# Patient Record
Sex: FEMALE | Race: WHITE | NOT HISPANIC OR LATINO | Employment: FULL TIME | URBAN - METROPOLITAN AREA
[De-identification: names, ages, dates, MRNs, and addresses within clinical notes are randomized per-mention and may not be internally consistent; named-entity substitution may affect disease eponyms.]

---

## 2017-01-06 RX ORDER — TRAMADOL HYDROCHLORIDE 50 MG/1
TABLET ORAL
Qty: 60 TABLET | Refills: 2 | OUTPATIENT
Start: 2017-01-06

## 2017-01-19 ENCOUNTER — OFFICE VISIT (OUTPATIENT)
Dept: FAMILY MEDICINE CLINIC | Facility: CLINIC | Age: 47
End: 2017-01-19

## 2017-01-19 VITALS
BODY MASS INDEX: 32.21 KG/M2 | SYSTOLIC BLOOD PRESSURE: 112 MMHG | HEIGHT: 61 IN | WEIGHT: 170.6 LBS | DIASTOLIC BLOOD PRESSURE: 72 MMHG | HEART RATE: 96 BPM | TEMPERATURE: 97.7 F | RESPIRATION RATE: 16 BRPM

## 2017-01-19 DIAGNOSIS — M25.512 CHRONIC PAIN OF BOTH SHOULDERS: Primary | ICD-10-CM

## 2017-01-19 DIAGNOSIS — M54.50 MIDLINE LOW BACK PAIN WITHOUT SCIATICA, UNSPECIFIED CHRONICITY: ICD-10-CM

## 2017-01-19 DIAGNOSIS — G89.29 CHRONIC PAIN OF BOTH SHOULDERS: Primary | ICD-10-CM

## 2017-01-19 DIAGNOSIS — M54.2 NECK PAIN OF OVER 3 MONTHS DURATION: ICD-10-CM

## 2017-01-19 DIAGNOSIS — K21.00 GERD WITH ESOPHAGITIS: ICD-10-CM

## 2017-01-19 DIAGNOSIS — M25.511 CHRONIC PAIN OF BOTH SHOULDERS: Primary | ICD-10-CM

## 2017-01-19 PROCEDURE — 99213 OFFICE O/P EST LOW 20 MIN: CPT | Performed by: PHYSICIAN ASSISTANT

## 2017-01-19 NOTE — PROGRESS NOTES
Subjective   Trinidad Quinteros is a 46 y.o. female    History of Present Illness  Patient presents today for follow-up regarding chronic shoulder and back pain associated with degenerative disc disease and degenerative joint disease.  Patient had previously been stable on combination of tramadol 50 g twice daily with Boelter in twice daily.  However, she developed esophageal ulcerations on this regimen and had to discontinue her Boelter in and start on Protonix.  Likely her GERD associated with this has responded nicely to Protonix and discontinuation of Boelter in.  She is no longer having any of the chest pain that she was experiencing.  However her shoulder pain and back pain has worsened off of Boelter in.  She is tolerating tramadol well denies any adverse effects from it.  She states that on her current regimen of tramadol 50 g twice daily she experiences a continuous level of 6 out of 10 pain on a daily basis.  Becomes bothersome to her during the daytime at work as well as at nighttime.  The following portions of the patient's history were reviewed and updated as appropriate: allergies, current medications, past social history and problem list    Review of Systems   Constitutional: Positive for activity change.   HENT: Negative for sore throat, trouble swallowing and voice change.    Respiratory: Negative.  Negative for shortness of breath.    Gastrointestinal: Negative.    Musculoskeletal: Positive for arthralgias, back pain, myalgias, neck pain and neck stiffness. Negative for gait problem and joint swelling.   Skin: Negative.  Negative for rash and wound.   Neurological: Negative for dizziness, tremors, weakness and numbness.   Hematological: Negative for adenopathy.   Psychiatric/Behavioral: Negative for behavioral problems and dysphoric mood. The patient is not nervous/anxious.        Objective     Vitals:    01/19/17 1510   BP: 112/72   Pulse: 96   Resp: 16   Temp: 97.7 °F (36.5 °C)       Physical Exam    Constitutional: She appears well-developed and well-nourished.   Abdominal: Soft. There is no tenderness.   Musculoskeletal: She exhibits no edema or deformity. Tenderness: tenderness bilateral shoulders and trapezius and posterior cervical musculature and thoracic paraspinal region.   Decreased range of motion of bilateral shoulders and cervical spine secondary to discomfort     Neurological: She exhibits normal muscle tone. Coordination normal.   Skin: Skin is warm and dry. No rash noted. No erythema. No pallor.   Nursing note and vitals reviewed.      Assessment/Plan     Diagnoses and all orders for this visit:    Chronic pain of both shoulders    Midline low back pain without sciatica, unspecified chronicity    Neck pain of over 3 months duration    GERD with esophagitis   continue on Protonix for GERD, I discussed with patient my recommendation that she stay off of anti-inflammatory such as Voltaren due to her history of esophageal ulcerations.  Therefore we will increase patient's tramadol to 100 mg 3 times a day for pain associated with arthritis in neck, back and shoulders.  Discussed common side effects of this medication and caution regarding possible dizziness or drowsiness.  She will increase dosage of medication gradually.

## 2017-01-25 RX ORDER — DEXTROMETHORPHAN HYDROBROMIDE AND PROMETHAZINE HYDROCHLORIDE 15; 6.25 MG/5ML; MG/5ML
SYRUP ORAL
Qty: 240 ML | Refills: 0 | Status: SHIPPED | OUTPATIENT
Start: 2017-01-25 | End: 2017-02-13

## 2017-01-25 RX ORDER — AMOXICILLIN 875 MG/1
875 TABLET, COATED ORAL 2 TIMES DAILY
Qty: 14 TABLET | Refills: 0 | Status: SHIPPED | OUTPATIENT
Start: 2017-01-25 | End: 2017-02-13

## 2017-01-31 DIAGNOSIS — G47.30 SLEEP APNEA, UNSPECIFIED TYPE: Primary | ICD-10-CM

## 2017-02-13 ENCOUNTER — OFFICE VISIT (OUTPATIENT)
Dept: FAMILY MEDICINE CLINIC | Facility: CLINIC | Age: 47
End: 2017-02-13

## 2017-02-13 VITALS
WEIGHT: 170 LBS | DIASTOLIC BLOOD PRESSURE: 78 MMHG | BODY MASS INDEX: 32.1 KG/M2 | OXYGEN SATURATION: 100 % | HEIGHT: 61 IN | HEART RATE: 74 BPM | SYSTOLIC BLOOD PRESSURE: 126 MMHG | TEMPERATURE: 97.7 F

## 2017-02-13 DIAGNOSIS — R68.89 FLU-LIKE SYMPTOMS: Primary | ICD-10-CM

## 2017-02-13 DIAGNOSIS — Z71.6 TOBACCO ABUSE COUNSELING: ICD-10-CM

## 2017-02-13 DIAGNOSIS — J06.9 URI, ACUTE: ICD-10-CM

## 2017-02-13 LAB
EXPIRATION DATE: NORMAL
FLUAV AG NPH QL: NEGATIVE
FLUBV AG NPH QL: NEGATIVE
INTERNAL CONTROL: NORMAL
Lab: NORMAL

## 2017-02-13 PROCEDURE — 99213 OFFICE O/P EST LOW 20 MIN: CPT | Performed by: PHYSICIAN ASSISTANT

## 2017-02-13 PROCEDURE — 87804 INFLUENZA ASSAY W/OPTIC: CPT | Performed by: PHYSICIAN ASSISTANT

## 2017-02-13 PROCEDURE — 99406 BEHAV CHNG SMOKING 3-10 MIN: CPT | Performed by: PHYSICIAN ASSISTANT

## 2017-02-13 RX ORDER — AZITHROMYCIN 250 MG/1
TABLET, FILM COATED ORAL
Qty: 6 TABLET | Refills: 0 | Status: SHIPPED | OUTPATIENT
Start: 2017-02-13 | End: 2017-03-15

## 2017-02-13 NOTE — PROGRESS NOTES
Subjective   Trinidad Quinteros is a 46 y.o. female    History of Present Illness  Patient comes in today complaining of symptoms of postnasal drainage, nasal congestion, headache and cough.  She states that she has a lot of drainage in her throat with a bit of a sore throat.  She is a half pack per day smoker.  She used to be a 1-1/2 pack per day smoker and quit completely for 9 months with Chantix last year but started back in December when her family was going through a lot of stress.  She states that she is now walking one half pack per day most days.  She is interested in trying to quit but states that she does not think she is going to be able to completely stop until the stress in her family life calms down.  The following portions of the patient's history were reviewed and updated as appropriate: allergies, current medications, past social history and problem list    Review of Systems   Constitutional: Positive for fatigue. Negative for fever.   HENT: Positive for postnasal drip, rhinorrhea, sneezing, sore throat and voice change. Negative for sinus pressure.    Respiratory: Positive for cough. Negative for chest tightness and shortness of breath.        Objective     Vitals:    02/13/17 1534   BP: 126/78   Pulse: 74   Temp: 97.7 °F (36.5 °C)   SpO2: 100%       Physical Exam   Constitutional: She appears well-developed and well-nourished. No distress.   HENT:   Head: Normocephalic and atraumatic.   Right Ear: External ear normal.   Left Ear: External ear normal.   Nose: Nose normal.   Mouth/Throat: Oropharynx is clear and moist. No oropharyngeal exudate.   Eyes: Conjunctivae are normal. Right eye exhibits no discharge. Left eye exhibits no discharge.   Neck: Normal range of motion. Neck supple.   Cardiovascular: Normal rate, regular rhythm and normal heart sounds.    Pulmonary/Chest: Effort normal and breath sounds normal. No respiratory distress.   Lymphadenopathy:     She has no cervical adenopathy.   Skin:  Skin is warm and dry. She is not diaphoretic.   Nursing note and vitals reviewed.      Assessment/Plan     Diagnoses and all orders for this visit:    Flu-like symptoms  -     POCT Influenza A/B  -     azithromycin (ZITHROMAX Z-HILARY) 250 MG tablet; Take 2 tablets the first day, then 1 tablet daily for 4 days.    URI, acute    I counseled patient regarding the health benefits in smoking cessation for 3 minutes.  I also discussed with patient the increased health risks of continued smoking, including increased risk for cancers, increased risk for coronary artery disease, increased risk for stroke, heart attack, COPD and overall worsened lung function and increased lung infections.  We discussed different strategies for smoking cessation and prescriptions were offered to assist patient in smoking cessation.  Patient will continue trying to decrease her cigarette consumption and will notify me if she is interested in resuming Chantix.  She declines a prescription of Chantix at this time.

## 2017-02-16 ENCOUNTER — OFFICE VISIT (OUTPATIENT)
Dept: NEUROLOGY | Facility: CLINIC | Age: 47
End: 2017-02-16

## 2017-02-16 VITALS
SYSTOLIC BLOOD PRESSURE: 130 MMHG | DIASTOLIC BLOOD PRESSURE: 80 MMHG | OXYGEN SATURATION: 97 % | HEIGHT: 61 IN | BODY MASS INDEX: 32.51 KG/M2 | WEIGHT: 172.2 LBS | HEART RATE: 86 BPM

## 2017-02-16 DIAGNOSIS — G47.33 OSA (OBSTRUCTIVE SLEEP APNEA): Primary | ICD-10-CM

## 2017-02-16 PROCEDURE — 99243 OFF/OP CNSLTJ NEW/EST LOW 30: CPT | Performed by: PSYCHIATRY & NEUROLOGY

## 2017-02-16 RX ORDER — CODEINE PHOSPHATE AND GUAIFENESIN 10; 100 MG/5ML; MG/5ML
SOLUTION ORAL
Refills: 0 | COMMUNITY
Start: 2017-02-13 | End: 2017-06-16

## 2017-02-16 NOTE — PROGRESS NOTES
Subjective:     Patient ID: Trinidad Quinteros is a 46 y.o. female.    History of Present Illness     47 yo woman referred by Josephine Delacruz for sleep problems.  H/O HTN, GERD, Tob use. Dx with moderate ADRIAN by Dr Stockton 3 to 4 years ago.  Used CPAP for 6 months with improvement in sleep quality and daytime alertness.  Stopped due to financial concerns.  Tolerated CPAP without difficulty.     Complains of moderate excessive daytime sleepiness, sitting quietly at home will fall asleep.  Loud snoring, awakens self snoring and gasping for air, and witnessed apneas.  Sleeps in separate room from  due to snoring.       CBC, CMP, TSH - NCS    The following portions of the patient's history were reviewed and updated as appropriate: allergies, current medications, past family history, past medical history, past social history, past surgical history and problem list.    Review of Systems   Constitutional: Positive for fatigue. Negative for activity change and unexpected weight change.   HENT: Negative for tinnitus and trouble swallowing.    Eyes: Negative for photophobia and visual disturbance.   Respiratory: Negative for apnea, cough and choking.    Cardiovascular: Negative for leg swelling.   Gastrointestinal: Negative for nausea and vomiting.   Endocrine: Negative for cold intolerance and heat intolerance.   Genitourinary: Negative for difficulty urinating, frequency, menstrual problem and urgency.   Musculoskeletal: Positive for back pain and neck pain. Negative for gait problem and myalgias.   Skin: Negative for color change and rash.   Allergic/Immunologic: Negative for immunocompromised state.   Neurological: Negative for dizziness, tremors, seizures, syncope, facial asymmetry, speech difficulty, weakness, light-headedness, numbness and headaches.   Hematological: Negative for adenopathy. Does not bruise/bleed easily.   Psychiatric/Behavioral: Positive for sleep disturbance. Negative for behavioral problems,  "confusion, decreased concentration and hallucinations.        Objective:  Vitals:    02/16/17 0831   BP: 130/80   Pulse: 86   SpO2: 97%   Weight: 172 lb 3.2 oz (78.1 kg)   Height: 61\" (154.9 cm)       Neurologic Exam     Mental Status   Oriented to person, place, and time.   Registration: recalls 3 of 3 objects. Recall at 5 minutes: recalls 3 of 3 objects. Follows 3 step commands.   Attention: normal. Concentration: normal.   Speech: speech is normal   Level of consciousness: alert  Knowledge: good and consistent with education.   Able to name object. Able to read. Able to repeat. Able to write. Normal comprehension.     Cranial Nerves     CN II   Visual fields full to confrontation.   Visual acuity: normal  Right visual field deficit: none  Left visual field deficit: none     CN III, IV, VI   Pupils are equal, round, and reactive to light.  Extraocular motions are normal.   Right pupil: Shape: regular. Reactivity: brisk. Consensual response: intact.   Left pupil: Shape: regular. Reactivity: brisk. Consensual response: intact.   Nystagmus: none   Diplopia: none  Ophthalmoparesis: none  Upgaze: normal  Downgaze: normal  Conjugate gaze: present  Vestibulo-ocular reflex: present    CN V   Facial sensation intact.   Right corneal reflex: normal  Left corneal reflex: normal    CN VII   Right facial weakness: none  Left facial weakness: none    CN VIII   Hearing: intact    CN IX, X   Palate: symmetric  Right gag reflex: normal  Left gag reflex: normal    CN XI   Right sternocleidomastoid strength: normal  Left sternocleidomastoid strength: normal    CN XII   Tongue: not atrophic  Fasciculations: absent  Tongue deviation: none    Motor Exam   Muscle bulk: normal  Overall muscle tone: normal  Right arm tone: normal  Left arm tone: normal  Right leg tone: normal  Left leg tone: normal    Strength   Strength 5/5 throughout.     Sensory Exam   Light touch normal.   Vibration normal.   Proprioception normal.   Pinprick normal. "     Gait, Coordination, and Reflexes     Gait  Gait: normal    Coordination   Romberg: negative  Finger to nose coordination: normal  Heel to shin coordination: normal  Tandem walking coordination: normal    Tremor   Resting tremor: absent  Intention tremor: absent  Action tremor: absent    Reflexes   Reflexes 2+ except as noted.       Physical Exam   Constitutional: She is oriented to person, place, and time. Vital signs are normal. She appears well-developed and well-nourished.   HENT:   Head: Normocephalic and atraumatic.   Eyes: EOM and lids are normal. Pupils are equal, round, and reactive to light.   Fundoscopic exam:       The right eye shows no exudate, no hemorrhage and no papilledema. The right eye shows venous pulsations.        The left eye shows no exudate, no hemorrhage and no papilledema. The left eye shows venous pulsations.   Neck: Normal range of motion and phonation normal. Normal carotid pulses present. Carotid bruit is not present. No thyroid mass and no thyromegaly present.   Cardiovascular: Normal rate, regular rhythm and normal heart sounds.    Pulmonary/Chest: Effort normal.   Neurological: She is oriented to person, place, and time. She has normal strength. She has a normal Finger-Nose-Finger Test, a normal Heel to Shin Test, a normal Romberg Test and a normal Tandem Gait Test. Gait normal.   Skin: Skin is warm and dry.   Psychiatric: She has a normal mood and affect. Her speech is normal.   Nursing note and vitals reviewed.      Assessment/Plan:       Problems Addressed this Visit        Respiratory    ADRIAN (obstructive sleep apnea) - Primary     S/S of ADRIAN.  Schedule home PSG.         Relevant Orders    Home Sleep Study

## 2017-02-21 ENCOUNTER — HOSPITAL ENCOUNTER (OUTPATIENT)
Dept: SLEEP MEDICINE | Facility: HOSPITAL | Age: 47
Discharge: HOME | End: 2017-02-21
Attending: PSYCHIATRY & NEUROLOGY | Admitting: PSYCHIATRY & NEUROLOGY

## 2017-02-21 VITALS
DIASTOLIC BLOOD PRESSURE: 66 MMHG | HEART RATE: 93 BPM | SYSTOLIC BLOOD PRESSURE: 123 MMHG | RESPIRATION RATE: 16 BRPM | WEIGHT: 172 LBS | HEIGHT: 61 IN | BODY MASS INDEX: 32.47 KG/M2

## 2017-02-21 PROCEDURE — 95800 SLP STDY UNATTENDED: CPT | Performed by: INTERNAL MEDICINE

## 2017-02-22 PROCEDURE — 95800 SLP STDY UNATTENDED: CPT | Performed by: INTERNAL MEDICINE

## 2017-03-13 ENCOUNTER — OFFICE VISIT (OUTPATIENT)
Dept: NEUROLOGY | Facility: CLINIC | Age: 47
End: 2017-03-13

## 2017-03-13 VITALS
DIASTOLIC BLOOD PRESSURE: 78 MMHG | BODY MASS INDEX: 31.75 KG/M2 | SYSTOLIC BLOOD PRESSURE: 116 MMHG | HEART RATE: 59 BPM | HEIGHT: 61 IN | OXYGEN SATURATION: 95 % | WEIGHT: 168.2 LBS

## 2017-03-13 DIAGNOSIS — G47.33 OSA (OBSTRUCTIVE SLEEP APNEA): Primary | ICD-10-CM

## 2017-03-13 PROCEDURE — 99213 OFFICE O/P EST LOW 20 MIN: CPT | Performed by: PSYCHIATRY & NEUROLOGY

## 2017-03-13 NOTE — PROGRESS NOTES
Subjective:     Patient ID: Trinidad Quinteros is a 46 y.o. female.    History of Present Illness     45 yo woman returns in follow up for sleep problems.  Last visit on 2/16/17 ordered home PSG.    PSG - AHI 62, supine index 93, side 26.      Remains with excessive daytime sleepiness, snoring and witnessed apnea.       Problem history:    H/O HTN, GERD, Tob use. Dx with moderate ADRIAN by Dr Stockton 3 to 4 years ago.  Used CPAP for 6 months with improvement in sleep quality and daytime alertness.  Stopped due to financial concerns.  Tolerated CPAP without difficulty.     Complains of moderate excessive daytime sleepiness, sitting quietly at home will fall asleep.  Loud snoring, awakens self snoring and gasping for air, and witnessed apneas.  Sleeps in separate room from  due to snoring.       CBC, CMP, TSH - NCS    The following portions of the patient's history were reviewed and updated as appropriate: allergies, current medications, past family history, past medical history, past social history, past surgical history and problem list.    Review of Systems   Constitutional: Positive for fatigue. Negative for activity change and unexpected weight change.   HENT: Negative for tinnitus and trouble swallowing.    Eyes: Negative for photophobia and visual disturbance.   Respiratory: Negative for apnea and choking.    Cardiovascular: Negative for leg swelling.   Endocrine: Negative for cold intolerance and heat intolerance.   Genitourinary: Negative for difficulty urinating, frequency, menstrual problem and urgency.   Musculoskeletal: Positive for back pain. Negative for gait problem.   Skin: Negative for color change.   Allergic/Immunologic: Negative for immunocompromised state.   Neurological: Negative for dizziness, tremors, seizures, syncope, facial asymmetry, speech difficulty and light-headedness.   Hematological: Negative for adenopathy. Does not bruise/bleed easily.   Psychiatric/Behavioral: Positive for  "sleep disturbance. Negative for behavioral problems, confusion, decreased concentration and hallucinations.        Objective:  Vitals:    03/13/17 0811   BP: 116/78   Pulse: 59   SpO2: 95%   Weight: 168 lb 3.2 oz (76.3 kg)   Height: 61\" (154.9 cm)       Neurologic Exam     Mental Status   Oriented to person, place, and time.   Speech: speech is normal   Level of consciousness: alert  Knowledge: good and consistent with education.   Normal comprehension.     Cranial Nerves   Cranial nerves II through XII intact.     CN II   Visual fields full to confrontation.   Visual acuity: normal  Right visual field deficit: none  Left visual field deficit: none     CN III, IV, VI   Pupils are equal, round, and reactive to light.  Extraocular motions are normal.   Nystagmus: none   Diplopia: none  Ophthalmoparesis: none  Upgaze: normal  Downgaze: normal  Conjugate gaze: present    CN V   Facial sensation intact.   Right corneal reflex: normal  Left corneal reflex: normal    CN VII   Right facial weakness: none  Left facial weakness: none    CN VIII   Hearing: intact    CN IX, X   Palate: symmetric  Right gag reflex: normal  Left gag reflex: normal    CN XI   Right sternocleidomastoid strength: normal  Left sternocleidomastoid strength: normal    CN XII   Tongue: not atrophic  Fasciculations: absent  Tongue deviation: none    Motor Exam   Muscle bulk: normal  Overall muscle tone: normal    Strength   Strength 5/5 throughout.     Sensory Exam   Light touch normal.     Gait, Coordination, and Reflexes     Gait  Gait: normal    Tremor   Resting tremor: absent  Intention tremor: absent  Action tremor: absent    Reflexes   Reflexes 2+ except as noted.       Physical Exam   Constitutional: She is oriented to person, place, and time. She appears well-developed and well-nourished.   Eyes: EOM are normal. Pupils are equal, round, and reactive to light.   Neurological: She is oriented to person, place, and time. She has normal strength. " Gait normal.   Psychiatric: Her speech is normal.   Nursing note and vitals reviewed.      Assessment/Plan:       Problems Addressed this Visit        Respiratory    ADRIAN (obstructive sleep apnea) - Primary     Severe ADRIAN AHI 62.    Start auto pap 8 - 16 cm H20

## 2017-03-15 ENCOUNTER — TELEPHONE (OUTPATIENT)
Dept: FAMILY MEDICINE CLINIC | Facility: CLINIC | Age: 47
End: 2017-03-15

## 2017-03-15 RX ORDER — CLARITHROMYCIN 500 MG/1
500 TABLET, COATED ORAL 2 TIMES DAILY
Qty: 14 TABLET | Refills: 0 | Status: SHIPPED | OUTPATIENT
Start: 2017-03-15 | End: 2017-05-15

## 2017-03-15 NOTE — TELEPHONE ENCOUNTER
Kraen, please double check with patient to confirm that she is not currently on the Z-Raffy.  If she has not been please call in Biaxin  mg 2 daily ×7 days to be taken with food.  Also please update her medication list and remove the Z-Raffy fro  m her med list if she is not on it currently.

## 2017-03-15 NOTE — TELEPHONE ENCOUNTER
Patient has chest congestion, with a productive cough. She is cough up brown. Denies SOB, wheezing and fever.  She asked if something could be called in?

## 2017-03-15 NOTE — TELEPHONE ENCOUNTER
"Karen, patient's current medication list shows that she is on a Z-Raffy.  Is this accurate?.  Also could you please change this into a \"telephone encounter\" the way that Keysha demonstrated Tuesday morning.  I think that'll allow me to get straight   into an order.  "

## 2017-03-17 ENCOUNTER — TELEPHONE (OUTPATIENT)
Dept: FAMILY MEDICINE CLINIC | Facility: CLINIC | Age: 47
End: 2017-03-17

## 2017-03-17 RX ORDER — AMOXICILLIN 875 MG/1
875 TABLET, COATED ORAL 2 TIMES DAILY
Qty: 14 TABLET | Refills: 0 | Status: SHIPPED | OUTPATIENT
Start: 2017-03-17 | End: 2017-05-15

## 2017-03-17 NOTE — TELEPHONE ENCOUNTER
Patient states she was prescribed  Biaxin xl 50 mg bid on 3/15 and it is making her have diarrhea and vomiting. Can she get something else called in

## 2017-04-01 DIAGNOSIS — G89.29 BACK PAIN, CHRONIC: ICD-10-CM

## 2017-04-01 DIAGNOSIS — M54.9 BACK PAIN, CHRONIC: ICD-10-CM

## 2017-04-03 ENCOUNTER — TELEPHONE (OUTPATIENT)
Dept: FAMILY MEDICINE CLINIC | Facility: CLINIC | Age: 47
End: 2017-04-03

## 2017-04-03 DIAGNOSIS — Z72.0 TOBACCO ABUSE: Primary | ICD-10-CM

## 2017-04-03 RX ORDER — TIZANIDINE 4 MG/1
TABLET ORAL
Qty: 45 TABLET | Refills: 5 | Status: SHIPPED | OUTPATIENT
Start: 2017-04-03 | End: 2017-10-11 | Stop reason: SDUPTHER

## 2017-04-03 RX ORDER — VARENICLINE TARTRATE 0.5 MG/1
0.5 TABLET, FILM COATED ORAL 2 TIMES DAILY
Qty: 60 TABLET | Refills: 0 | Status: SHIPPED | OUTPATIENT
Start: 2017-04-03 | End: 2017-07-20 | Stop reason: SDUPTHER

## 2017-04-19 ENCOUNTER — TELEPHONE (OUTPATIENT)
Dept: FAMILY MEDICINE CLINIC | Facility: CLINIC | Age: 47
End: 2017-04-19

## 2017-04-19 NOTE — TELEPHONE ENCOUNTER
----- Message from Angela Rahman sent at 4/19/2017 10:20 AM EDT -----  Patient called and has a lot of drainage and it's making her cough/gag. She would like to have something called in to [facility] in [place], [place]..  ThanksAngela..

## 2017-04-20 RX ORDER — CODEINE PHOSPHATE AND GUAIFENESIN 10; 100 MG/5ML; MG/5ML
SOLUTION ORAL
Qty: 120 ML | Refills: 1 | OUTPATIENT
Start: 2017-04-20

## 2017-05-01 ENCOUNTER — TELEPHONE (OUTPATIENT)
Dept: FAMILY MEDICINE CLINIC | Facility: CLINIC | Age: 47
End: 2017-05-01

## 2017-05-05 RX ORDER — TRAMADOL HYDROCHLORIDE 50 MG/1
TABLET ORAL
Qty: 180 TABLET | Refills: 2 | Status: SHIPPED | OUTPATIENT
Start: 2017-05-05 | End: 2017-10-19

## 2017-05-15 ENCOUNTER — OFFICE VISIT (OUTPATIENT)
Dept: NEUROLOGY | Facility: CLINIC | Age: 47
End: 2017-05-15

## 2017-05-15 VITALS
BODY MASS INDEX: 31.95 KG/M2 | HEIGHT: 61 IN | OXYGEN SATURATION: 95 % | WEIGHT: 169.2 LBS | SYSTOLIC BLOOD PRESSURE: 126 MMHG | DIASTOLIC BLOOD PRESSURE: 74 MMHG | HEART RATE: 72 BPM

## 2017-05-15 DIAGNOSIS — G47.33 OSA (OBSTRUCTIVE SLEEP APNEA): Primary | ICD-10-CM

## 2017-05-15 PROCEDURE — 99213 OFFICE O/P EST LOW 20 MIN: CPT | Performed by: PSYCHIATRY & NEUROLOGY

## 2017-06-04 DIAGNOSIS — K21.00 GASTROESOPHAGEAL REFLUX DISEASE WITH ESOPHAGITIS: ICD-10-CM

## 2017-06-05 RX ORDER — PANTOPRAZOLE SODIUM 40 MG/1
TABLET, DELAYED RELEASE ORAL
Qty: 30 TABLET | Refills: 5 | Status: SHIPPED | OUTPATIENT
Start: 2017-06-05 | End: 2017-10-12 | Stop reason: SDUPTHER

## 2017-06-07 ENCOUNTER — APPOINTMENT (OUTPATIENT)
Dept: LAB | Facility: HOSPITAL | Age: 47
End: 2017-06-07

## 2017-06-07 ENCOUNTER — OFFICE VISIT (OUTPATIENT)
Dept: FAMILY MEDICINE CLINIC | Facility: CLINIC | Age: 47
End: 2017-06-07

## 2017-06-07 VITALS
HEART RATE: 64 BPM | DIASTOLIC BLOOD PRESSURE: 78 MMHG | TEMPERATURE: 98 F | WEIGHT: 171 LBS | OXYGEN SATURATION: 98 % | BODY MASS INDEX: 32.28 KG/M2 | SYSTOLIC BLOOD PRESSURE: 118 MMHG | HEIGHT: 61 IN

## 2017-06-07 DIAGNOSIS — M25.552 PAIN OF BOTH HIP JOINTS: Primary | ICD-10-CM

## 2017-06-07 DIAGNOSIS — M25.551 PAIN OF BOTH HIP JOINTS: Primary | ICD-10-CM

## 2017-06-07 DIAGNOSIS — G47.01 INSOMNIA DUE TO MEDICAL CONDITION: ICD-10-CM

## 2017-06-07 LAB
CK SERPL-CCNC: 61 U/L (ref 26–174)
CRP SERPL-MCNC: 0.26 MG/DL (ref 0–1)
ERYTHROCYTE [SEDIMENTATION RATE] IN BLOOD: 9 MM/HR (ref 0–20)

## 2017-06-07 PROCEDURE — 85652 RBC SED RATE AUTOMATED: CPT | Performed by: PHYSICIAN ASSISTANT

## 2017-06-07 PROCEDURE — 36415 COLL VENOUS BLD VENIPUNCTURE: CPT | Performed by: PHYSICIAN ASSISTANT

## 2017-06-07 PROCEDURE — 82550 ASSAY OF CK (CPK): CPT | Performed by: PHYSICIAN ASSISTANT

## 2017-06-07 PROCEDURE — 86140 C-REACTIVE PROTEIN: CPT | Performed by: PHYSICIAN ASSISTANT

## 2017-06-07 PROCEDURE — 99213 OFFICE O/P EST LOW 20 MIN: CPT | Performed by: PHYSICIAN ASSISTANT

## 2017-06-07 RX ORDER — PREDNISONE 20 MG/1
20 TABLET ORAL 2 TIMES DAILY
Qty: 10 TABLET | Refills: 0 | Status: SHIPPED | OUTPATIENT
Start: 2017-06-07 | End: 2017-06-16

## 2017-06-07 RX ORDER — SERTRALINE HYDROCHLORIDE 100 MG/1
1 TABLET, FILM COATED ORAL DAILY
Refills: 1 | COMMUNITY
Start: 2017-05-15 | End: 2017-10-12 | Stop reason: SDUPTHER

## 2017-06-07 NOTE — PROGRESS NOTES
Subjective   Trinidad Quinteros is a 46 y.o. female    History of Present Illness  Patient presents today complaining of pain in both hips on and off throughout the daytime but much worse at night for the past 2-3 weeks.  She states it awakens her in the middle the night and she cannot rest well because of the pain.  She denies any history of trauma.  She states she has been taking her tramadol 2 tablets 4 times a day which controls her pain during the daytime but not at night.  She denies any significant increase in her low back pain.  She states she's also been feeling some muscular pain in her posterior neck and upper back.  The following portions of the patient's history were reviewed and updated as appropriate: allergies, current medications, past social history and problem list    Review of Systems   Constitutional: Positive for activity change. Negative for fatigue and unexpected weight change.   Musculoskeletal: Positive for arthralgias and myalgias. Negative for gait problem and joint swelling.   Skin: Negative.    Neurological: Negative for tremors, weakness, light-headedness, numbness and headaches.   Psychiatric/Behavioral: Positive for sleep disturbance. Negative for agitation, behavioral problems, confusion, decreased concentration, dysphoric mood and hallucinations. The patient is not nervous/anxious and is not hyperactive.        Objective     Vitals:    06/07/17 1557   BP: 118/78   Pulse: 64   Temp: 98 °F (36.7 °C)   SpO2: 98%       Physical Exam   Constitutional: She is oriented to person, place, and time. She appears well-developed and well-nourished. No distress.   Eyes: Conjunctivae are normal.   Cardiovascular: Normal rate and regular rhythm.    Pulmonary/Chest: Effort normal and breath sounds normal.   Musculoskeletal: Normal range of motion. She exhibits tenderness. She exhibits no edema or deformity.   Neurological: She is alert and oriented to person, place, and time. She exhibits normal  muscle tone. Coordination normal.   Skin: Skin is warm and dry. No rash noted. She is not diaphoretic. No erythema. No pallor.   Psychiatric: She has a normal mood and affect. Her behavior is normal. Judgment and thought content normal. Cognition and memory are normal. She is attentive.   Nursing note and vitals reviewed.      Assessment/Plan     Diagnoses and all orders for this visit:    Pain of both hip joints  -     CK  -     C-reactive Protein  -     Sedimentation Rate    Insomnia due to medical condition    Other orders  -     sertraline (ZOLOFT) 100 MG tablet; Take 1 tablet by mouth Daily.  -     predniSONE (DELTASONE) 20 MG tablet; Take 1 tablet by mouth 2 (Two) Times a Day.    Prescription written for Percocet 5/325 take one daily at bedtime #10, gabapentin 300 mg 1 daily at bedtime #30, [place] appropriate, discussed abuse potential of these medications, this is for short-term usage only.  Continue on current medications and add prednisone as noted above.  I have asked patient to hold her Lipitor for the next 3 weeks in case she is having an adverse effect from Lipitor causing her myalgias as well.

## 2017-06-09 ENCOUNTER — TELEPHONE (OUTPATIENT)
Dept: FAMILY MEDICINE CLINIC | Facility: CLINIC | Age: 47
End: 2017-06-09

## 2017-06-09 RX ORDER — AMOXICILLIN 875 MG/1
875 TABLET, COATED ORAL 2 TIMES DAILY
Qty: 14 TABLET | Refills: 0 | Status: SHIPPED | OUTPATIENT
Start: 2017-06-09 | End: 2017-07-14

## 2017-06-09 NOTE — TELEPHONE ENCOUNTER
Patient states she has a sinus infection  Would like something stent to [facility] [place]. Patient has nka.

## 2017-06-16 ENCOUNTER — OFFICE VISIT (OUTPATIENT)
Dept: FAMILY MEDICINE CLINIC | Facility: CLINIC | Age: 47
End: 2017-06-16

## 2017-06-16 VITALS
SYSTOLIC BLOOD PRESSURE: 114 MMHG | BODY MASS INDEX: 31.34 KG/M2 | WEIGHT: 166 LBS | DIASTOLIC BLOOD PRESSURE: 74 MMHG | HEART RATE: 96 BPM | OXYGEN SATURATION: 99 % | TEMPERATURE: 99.1 F | HEIGHT: 61 IN

## 2017-06-16 DIAGNOSIS — M25.552 PAIN OF BOTH HIP JOINTS: Primary | ICD-10-CM

## 2017-06-16 DIAGNOSIS — M25.512 CHRONIC PAIN OF BOTH SHOULDERS: ICD-10-CM

## 2017-06-16 DIAGNOSIS — M25.551 PAIN OF BOTH HIP JOINTS: Primary | ICD-10-CM

## 2017-06-16 DIAGNOSIS — M25.511 CHRONIC PAIN OF BOTH SHOULDERS: ICD-10-CM

## 2017-06-16 DIAGNOSIS — G89.29 CHRONIC PAIN OF BOTH SHOULDERS: ICD-10-CM

## 2017-06-16 PROCEDURE — 99213 OFFICE O/P EST LOW 20 MIN: CPT | Performed by: PHYSICIAN ASSISTANT

## 2017-06-16 RX ORDER — OXYCODONE HYDROCHLORIDE AND ACETAMINOPHEN 5; 325 MG/1; MG/1
1 TABLET ORAL NIGHTLY
Refills: 0 | COMMUNITY
Start: 2017-06-07 | End: 2017-10-19

## 2017-06-16 RX ORDER — GABAPENTIN 300 MG/1
1 CAPSULE ORAL NIGHTLY
Refills: 0 | COMMUNITY
Start: 2017-06-07 | End: 2017-08-06 | Stop reason: SDUPTHER

## 2017-06-16 NOTE — PROGRESS NOTES
Subjective   Trinidad Quinteros is a 46 y.o. female    History of Present Illness  Patient presents today for follow-up on bilateral hip pain and upper back and neck pain.  Please see previous office notes.  She states all of her pain has completely resolved and she is sleeping well now with the combination of gabapentin 3 mg nightly with Percocet 5/325 nightly.  She is been able to reduce down to no longer taking tramadol at all during the day and not having pain at all during the day.  She also has been off of Lipitor since her last visit.  She denies any adverse effects on current meds.  The following portions of the patient's history were reviewed and updated as appropriate: allergies, current medications, past social history and problem list    Review of Systems   Constitutional: Positive for activity change.   Musculoskeletal: Negative for arthralgias, gait problem, joint swelling and myalgias.   Skin: Negative.    Neurological: Negative for weakness and numbness.       Objective     Vitals:    06/16/17 0914   BP: 114/74   Pulse: 96   Temp: 99.1 °F (37.3 °C)   SpO2: 99%       Physical Exam   Constitutional: She appears well-developed and well-nourished.   Musculoskeletal: She exhibits no edema, tenderness or deformity.   Neurological: She exhibits normal muscle tone. Coordination normal.   Skin: Skin is warm and dry. No rash noted. No erythema. No pallor.   Psychiatric: She has a normal mood and affect. Her behavior is normal. Judgment and thought content normal.   Nursing note and vitals reviewed.      Assessment/Plan     Diagnoses and all orders for this visit:    Pain of both hip joints    Chronic pain of both shoulders    Other orders  -     gabapentin (NEURONTIN) 300 MG capsule; Take 1 capsule by mouth Every Night.  -     oxyCODONE-acetaminophen (PERCOCET) 5-325 MG per tablet; Take 1 tablet by mouth Every Night.    Refilled Percocet 5/325 one daily at bedtime #30 with no refills and gabapentin 300 mg 1  daily at bedtime #30 with 5 refills.  Discussed abuse potential is medications, controlled substance agreement reviewed and signed, [place] appropriate.  Will have patient resume her Lipitor and if myalgias return she is to notify me.  Otherwise follow-up in one month for recheck which time we will try to reduce her down off of Percocet.

## 2017-07-14 ENCOUNTER — OFFICE VISIT (OUTPATIENT)
Dept: FAMILY MEDICINE CLINIC | Facility: CLINIC | Age: 47
End: 2017-07-14

## 2017-07-14 VITALS
OXYGEN SATURATION: 100 % | WEIGHT: 165 LBS | BODY MASS INDEX: 31.15 KG/M2 | DIASTOLIC BLOOD PRESSURE: 70 MMHG | SYSTOLIC BLOOD PRESSURE: 126 MMHG | HEART RATE: 114 BPM | HEIGHT: 61 IN

## 2017-07-14 DIAGNOSIS — M25.512 CHRONIC PAIN OF BOTH SHOULDERS: ICD-10-CM

## 2017-07-14 DIAGNOSIS — G89.29 CHRONIC PAIN OF BOTH SHOULDERS: ICD-10-CM

## 2017-07-14 DIAGNOSIS — M25.551 PAIN OF BOTH HIP JOINTS: Primary | ICD-10-CM

## 2017-07-14 DIAGNOSIS — M25.511 CHRONIC PAIN OF BOTH SHOULDERS: ICD-10-CM

## 2017-07-14 DIAGNOSIS — R94.31 ABNORMAL EKG: ICD-10-CM

## 2017-07-14 DIAGNOSIS — M25.552 PAIN OF BOTH HIP JOINTS: Primary | ICD-10-CM

## 2017-07-14 PROCEDURE — 99213 OFFICE O/P EST LOW 20 MIN: CPT | Performed by: PHYSICIAN ASSISTANT

## 2017-07-14 RX ORDER — ATORVASTATIN CALCIUM 40 MG/1
1 TABLET, FILM COATED ORAL DAILY
Refills: 1 | COMMUNITY
Start: 2017-07-05 | End: 2017-10-12 | Stop reason: SDUPTHER

## 2017-07-14 NOTE — PROGRESS NOTES
Subjective   Trinidad Quinteros is a 46 y.o. female    History of Present Illness  Patient presents today for follow-up on chronic hip and shoulder pain.  She states that her hip and shoulder pain are both markedly improved.  Her hip pain is completely resolved and her shoulder pain is much better.  She is taking 2 tramadol daily in the morning time for her shoulder pain and hip pain and not requiring any further pain medication throughout the day time other than the one Percocet 1 gabapentin at nighttime.  She denies any adverse effects from either medication.  The following portions of the patient's history were reviewed and updated as appropriate: allergies, current medications, past social history and problem list    Review of Systems   Constitutional: Positive for activity change.   Musculoskeletal: Positive for arthralgias and myalgias. Negative for gait problem and joint swelling.        Pt has seen a significant reduction in shoulder pain overall to where she is only having to take 2 tramadol each morning and her hip pain is completely controlled.  She is resting well throughout the night now.   Skin: Negative.    Neurological: Negative for weakness and numbness.       Objective     Vitals:    07/14/17 1519   BP: 126/70   Pulse: 114   SpO2: 100%       Physical Exam   Constitutional: She appears well-developed and well-nourished.   Musculoskeletal: She exhibits no edema, tenderness or deformity.   Neurological: She exhibits normal muscle tone. Coordination normal.   Skin: Skin is warm and dry. No rash noted. No erythema. No pallor.   Nursing note and vitals reviewed.      Assessment/Plan     Diagnoses and all orders for this visit:    Pain of both hip joints    Abnormal EKG  -     Ambulatory Referral to Cardiology    Chronic pain of both shoulders    Other orders  -     atorvastatin (LIPITOR) 40 MG tablet; Take 1 tablet by mouth Daily.    Continue on gabapentin at current dosage, new prescription written for  Percocet 5/325 take 1 daily at bedtime #30 with no refills.  Follow-up in office in 3 months for recheck.  Joshua is appropriate, controlled substance agreement is signed and abuse potential discussed regarding this medication.

## 2017-07-20 DIAGNOSIS — Z72.0 TOBACCO ABUSE: ICD-10-CM

## 2017-07-20 RX ORDER — VARENICLINE TARTRATE 0.5 MG/1
TABLET, FILM COATED ORAL
Qty: 60 TABLET | Refills: 0 | Status: SHIPPED | OUTPATIENT
Start: 2017-07-20 | End: 2017-08-21 | Stop reason: SDUPTHER

## 2017-08-03 ENCOUNTER — OFFICE VISIT (OUTPATIENT)
Dept: FAMILY MEDICINE CLINIC | Facility: CLINIC | Age: 47
End: 2017-08-03

## 2017-08-03 VITALS
RESPIRATION RATE: 14 BRPM | HEIGHT: 61 IN | BODY MASS INDEX: 31.87 KG/M2 | HEART RATE: 63 BPM | SYSTOLIC BLOOD PRESSURE: 122 MMHG | WEIGHT: 168.8 LBS | OXYGEN SATURATION: 98 % | DIASTOLIC BLOOD PRESSURE: 72 MMHG

## 2017-08-03 DIAGNOSIS — F41.8 SITUATIONAL ANXIETY: Primary | ICD-10-CM

## 2017-08-03 PROCEDURE — 99213 OFFICE O/P EST LOW 20 MIN: CPT | Performed by: PHYSICIAN ASSISTANT

## 2017-08-04 NOTE — PROGRESS NOTES
Subjective   Trinidad Quinteros is a 46 y.o. female    History of Present Illness  Patient presents today for evaluation of acute worsening of generalized anxiety disorder due to situational stressors.  She was just notified by her brother over the weekend that he has been diagnosed with stage IV lung cancer.  Patient has had a number of family crises occur within the past 6 months.  She states that she's having difficulty clearing her mind of this, difficulty focusing and concentrating on work throughout the day great difficulty clearing her mind to build asleep at night.  She denies any homicidal or suicidal ideations.  She states her Zoloft has been keeping her anxiety depression very well controlled until this news reached her this weekend.  The following portions of the patient's history were reviewed and updated as appropriate: allergies, current medications, past social history and problem list    Review of Systems   Constitutional: Negative for appetite change and fatigue.   Respiratory: Negative for chest tightness and shortness of breath.    Gastrointestinal: Negative for abdominal pain, diarrhea and nausea.   Neurological: Negative for dizziness, tremors, weakness, light-headedness and headaches.   Psychiatric/Behavioral: Positive for sleep disturbance. Negative for agitation, behavioral problems, confusion, decreased concentration, dysphoric mood and suicidal ideas. The patient is nervous/anxious.        Objective     Vitals:    08/03/17 1608   BP: 122/72   Pulse: 63   Resp: 14   SpO2: 98%       Physical Exam   Constitutional: She is oriented to person, place, and time. She appears well-developed and well-nourished.   Neck: No thyroid mass and no thyromegaly present.   Cardiovascular: Normal rate, regular rhythm and normal heart sounds.    Pulmonary/Chest: Effort normal.   Neurological: She is alert and oriented to person, place, and time.   Psychiatric: Her speech is normal and behavior is normal.  Judgment and thought content normal. Her mood appears anxious. Her affect is not angry and not inappropriate. Cognition and memory are normal. She does not exhibit a depressed mood. She is attentive.   Nursing note and vitals reviewed.      Assessment/Plan     Diagnoses and all orders for this visit:    Situational anxiety    Prescription written for Xanax 0.5 mg instructions to take a half to 1 twice a day when necessary anxiety or panic in summer 60 with no refills.  Continue on Zoloft at current dosage.  Discussed common side effects of this medication and warnings, recommended not to drive after taking medication.  Follow-up if unimproved after one week.  Follow-up sooner if any adverse effects noted for medication of symptoms of anxiety worsen.

## 2017-08-09 RX ORDER — GABAPENTIN 300 MG/1
CAPSULE ORAL
Qty: 30 CAPSULE | Refills: 5 | OUTPATIENT
Start: 2017-08-09 | End: 2017-10-12 | Stop reason: SDUPTHER

## 2017-08-14 ENCOUNTER — TELEPHONE (OUTPATIENT)
Dept: FAMILY MEDICINE CLINIC | Facility: CLINIC | Age: 47
End: 2017-08-14

## 2017-08-14 NOTE — TELEPHONE ENCOUNTER
----- Message from Angela Rahman sent at 8/14/2017  2:53 PM EDT -----  Patient would like a refill on oxyCODONE-acetaminophen (PERCOCET) 5-325 MG per tablet..  Thanks,  Angela..       Refill written, given to Tyler

## 2017-08-21 ENCOUNTER — CLINICAL SUPPORT (OUTPATIENT)
Dept: FAMILY MEDICINE CLINIC | Facility: CLINIC | Age: 47
End: 2017-08-21

## 2017-08-21 DIAGNOSIS — J30.1 SEASONAL ALLERGIC RHINITIS DUE TO POLLEN: Primary | ICD-10-CM

## 2017-08-21 DIAGNOSIS — Z72.0 TOBACCO ABUSE: ICD-10-CM

## 2017-08-21 DIAGNOSIS — J32.9 SINUSITIS, UNSPECIFIED CHRONICITY, UNSPECIFIED LOCATION: ICD-10-CM

## 2017-08-21 DIAGNOSIS — Z88.9 MULTIPLE ALLERGIES: ICD-10-CM

## 2017-08-21 PROCEDURE — 96372 THER/PROPH/DIAG INJ SC/IM: CPT | Performed by: PHYSICIAN ASSISTANT

## 2017-08-21 RX ORDER — METHYLPREDNISOLONE ACETATE 40 MG/ML
40 INJECTION, SUSPENSION INTRA-ARTICULAR; INTRALESIONAL; INTRAMUSCULAR; SOFT TISSUE ONCE
Status: COMPLETED | OUTPATIENT
Start: 2017-08-21 | End: 2017-08-21

## 2017-08-21 RX ADMIN — METHYLPREDNISOLONE ACETATE 40 MG: 40 INJECTION, SUSPENSION INTRA-ARTICULAR; INTRALESIONAL; INTRAMUSCULAR; SOFT TISSUE at 16:29

## 2017-08-22 RX ORDER — VARENICLINE TARTRATE 0.5 MG/1
TABLET, FILM COATED ORAL
Qty: 60 TABLET | Refills: 0 | Status: SHIPPED | OUTPATIENT
Start: 2017-08-22 | End: 2017-09-28 | Stop reason: SDUPTHER

## 2017-08-28 ENCOUNTER — TELEPHONE (OUTPATIENT)
Dept: FAMILY MEDICINE CLINIC | Facility: CLINIC | Age: 47
End: 2017-08-28

## 2017-08-28 NOTE — TELEPHONE ENCOUNTER
----- Message from Angela Rahman sent at 8/25/2017  1:58 PM EDT -----  Looking at Dr.Van Hernández's message from August 18th regarding patient's urinary incontinence;can patient please have something called in for bladder control such as Dr.Van Hernández's suggestion of Toviaz. Patient is not allergic to anything. She uses [facility] in [place], [place]..  ThanksAngela..

## 2017-08-28 NOTE — TELEPHONE ENCOUNTER
Pt was wanting something for her urinary incontinence.  She has not tried anything like oxybutynin or detrol.  Would we be able to prescribe her something

## 2017-08-29 ENCOUNTER — TELEPHONE (OUTPATIENT)
Dept: FAMILY MEDICINE CLINIC | Facility: CLINIC | Age: 47
End: 2017-08-29

## 2017-08-29 RX ORDER — OXYBUTYNIN CHLORIDE 10 MG/1
10 TABLET, EXTENDED RELEASE ORAL DAILY
Qty: 30 TABLET | Refills: 5 | Status: SHIPPED | OUTPATIENT
Start: 2017-08-29 | End: 2017-10-12 | Stop reason: SDUPTHER

## 2017-08-29 NOTE — TELEPHONE ENCOUNTER
----- Message from Angela Rahman sent at 8/28/2017  8:40 AM EDT -----  Looking at Dr.Van Hernández's message from August 18th regarding patient's urinary incontinence;can patient please have something called in for bladder control such as Dr.Van Hernández's suggestion of Toviaz. Patient is not allergic to anything. She uses [facility] in [place], [place]..

## 2017-08-29 NOTE — TELEPHONE ENCOUNTER
Try oxybutynin ER 10 mg 1 daily, #30, 5 refills    Per Dr. Roth Going to fax in medication above for patient.

## 2017-09-07 ENCOUNTER — TELEPHONE (OUTPATIENT)
Dept: FAMILY MEDICINE CLINIC | Facility: CLINIC | Age: 47
End: 2017-09-07

## 2017-09-07 RX ORDER — ALPRAZOLAM 0.5 MG/1
0.5 TABLET ORAL 2 TIMES DAILY PRN
Qty: 60 TABLET | Refills: 0 | OUTPATIENT
Start: 2017-09-07 | End: 2017-10-19 | Stop reason: SDUPTHER

## 2017-09-07 NOTE — TELEPHONE ENCOUNTER
----- Message from Angela Rahman sent at 9/7/2017  9:11 AM EDT -----  Patient would like a refill on her Xanax .5mg twice daily as needed. She uses [facility] in [place], [place]..  ThanksSandra.

## 2017-09-12 ENCOUNTER — TELEPHONE (OUTPATIENT)
Dept: FAMILY MEDICINE CLINIC | Facility: CLINIC | Age: 47
End: 2017-09-12

## 2017-09-13 ENCOUNTER — TELEPHONE (OUTPATIENT)
Dept: FAMILY MEDICINE CLINIC | Facility: CLINIC | Age: 47
End: 2017-09-13

## 2017-09-13 RX ORDER — TRAMADOL HYDROCHLORIDE 50 MG/1
TABLET ORAL
Qty: 60 TABLET | Refills: 2 | OUTPATIENT
Start: 2017-09-13

## 2017-09-28 ENCOUNTER — TELEPHONE (OUTPATIENT)
Dept: FAMILY MEDICINE CLINIC | Facility: CLINIC | Age: 47
End: 2017-09-28

## 2017-09-28 DIAGNOSIS — Z72.0 TOBACCO ABUSE: ICD-10-CM

## 2017-09-28 RX ORDER — VARENICLINE TARTRATE 0.5 MG/1
0.5 TABLET, FILM COATED ORAL 2 TIMES DAILY
Qty: 56 TABLET | Refills: 0 | Status: SHIPPED | OUTPATIENT
Start: 2017-09-28 | End: 2017-10-12 | Stop reason: SDUPTHER

## 2017-09-28 NOTE — TELEPHONE ENCOUNTER
----- Message from Angela Rahman sent at 9/28/2017  2:58 PM EDT -----      ----- Message -----     From: Angela Rahman     Sent: 9/28/2017  11:53 AM       To: Josephine Delacruz PA-C    Patient needs her Chantix sent in to [facility]..  Thanks,  Angela..

## 2017-09-29 ENCOUNTER — TELEPHONE (OUTPATIENT)
Dept: FAMILY MEDICINE CLINIC | Facility: CLINIC | Age: 47
End: 2017-09-29

## 2017-09-29 RX ORDER — VARENICLINE TARTRATE 1 MG/1
1 TABLET, FILM COATED ORAL 2 TIMES DAILY
Qty: 60 TABLET | Refills: 0 | Status: SHIPPED | OUTPATIENT
Start: 2017-09-29 | End: 2017-10-12 | Stop reason: SDUPTHER

## 2017-09-29 NOTE — TELEPHONE ENCOUNTER
----- Message from Angela Rahman sent at 9/28/2017 11:53 AM EDT -----  Patient needs her Chantix sent in to [facility]..  Thanks,  Sandra.

## 2017-10-10 ENCOUNTER — TELEPHONE (OUTPATIENT)
Dept: FAMILY MEDICINE CLINIC | Facility: CLINIC | Age: 47
End: 2017-10-10

## 2017-10-10 RX ORDER — AMLODIPINE BESYLATE 5 MG/1
5 TABLET ORAL DAILY
Qty: 30 TABLET | Refills: 11 | Status: CANCELLED | OUTPATIENT
Start: 2017-10-10

## 2017-10-10 RX ORDER — VALSARTAN AND HYDROCHLOROTHIAZIDE 160; 12.5 MG/1; MG/1
1 TABLET, FILM COATED ORAL DAILY
Qty: 30 TABLET | Refills: 11 | Status: CANCELLED | OUTPATIENT
Start: 2017-10-10

## 2017-10-10 RX ORDER — ATORVASTATIN CALCIUM 40 MG/1
40 TABLET, FILM COATED ORAL DAILY
Qty: 30 TABLET | Refills: 11 | Status: CANCELLED | OUTPATIENT
Start: 2017-10-10

## 2017-10-10 NOTE — TELEPHONE ENCOUNTER
This is fine to call in the tizanidine, I don't have Norco on her med list but was Percocet.  I also don't have this dosage of Xanax on the med list.  Please clarify the exact dosages of her medications that are narcotics for me.  And please make sure they're corrected in her chart.

## 2017-10-10 NOTE — TELEPHONE ENCOUNTER
----- Message from Agnela Rahman sent at 10/10/2017  2:27 PM EDT -----  Patient needs the following scripts   Norco 1XD   Xanax 1 mg 2XD  Tizanidine 4mg ½ to 1 tab po 2XD as needed for pain   ThanksAngela..

## 2017-10-11 RX ORDER — TIZANIDINE 4 MG/1
TABLET ORAL
Qty: 45 TABLET | Refills: 5 | Status: SHIPPED | OUTPATIENT
Start: 2017-10-11 | End: 2017-10-11 | Stop reason: SDUPTHER

## 2017-10-11 RX ORDER — TIZANIDINE 4 MG/1
2-4 TABLET ORAL 2 TIMES DAILY PRN
Qty: 45 TABLET | Refills: 5 | Status: SHIPPED | OUTPATIENT
Start: 2017-10-11 | End: 2018-06-08

## 2017-10-11 NOTE — TELEPHONE ENCOUNTER
Patient is taking:     Percocet 5/325mg 1 po at Bedtime     Xanax 0.5mg 1 po BID PRN     Tizanidine was sent to pharm-

## 2017-10-11 NOTE — TELEPHONE ENCOUNTER
Please make sure her medication list is updated correctly in the chart so that we have corrected documentation of her medications for future.  I will see her in the office tomorrow for the narcotic refills.

## 2017-10-12 ENCOUNTER — OFFICE VISIT (OUTPATIENT)
Dept: FAMILY MEDICINE CLINIC | Facility: CLINIC | Age: 47
End: 2017-10-12

## 2017-10-12 VITALS
TEMPERATURE: 98.3 F | SYSTOLIC BLOOD PRESSURE: 120 MMHG | WEIGHT: 170 LBS | HEART RATE: 68 BPM | OXYGEN SATURATION: 97 % | RESPIRATION RATE: 14 BRPM | HEIGHT: 61 IN | BODY MASS INDEX: 32.1 KG/M2 | DIASTOLIC BLOOD PRESSURE: 72 MMHG

## 2017-10-12 DIAGNOSIS — I10 ESSENTIAL HYPERTENSION: Primary | ICD-10-CM

## 2017-10-12 DIAGNOSIS — Z72.0 TOBACCO ABUSE: ICD-10-CM

## 2017-10-12 DIAGNOSIS — F41.9 ANXIETY: ICD-10-CM

## 2017-10-12 DIAGNOSIS — G89.29 CHRONIC MIDLINE LOW BACK PAIN WITHOUT SCIATICA: ICD-10-CM

## 2017-10-12 DIAGNOSIS — M54.50 CHRONIC MIDLINE LOW BACK PAIN WITHOUT SCIATICA: ICD-10-CM

## 2017-10-12 DIAGNOSIS — M25.552 PAIN OF LEFT HIP JOINT: ICD-10-CM

## 2017-10-12 PROCEDURE — 99214 OFFICE O/P EST MOD 30 MIN: CPT | Performed by: PHYSICIAN ASSISTANT

## 2017-10-12 RX ORDER — SERTRALINE HYDROCHLORIDE 100 MG/1
100 TABLET, FILM COATED ORAL DAILY
Qty: 30 TABLET | Refills: 11 | Status: SHIPPED | OUTPATIENT
Start: 2017-10-12 | End: 2018-10-16 | Stop reason: SDUPTHER

## 2017-10-12 RX ORDER — PROMETHAZINE HYDROCHLORIDE 25 MG/1
25 TABLET ORAL EVERY 6 HOURS PRN
Qty: 12 TABLET | Refills: 11 | Status: SHIPPED | OUTPATIENT
Start: 2017-10-12 | End: 2018-10-16 | Stop reason: SDUPTHER

## 2017-10-12 RX ORDER — VARENICLINE TARTRATE 0.5 MG/1
0.5 TABLET, FILM COATED ORAL 2 TIMES DAILY
Qty: 56 TABLET | Refills: 0 | Status: SHIPPED | OUTPATIENT
Start: 2017-10-12 | End: 2018-01-05 | Stop reason: SDUPTHER

## 2017-10-12 RX ORDER — ATORVASTATIN CALCIUM 40 MG/1
40 TABLET, FILM COATED ORAL DAILY
Qty: 30 TABLET | Refills: 11 | Status: SHIPPED | OUTPATIENT
Start: 2017-10-12 | End: 2018-10-16 | Stop reason: SDUPTHER

## 2017-10-12 RX ORDER — OXYBUTYNIN CHLORIDE 10 MG/1
10 TABLET, EXTENDED RELEASE ORAL DAILY
Qty: 30 TABLET | Refills: 11 | Status: SHIPPED | OUTPATIENT
Start: 2017-10-12 | End: 2018-02-19

## 2017-10-12 RX ORDER — PANTOPRAZOLE SODIUM 40 MG/1
40 TABLET, DELAYED RELEASE ORAL DAILY
Qty: 30 TABLET | Refills: 11 | Status: SHIPPED | OUTPATIENT
Start: 2017-10-12 | End: 2018-10-16 | Stop reason: SDUPTHER

## 2017-10-13 PROBLEM — F41.9 ANXIETY: Status: ACTIVE | Noted: 2017-10-13

## 2017-10-13 PROBLEM — M54.50 LOW BACK PAIN: Status: ACTIVE | Noted: 2017-10-13

## 2017-10-13 PROBLEM — M25.552 PAIN OF LEFT HIP JOINT: Status: ACTIVE | Noted: 2017-10-13

## 2017-10-13 NOTE — PROGRESS NOTES
Subjective   Trinidad Quniteros is a 47 y.o. female    History of Present Illness  Patient comes in today for follow-up on chronic hip pain, chronic back pain, generalized anxiety disorder and hypertension.  She needs med refills today as well.  Her hip and back pain are stable, she is typically been taking tramadol in the morning, Percocet in the evening and gabapentin in evening.  She feels gabapentin is very effective in her hip and low back pain and would like to try increasing it to twice a day with thoughts that she may be able to decrease her tramadol.  Anxiety is stable on Xanax, blood pressure has been well-controlled lately and patient states that she hasn't taken her blood pressure medication several days.  She would like to try reducing her blood pressure medications.  The following portions of the patient's history were reviewed and updated as appropriate: allergies, current medications, past social history and problem list    Review of Systems   Constitutional: Positive for activity change. Negative for appetite change, fatigue and unexpected weight change.   Respiratory: Negative.  Negative for cough, chest tightness and shortness of breath.    Cardiovascular: Negative for chest pain, palpitations and leg swelling.   Gastrointestinal: Negative.  Negative for abdominal pain, diarrhea and nausea.   Genitourinary: Negative.    Musculoskeletal: Positive for arthralgias, back pain and myalgias. Negative for gait problem and joint swelling.   Skin: Negative.  Negative for color change and rash.   Neurological: Negative for dizziness, tremors, syncope, weakness, light-headedness, numbness and headaches.   Psychiatric/Behavioral: Positive for dysphoric mood and sleep disturbance. Negative for agitation, behavioral problems, confusion, decreased concentration and suicidal ideas. The patient is nervous/anxious.        Objective     Vitals:    10/12/17 0814   BP: 120/72   Pulse: 68   Resp: 14   Temp: 98.3 °F (36.8  °C)   SpO2: 97%       Physical Exam   Constitutional: She is oriented to person, place, and time. She appears well-developed and well-nourished. No distress.   Neck: No JVD present. No thyroid mass and no thyromegaly present.   Cardiovascular: Normal rate, regular rhythm, normal heart sounds and intact distal pulses.    No murmur heard.  Pulmonary/Chest: Effort normal and breath sounds normal. No respiratory distress. She exhibits no tenderness.   Abdominal: Soft. She exhibits no distension. There is no tenderness.   Musculoskeletal: She exhibits no edema, tenderness or deformity.   Neurological: She is alert and oriented to person, place, and time. She exhibits normal muscle tone. Coordination normal.   Skin: Skin is warm and dry. No rash noted. She is not diaphoretic. No erythema. No pallor.   Psychiatric: Her speech is normal and behavior is normal. Judgment and thought content normal. Her mood appears anxious. Her affect is not angry and not inappropriate. Cognition and memory are normal. She does not exhibit a depressed mood. She is attentive.   Nursing note and vitals reviewed.      Assessment/Plan     Diagnoses and all orders for this visit:    Essential hypertension    Tobacco abuse  -     varenicline (CHANTIX) 0.5 MG tablet; Take 1 tablet by mouth 2 (Two) Times a Day.    Chronic midline low back pain without sciatica    Pain of left hip joint    Anxiety    Other orders  -     sertraline (ZOLOFT) 100 MG tablet; Take 1 tablet by mouth Daily.  -     promethazine (PHENERGAN) 25 MG tablet; Take 1 tablet by mouth Every 6 (Six) Hours if needed for nausea and vomiting  -     pantoprazole (PROTONIX) 40 MG EC tablet; Take 1 tablet by mouth Daily.  -     oxybutynin XL (DITROPAN-XL) 10 MG 24 hr tablet; Take 1 tablet by mouth Daily.  -     atorvastatin (LIPITOR) 40 MG tablet; Take 1 tablet by mouth Daily.    Refill Xanax, tramadol, gabapentin, Percocet.  See scanned prescriptions.  Med refills given for 6 months worth  on gabapentin tramadol and Xanax in one month's worth on Percocet.  Patient's to hold her Norvasc currently and monitor blood pressure, resume blood pressure rises above 140/90.  Will increase gabapentin to twice daily and discussed common side effects to look for.  Discussed abuse potential of medications prescribed.  Zachery appropriate.

## 2017-10-19 ENCOUNTER — TELEPHONE (OUTPATIENT)
Dept: FAMILY MEDICINE CLINIC | Facility: CLINIC | Age: 47
End: 2017-10-19

## 2017-10-19 ENCOUNTER — OFFICE VISIT (OUTPATIENT)
Dept: FAMILY MEDICINE CLINIC | Facility: CLINIC | Age: 47
End: 2017-10-19

## 2017-10-19 VITALS
BODY MASS INDEX: 32.36 KG/M2 | WEIGHT: 171.4 LBS | OXYGEN SATURATION: 99 % | TEMPERATURE: 97.9 F | DIASTOLIC BLOOD PRESSURE: 70 MMHG | RESPIRATION RATE: 14 BRPM | HEIGHT: 61 IN | SYSTOLIC BLOOD PRESSURE: 130 MMHG | HEART RATE: 86 BPM

## 2017-10-19 DIAGNOSIS — J30.2 ACUTE SEASONAL ALLERGIC RHINITIS, UNSPECIFIED TRIGGER: Primary | ICD-10-CM

## 2017-10-19 PROCEDURE — 96372 THER/PROPH/DIAG INJ SC/IM: CPT | Performed by: PHYSICIAN ASSISTANT

## 2017-10-19 PROCEDURE — 99213 OFFICE O/P EST LOW 20 MIN: CPT | Performed by: PHYSICIAN ASSISTANT

## 2017-10-19 RX ORDER — METHYLPREDNISOLONE ACETATE 40 MG/ML
40 INJECTION, SUSPENSION INTRA-ARTICULAR; INTRALESIONAL; INTRAMUSCULAR; SOFT TISSUE ONCE
Status: COMPLETED | OUTPATIENT
Start: 2017-10-19 | End: 2017-10-19

## 2017-10-19 RX ADMIN — METHYLPREDNISOLONE ACETATE 40 MG: 40 INJECTION, SUSPENSION INTRA-ARTICULAR; INTRALESIONAL; INTRAMUSCULAR; SOFT TISSUE at 15:03

## 2017-10-19 NOTE — PROGRESS NOTES
Subjective   Trinidad Quinteros is a 47 y.o. female    History of Present Illness  Patient comes in today stating that she's been feeling bad with postnasal drip, clear runny nose, dull headache and dry cough on and off since the weekend when she fell down in the [place] and got cold.  She is not smoking currently.  She is not running a fever.  She states that in the past she's had a Depo-Medrol shot for allergies that has helped.  She does have seasonal allergies.  The following portions of the patient's history were reviewed and updated as appropriate: allergies, current medications, past social history and problem list    Review of Systems   Constitutional: Negative for chills, fatigue and fever.   HENT: Positive for congestion, postnasal drip, rhinorrhea, sneezing and sore throat. Negative for ear pain, hearing loss, sinus pressure and trouble swallowing.    Eyes: Positive for itching.   Respiratory: Negative for cough.    Cardiovascular: Negative for chest pain.   Neurological: Negative for headaches.       Objective     Vitals:    10/19/17 1340   BP: 130/70   Pulse: 86   Resp: 14   Temp: 97.9 °F (36.6 °C)   SpO2: 99%       Physical Exam   Constitutional: She appears well-developed and well-nourished. No distress.   HENT:   Head: Normocephalic and atraumatic.   Right Ear: External ear normal.   Left Ear: External ear normal.   Nose: Nose normal.   Mouth/Throat: Oropharynx is clear and moist.   Eyes: Conjunctivae are normal.   Cardiovascular: Normal rate, regular rhythm and normal heart sounds.    Pulmonary/Chest: Effort normal and breath sounds normal.   Skin: She is not diaphoretic.   Nursing note and vitals reviewed.      Assessment/Plan     Diagnoses and all orders for this visit:    Acute seasonal allergic rhinitis, unspecified trigger  -     methylPREDNISolone acetate (DEPO-medrol) injection 40 mg; Inject 1 mL into the shoulder, thigh, or buttocks 1 (One) Time.

## 2017-10-19 NOTE — TELEPHONE ENCOUNTER
----- Message from Angela Rahman sent at 10/19/2017 11:58 AM EDT -----  Patient wants to know if she can get a Rocephin shot for a sinus infection. She said her head hurts, and her eyes are swollen.  ThanksAngela..

## 2017-11-10 ENCOUNTER — TELEPHONE (OUTPATIENT)
Dept: FAMILY MEDICINE CLINIC | Facility: CLINIC | Age: 47
End: 2017-11-10

## 2017-11-10 NOTE — TELEPHONE ENCOUNTER
----- Message from Angela Rahman sent at 11/10/2017  8:23 AM EST -----  Patient needs a refill on her prescription Percocet 325 mg 5x daily.   ThanksSandra.

## 2017-11-10 NOTE — TELEPHONE ENCOUNTER
Patient is due for refills of Percocet.  Phone message in chart showed incorrect dosage.  She is on Percocet 5/325 once per day.  Prescription written for patient to have refill of quantity 30 with no refills.  Zachery appropriate.  Prescription scanned into chart.

## 2017-11-20 ENCOUNTER — OFFICE VISIT (OUTPATIENT)
Dept: FAMILY MEDICINE CLINIC | Facility: CLINIC | Age: 47
End: 2017-11-20

## 2017-11-20 VITALS
OXYGEN SATURATION: 100 % | HEART RATE: 58 BPM | DIASTOLIC BLOOD PRESSURE: 80 MMHG | TEMPERATURE: 97.7 F | HEIGHT: 61 IN | WEIGHT: 173 LBS | BODY MASS INDEX: 32.66 KG/M2 | SYSTOLIC BLOOD PRESSURE: 124 MMHG

## 2017-11-20 DIAGNOSIS — M79.672 LEFT FOOT PAIN: Primary | ICD-10-CM

## 2017-11-20 PROCEDURE — 99213 OFFICE O/P EST LOW 20 MIN: CPT | Performed by: PHYSICIAN ASSISTANT

## 2017-11-20 RX ORDER — PREDNISONE 20 MG/1
20 TABLET ORAL 2 TIMES DAILY
Qty: 10 TABLET | Refills: 0 | Status: SHIPPED | OUTPATIENT
Start: 2017-11-20 | End: 2017-12-13

## 2017-11-20 NOTE — PROGRESS NOTES
Subjective   Trinidad Quinteros is a 47 y.o. female    History of Present Illness  Patient comes in today concerned with pain in her left foot since yesterday.  No history of trauma or injury.  She states she just woke up with it hurting on the side of her foot yesterday.  She states it hurts when she bears weight on it.  There is no redness, no swelling no increased warmth.  She states it has a sensation of throbbing and burning.  She states putting ice pack on it does seem to help last night.  The following portions of the patient's history were reviewed and updated as appropriate: allergies, current medications, past social history and problem list    Review of Systems   Constitutional: Positive for activity change.   Cardiovascular: Negative for leg swelling.   Musculoskeletal: Positive for arthralgias and myalgias. Negative for gait problem and joint swelling.   Skin: Negative.    Neurological: Negative for weakness and numbness.       Objective     Vitals:    11/20/17 1034   BP: 124/80   Pulse: 58   Temp: 97.7 °F (36.5 °C)   SpO2: 100%       Physical Exam   Constitutional: She appears well-developed and well-nourished.   Cardiovascular: Intact distal pulses.    Musculoskeletal: Normal range of motion. She exhibits tenderness ( Mild tenderness lateral mid shaft left foot fifth metatarsal). She exhibits no edema or deformity.   Neurological: She exhibits normal muscle tone. Coordination normal.   Skin: Skin is warm and dry. No rash noted. No erythema. No pallor.   Nursing note and vitals reviewed.      Assessment/Plan     Diagnoses and all orders for this visit:    Left foot pain  -     diclofenac (VOLTAREN) 1 % gel gel; Apply 4 g topically 4 (Four) Times a Day As Needed (foot pain).  -     predniSONE (DELTASONE) 20 MG tablet; Take 1 tablet by mouth 2 (Two) Times a Day for foot pain

## 2017-12-10 RX ORDER — OXYCODONE HYDROCHLORIDE AND ACETAMINOPHEN 5; 325 MG/1; MG/1
1 TABLET ORAL
Qty: 30 TABLET | Refills: 0 | Status: CANCELLED | OUTPATIENT
Start: 2017-12-10

## 2017-12-11 ENCOUNTER — TELEPHONE (OUTPATIENT)
Dept: FAMILY MEDICINE CLINIC | Facility: CLINIC | Age: 47
End: 2017-12-11

## 2017-12-11 NOTE — TELEPHONE ENCOUNTER
----- Message from Angela Rahman sent at 12/11/2017  3:03 PM EST -----  Patient is requesting a refill on her oxyCODONE-acetaminophen (PERCOCET) 5-325 MG per tablet one PO @night..  ThanksAngela..

## 2017-12-13 ENCOUNTER — OFFICE VISIT (OUTPATIENT)
Dept: FAMILY MEDICINE CLINIC | Facility: CLINIC | Age: 47
End: 2017-12-13

## 2017-12-13 ENCOUNTER — APPOINTMENT (OUTPATIENT)
Dept: LAB | Facility: HOSPITAL | Age: 47
End: 2017-12-13

## 2017-12-13 VITALS
TEMPERATURE: 97.8 F | OXYGEN SATURATION: 98 % | WEIGHT: 179 LBS | HEART RATE: 57 BPM | SYSTOLIC BLOOD PRESSURE: 130 MMHG | BODY MASS INDEX: 33.79 KG/M2 | HEIGHT: 61 IN | DIASTOLIC BLOOD PRESSURE: 84 MMHG

## 2017-12-13 DIAGNOSIS — N39.3 STRESS INCONTINENCE: ICD-10-CM

## 2017-12-13 DIAGNOSIS — R53.83 FATIGUE, UNSPECIFIED TYPE: ICD-10-CM

## 2017-12-13 DIAGNOSIS — G47.33 OSA (OBSTRUCTIVE SLEEP APNEA): ICD-10-CM

## 2017-12-13 DIAGNOSIS — I10 ESSENTIAL HYPERTENSION: ICD-10-CM

## 2017-12-13 DIAGNOSIS — Z12.39 BREAST CANCER SCREENING: ICD-10-CM

## 2017-12-13 DIAGNOSIS — E66.09 CLASS 1 OBESITY DUE TO EXCESS CALORIES WITHOUT SERIOUS COMORBIDITY WITH BODY MASS INDEX (BMI) OF 33.0 TO 33.9 IN ADULT: ICD-10-CM

## 2017-12-13 DIAGNOSIS — R73.03 PREDIABETES: ICD-10-CM

## 2017-12-13 DIAGNOSIS — K21.9 GASTROESOPHAGEAL REFLUX DISEASE WITHOUT ESOPHAGITIS: ICD-10-CM

## 2017-12-13 DIAGNOSIS — F41.9 ANXIETY: ICD-10-CM

## 2017-12-13 DIAGNOSIS — N32.81 OAB (OVERACTIVE BLADDER): ICD-10-CM

## 2017-12-13 DIAGNOSIS — Z00.00 GENERAL MEDICAL EXAM: Primary | ICD-10-CM

## 2017-12-13 DIAGNOSIS — E78.49 OTHER HYPERLIPIDEMIA: ICD-10-CM

## 2017-12-13 LAB
ANION GAP SERPL CALCULATED.3IONS-SCNC: 5 MMOL/L (ref 3–11)
ARTICHOKE IGE QN: 129 MG/DL (ref 0–130)
BUN BLD-MCNC: 8 MG/DL (ref 9–23)
BUN/CREAT SERPL: 10 (ref 7–25)
CALCIUM SPEC-SCNC: 9.8 MG/DL (ref 8.7–10.4)
CHLORIDE SERPL-SCNC: 102 MMOL/L (ref 99–109)
CHOLEST SERPL-MCNC: 237 MG/DL (ref 0–200)
CO2 SERPL-SCNC: 32 MMOL/L (ref 20–31)
CREAT BLD-MCNC: 0.8 MG/DL (ref 0.6–1.3)
DEPRECATED RDW RBC AUTO: 45.9 FL (ref 37–54)
ERYTHROCYTE [DISTWIDTH] IN BLOOD BY AUTOMATED COUNT: 13.9 % (ref 11.3–14.5)
GFR SERPL CREATININE-BSD FRML MDRD: 77 ML/MIN/1.73
GLUCOSE BLD-MCNC: 99 MG/DL (ref 70–100)
HBA1C MFR BLD: 6.2 % (ref 4.8–5.6)
HCT VFR BLD AUTO: 39 % (ref 34.5–44)
HDLC SERPL-MCNC: 41 MG/DL (ref 40–60)
HGB BLD-MCNC: 12.6 G/DL (ref 11.5–15.5)
MCH RBC QN AUTO: 29.3 PG (ref 27–31)
MCHC RBC AUTO-ENTMCNC: 32.3 G/DL (ref 32–36)
MCV RBC AUTO: 90.7 FL (ref 80–99)
PLATELET # BLD AUTO: 286 10*3/MM3 (ref 150–450)
PMV BLD AUTO: 9.5 FL (ref 6–12)
POTASSIUM BLD-SCNC: 4.5 MMOL/L (ref 3.5–5.5)
RBC # BLD AUTO: 4.3 10*6/MM3 (ref 3.89–5.14)
SODIUM BLD-SCNC: 139 MMOL/L (ref 132–146)
TRIGL SERPL-MCNC: 522 MG/DL (ref 0–150)
TSH SERPL DL<=0.05 MIU/L-ACNC: 2.85 MIU/ML (ref 0.35–5.35)
WBC NRBC COR # BLD: 6.41 10*3/MM3 (ref 3.5–10.8)

## 2017-12-13 PROCEDURE — 85027 COMPLETE CBC AUTOMATED: CPT | Performed by: PHYSICIAN ASSISTANT

## 2017-12-13 PROCEDURE — 99396 PREV VISIT EST AGE 40-64: CPT | Performed by: PHYSICIAN ASSISTANT

## 2017-12-13 PROCEDURE — 80061 LIPID PANEL: CPT | Performed by: PHYSICIAN ASSISTANT

## 2017-12-13 PROCEDURE — 83036 HEMOGLOBIN GLYCOSYLATED A1C: CPT | Performed by: PHYSICIAN ASSISTANT

## 2017-12-13 PROCEDURE — 36415 COLL VENOUS BLD VENIPUNCTURE: CPT | Performed by: PHYSICIAN ASSISTANT

## 2017-12-13 PROCEDURE — 80048 BASIC METABOLIC PNL TOTAL CA: CPT | Performed by: PHYSICIAN ASSISTANT

## 2017-12-13 PROCEDURE — 84443 ASSAY THYROID STIM HORMONE: CPT | Performed by: PHYSICIAN ASSISTANT

## 2017-12-13 NOTE — PROGRESS NOTES
Subjective   Trinidad Quinteros is a 47 y.o. female    History of Present Illness  Patient presents today for a preventive medical visit.  Patient is here to determine screening labs and tests that are due and to determine immunization status as well.  Additionally we will review her chronic medical conditions while she is here, all chronic medical conditions are currently stable on medication except for her stress incontinence, she states every time she laughs or sneezes she loses urine and is been having to wear a pad daily.  This is becoming uncomfortable to her.    Patient will be counseled regarding preventative medicine issues such as regular exercise and  healthy diet as well.    The following portions of the patient's history were reviewed and updated as appropriate: allergies, current medications, past social history and problem list    Review of Systems   Constitutional: Positive for appetite change ( Patient states in smoking cessation she has been eating more candy, weight is going up). Negative for diaphoresis, fatigue and unexpected weight change.   HENT: Negative.    Eyes: Negative.  Negative for visual disturbance.        Up-to-date on annual eye exam   Respiratory: Negative.  Negative for apnea, cough, chest tightness and shortness of breath.         Wearing CPAP   Cardiovascular: Negative.  Negative for chest pain, palpitations and leg swelling.   Gastrointestinal: Negative.  Negative for abdominal pain, diarrhea, nausea and vomiting.   Endocrine: Negative.  Negative for polydipsia, polyphagia and polyuria.   Genitourinary: Positive for enuresis.   Musculoskeletal: Positive for arthralgias and back pain. Negative for gait problem and myalgias.   Skin: Negative.  Negative for color change and rash.   Allergic/Immunologic: Negative.    Neurological: Negative.  Negative for dizziness, tremors, syncope, weakness, light-headedness, numbness and headaches.   Hematological: Negative.     Psychiatric/Behavioral: Negative.  Negative for agitation, behavioral problems, confusion, decreased concentration, dysphoric mood, sleep disturbance and suicidal ideas. The patient is not nervous/anxious.    All other systems reviewed and are negative.      Objective     Vitals:    12/13/17 0836   BP: 130/84   Pulse: 57   Temp: 97.8 °F (36.6 °C)   SpO2: 98%       Physical Exam   Constitutional: She is oriented to person, place, and time. She appears well-developed and well-nourished.   HENT:   Head: Normocephalic and atraumatic.   Right Ear: External ear normal.   Left Ear: External ear normal.   Nose: Nose normal.   Mouth/Throat: Oropharynx is clear and moist.   Eyes: Conjunctivae and EOM are normal. Pupils are equal, round, and reactive to light.   Neck: Normal range of motion. Neck supple. No JVD present. Carotid bruit is not present. No thyromegaly present.   Cardiovascular: Normal rate, regular rhythm, normal heart sounds and intact distal pulses.    No murmur heard.  Pulmonary/Chest: Effort normal and breath sounds normal.   Abdominal: Soft. Bowel sounds are normal. She exhibits no mass. There is no tenderness.   Musculoskeletal: Normal range of motion. She exhibits no edema.   Lymphadenopathy:     She has no cervical adenopathy.   Neurological: She is alert and oriented to person, place, and time. She has normal reflexes. No cranial nerve deficit.   Skin: Skin is warm and dry.   Psychiatric: She has a normal mood and affect.   Nursing note and vitals reviewed.    Discussed preventative medicine issues with patient including regular exercise, healthy diet, stress reduction, adequate sleep and recommended age-appropriate screening studies.  Assessment/Plan     Diagnoses and all orders for this visit:    General medical exam  -     CBC (No Diff)    Breast cancer screening  -     Mammo Screening Digital Tomosynthesis Bilateral With CAD; Future    Other hyperlipidemia  -     Lipid Panel    Essential  hypertension  -     Basic Metabolic Panel    ADRIAN (obstructive sleep apnea)    Gastroesophageal reflux disease without esophagitis  -     CBC (No Diff)    OAB (overactive bladder)  -     Ambulatory Referral to Gynecology    Anxiety    Stress incontinence    Prediabetes  -     Basic Metabolic Panel  -     Hemoglobin A1c    Fatigue, unspecified type  -     TSH    Class 1 obesity due to excess calories without serious comorbidity with body mass index (BMI) of 33.0 to 33.9 in adult      Counseled patient regarding need to reduce concentrated sugars and diet

## 2017-12-18 ENCOUNTER — TELEPHONE (OUTPATIENT)
Dept: FAMILY MEDICINE CLINIC | Facility: CLINIC | Age: 47
End: 2017-12-18

## 2017-12-18 RX ORDER — AMOXICILLIN 875 MG/1
875 TABLET, COATED ORAL 2 TIMES DAILY
Qty: 14 TABLET | Refills: 0 | Status: SHIPPED | OUTPATIENT
Start: 2017-12-18 | End: 2018-01-11

## 2017-12-26 ENCOUNTER — TELEPHONE (OUTPATIENT)
Dept: FAMILY MEDICINE CLINIC | Facility: CLINIC | Age: 47
End: 2017-12-26

## 2017-12-26 RX ORDER — DEXTROMETHORPHAN HYDROBROMIDE AND PROMETHAZINE HYDROCHLORIDE 15; 6.25 MG/5ML; MG/5ML
10 SYRUP ORAL EVERY 4 HOURS PRN
Qty: 120 ML | Refills: 1 | Status: SHIPPED | OUTPATIENT
Start: 2017-12-26 | End: 2018-01-11

## 2017-12-26 RX ORDER — AZITHROMYCIN 250 MG/1
TABLET, FILM COATED ORAL
Qty: 6 TABLET | Refills: 0 | Status: SHIPPED | OUTPATIENT
Start: 2017-12-26 | End: 2018-01-11

## 2017-12-26 NOTE — TELEPHONE ENCOUNTER
Patient was prescribed amoxicillin 875mg Dec 18 but still c/o nasal pressure/congestion, HA, and coughing at night. She is sleeping in a recliner and unable to use CPAP d/t cough. She wants to know if another a/b and cough syrup can be sent to [facility].

## 2017-12-26 NOTE — TELEPHONE ENCOUNTER
Please call in a Z-Raffy and promethazine DM 2 teaspoons every 4 hours as needed for cough dispense 120 mL with 1 refill.

## 2018-01-02 DIAGNOSIS — Z72.0 TOBACCO ABUSE: ICD-10-CM

## 2018-01-02 RX ORDER — VARENICLINE TARTRATE 0.5 MG/1
0.5 TABLET, FILM COATED ORAL
Qty: 56 TABLET | Refills: 0 | OUTPATIENT
Start: 2018-01-02

## 2018-01-05 ENCOUNTER — TELEPHONE (OUTPATIENT)
Dept: FAMILY MEDICINE CLINIC | Facility: CLINIC | Age: 48
End: 2018-01-05

## 2018-01-05 DIAGNOSIS — Z72.0 TOBACCO ABUSE: ICD-10-CM

## 2018-01-05 RX ORDER — VARENICLINE TARTRATE 0.5 MG/1
0.5 TABLET, FILM COATED ORAL 2 TIMES DAILY
Qty: 60 TABLET | Refills: 0 | Status: SHIPPED | OUTPATIENT
Start: 2018-01-05 | End: 2018-05-10

## 2018-01-05 NOTE — TELEPHONE ENCOUNTER
----- Message from Angela Rahman sent at 1/5/2018  8:36 AM EST -----  Patient needs a refill on her varenicline (CHANTIX) 0.5 MG tablet(2 x a day) sent to [facility] pharmacy..  ThanksAngela..

## 2018-01-11 ENCOUNTER — TELEPHONE (OUTPATIENT)
Dept: FAMILY MEDICINE CLINIC | Facility: CLINIC | Age: 48
End: 2018-01-11

## 2018-01-11 ENCOUNTER — OFFICE VISIT (OUTPATIENT)
Dept: FAMILY MEDICINE CLINIC | Facility: CLINIC | Age: 48
End: 2018-01-11

## 2018-01-11 VITALS
HEART RATE: 64 BPM | DIASTOLIC BLOOD PRESSURE: 80 MMHG | TEMPERATURE: 98.3 F | SYSTOLIC BLOOD PRESSURE: 132 MMHG | OXYGEN SATURATION: 99 % | WEIGHT: 179 LBS | HEIGHT: 61 IN | BODY MASS INDEX: 33.79 KG/M2

## 2018-01-11 DIAGNOSIS — J32.9 SINUSITIS, UNSPECIFIED CHRONICITY, UNSPECIFIED LOCATION: ICD-10-CM

## 2018-01-11 DIAGNOSIS — M25.552 PAIN OF LEFT HIP JOINT: ICD-10-CM

## 2018-01-11 DIAGNOSIS — M54.50 CHRONIC MIDLINE LOW BACK PAIN WITHOUT SCIATICA: Primary | ICD-10-CM

## 2018-01-11 DIAGNOSIS — G89.29 CHRONIC MIDLINE LOW BACK PAIN WITHOUT SCIATICA: Primary | ICD-10-CM

## 2018-01-11 PROCEDURE — 99213 OFFICE O/P EST LOW 20 MIN: CPT | Performed by: PHYSICIAN ASSISTANT

## 2018-01-11 RX ORDER — CEFDINIR 300 MG/1
CAPSULE ORAL
Qty: 20 CAPSULE | Refills: 0 | Status: SHIPPED | OUTPATIENT
Start: 2018-01-11 | End: 2018-02-19

## 2018-01-11 NOTE — TELEPHONE ENCOUNTER
----- Message from Angela Rahman sent at 1/11/2018  8:58 AM EST -----  Patient needs script for  Oxycodone 5-325 MG, 1 tablet at bedtime for pain. Patient also has some sinus pressure and drainage. Headache persistent throughout the day as well. Wants to know if something can be prescribed for the sinus infection. Pharmacy is [facility]..  ThanksAngela..

## 2018-01-11 NOTE — TELEPHONE ENCOUNTER
I would be glad to work her in and see her today in the office, we just had her on antibiotic a couple of weeks ago so I want to check her and examine her to make sure what will work best for her at this time.  I will write her pain medication at the same time that I see her.

## 2018-01-11 NOTE — PROGRESS NOTES
Subjective   Trinidad Quinteros is a 47 y.o. female    History of Present Illness  Patient comes in today for 2 separate concerns one is for follow-up on chronic back pain and hip pain due to degenerative disc disease and degenerative joint disease.  She is due for refills of her Percocet she takes 1 tablet nightly for discomfort and is doing well on this regimen she states that her pain is well-controlled were she is able to sleep now and she denies any hangover effect in the morning from medication or any specific adverse effects.  She also has a new problem which is of sinus congestion, body aches and cough which have worsened over the course last 2 days.  She does not run a fever, she states she feels more tired than usual, she's had problems on and off with sinus infections for the past month and is been a couple antibiotics and feels that her symptoms never completely went away.  She did get a flu shot.  She is doing very well on her smoking cessation she is down to 1 cigarette every other day.  The following portions of the patient's history were reviewed and updated as appropriate: allergies, current medications, past social history and problem list    Review of Systems   Constitutional: Negative.  Negative for chills, fatigue and fever.   HENT: Positive for congestion, ear pain, postnasal drip, rhinorrhea and sinus pressure. Negative for sore throat.    Eyes: Positive for pain.   Respiratory: Positive for cough. Negative for shortness of breath.    Gastrointestinal: Negative.    Genitourinary: Negative.    Musculoskeletal: Positive for arthralgias and back pain. Negative for gait problem and myalgias.   Neurological: Positive for headaches. Negative for dizziness, tremors, weakness and numbness.   Hematological: Negative for adenopathy.       Objective     Vitals:    01/11/18 1540   BP: 132/80   Pulse: 64   Temp: 98.3 °F (36.8 °C)   SpO2: 99%       Physical Exam   Constitutional: She is oriented to person,  place, and time. She appears well-developed and well-nourished.   HENT:   Head: Normocephalic and atraumatic.   Right Ear: Tympanic membrane and ear canal normal.   Left Ear: Tympanic membrane and ear canal normal.   Nose: Mucosal edema, rhinorrhea and sinus tenderness present. Right sinus exhibits maxillary sinus tenderness and frontal sinus tenderness. Left sinus exhibits maxillary sinus tenderness and frontal sinus tenderness.   Mouth/Throat: Oropharynx is clear and moist. No oropharyngeal exudate.   Eyes: Pupils are equal, round, and reactive to light.   Cardiovascular: Normal rate and regular rhythm.    Pulmonary/Chest: Effort normal and breath sounds normal.   Abdominal: Soft. Bowel sounds are normal.   Musculoskeletal:        Lumbar back: She exhibits decreased range of motion, tenderness, bony tenderness and pain. She exhibits no swelling, no deformity and no spasm.   Neurological: She is alert and oriented to person, place, and time. She has normal reflexes.   Nursing note and vitals reviewed.      Assessment/Plan     Diagnoses and all orders for this visit:    Chronic midline low back pain without sciatica    Pain of left hip joint    Sinusitis, unspecified chronicity, unspecified location    Other orders  -     cefdinir (OMNICEF) 300 MG capsule; Take 2 capsules once daily for 10 days    Rapid flu screen was negative discussed with patient, refill given on Percocet 5/325 one daily at bedtime for chronic back and hip pain associated with arthritis dispensed #30, discussed abuse potential this medication, Joshua appropriate.  Continue with Stahist the patient currently has been using half to 1 every 12 hours as needed for congestion

## 2018-01-29 ENCOUNTER — TELEPHONE (OUTPATIENT)
Dept: FAMILY MEDICINE CLINIC | Facility: CLINIC | Age: 48
End: 2018-01-29

## 2018-01-29 RX ORDER — DEXTROMETHORPHAN HYDROBROMIDE AND PROMETHAZINE HYDROCHLORIDE 15; 6.25 MG/5ML; MG/5ML
SYRUP ORAL
Qty: 180 ML | Refills: 0 | Status: SHIPPED | OUTPATIENT
Start: 2018-01-29 | End: 2018-02-19

## 2018-02-09 ENCOUNTER — TELEPHONE (OUTPATIENT)
Dept: FAMILY MEDICINE CLINIC | Facility: CLINIC | Age: 48
End: 2018-02-09

## 2018-02-19 ENCOUNTER — TELEPHONE (OUTPATIENT)
Dept: FAMILY MEDICINE CLINIC | Facility: CLINIC | Age: 48
End: 2018-02-19

## 2018-02-19 ENCOUNTER — OFFICE VISIT (OUTPATIENT)
Dept: FAMILY MEDICINE CLINIC | Facility: CLINIC | Age: 48
End: 2018-02-19

## 2018-02-19 VITALS
SYSTOLIC BLOOD PRESSURE: 130 MMHG | HEIGHT: 61 IN | TEMPERATURE: 98 F | HEART RATE: 68 BPM | OXYGEN SATURATION: 99 % | DIASTOLIC BLOOD PRESSURE: 84 MMHG

## 2018-02-19 DIAGNOSIS — R68.89 FLU-LIKE SYMPTOMS: Primary | ICD-10-CM

## 2018-02-19 LAB
EXPIRATION DATE: NORMAL
FLUAV AG NPH QL: NORMAL
FLUBV AG NPH QL: NORMAL
INTERNAL CONTROL: NORMAL
Lab: NORMAL

## 2018-02-19 PROCEDURE — 87804 INFLUENZA ASSAY W/OPTIC: CPT | Performed by: PHYSICIAN ASSISTANT

## 2018-02-19 PROCEDURE — 99213 OFFICE O/P EST LOW 20 MIN: CPT | Performed by: PHYSICIAN ASSISTANT

## 2018-02-19 RX ORDER — ONDANSETRON HYDROCHLORIDE 8 MG/1
8 TABLET, FILM COATED ORAL EVERY 8 HOURS PRN
Qty: 15 TABLET | Refills: 0 | Status: SHIPPED | OUTPATIENT
Start: 2018-02-19 | End: 2018-05-10

## 2018-02-19 RX ORDER — DICYCLOMINE HCL 20 MG
20 TABLET ORAL EVERY 6 HOURS
Qty: 20 TABLET | Refills: 0 | Status: SHIPPED | OUTPATIENT
Start: 2018-02-19 | End: 2018-05-10

## 2018-02-19 RX ORDER — DEXTROMETHORPHAN HYDROBROMIDE AND PROMETHAZINE HYDROCHLORIDE 15; 6.25 MG/5ML; MG/5ML
SYRUP ORAL
Qty: 180 ML | Refills: 0 | Status: SHIPPED | OUTPATIENT
Start: 2018-02-19 | End: 2018-03-21 | Stop reason: SDUPTHER

## 2018-02-19 NOTE — PROGRESS NOTES
Subjective   Trinidad Quinteros is a 47 y.o. female  Nausea (nausea x4 days ); Chills (body chills x4 days ); and Generalized Body Aches (body aches x4 days )      History of Present Illness  Patient comes in today for evaluation of symptoms that began yesterday with body aches chills and nausea.  She had 3 episodes of diarrhea today.  Slight cough.  She has smoked a few cigarettes recently approximate 6 per day.  No fever.  She does work in our office.  The following portions of the patient's history were reviewed and updated as appropriate: allergies, current medications, past social history and problem list    Review of Systems   Constitutional: Positive for appetite change, chills and fatigue. Negative for fever and unexpected weight change.   Respiratory: Positive for cough. Negative for choking, chest tightness and shortness of breath.    Cardiovascular: Negative for chest pain.   Gastrointestinal: Positive for diarrhea and nausea. Negative for abdominal distention, abdominal pain, blood in stool, constipation and vomiting.   Musculoskeletal: Positive for myalgias.   Skin: Negative.        Objective     Vitals:    02/19/18 1615   BP: 130/84   Pulse: 68   Temp: 98 °F (36.7 °C)   SpO2: 99%       Physical Exam   Constitutional: She appears well-developed and well-nourished. No distress.   HENT:   Head: Normocephalic and atraumatic.   Right Ear: External ear normal.   Left Ear: External ear normal.   Nose: Nose normal.   Mouth/Throat: Oropharynx is clear and moist. No oropharyngeal exudate.   Eyes: Conjunctivae are normal. Right eye exhibits no discharge. Left eye exhibits no discharge. No scleral icterus.   Neck: Normal range of motion. Neck supple.   Cardiovascular: Normal rate, regular rhythm and normal heart sounds.    Pulmonary/Chest: Effort normal and breath sounds normal. No respiratory distress.   Abdominal: Soft. Bowel sounds are normal. She exhibits no distension and no mass. There is no tenderness. There  is no rebound and no guarding. No hernia.   Lymphadenopathy:     She has no cervical adenopathy.   Skin: Skin is warm and dry. No rash noted. She is not diaphoretic. No erythema. No pallor.   Nursing note and vitals reviewed.    Rapid flu screen negative discussed with patient  Assessment/Plan     Diagnoses and all orders for this visit:    Flu-like symptoms  -     POCT Influenza A/B  -     ondansetron (ZOFRAN) 8 MG tablet; Take 1 tablet by mouth Every 8 (Eight) Hours As Needed for Nausea.  -     dicyclomine (BENTYL) 20 MG tablet; Take 1 tablet by mouth Every 6 (Six) Hours.  -     promethazine-dextromethorphan (PROMETHAZINE-DM) 6.25-15 MG/5ML syrup; Take 1-2 teaspoons by mouth every 4 hours as needed for cough

## 2018-02-19 NOTE — TELEPHONE ENCOUNTER
----- Message from Angela Rahman sent at 2/19/2018  8:16 AM EST -----  Patient is complaining of nausea, sinus drainage (coughing), nasal stuffiness, head/ ear ache. She wants to know if you can squeeze her in today?   Also, she needs a refill on her promethazine-dextromethorphan (PROMETHAZINE-DM) 6.25-15 MG/5ML syrup sent to [facility] pharmacy..  ThanksAngela..

## 2018-02-22 ENCOUNTER — APPOINTMENT (OUTPATIENT)
Dept: MAMMOGRAPHY | Facility: HOSPITAL | Age: 48
End: 2018-02-22

## 2018-03-12 ENCOUNTER — TELEPHONE (OUTPATIENT)
Dept: FAMILY MEDICINE CLINIC | Facility: CLINIC | Age: 48
End: 2018-03-12

## 2018-03-12 NOTE — TELEPHONE ENCOUNTER
----- Message from Angela Rahman sent at 3/12/2018 11:31 AM EDT -----  Patient needs a refill on her oxyCODONE-acetaminophen (PERCOCET) 5-325 MG per tablet take once daily for back and hip pain..  ThanksAngela..

## 2018-03-21 ENCOUNTER — OFFICE VISIT (OUTPATIENT)
Dept: FAMILY MEDICINE CLINIC | Facility: CLINIC | Age: 48
End: 2018-03-21

## 2018-03-21 VITALS
HEART RATE: 73 BPM | TEMPERATURE: 97.7 F | DIASTOLIC BLOOD PRESSURE: 64 MMHG | OXYGEN SATURATION: 98 % | SYSTOLIC BLOOD PRESSURE: 122 MMHG | HEIGHT: 61 IN | WEIGHT: 180 LBS | BODY MASS INDEX: 33.99 KG/M2

## 2018-03-21 DIAGNOSIS — R68.89 FLU-LIKE SYMPTOMS: ICD-10-CM

## 2018-03-21 PROCEDURE — 99213 OFFICE O/P EST LOW 20 MIN: CPT | Performed by: PHYSICIAN ASSISTANT

## 2018-03-21 RX ORDER — CEFDINIR 300 MG/1
300 CAPSULE ORAL 2 TIMES DAILY
Qty: 14 CAPSULE | Refills: 0 | Status: SHIPPED | OUTPATIENT
Start: 2018-03-21 | End: 2018-03-28

## 2018-03-21 RX ORDER — DEXTROMETHORPHAN HYDROBROMIDE AND PROMETHAZINE HYDROCHLORIDE 15; 6.25 MG/5ML; MG/5ML
SYRUP ORAL
Qty: 180 ML | Refills: 0 | Status: SHIPPED | OUTPATIENT
Start: 2018-03-21 | End: 2018-04-30 | Stop reason: SDUPTHER

## 2018-03-21 NOTE — PROGRESS NOTES
Subjective   Trinidad Quinteros is a 47 y.o. female  Sinus Problem (sinus pressure with brown mucus  x2 days ) and Cough (cough x2 days )      History of Present Illness  Patient comes in today with concerns of sinus congestion postnasal drainage, sore throat and cough since last night.  Patient is smoking one half pack per day of cigarettes.  She denies any fever.  The following portions of the patient's history were reviewed and updated as appropriate: allergies, current medications, past social history and problem list    Review of Systems   Constitutional: Negative for chills, fatigue and fever.   HENT: Positive for congestion, ear pain, postnasal drip, rhinorrhea, sinus pressure and sore throat.    Eyes: Positive for pain.   Respiratory: Positive for cough. Negative for shortness of breath.    Neurological: Positive for headaches. Negative for dizziness.   Hematological: Negative for adenopathy.       Objective     Vitals:    03/21/18 1529   BP: 122/64   Pulse: 73   Temp: 97.7 °F (36.5 °C)   SpO2: 98%       Physical Exam   Constitutional: She appears well-developed and well-nourished.   HENT:   Head: Normocephalic and atraumatic.   Right Ear: Tympanic membrane and ear canal normal.   Left Ear: Tympanic membrane and ear canal normal.   Nose: Mucosal edema, rhinorrhea and sinus tenderness present. Right sinus exhibits maxillary sinus tenderness and frontal sinus tenderness. Left sinus exhibits maxillary sinus tenderness and frontal sinus tenderness.   Mouth/Throat: Oropharynx is clear and moist. No oropharyngeal exudate.   Eyes: Pupils are equal, round, and reactive to light.   Cardiovascular: Normal rate and regular rhythm.    Pulmonary/Chest: Effort normal and breath sounds normal.   Nursing note and vitals reviewed.      Assessment/Plan     Diagnoses and all orders for this visit:    Flu-like symptoms  -     promethazine-dextromethorphan (PROMETHAZINE-DM) 6.25-15 MG/5ML syrup; Take 1-2 teaspoons by mouth every 4  hours as needed for cough    Other orders  -     cefdinir (OMNICEF) 300 MG capsule; Take 1 capsule by mouth 2 (Two) Times a Day.

## 2018-03-28 ENCOUNTER — TELEPHONE (OUTPATIENT)
Dept: FAMILY MEDICINE CLINIC | Facility: CLINIC | Age: 48
End: 2018-03-28

## 2018-03-28 RX ORDER — AZITHROMYCIN 250 MG/1
TABLET, FILM COATED ORAL
Qty: 6 TABLET | Refills: 0 | Status: SHIPPED | OUTPATIENT
Start: 2018-03-28 | End: 2018-05-10

## 2018-03-28 RX ORDER — PREDNISONE 20 MG/1
20 TABLET ORAL DAILY
Qty: 5 TABLET | Refills: 0 | Status: SHIPPED | OUTPATIENT
Start: 2018-03-28 | End: 2018-05-10

## 2018-03-28 NOTE — TELEPHONE ENCOUNTER
----- Message from Angela Rahman sent at 3/28/2018  9:44 AM EDT -----  Patient has been on cefdinir (OMNICEF) 300 MG capsules (one tablet twice daily) and has taken the last of her antibiotic today. Patient still has sinus drainage/pressure and feels like she still has a sinus infection. Patient is wondering if something else stronger should be called in?? Patient is allergic to bactrim. Patient uses [facility] pharmacy..  Thanks,  Angela..

## 2018-04-10 ENCOUNTER — TELEPHONE (OUTPATIENT)
Dept: FAMILY MEDICINE CLINIC | Facility: CLINIC | Age: 48
End: 2018-04-10

## 2018-04-10 RX ORDER — ALPRAZOLAM 0.5 MG/1
0.5 TABLET ORAL 2 TIMES DAILY
Qty: 60 TABLET | Refills: 0 | OUTPATIENT
Start: 2018-04-10 | End: 2018-05-10 | Stop reason: SDUPTHER

## 2018-04-10 NOTE — TELEPHONE ENCOUNTER
Please call in 1 month's refill on Xanax, prescription has been written for 1 month of Percocet, notify patient she is due for a follow-up in the office for recheck next month.

## 2018-04-10 NOTE — TELEPHONE ENCOUNTER
----- Message from Angela Rahman sent at 4/10/2018 10:30 AM EDT -----  Patient needs a refill on her Oxycodone 5-325 mg (one at night for hip/back pain) and also Xanax 0.5 mg (2 x daily) out of refills. [facility]..  Thanks,  Angela..

## 2018-04-26 RX ORDER — VALSARTAN AND HYDROCHLOROTHIAZIDE 160; 12.5 MG/1; MG/1
1 TABLET, FILM COATED ORAL DAILY
Qty: 30 TABLET | Refills: 5 | Status: SHIPPED | OUTPATIENT
Start: 2018-04-26 | End: 2018-07-18 | Stop reason: ALTCHOICE

## 2018-04-30 ENCOUNTER — TELEPHONE (OUTPATIENT)
Dept: FAMILY MEDICINE CLINIC | Facility: CLINIC | Age: 48
End: 2018-04-30

## 2018-04-30 RX ORDER — DEXTROMETHORPHAN HYDROBROMIDE AND PROMETHAZINE HYDROCHLORIDE 15; 6.25 MG/5ML; MG/5ML
5 SYRUP ORAL 4 TIMES DAILY PRN
Qty: 118 ML | Refills: 0 | Status: SHIPPED | OUTPATIENT
Start: 2018-04-30 | End: 2018-05-10

## 2018-04-30 RX ORDER — AMOXICILLIN 875 MG/1
875 TABLET, COATED ORAL 2 TIMES DAILY
Qty: 14 TABLET | Refills: 0 | Status: SHIPPED | OUTPATIENT
Start: 2018-04-30 | End: 2018-05-10

## 2018-05-10 ENCOUNTER — OFFICE VISIT (OUTPATIENT)
Dept: FAMILY MEDICINE CLINIC | Facility: CLINIC | Age: 48
End: 2018-05-10

## 2018-05-10 VITALS
HEART RATE: 86 BPM | HEIGHT: 61 IN | WEIGHT: 178 LBS | TEMPERATURE: 98 F | DIASTOLIC BLOOD PRESSURE: 82 MMHG | BODY MASS INDEX: 33.61 KG/M2 | OXYGEN SATURATION: 99 % | SYSTOLIC BLOOD PRESSURE: 130 MMHG

## 2018-05-10 DIAGNOSIS — Z72.0 TOBACCO ABUSE: ICD-10-CM

## 2018-05-10 DIAGNOSIS — M54.50 CHRONIC MIDLINE LOW BACK PAIN WITHOUT SCIATICA: Primary | ICD-10-CM

## 2018-05-10 DIAGNOSIS — B35.1 ONYCHOMYCOSIS: ICD-10-CM

## 2018-05-10 DIAGNOSIS — F41.9 ANXIETY: ICD-10-CM

## 2018-05-10 DIAGNOSIS — G89.29 CHRONIC MIDLINE LOW BACK PAIN WITHOUT SCIATICA: Primary | ICD-10-CM

## 2018-05-10 PROCEDURE — 99214 OFFICE O/P EST MOD 30 MIN: CPT | Performed by: PHYSICIAN ASSISTANT

## 2018-05-10 RX ORDER — FLUCONAZOLE 200 MG/1
200 TABLET ORAL WEEKLY
Qty: 4 TABLET | Refills: 2 | Status: SHIPPED | OUTPATIENT
Start: 2018-05-10 | End: 2018-07-12

## 2018-05-10 NOTE — PROGRESS NOTES
Subjective   Trinidad Quinteros is a 47 y.o. female  Back Pain (follow up on Low back pain, req refill on Oxycodone) and Anxiety (Follow up on anxiety, req refill on Alprazolam )      History of Present Illness  Patient comes in today for evaluation of 3 separate issues.  She's here for follow-up on chronic low back pain associated with spinal stenosis and degenerative disc disease.  Last x-ray was in 2015 showing near collapse of L5-S1 disc space.  She states that she still has significant pain especially at night time awakening her from her sleep if she does not take her Percocet.  She states with the Percocet she is able to rest well.  She still feels discomfort throughout the day but is very tolerable and she is able to function and do simple things that she enjoys such as gardening with modifications, she uses a stool to sit on when she does report gardening.  She denies numbness or weakness in her legs.  Second issue is generalized anxiety disorder.  This is currently stable.  She states that her stress level has reduced somewhat in her family as she had a lot of high stress with multiple family members ill last year.  She is trying to reduce her Xanax down currently trying to cut back to a half a tablet in the morning and 1 in the evening.  She does have some days that she needs a whole tablet twice daily.  Still taking Zoloft as well.  The addition of gabapentin to the Percocet at nighttime helped her rest improved significantly and she is due for refill on this also.  No adverse effects from her medications, denies any dizziness drowsiness or confusion.  She also has a new problem which is of thickening yellowing appearance to her great toenail on her left foot.  States it has gotten progressively worse over the past 6 months.  She has not tried any prescription medications just over-the-counter remedies which is been ineffective.  The following portions of the patient's history were reviewed and updated as  appropriate: allergies, current medications, past social history and problem list    Review of Systems   Constitutional: Negative.  Negative for appetite change and fatigue.   Respiratory: Negative.  Negative for chest tightness and shortness of breath.    Gastrointestinal: Negative.  Negative for abdominal pain, diarrhea and nausea.   Genitourinary: Negative.    Musculoskeletal: Positive for arthralgias and back pain. Negative for gait problem and myalgias.   Skin: Positive for color change.   Neurological: Negative for dizziness, tremors, weakness, light-headedness, numbness and headaches.   Psychiatric/Behavioral: Positive for sleep disturbance. Negative for agitation, behavioral problems, confusion, decreased concentration, dysphoric mood and suicidal ideas. The patient is nervous/anxious.        Objective     Vitals:    05/10/18 0818   BP: 130/82   Pulse: 86   Temp: 98 °F (36.7 °C)   SpO2: 99%       Physical Exam   Constitutional: She is oriented to person, place, and time. She appears well-developed and well-nourished. No distress.   Neck: No thyroid mass and no thyromegaly present.   Cardiovascular: Normal rate, regular rhythm and normal heart sounds.    Pulmonary/Chest: Effort normal and breath sounds normal.   Abdominal: Soft. Bowel sounds are normal.   Musculoskeletal:        Lumbar back: She exhibits decreased range of motion, tenderness, bony tenderness and pain. She exhibits no swelling, no deformity and no spasm.   Neurological: She is alert and oriented to person, place, and time. She has normal reflexes.   Skin: Skin is warm and dry. No rash noted. She is not diaphoretic. No erythema. No pallor.   Toenail on great toe left foot thickened and yellowed consistent with onychomycosis.   Psychiatric: Her speech is normal and behavior is normal. Judgment and thought content normal. Her mood appears anxious. Her affect is not angry and not inappropriate. Cognition and memory are normal. She does not exhibit  a depressed mood. She is attentive.   Nursing note and vitals reviewed.      Assessment/Plan     Diagnoses and all orders for this visit:    Chronic midline low back pain without sciatica    Tobacco abuse    Anxiety    Onychomycosis    Other orders  -     fluconazole (DIFLUCAN) 200 MG tablet; Take 1 tablet by mouth 1 (One) Time Per Week for fungal infection    Refilled gabapentin 300 mg twice a day #60 with 5 refills, Xanax 0.5 mg half to 1 twice a day for anxiety #60 with 5 refills, discussed abuse potential these medications, Joshua reviewed, appropriate, follow-up in 6 months for recheck.  Refilled Percocet 5/325 one daily at bedtime or chronic back pain #30 with no refills, follow-up in office in 3 months for recheck.

## 2018-05-30 ENCOUNTER — TELEPHONE (OUTPATIENT)
Dept: FAMILY MEDICINE CLINIC | Facility: CLINIC | Age: 48
End: 2018-05-30

## 2018-05-30 RX ORDER — DICYCLOMINE HCL 20 MG
20 TABLET ORAL EVERY 6 HOURS
Qty: 20 TABLET | Refills: 1 | Status: SHIPPED | OUTPATIENT
Start: 2018-05-30 | End: 2018-08-15 | Stop reason: SDUPTHER

## 2018-06-12 RX ORDER — TIZANIDINE 4 MG/1
2-4 TABLET ORAL 2 TIMES DAILY PRN
Qty: 45 TABLET | Refills: 5 | Status: SHIPPED | OUTPATIENT
Start: 2018-06-12 | End: 2018-12-10

## 2018-07-09 ENCOUNTER — TELEPHONE (OUTPATIENT)
Dept: FAMILY MEDICINE CLINIC | Facility: CLINIC | Age: 48
End: 2018-07-09

## 2018-07-09 RX ORDER — AZITHROMYCIN 250 MG/1
TABLET, FILM COATED ORAL
Qty: 6 TABLET | Refills: 0 | Status: SHIPPED | OUTPATIENT
Start: 2018-07-09 | End: 2018-08-15

## 2018-07-09 NOTE — TELEPHONE ENCOUNTER
Kaleigh, please call in a Z-Raffy for patient and I have given her a copy of her Percocet prescription      ----- Message from Trinidad Quinteros sent at 7/9/2018 10:54 AM EDT -----  Can I get something called in for sinus infection.Nasal pressure, drainage, ears hurting. Also script for:      oxyCODONE-acetaminophen (PERCOCET) 5-325 MG per tablet 30 tablet 0    Sig - Route: Take 1 tablet by mouth every night at bedtime for hip and back pain - Oral     Or do I have to be seen. Thank you

## 2018-07-11 ENCOUNTER — TELEPHONE (OUTPATIENT)
Dept: FAMILY MEDICINE CLINIC | Facility: CLINIC | Age: 48
End: 2018-07-11

## 2018-07-11 NOTE — TELEPHONE ENCOUNTER
I want you to make an appointment to see me this week in the office so we can go through this.    ----- Message from Trinidad Quinteros sent at 7/11/2018  8:59 AM EDT -----  I AM STILL HAVING A LOT OF PROBLEMS WITH DIARRHEA. ITS LIKE EVERYTHING I EAT GOES STRAIGHT THROUGH. GOING AT LEAST 3 OR MORE TIMES A DAY. I AM TAKING THE BENTYL, BUT STILL HAVING DIARRHEA. JUST WANTED TO SEE WHAT YOU SUGGEST THAT I DO. THANK YOU

## 2018-07-12 ENCOUNTER — OFFICE VISIT (OUTPATIENT)
Dept: FAMILY MEDICINE CLINIC | Facility: CLINIC | Age: 48
End: 2018-07-12

## 2018-07-12 ENCOUNTER — LAB (OUTPATIENT)
Dept: LAB | Facility: HOSPITAL | Age: 48
End: 2018-07-12

## 2018-07-12 VITALS
DIASTOLIC BLOOD PRESSURE: 80 MMHG | TEMPERATURE: 98.6 F | HEIGHT: 61 IN | BODY MASS INDEX: 32.89 KG/M2 | OXYGEN SATURATION: 99 % | WEIGHT: 174.2 LBS | HEART RATE: 87 BPM | SYSTOLIC BLOOD PRESSURE: 136 MMHG

## 2018-07-12 DIAGNOSIS — R19.7 DIARRHEA, UNSPECIFIED TYPE: Primary | ICD-10-CM

## 2018-07-12 DIAGNOSIS — R19.7 DIARRHEA, UNSPECIFIED TYPE: ICD-10-CM

## 2018-07-12 DIAGNOSIS — R63.4 UNEXPLAINED WEIGHT LOSS: ICD-10-CM

## 2018-07-12 DIAGNOSIS — R11.0 NAUSEA: ICD-10-CM

## 2018-07-12 LAB
ALBUMIN SERPL-MCNC: 4.6 G/DL (ref 3.2–4.8)
ALBUMIN/GLOB SERPL: 2.1 G/DL (ref 1.5–2.5)
ALP SERPL-CCNC: 86 U/L (ref 25–100)
ALT SERPL W P-5'-P-CCNC: 15 U/L (ref 7–40)
ANION GAP SERPL CALCULATED.3IONS-SCNC: 3 MMOL/L (ref 3–11)
AST SERPL-CCNC: 18 U/L (ref 0–33)
BASOPHILS # BLD AUTO: 0.04 10*3/MM3 (ref 0–0.2)
BASOPHILS NFR BLD AUTO: 0.5 % (ref 0–1)
BILIRUB SERPL-MCNC: 0.3 MG/DL (ref 0.3–1.2)
BUN BLD-MCNC: 9 MG/DL (ref 9–23)
BUN/CREAT SERPL: 10.2 (ref 7–25)
CALCIUM SPEC-SCNC: 9 MG/DL (ref 8.7–10.4)
CHLORIDE SERPL-SCNC: 106 MMOL/L (ref 99–109)
CO2 SERPL-SCNC: 31 MMOL/L (ref 20–31)
CREAT BLD-MCNC: 0.88 MG/DL (ref 0.6–1.3)
CRP SERPL-MCNC: 0.7 MG/DL (ref 0–1)
DEPRECATED RDW RBC AUTO: 47.4 FL (ref 37–54)
EOSINOPHIL # BLD AUTO: 0.21 10*3/MM3 (ref 0–0.3)
EOSINOPHIL NFR BLD AUTO: 2.6 % (ref 0–3)
ERYTHROCYTE [DISTWIDTH] IN BLOOD BY AUTOMATED COUNT: 14.2 % (ref 11.3–14.5)
ERYTHROCYTE [SEDIMENTATION RATE] IN BLOOD: 9 MM/HR (ref 0–20)
GFR SERPL CREATININE-BSD FRML MDRD: 69 ML/MIN/1.73
GLOBULIN UR ELPH-MCNC: 2.2 GM/DL
GLUCOSE BLD-MCNC: 83 MG/DL (ref 70–100)
HCT VFR BLD AUTO: 43.1 % (ref 34.5–44)
HGB BLD-MCNC: 13.9 G/DL (ref 11.5–15.5)
IMM GRANULOCYTES # BLD: 0.02 10*3/MM3 (ref 0–0.03)
IMM GRANULOCYTES NFR BLD: 0.2 % (ref 0–0.6)
LYMPHOCYTES # BLD AUTO: 2.97 10*3/MM3 (ref 0.6–4.8)
LYMPHOCYTES NFR BLD AUTO: 36.1 % (ref 24–44)
MCH RBC QN AUTO: 29.3 PG (ref 27–31)
MCHC RBC AUTO-ENTMCNC: 32.3 G/DL (ref 32–36)
MCV RBC AUTO: 90.9 FL (ref 80–99)
MONOCYTES # BLD AUTO: 0.54 10*3/MM3 (ref 0–1)
MONOCYTES NFR BLD AUTO: 6.6 % (ref 0–12)
NEUTROPHILS # BLD AUTO: 4.45 10*3/MM3 (ref 1.5–8.3)
NEUTROPHILS NFR BLD AUTO: 54 % (ref 41–71)
PLATELET # BLD AUTO: 297 10*3/MM3 (ref 150–450)
PMV BLD AUTO: 10.4 FL (ref 6–12)
POTASSIUM BLD-SCNC: 3.7 MMOL/L (ref 3.5–5.5)
PROT SERPL-MCNC: 6.8 G/DL (ref 5.7–8.2)
RBC # BLD AUTO: 4.74 10*6/MM3 (ref 3.89–5.14)
SODIUM BLD-SCNC: 140 MMOL/L (ref 132–146)
WBC NRBC COR # BLD: 8.23 10*3/MM3 (ref 3.5–10.8)

## 2018-07-12 PROCEDURE — 99213 OFFICE O/P EST LOW 20 MIN: CPT | Performed by: PHYSICIAN ASSISTANT

## 2018-07-12 PROCEDURE — 86140 C-REACTIVE PROTEIN: CPT

## 2018-07-12 PROCEDURE — 85025 COMPLETE CBC W/AUTO DIFF WBC: CPT

## 2018-07-12 PROCEDURE — 80053 COMPREHEN METABOLIC PANEL: CPT

## 2018-07-12 PROCEDURE — 85652 RBC SED RATE AUTOMATED: CPT

## 2018-07-12 PROCEDURE — 36415 COLL VENOUS BLD VENIPUNCTURE: CPT

## 2018-07-12 RX ORDER — METRONIDAZOLE 500 MG/1
500 TABLET ORAL 3 TIMES DAILY
Qty: 21 TABLET | Refills: 0 | Status: SHIPPED | OUTPATIENT
Start: 2018-07-12 | End: 2018-08-15 | Stop reason: ALTCHOICE

## 2018-07-12 NOTE — PROGRESS NOTES
Subjective   Trinidad Quinteros is a 47 y.o. female  Diarrhea (Diarrhea x2 weeks ) and Nausea (intermittent nausea x2 weeks )      History of Present Illness  Patient comes in today with concerns of worsening progressive diarrhea.  She states that she initially had diarrhea that started mid May and was given Bentyl which she states did help and the diarrhea went away completely for approximately 3 weeks then it came back.  She states over the course of the past week it has worsened significantly where she is having liquid stools at least 3 times a day after each meal.  She does not have any nocturnal symptoms.  She has intermittent nausea but no vomiting.  She states she feels excessively gassy and bloated.  No fever.  No blood in stools no black tarry stools.  No history of colonoscopy, no history of colon disorders.  Weight is down 6 pounds since March  unintentionally.  No foreign travel, no family members ill, she was recently prescribed Zithromax but she states the symptoms that started before this.  Bentyl is helping some.  The following portions of the patient's history were reviewed and updated as appropriate: allergies, current medications, past social history and problem list    Review of Systems   Constitutional: Positive for appetite change. Negative for chills, diaphoresis, fever and unexpected weight change.   Respiratory: Negative for cough, chest tightness and shortness of breath.    Cardiovascular: Negative for chest pain.   Gastrointestinal: Positive for abdominal distention, diarrhea and nausea. Negative for abdominal pain, blood in stool, constipation and vomiting.   Genitourinary: Negative.  Negative for difficulty urinating and dysuria.   Musculoskeletal: Negative.  Negative for back pain.   Skin: Negative for color change and rash.   Allergic/Immunologic: Negative for food allergies.       Objective     Vitals:    07/12/18 1531   BP: 136/80   Pulse: 87   Temp: 98.6 °F (37 °C)   SpO2: 99%        Physical Exam   Constitutional: She is oriented to person, place, and time. She appears well-developed and well-nourished. No distress.   HENT:   Head: Normocephalic and atraumatic.   Eyes: Conjunctivae are normal. No scleral icterus.   Cardiovascular: Normal rate and regular rhythm.    Pulmonary/Chest: Effort normal and breath sounds normal.   Abdominal: Soft. Bowel sounds are normal. She exhibits no distension and no mass. There is no tenderness. There is no rebound and no guarding. No hernia.   Neurological: She is alert and oriented to person, place, and time.   Skin: Skin is warm and dry. No rash noted. She is not diaphoretic. No erythema. No pallor.   Psychiatric: She has a normal mood and affect. Her behavior is normal.   Nursing note and vitals reviewed.      Assessment/Plan     Diagnoses and all orders for this visit:    Diarrhea, unspecified type  -     CBC & Differential; Future  -     Comprehensive Metabolic Panel; Future  -     C-reactive Protein; Future  -     Sedimentation Rate; Future    Unexplained weight loss  -     CBC & Differential; Future  -     Comprehensive Metabolic Panel; Future  -     C-reactive Protein; Future  -     Sedimentation Rate; Future    Nausea  -     CBC & Differential; Future  -     Comprehensive Metabolic Panel; Future  -     C-reactive Protein; Future  -     Sedimentation Rate; Future    Other orders  -     metroNIDAZOLE (FLAGYL) 500 MG tablet; Take 1 tablet by mouth 3 (Three) Times a Day.

## 2018-07-18 ENCOUNTER — TELEPHONE (OUTPATIENT)
Dept: FAMILY MEDICINE CLINIC | Facility: CLINIC | Age: 48
End: 2018-07-18

## 2018-07-18 RX ORDER — LOSARTAN POTASSIUM AND HYDROCHLOROTHIAZIDE 12.5; 1 MG/1; MG/1
1 TABLET ORAL DAILY
Qty: 30 TABLET | Refills: 5 | Status: SHIPPED | OUTPATIENT
Start: 2018-07-18 | End: 2018-08-15 | Stop reason: ALTCHOICE

## 2018-07-18 NOTE — TELEPHONE ENCOUNTER
New replacement medicine has been sent to your pharmacy, please stop your current valsartan/HCTZ    ----- Message from Trinidad Quinteros sent at 7/18/2018 11:20 AM EDT -----  NEED NEW / DIFFERENT BP MEDICATION DUE TO RECALL ON VALSARTIN.    DEBRA ARZATE     THANK YOU

## 2018-08-08 RX ORDER — AMOXICILLIN 875 MG/1
875 TABLET, COATED ORAL 2 TIMES DAILY
Qty: 14 TABLET | Refills: 0 | Status: SHIPPED | OUTPATIENT
Start: 2018-08-08 | End: 2018-08-15 | Stop reason: ALTCHOICE

## 2018-08-15 ENCOUNTER — OFFICE VISIT (OUTPATIENT)
Dept: FAMILY MEDICINE CLINIC | Facility: CLINIC | Age: 48
End: 2018-08-15

## 2018-08-15 VITALS
DIASTOLIC BLOOD PRESSURE: 80 MMHG | TEMPERATURE: 98.5 F | SYSTOLIC BLOOD PRESSURE: 140 MMHG | WEIGHT: 170 LBS | BODY MASS INDEX: 32.1 KG/M2 | HEIGHT: 61 IN | OXYGEN SATURATION: 99 % | HEART RATE: 64 BPM

## 2018-08-15 DIAGNOSIS — R63.4 UNEXPLAINED WEIGHT LOSS: ICD-10-CM

## 2018-08-15 DIAGNOSIS — Z80.0 FAMILY HISTORY OF COLON CANCER: ICD-10-CM

## 2018-08-15 DIAGNOSIS — R19.7 DIARRHEA, UNSPECIFIED TYPE: Primary | ICD-10-CM

## 2018-08-15 DIAGNOSIS — J06.9 ACUTE URI: ICD-10-CM

## 2018-08-15 PROCEDURE — 99214 OFFICE O/P EST MOD 30 MIN: CPT | Performed by: PHYSICIAN ASSISTANT

## 2018-08-15 RX ORDER — LOSARTAN POTASSIUM 100 MG/1
100 TABLET ORAL DAILY
Qty: 30 TABLET | Refills: 5 | Status: SHIPPED | OUTPATIENT
Start: 2018-08-15 | End: 2019-02-21 | Stop reason: SDUPTHER

## 2018-08-15 RX ORDER — HYDROCHLOROTHIAZIDE 12.5 MG/1
12.5 TABLET ORAL DAILY
Qty: 30 TABLET | Refills: 5 | Status: SHIPPED | OUTPATIENT
Start: 2018-08-15 | End: 2019-02-21 | Stop reason: SDUPTHER

## 2018-08-15 RX ORDER — DICYCLOMINE HCL 20 MG
20 TABLET ORAL EVERY 6 HOURS
Qty: 20 TABLET | Refills: 1 | Status: SHIPPED | OUTPATIENT
Start: 2018-08-15 | End: 2019-05-21 | Stop reason: SDUPTHER

## 2018-08-15 RX ORDER — CEFDINIR 300 MG/1
300 CAPSULE ORAL 2 TIMES DAILY
Qty: 14 CAPSULE | Refills: 0 | Status: SHIPPED | OUTPATIENT
Start: 2018-08-15 | End: 2018-11-07

## 2018-08-15 NOTE — PROGRESS NOTES
Subjective   Trinidad Quinteros is a 47 y.o. female  Earache (bilateral ear pain x1 week ); Sinus Pressure (Sinus pressure x1 week ); and Cough (Productive cough with yellow mucus x1 week )      History of Present Illness  Patient comes in for 2 separate issues today 1 is for URI symptoms of earache sinus pressure and congestion and cough for the past week.  She has had some yellow mucus.  She smokes half pack per day of cigarettes.  She states symptoms seem to be worsening.  Second issue is of continued difficulty with diarrhea since May.  Please see previous office notes and labs.  She first started having loose stools and may, symptoms initially responded to Bentyl but then they returned.  Did not respond to Flagyl, labs were normal, no history of diarrhea in the past, patient has had more issues with constipation in the past.  No history of colonoscopy, she does have family history for grandfather having colon cancer.  Her weight decreased by 10 pounds since this begun in May.  She has tried a probiotic without relief.  She states she's not having any anxiety or stress that seems to make her feel like having diarrhea, she's having several liquid stools a day, no blood in her stool.  No medication changes.  The following portions of the patient's history were reviewed and updated as appropriate: allergies, current medications, past social history and problem list    Review of Systems   Constitutional: Positive for appetite change. Negative for chills, diaphoresis, fever and unexpected weight change.   HENT: Positive for congestion, postnasal drip, rhinorrhea, sneezing, sore throat and voice change. Negative for sinus pressure.    Respiratory: Positive for cough. Negative for chest tightness and shortness of breath.    Cardiovascular: Negative for chest pain.   Gastrointestinal: Positive for abdominal distention and diarrhea. Negative for abdominal pain, blood in stool, constipation, nausea and vomiting.    Genitourinary: Negative for difficulty urinating, dysuria and hematuria.   Musculoskeletal: Negative for back pain.   Skin: Negative for color change and rash.   Allergic/Immunologic: Negative.  Negative for food allergies.   Hematological: Negative.        Objective     Vitals:    08/15/18 1542   BP: 140/80   Pulse: 64   Temp: 98.5 °F (36.9 °C)   SpO2: 99%       Physical Exam   Constitutional: She is oriented to person, place, and time. She appears well-developed and well-nourished. No distress.   HENT:   Head: Normocephalic and atraumatic.   Right Ear: Tympanic membrane and ear canal normal.   Left Ear: Tympanic membrane and ear canal normal.   Nose: Mucosal edema, rhinorrhea and sinus tenderness present. Right sinus exhibits maxillary sinus tenderness and frontal sinus tenderness. Left sinus exhibits maxillary sinus tenderness and frontal sinus tenderness.   Mouth/Throat: Oropharynx is clear and moist. No oropharyngeal exudate.   Eyes: Pupils are equal, round, and reactive to light. Conjunctivae are normal. No scleral icterus.   Cardiovascular: Normal rate and regular rhythm.    Pulmonary/Chest: Effort normal and breath sounds normal.   Abdominal: Soft. Bowel sounds are normal. She exhibits no distension and no mass. There is no tenderness. There is no rebound and no guarding. No hernia.   Neurological: She is alert and oriented to person, place, and time.   Skin: Skin is warm and dry. No rash noted. She is not diaphoretic. No erythema. No pallor.   Psychiatric: She has a normal mood and affect. Her behavior is normal. Judgment and thought content normal.   Nursing note and vitals reviewed.      Assessment/Plan     Diagnoses and all orders for this visit:    Diarrhea, unspecified type  -     Ambulatory Referral to Gastroenterology    Acute URI    Family history of colon cancer  -     Ambulatory Referral to Gastroenterology    Unexplained weight loss    Other orders  -     dicyclomine (BENTYL) 20 MG tablet; Take  1 tablet by mouth Every 6 (Six) Hours. For diarrhea and stomach cramps  -     cefdinir (OMNICEF) 300 MG capsule; Take 1 capsule by mouth 2 (Two) Times a Day.  -     losartan (COZAAR) 100 MG tablet; Take 1 tablet by mouth Daily.  -     hydrochlorothiazide (HYDRODIURIL) 12.5 MG tablet; Take 1 tablet by mouth Daily.

## 2018-08-23 ENCOUNTER — TELEPHONE (OUTPATIENT)
Dept: FAMILY MEDICINE CLINIC | Facility: CLINIC | Age: 48
End: 2018-08-23

## 2018-08-23 DIAGNOSIS — Z12.11 SCREENING FOR COLON CANCER: Primary | ICD-10-CM

## 2018-08-23 RX ORDER — METHYLPREDNISOLONE 4 MG/1
TABLET ORAL
Qty: 21 TABLET | Refills: 0 | Status: SHIPPED | OUTPATIENT
Start: 2018-08-23 | End: 2018-11-14

## 2018-08-23 RX ORDER — POLYETHYLENE GLYCOL 3350, SODIUM CHLORIDE, SODIUM BICARBONATE, POTASSIUM CHLORIDE 420; 11.2; 5.72; 1.48 G/4L; G/4L; G/4L; G/4L
POWDER, FOR SOLUTION ORAL TAKE AS DIRECTED
Qty: 4000 ML | Refills: 0 | Status: SHIPPED | OUTPATIENT
Start: 2018-08-23 | End: 2018-11-14

## 2018-08-23 NOTE — TELEPHONE ENCOUNTER
Yes, this sounds more like allergies I have sent in a Medrol Dosepak for her to try and would recommend taking an over-the-counter antihistamine such as Claritin along with it.    ----- Message from Angela Rahman sent at 8/23/2018  8:14 AM EDT -----  Patient finished her antibiotic cefdinir (OMNICEF) 300 MG capsule (take one capsule twice daily). She is still suffering from sinus drainage/pressure. Patient's ears hurt as well. She is wondering if this is more allergy related?   ThanksAngela..

## 2018-08-31 ENCOUNTER — LAB REQUISITION (OUTPATIENT)
Dept: LAB | Facility: HOSPITAL | Age: 48
End: 2018-08-31

## 2018-08-31 ENCOUNTER — OUTSIDE FACILITY SERVICE (OUTPATIENT)
Dept: GASTROENTEROLOGY | Facility: CLINIC | Age: 48
End: 2018-08-31

## 2018-08-31 DIAGNOSIS — Z12.11 ENCOUNTER FOR SCREENING FOR MALIGNANT NEOPLASM OF COLON: ICD-10-CM

## 2018-08-31 PROCEDURE — 45385 COLONOSCOPY W/LESION REMOVAL: CPT | Performed by: INTERNAL MEDICINE

## 2018-08-31 PROCEDURE — 88305 TISSUE EXAM BY PATHOLOGIST: CPT | Performed by: INTERNAL MEDICINE

## 2018-09-04 ENCOUNTER — TELEPHONE (OUTPATIENT)
Dept: FAMILY MEDICINE CLINIC | Facility: CLINIC | Age: 48
End: 2018-09-04

## 2018-09-04 LAB
CYTO UR: NORMAL
LAB AP CASE REPORT: NORMAL
LAB AP CLINICAL INFORMATION: NORMAL
PATH REPORT.FINAL DX SPEC: NORMAL
PATH REPORT.GROSS SPEC: NORMAL

## 2018-09-04 RX ORDER — VARENICLINE TARTRATE 0.5 MG/1
0.5 TABLET, FILM COATED ORAL DAILY
Qty: 30 TABLET | Refills: 1 | Status: SHIPPED | OUTPATIENT
Start: 2018-09-04 | End: 2018-09-05 | Stop reason: SDUPTHER

## 2018-09-04 NOTE — TELEPHONE ENCOUNTER
Please call in a Chantix starter pack and then a Chantix continuing pack with 5 refills.    ----- Message from Trinidad Quinteros sent at 9/4/2018 12:23 PM EDT -----  Contact: PATIENT  CAN YOU CALL IN CHANTIX FOR ME PLEASE.     [facility]

## 2018-09-05 ENCOUNTER — TELEPHONE (OUTPATIENT)
Dept: FAMILY MEDICINE CLINIC | Facility: CLINIC | Age: 48
End: 2018-09-05

## 2018-09-05 RX ORDER — VARENICLINE TARTRATE 0.5 MG/1
0.5 TABLET, FILM COATED ORAL 2 TIMES DAILY
Qty: 60 TABLET | Refills: 1 | Status: SHIPPED | OUTPATIENT
Start: 2018-09-05 | End: 2019-03-08

## 2018-10-08 ENCOUNTER — TELEPHONE (OUTPATIENT)
Dept: FAMILY MEDICINE CLINIC | Facility: CLINIC | Age: 48
End: 2018-10-08

## 2018-10-08 NOTE — TELEPHONE ENCOUNTER
----- Message from Angela Rahman sent at 10/8/2018  9:06 AM EDT -----  Patient needs a refill on her [place] 5/325, taken nightly..  Thanks,  Angela..

## 2018-10-17 ENCOUNTER — TELEPHONE (OUTPATIENT)
Dept: FAMILY MEDICINE CLINIC | Facility: CLINIC | Age: 48
End: 2018-10-17

## 2018-10-17 RX ORDER — ATORVASTATIN CALCIUM 40 MG/1
40 TABLET, FILM COATED ORAL DAILY
Qty: 30 TABLET | Refills: 11 | Status: SHIPPED | OUTPATIENT
Start: 2018-10-17 | End: 2019-10-30 | Stop reason: SDUPTHER

## 2018-10-17 RX ORDER — PANTOPRAZOLE SODIUM 40 MG/1
40 TABLET, DELAYED RELEASE ORAL DAILY
Qty: 30 TABLET | Refills: 11 | Status: SHIPPED | OUTPATIENT
Start: 2018-10-17 | End: 2019-10-30 | Stop reason: SDUPTHER

## 2018-10-17 RX ORDER — PROMETHAZINE HYDROCHLORIDE 25 MG/1
25 TABLET ORAL EVERY 6 HOURS PRN
Qty: 12 TABLET | Refills: 11 | Status: SHIPPED | OUTPATIENT
Start: 2018-10-17 | End: 2019-10-30 | Stop reason: SDUPTHER

## 2018-10-17 RX ORDER — SERTRALINE HYDROCHLORIDE 100 MG/1
100 TABLET, FILM COATED ORAL DAILY
Qty: 30 TABLET | Refills: 11 | Status: SHIPPED | OUTPATIENT
Start: 2018-10-17 | End: 2019-10-30 | Stop reason: SDUPTHER

## 2018-10-17 NOTE — TELEPHONE ENCOUNTER
----- Message from Angela Rahman sent at 10/17/2018  9:19 AM EDT -----  Patient wants to see if [facility] will write her a script for oxyCODONE-acetaminophen (PERCOCET) 5-325 MG per tablet (taken once nightly before bedtime)..The Norco is not working and she wakes up with back pain..  ThanksAngela..

## 2018-11-07 ENCOUNTER — TELEPHONE (OUTPATIENT)
Dept: FAMILY MEDICINE CLINIC | Facility: CLINIC | Age: 48
End: 2018-11-07

## 2018-11-07 RX ORDER — AMOXICILLIN 875 MG/1
875 TABLET, COATED ORAL 2 TIMES DAILY
Qty: 20 TABLET | Refills: 0 | Status: SHIPPED | OUTPATIENT
Start: 2018-11-07 | End: 2018-11-28

## 2018-11-07 NOTE — TELEPHONE ENCOUNTER
----- Message from Angela Rahman sent at 11/7/2018  9:29 AM EST -----  Patient has a toothache, and thinks that it's infected. She wants to know if she can get an antibiotic sent to the [facility] pharmacy..  ThanksAngela..

## 2018-11-09 ENCOUNTER — TELEPHONE (OUTPATIENT)
Dept: FAMILY MEDICINE CLINIC | Facility: CLINIC | Age: 48
End: 2018-11-09

## 2018-11-09 NOTE — TELEPHONE ENCOUNTER
----- Message from Angela Rahman sent at 11/9/2018 11:04 AM EST -----  Patient needs refills on her ALPRAZolam (XANAX) 0.5 MG tablet (take one tablet two times daily as needed), also her gabapentin (NEURONTIN) 300 MG capsule (take one capsule by mouth twice daily) and traMADol (ULTRAM) 50 MG tablet (take two every morning for hip pain), sent to [facility]..  Thanks,  Sandra.

## 2018-11-09 NOTE — TELEPHONE ENCOUNTER
Patient is due for a follow-up in the office to review all medical conditions.  Please call in enough refills for her to have through the next week and I would be glad to see her next week in the office.

## 2018-11-14 ENCOUNTER — OFFICE VISIT (OUTPATIENT)
Dept: FAMILY MEDICINE CLINIC | Facility: CLINIC | Age: 48
End: 2018-11-14

## 2018-11-14 VITALS
WEIGHT: 180 LBS | BODY MASS INDEX: 33.99 KG/M2 | HEART RATE: 68 BPM | SYSTOLIC BLOOD PRESSURE: 138 MMHG | HEIGHT: 61 IN | TEMPERATURE: 98.5 F | OXYGEN SATURATION: 99 % | DIASTOLIC BLOOD PRESSURE: 84 MMHG

## 2018-11-14 DIAGNOSIS — G89.29 CHRONIC MIDLINE LOW BACK PAIN WITH SCIATICA, SCIATICA LATERALITY UNSPECIFIED: Primary | ICD-10-CM

## 2018-11-14 DIAGNOSIS — M54.40 CHRONIC MIDLINE LOW BACK PAIN WITH SCIATICA, SCIATICA LATERALITY UNSPECIFIED: Primary | ICD-10-CM

## 2018-11-14 DIAGNOSIS — F41.9 ANXIETY: ICD-10-CM

## 2018-11-14 DIAGNOSIS — I10 ESSENTIAL HYPERTENSION: ICD-10-CM

## 2018-11-14 DIAGNOSIS — E78.49 OTHER HYPERLIPIDEMIA: ICD-10-CM

## 2018-11-14 DIAGNOSIS — Z12.39 BREAST CANCER SCREENING: ICD-10-CM

## 2018-11-14 PROCEDURE — 99213 OFFICE O/P EST LOW 20 MIN: CPT | Performed by: PHYSICIAN ASSISTANT

## 2018-11-14 NOTE — PROGRESS NOTES
Subjective   Trinidad Quinteros is a 48 y.o. female  Back Pain (Follow up on back pain, req refills on Gabapentin and Oxycodone ) and Anxiety (Follow up on anxiety, req refills on Xanax )      History of Present Illness  Patient comes in today for follow-up on chronic back pain associated with degenerative disc disease with radiculopathy she is due for refills on her oxycodone that she takes once a very night and her gabapentin that she takes twice daily.  States her back pain is stable on this we tried reducing her down to hydrocodone that she had significant pain in her back and hip causing her difficulty throughout the day with walking and caring out ADLs.  She denies any pain or problems with this regimen.  She also is due for refills on her Xanax for anxiety she states she does have a lot of stress going on in her family life that she feels that currently her medication regimen is keeping it controlled and she is resting well at night she denies any adverse effects from medications.  She states she has been smoking a bit more lately but is trying to cut back again.  The following portions of the patient's history were reviewed and updated as appropriate: allergies, current medications, past social history and problem list    Review of Systems   Constitutional: Negative.  Negative for appetite change and fatigue.   Respiratory: Negative.  Negative for chest tightness and shortness of breath.    Gastrointestinal: Negative.  Negative for abdominal pain, diarrhea and nausea.   Genitourinary: Negative.    Musculoskeletal: Positive for arthralgias, back pain and myalgias. Negative for gait problem.   Neurological: Positive for numbness. Negative for dizziness, tremors, weakness, light-headedness and headaches.   Psychiatric/Behavioral: Negative for agitation, behavioral problems, confusion, decreased concentration, dysphoric mood, sleep disturbance and suicidal ideas. The patient is nervous/anxious.        Objective      Vitals:    11/14/18 1334   BP: 138/84   Pulse: 68   Temp: 98.5 °F (36.9 °C)   SpO2: 99%       Physical Exam   Constitutional: She is oriented to person, place, and time. She appears well-developed and well-nourished. No distress.   Neck: No thyroid mass and no thyromegaly present.   Cardiovascular: Normal rate, regular rhythm and normal heart sounds.   Pulmonary/Chest: Effort normal and breath sounds normal.   Abdominal: Soft. Bowel sounds are normal.   Musculoskeletal:        Lumbar back: She exhibits decreased range of motion, tenderness, bony tenderness and pain. She exhibits no swelling, no deformity and no spasm.   Neurological: She is alert and oriented to person, place, and time. She has normal reflexes. No cranial nerve deficit. Coordination normal.   Skin: She is not diaphoretic.   Psychiatric: Her speech is normal and behavior is normal. Judgment and thought content normal. Her mood appears anxious. Her affect is not angry and not inappropriate. Cognition and memory are normal. She does not exhibit a depressed mood. She is attentive.   Nursing note and vitals reviewed.      Assessment/Plan     Diagnoses and all orders for this visit:    Chronic midline low back pain with sciatica, sciatica laterality unspecified    Anxiety    Refilled gabapentin 3 mg twice daily for 6 months and Xanax 0.5 mg twice daily for 6 months as well as Percocet 5/325 one daily at bedtime for chronic back pain with no refills discussed abuse potential these medications, Joshua reviewed, appropriate, follow-up in office in one 6 months and as needed.

## 2018-11-21 ENCOUNTER — TRANSCRIBE ORDERS (OUTPATIENT)
Dept: FAMILY MEDICINE CLINIC | Facility: CLINIC | Age: 48
End: 2018-11-21

## 2018-11-21 DIAGNOSIS — Z12.31 VISIT FOR SCREENING MAMMOGRAM: Primary | ICD-10-CM

## 2018-11-28 ENCOUNTER — OFFICE VISIT (OUTPATIENT)
Dept: FAMILY MEDICINE CLINIC | Facility: CLINIC | Age: 48
End: 2018-11-28

## 2018-11-28 VITALS
HEIGHT: 61 IN | SYSTOLIC BLOOD PRESSURE: 130 MMHG | TEMPERATURE: 97.8 F | OXYGEN SATURATION: 100 % | HEART RATE: 68 BPM | BODY MASS INDEX: 33.04 KG/M2 | WEIGHT: 175 LBS | DIASTOLIC BLOOD PRESSURE: 82 MMHG

## 2018-11-28 DIAGNOSIS — M54.40 CHRONIC MIDLINE LOW BACK PAIN WITH SCIATICA, SCIATICA LATERALITY UNSPECIFIED: Primary | ICD-10-CM

## 2018-11-28 DIAGNOSIS — M54.42 ACUTE LEFT-SIDED LOW BACK PAIN WITH LEFT-SIDED SCIATICA: ICD-10-CM

## 2018-11-28 DIAGNOSIS — G89.29 CHRONIC MIDLINE LOW BACK PAIN WITH SCIATICA, SCIATICA LATERALITY UNSPECIFIED: Primary | ICD-10-CM

## 2018-11-28 PROCEDURE — 96372 THER/PROPH/DIAG INJ SC/IM: CPT | Performed by: PHYSICIAN ASSISTANT

## 2018-11-28 PROCEDURE — 99213 OFFICE O/P EST LOW 20 MIN: CPT | Performed by: PHYSICIAN ASSISTANT

## 2018-11-28 RX ORDER — KETOROLAC TROMETHAMINE 30 MG/ML
30 INJECTION, SOLUTION INTRAMUSCULAR; INTRAVENOUS EVERY 6 HOURS PRN
Status: DISCONTINUED | OUTPATIENT
Start: 2018-11-28 | End: 2018-11-29

## 2018-11-28 RX ORDER — METHYLPREDNISOLONE ACETATE 40 MG/ML
40 INJECTION, SUSPENSION INTRA-ARTICULAR; INTRALESIONAL; INTRAMUSCULAR; SOFT TISSUE ONCE
Status: COMPLETED | OUTPATIENT
Start: 2018-11-28 | End: 2018-11-28

## 2018-11-28 RX ADMIN — METHYLPREDNISOLONE ACETATE 40 MG: 40 INJECTION, SUSPENSION INTRA-ARTICULAR; INTRALESIONAL; INTRAMUSCULAR; SOFT TISSUE at 11:12

## 2018-11-28 RX ADMIN — METHYLPREDNISOLONE ACETATE 40 MG: 40 INJECTION, SUSPENSION INTRA-ARTICULAR; INTRALESIONAL; INTRAMUSCULAR; SOFT TISSUE at 11:13

## 2018-11-28 NOTE — PROGRESS NOTES
Subjective   Trinidad Quinteros is a 48 y.o. female  Back Pain (increased low back pain since this morning )      History of Present Illness  Patient comes in today concerned with severe burning pain in her left heel that started last evening and this morning as she does over to  her first she felt extreme pain and burning going down her entire left leg from her low back.  She does have a history of degenerative disc disease of her lumbar spine.  She denies any numbness or weakness in her leg.  The following portions of the patient's history were reviewed and updated as appropriate: allergies, current medications, past social history and problem list    Review of Systems   Constitutional: Negative.    Respiratory: Negative.    Gastrointestinal: Negative.    Genitourinary: Negative.    Musculoskeletal: Positive for back pain, gait problem and myalgias. Negative for arthralgias.   Neurological: Negative for dizziness, tremors, weakness and numbness.       Objective     Vitals:    11/28/18 1033   BP: 130/82   Pulse: 68   Temp: 97.8 °F (36.6 °C)   SpO2: 100%       Physical Exam   Constitutional: She is oriented to person, place, and time. She appears well-developed and well-nourished. No distress.   Cardiovascular: Normal rate and regular rhythm.   Pulmonary/Chest: Effort normal and breath sounds normal.   Abdominal: Soft. Bowel sounds are normal.   Musculoskeletal: She exhibits tenderness ( Tenderness left low back into buttock).        Lumbar back: She exhibits decreased range of motion, tenderness, bony tenderness and pain. She exhibits no swelling, no deformity and no spasm.   Gait antalgic   Neurological: She is alert and oriented to person, place, and time. She has normal reflexes. No sensory deficit.   Skin: Skin is warm and dry. No rash noted. She is not diaphoretic. No erythema.   Nursing note and vitals reviewed.      Assessment/Plan     Diagnoses and all orders for this visit:    Chronic midline low back  pain with sciatica, sciatica laterality unspecified    Acute left-sided low back pain with left-sided sciatica  -     methylPREDNISolone acetate (DEPO-medrol) injection 40 mg; Inject 1 mL into the appropriate muscle as directed by prescriber 1 (One) Time.  -     methylPREDNISolone acetate (DEPO-medrol) injection 40 mg; Inject 1 mL into the appropriate muscle as directed by prescriber 1 (One) Time.  -     ketorolac (TORADOL) injection 30 mg; Infuse 1 mL into a venous catheter Every 6 (Six) Hours As Needed for Moderate Pain .  -     ketorolac (TORADOL) injection 30 mg; Infuse 1 mL into a venous catheter Every 6 (Six) Hours As Needed for Moderate Pain .

## 2018-11-29 RX ORDER — KETOROLAC TROMETHAMINE 30 MG/ML
2 INJECTION, SOLUTION INTRAMUSCULAR; INTRAVENOUS ONCE
Status: COMPLETED | OUTPATIENT
Start: 2018-11-28 | End: 2018-11-29

## 2018-11-29 RX ADMIN — KETOROLAC TROMETHAMINE 2.1 MG: 30 INJECTION, SOLUTION INTRAMUSCULAR; INTRAVENOUS at 08:37

## 2018-11-30 ENCOUNTER — TELEPHONE (OUTPATIENT)
Dept: FAMILY MEDICINE CLINIC | Facility: CLINIC | Age: 48
End: 2018-11-30

## 2018-11-30 NOTE — TELEPHONE ENCOUNTER
----- Message from Angela Rahman sent at 11/30/2018  2:51 PM EST -----  Patient wants to know if she can take percocet more than twice daily for her back pain? She is on oxyCODONE-acetaminophen (PERCOCET) 5-325 MG per tablets.   ThanksAngela..

## 2018-12-04 ENCOUNTER — TELEPHONE (OUTPATIENT)
Dept: FAMILY MEDICINE CLINIC | Facility: CLINIC | Age: 48
End: 2018-12-04

## 2018-12-04 RX ORDER — DEXTROMETHORPHAN HYDROBROMIDE AND PROMETHAZINE HYDROCHLORIDE 15; 6.25 MG/5ML; MG/5ML
5 SYRUP ORAL 4 TIMES DAILY PRN
Qty: 120 ML | Refills: 0 | Status: SHIPPED | OUTPATIENT
Start: 2018-12-04 | End: 2019-02-11

## 2018-12-04 RX ORDER — AMOXICILLIN 875 MG/1
875 TABLET, COATED ORAL 2 TIMES DAILY
Qty: 20 TABLET | Refills: 0 | Status: SHIPPED | OUTPATIENT
Start: 2018-12-04 | End: 2019-01-30

## 2018-12-04 NOTE — TELEPHONE ENCOUNTER
----- Message from Angela Rahman sent at 12/4/2018  9:56 AM EST -----  Patient has a sinus infection/green mucus (coughing up), lots of drainage, head pressure, and  cough is worse at night.Patient would like an antibiotic and cough syrup.  She wants to know if she can get something called in to [facility] pharmacy..  ThanksAngela..

## 2018-12-11 ENCOUNTER — TELEPHONE (OUTPATIENT)
Dept: FAMILY MEDICINE CLINIC | Facility: CLINIC | Age: 48
End: 2018-12-11

## 2018-12-11 RX ORDER — TIZANIDINE 4 MG/1
2-4 TABLET ORAL 2 TIMES DAILY PRN
Qty: 45 TABLET | Refills: 5 | Status: SHIPPED | OUTPATIENT
Start: 2018-12-11 | End: 2019-06-06 | Stop reason: SDUPTHER

## 2018-12-13 ENCOUNTER — LAB (OUTPATIENT)
Dept: LAB | Facility: HOSPITAL | Age: 48
End: 2018-12-13

## 2018-12-13 DIAGNOSIS — I10 ESSENTIAL HYPERTENSION: ICD-10-CM

## 2018-12-13 DIAGNOSIS — E78.49 OTHER HYPERLIPIDEMIA: ICD-10-CM

## 2018-12-13 LAB
ANION GAP SERPL CALCULATED.3IONS-SCNC: 5 MMOL/L (ref 3–11)
ARTICHOKE IGE QN: 146 MG/DL (ref 0–130)
BUN BLD-MCNC: 9 MG/DL (ref 9–23)
BUN/CREAT SERPL: 9.9 (ref 7–25)
CALCIUM SPEC-SCNC: 9.5 MG/DL (ref 8.7–10.4)
CHLORIDE SERPL-SCNC: 105 MMOL/L (ref 99–109)
CHOLEST SERPL-MCNC: 230 MG/DL (ref 0–200)
CO2 SERPL-SCNC: 31 MMOL/L (ref 20–31)
CREAT BLD-MCNC: 0.91 MG/DL (ref 0.6–1.3)
GFR SERPL CREATININE-BSD FRML MDRD: 66 ML/MIN/1.73
GLUCOSE BLD-MCNC: 78 MG/DL (ref 70–100)
HDLC SERPL-MCNC: 53 MG/DL (ref 40–60)
POTASSIUM BLD-SCNC: 4.4 MMOL/L (ref 3.5–5.5)
SODIUM BLD-SCNC: 141 MMOL/L (ref 132–146)
TRIGL SERPL-MCNC: 387 MG/DL (ref 0–150)

## 2018-12-13 PROCEDURE — 80061 LIPID PANEL: CPT

## 2018-12-13 PROCEDURE — 80048 BASIC METABOLIC PNL TOTAL CA: CPT

## 2018-12-13 PROCEDURE — 36415 COLL VENOUS BLD VENIPUNCTURE: CPT

## 2019-01-02 ENCOUNTER — TELEPHONE (OUTPATIENT)
Dept: FAMILY MEDICINE CLINIC | Facility: CLINIC | Age: 49
End: 2019-01-02

## 2019-01-02 RX ORDER — CEFDINIR 300 MG/1
300 CAPSULE ORAL 2 TIMES DAILY
Qty: 14 CAPSULE | Refills: 0 | Status: SHIPPED | OUTPATIENT
Start: 2019-01-02 | End: 2019-01-30

## 2019-01-02 NOTE — TELEPHONE ENCOUNTER
----- Message from Angela Rahman sent at 1/2/2019  9:44 AM EST -----  Patient needs something called in for a sinus infection, Cefdinir( preferably), she said it works better than amoxicillin. Her symptoms are head congestion, sinus pressure, cough, and ear pain. She also wants to know if she can get some cough syrup to help her sleep at night due to the coughing/drainage. Patient uses [facility] pharmacy..  ThanksSandra.

## 2019-01-08 ENCOUNTER — TELEPHONE (OUTPATIENT)
Dept: FAMILY MEDICINE CLINIC | Facility: CLINIC | Age: 49
End: 2019-01-08

## 2019-01-08 NOTE — TELEPHONE ENCOUNTER
----- Message from Angela Rahman sent at 1/8/2019 11:20 AM EST -----  Trinidad needs a refill on her Percocet 5/325 1 po [facility]..  ThanksLy

## 2019-01-30 ENCOUNTER — OFFICE VISIT (OUTPATIENT)
Dept: FAMILY MEDICINE CLINIC | Facility: CLINIC | Age: 49
End: 2019-01-30

## 2019-01-30 VITALS
DIASTOLIC BLOOD PRESSURE: 70 MMHG | OXYGEN SATURATION: 99 % | WEIGHT: 175 LBS | TEMPERATURE: 98.1 F | HEIGHT: 61 IN | BODY MASS INDEX: 33.04 KG/M2 | HEART RATE: 64 BPM | SYSTOLIC BLOOD PRESSURE: 126 MMHG

## 2019-01-30 DIAGNOSIS — M77.9 TENDINITIS: ICD-10-CM

## 2019-01-30 DIAGNOSIS — M54.10 RADICULOPATHY OF ARM: ICD-10-CM

## 2019-01-30 DIAGNOSIS — M25.512 ACUTE PAIN OF LEFT SHOULDER: Primary | ICD-10-CM

## 2019-01-30 PROCEDURE — 99213 OFFICE O/P EST LOW 20 MIN: CPT | Performed by: PHYSICIAN ASSISTANT

## 2019-01-30 RX ORDER — METHYLPREDNISOLONE 4 MG/1
TABLET ORAL
Qty: 21 TABLET | Refills: 0 | Status: SHIPPED | OUTPATIENT
Start: 2019-01-30 | End: 2019-03-08

## 2019-01-30 NOTE — PROGRESS NOTES
Subjective   Trinidad Quinteros is a 48 y.o. female  Shoulder Pain (left shoulder pain x2 week )      History of Present Illness  Patient comes in today for dilation of left shoulder pain for the past 2 weeks.  No history of trauma.  States started in the back of her shoulder/upper shoulder blade region on the left side and has spread down to her elbow.  She states now she is expressing tingling from her elbow to her hand in all fingertips.  No weakness or numbness.  Denies any neck pain.  She's been taking her regular medications without any improvement.  No change in activities.  The following portions of the patient's history were reviewed and updated as appropriate: allergies, current medications, past social history and problem list    Review of Systems   Constitutional: Positive for activity change.   Musculoskeletal: Positive for arthralgias and myalgias. Negative for gait problem and joint swelling.   Skin: Negative.    Neurological: Negative for weakness and numbness.       Objective     Vitals:    01/30/19 1455   BP: 126/70   Pulse: 64   Temp: 98.1 °F (36.7 °C)   SpO2: 99%       Physical Exam   Constitutional: She is oriented to person, place, and time. She appears well-developed and well-nourished.   Musculoskeletal: She exhibits tenderness ( Tenderness at left posterior shoulder/scapular region and tender Route bicep tendon adjacent to the proximal left elbow and nontender at lateral epicondyles or medial epicondyles, strength equal 5 over 5 both upper extremities.). She exhibits no edema or deformity.   Neurological: She is alert and oriented to person, place, and time. She exhibits normal muscle tone. Coordination normal.   Neurovascular status intact upper extremities.   Skin: Skin is warm and dry. No rash noted. No erythema. No pallor.   Nursing note and vitals reviewed.      Assessment/Plan     Diagnoses and all orders for this visit:    Acute pain of left shoulder    Tendinitis    Radiculopathy of  arm    Other orders  -     MethylPREDNISolone (MEDROL, HILARY,) 4 MG tablet; Take as directed on package instructions.    Will consider x-ray of cervical spine and thoracic spine if symptoms persist.

## 2019-02-07 ENCOUNTER — TRANSCRIBE ORDERS (OUTPATIENT)
Dept: FAMILY MEDICINE CLINIC | Facility: CLINIC | Age: 49
End: 2019-02-07

## 2019-02-07 DIAGNOSIS — M25.512 ACUTE PAIN OF LEFT SHOULDER: Primary | ICD-10-CM

## 2019-02-07 RX ORDER — TRAMADOL HYDROCHLORIDE 50 MG/1
100 TABLET ORAL EVERY MORNING
Qty: 60 TABLET | Refills: 5 | Status: CANCELLED | OUTPATIENT
Start: 2019-02-07

## 2019-02-11 ENCOUNTER — OFFICE VISIT (OUTPATIENT)
Dept: FAMILY MEDICINE CLINIC | Facility: CLINIC | Age: 49
End: 2019-02-11

## 2019-02-11 VITALS
TEMPERATURE: 98.2 F | DIASTOLIC BLOOD PRESSURE: 84 MMHG | SYSTOLIC BLOOD PRESSURE: 142 MMHG | HEART RATE: 98 BPM | HEIGHT: 61 IN | BODY MASS INDEX: 33.23 KG/M2 | WEIGHT: 176 LBS | OXYGEN SATURATION: 99 %

## 2019-02-11 DIAGNOSIS — R68.89 FLU-LIKE SYMPTOMS: Primary | ICD-10-CM

## 2019-02-11 DIAGNOSIS — J01.90 ACUTE NON-RECURRENT SINUSITIS, UNSPECIFIED LOCATION: ICD-10-CM

## 2019-02-11 PROCEDURE — 87804 INFLUENZA ASSAY W/OPTIC: CPT | Performed by: PHYSICIAN ASSISTANT

## 2019-02-11 PROCEDURE — 99213 OFFICE O/P EST LOW 20 MIN: CPT | Performed by: PHYSICIAN ASSISTANT

## 2019-02-11 RX ORDER — CEFDINIR 300 MG/1
300 CAPSULE ORAL 2 TIMES DAILY
Qty: 20 CAPSULE | Refills: 0 | Status: SHIPPED | OUTPATIENT
Start: 2019-02-11 | End: 2019-02-25

## 2019-02-11 RX ORDER — PREDNISONE 20 MG/1
20 TABLET ORAL 2 TIMES DAILY
Qty: 10 TABLET | Refills: 0 | Status: SHIPPED | OUTPATIENT
Start: 2019-02-11 | End: 2019-03-08

## 2019-02-11 RX ORDER — DEXTROMETHORPHAN HYDROBROMIDE AND PROMETHAZINE HYDROCHLORIDE 15; 6.25 MG/5ML; MG/5ML
5 SYRUP ORAL 4 TIMES DAILY PRN
Qty: 120 ML | Refills: 1 | Status: SHIPPED | OUTPATIENT
Start: 2019-02-11 | End: 2019-03-08

## 2019-02-11 NOTE — PROGRESS NOTES
Subjective   Trinidad Quinteros is a 48 y.o. female  Cough (productive cough with green mucus x2 days ); Generalized Body Aches (body aches x2 days ); and Sore Throat (sore throat x2 days )      History of Present Illness  Patient comes in today for evaluation of symptoms that are progressively worsened through the weekend.  She states she's having some generalized body aches, sore throat and chest congestion and head congestion, she is coughing up green mucus.  She's been taking Stahist and some difficulty breathing with sore throat.  She has not run a fever.  She did get a flu shot.  The following portions of the patient's history were reviewed and updated as appropriate: allergies, current medications, past social history and problem list    Review of Systems   Constitutional: Negative for chills, fatigue and fever.   HENT: Positive for congestion, ear pain, postnasal drip, rhinorrhea and sinus pressure. Negative for sore throat.    Eyes: Negative for pain.   Respiratory: Positive for cough. Negative for shortness of breath.    Musculoskeletal: Positive for myalgias.   Neurological: Negative for dizziness and headaches.   Hematological: Negative for adenopathy.       Objective     Vitals:    02/11/19 0951   BP: 142/84   Pulse: 98   Temp: 98.2 °F (36.8 °C)   SpO2: 99%       Physical Exam   Constitutional: She appears well-developed and well-nourished. No distress.   HENT:   Head: Normocephalic and atraumatic.   Right Ear: Tympanic membrane and ear canal normal.   Left Ear: Tympanic membrane and ear canal normal.   Nose: Mucosal edema, rhinorrhea and sinus tenderness present. Right sinus exhibits maxillary sinus tenderness and frontal sinus tenderness. Left sinus exhibits maxillary sinus tenderness and frontal sinus tenderness.   Mouth/Throat: Oropharynx is clear and moist. No oropharyngeal exudate.   Eyes: Pupils are equal, round, and reactive to light.   Cardiovascular: Normal rate, regular rhythm and normal heart  sounds.   Pulmonary/Chest: Effort normal. She has wheezes.   Skin: She is not diaphoretic.   Nursing note and vitals reviewed.  Assessment/Plan     Diagnoses and all orders for this visit:    Flu-like symptoms  -     POCT Influenza A/B    Acute non-recurrent sinusitis, unspecified location    Other orders  -     cefdinir (OMNICEF) 300 MG capsule; Take 1 capsule by mouth 2 (Two) Times a Day.  -     promethazine-dextromethorphan (PROMETHAZINE-DM) 6.25-15 MG/5ML syrup; Take 5 mL by mouth 4 (Four) Times a Day As Needed for Cough.  -     predniSONE (DELTASONE) 20 MG tablet; Take 1 tablet by mouth 2 (Two) Times a Day.     flu test negative discussed with patient

## 2019-02-12 ENCOUNTER — APPOINTMENT (OUTPATIENT)
Dept: MAMMOGRAPHY | Facility: HOSPITAL | Age: 49
End: 2019-02-12

## 2019-02-21 RX ORDER — HYDROCHLOROTHIAZIDE 12.5 MG/1
12.5 TABLET ORAL DAILY
Qty: 30 TABLET | Refills: 5 | Status: SHIPPED | OUTPATIENT
Start: 2019-02-21 | End: 2019-09-05 | Stop reason: SDUPTHER

## 2019-02-21 RX ORDER — LOSARTAN POTASSIUM 100 MG/1
100 TABLET ORAL DAILY
Qty: 30 TABLET | Refills: 5 | Status: SHIPPED | OUTPATIENT
Start: 2019-02-21 | End: 2019-09-05 | Stop reason: SDUPTHER

## 2019-02-25 ENCOUNTER — TELEPHONE (OUTPATIENT)
Dept: FAMILY MEDICINE CLINIC | Facility: CLINIC | Age: 49
End: 2019-02-25

## 2019-02-25 RX ORDER — AMOXICILLIN AND CLAVULANATE POTASSIUM 875; 125 MG/1; MG/1
1 TABLET, FILM COATED ORAL 2 TIMES DAILY
Qty: 20 TABLET | Refills: 0 | Status: SHIPPED | OUTPATIENT
Start: 2019-02-25 | End: 2019-03-08

## 2019-02-25 NOTE — TELEPHONE ENCOUNTER
----- Message from Angela Rahman sent at 2/25/2019  9:01 AM EST -----  Patient said that her sinus infection has gotten much worse. Complaints of head congestion, and nasal stuffiness. She was put on Cefdinir 300 mg, one tablet twice daily, and also Prednisone 20 mg, one tablet twice daily back on 02/11/19. Patient wants to know if something stronger can be called in to the [facility] pharmacy.  ThanksSandra.

## 2019-02-25 NOTE — TELEPHONE ENCOUNTER
----- Message from Angela Rahman sent at 2/25/2019  9:01 AM EST -----  Patient said that her sinus infection has gotten much worse. Complaints of head congestion, and nasal stuffiness. She was put on Cefdinir 300 mg, one tablet twice daily, and also Prednisone 20 mg, one tablet twice daily back on 02/11/19. Patient wants to know if something stronger can be called in to the [facility] pharmacy.  ThanksSandra.

## 2019-03-08 ENCOUNTER — OFFICE VISIT (OUTPATIENT)
Dept: FAMILY MEDICINE CLINIC | Facility: CLINIC | Age: 49
End: 2019-03-08

## 2019-03-08 ENCOUNTER — HOSPITAL ENCOUNTER (OUTPATIENT)
Dept: GENERAL RADIOLOGY | Facility: HOSPITAL | Age: 49
Discharge: HOME | End: 2019-03-08
Admitting: PHYSICIAN ASSISTANT

## 2019-03-08 ENCOUNTER — HOSPITAL ENCOUNTER (OUTPATIENT)
Dept: GENERAL RADIOLOGY | Facility: HOSPITAL | Age: 49
Discharge: HOME | End: 2019-03-08

## 2019-03-08 VITALS
HEIGHT: 61 IN | BODY MASS INDEX: 33.99 KG/M2 | TEMPERATURE: 97.8 F | OXYGEN SATURATION: 98 % | HEART RATE: 72 BPM | SYSTOLIC BLOOD PRESSURE: 130 MMHG | DIASTOLIC BLOOD PRESSURE: 72 MMHG | WEIGHT: 180 LBS

## 2019-03-08 DIAGNOSIS — M79.602 PAIN OF LEFT UPPER EXTREMITY: ICD-10-CM

## 2019-03-08 DIAGNOSIS — G89.29 CHRONIC LEFT-SIDED THORACIC BACK PAIN: ICD-10-CM

## 2019-03-08 DIAGNOSIS — M54.6 CHRONIC LEFT-SIDED THORACIC BACK PAIN: ICD-10-CM

## 2019-03-08 DIAGNOSIS — M25.552 PAIN OF LEFT HIP JOINT: ICD-10-CM

## 2019-03-08 DIAGNOSIS — M54.40 CHRONIC MIDLINE LOW BACK PAIN WITH SCIATICA, SCIATICA LATERALITY UNSPECIFIED: ICD-10-CM

## 2019-03-08 DIAGNOSIS — G89.29 CHRONIC MIDLINE LOW BACK PAIN WITH SCIATICA, SCIATICA LATERALITY UNSPECIFIED: ICD-10-CM

## 2019-03-08 DIAGNOSIS — M54.2 NECK PAIN: ICD-10-CM

## 2019-03-08 DIAGNOSIS — M54.2 NECK PAIN: Primary | ICD-10-CM

## 2019-03-08 PROCEDURE — 73030 X-RAY EXAM OF SHOULDER: CPT

## 2019-03-08 PROCEDURE — 99214 OFFICE O/P EST MOD 30 MIN: CPT | Performed by: PHYSICIAN ASSISTANT

## 2019-03-08 PROCEDURE — 72070 X-RAY EXAM THORAC SPINE 2VWS: CPT

## 2019-03-08 PROCEDURE — 72040 X-RAY EXAM NECK SPINE 2-3 VW: CPT

## 2019-03-08 NOTE — PROGRESS NOTES
Subjective   Trinidad Quinteros is a 48 y.o. female  Shoulder Pain (Follow up on left shoulder pain going down arm and hand, req refill on Oxycodone ); Tingling (increased tingling sensation in fingers on left hand ); and Back Pain      History of Present Illness  Patient comes in today for follow-up on chronic back and hip pain which is currently stable on combination of gabapentin, Zanaflex and Percocet.  She is due for refills of Percocet.  She is also here for evaluation of persistent worsening left shoulder, left lateral neck and left upper back pain since the beginning of January.  She was seen the end of January for this initially, see notes in chart.  She states that has not improved at all in fact has worsened.  She now is experiencing pain with occasional numbness stinging burning and tingling going into her left elbow and into her left hand and fingers.  It is bothering her during the daytime and at nighttime.  She has no history of trauma to the neck arm or upper back.  No improvement with prednisone.  She is currently taking oxycodone nightly for chronic hip and back pain as well as gabapentin 300 mg's twice daily and muscle relaxants without resolution or improvement in symptoms.  She is also due for refills on her Percocet today that she takes for chronic back and hip pain.  This is currently stable on medication.  The following portions of the patient's history were reviewed and updated as appropriate: allergies, current medications, past social history and problem list    Review of Systems   Constitutional: Positive for activity change.   Musculoskeletal: Positive for arthralgias, back pain, myalgias and neck pain. Negative for gait problem and joint swelling.   Skin: Negative.    Neurological: Positive for weakness and numbness. Negative for tremors.   Psychiatric/Behavioral: Positive for sleep disturbance.       Objective     Vitals:    03/08/19 1502   BP: 130/72   Pulse: 72   Temp: 97.8 °F (36.6  °C)   SpO2: 98%       Physical Exam   Constitutional: She is oriented to person, place, and time. She appears well-developed and well-nourished. No distress.   Musculoskeletal: She exhibits tenderness ( Tenderness left upper trapezius, left lateral cervical musculature and left lateral thoracic region as well as left posterior shoulder.). She exhibits no edema or deformity.   Neurological: She is alert and oriented to person, place, and time. She displays abnormal reflex. A sensory deficit is present. No cranial nerve deficit. She exhibits normal muscle tone. Coordination normal.   Skin: Skin is warm and dry. No rash noted. She is not diaphoretic. No erythema. No pallor.   Nursing note and vitals reviewed.      Assessment/Plan     Diagnoses and all orders for this visit:    Neck pain  -     XR Spine Cervical 2 or 3 View; Future    Chronic left-sided thoracic back pain  -     XR spine thoracic 2 vw; Future    Chronic midline low back pain with sciatica, sciatica laterality unspecified    Pain of left hip joint    Pain of left upper extremity    Increase gabapentin to 300 mg in the morning 600 mg at nighttime new prescription given #90 with 5 refills, refilled Percocet 5/325 1 nightly for chronic back and hip pain #30 with no refills.  Discussed abuse potential of these medications Joshua reviewed, appropriate.  I will contact patient with x-ray report after I have reviewed it and will determine whether MRI and/or physical therapy would be needed for further evaluation and treatment.

## 2019-03-11 ENCOUNTER — TRANSCRIBE ORDERS (OUTPATIENT)
Dept: FAMILY MEDICINE CLINIC | Facility: CLINIC | Age: 49
End: 2019-03-11

## 2019-03-11 ENCOUNTER — TELEPHONE (OUTPATIENT)
Dept: FAMILY MEDICINE CLINIC | Facility: CLINIC | Age: 49
End: 2019-03-11

## 2019-03-11 DIAGNOSIS — G89.29 CHRONIC NECK PAIN: ICD-10-CM

## 2019-03-11 DIAGNOSIS — M51.9 THORACIC DISC DISORDER: Primary | ICD-10-CM

## 2019-03-11 DIAGNOSIS — M54.2 CHRONIC NECK PAIN: ICD-10-CM

## 2019-03-13 ENCOUNTER — HOSPITAL ENCOUNTER (OUTPATIENT)
Dept: MRI IMAGING | Facility: HOSPITAL | Age: 49
Discharge: HOME | End: 2019-03-13
Admitting: PHYSICIAN ASSISTANT

## 2019-03-13 DIAGNOSIS — M54.2 CHRONIC NECK PAIN: ICD-10-CM

## 2019-03-13 DIAGNOSIS — M51.9 THORACIC DISC DISORDER: ICD-10-CM

## 2019-03-13 DIAGNOSIS — G89.29 CHRONIC NECK PAIN: ICD-10-CM

## 2019-03-13 PROCEDURE — 72141 MRI NECK SPINE W/O DYE: CPT

## 2019-03-13 RX ORDER — CYANOCOBALAMIN 1000 UG/ML
1000 INJECTION, SOLUTION INTRAMUSCULAR; SUBCUTANEOUS
Qty: 3 ML | Refills: 3 | Status: SHIPPED | OUTPATIENT
Start: 2019-03-13 | End: 2020-03-25 | Stop reason: SDUPTHER

## 2019-03-15 ENCOUNTER — TRANSCRIBE ORDERS (OUTPATIENT)
Dept: FAMILY MEDICINE CLINIC | Facility: CLINIC | Age: 49
End: 2019-03-15

## 2019-03-15 DIAGNOSIS — M54.12 CERVICAL RADICULAR PAIN: Primary | ICD-10-CM

## 2019-03-19 ENCOUNTER — TELEPHONE (OUTPATIENT)
Dept: FAMILY MEDICINE CLINIC | Facility: CLINIC | Age: 49
End: 2019-03-19

## 2019-03-19 NOTE — TELEPHONE ENCOUNTER
----- Message from Trinidad Quinteros sent at 3/19/2019 11:05 AM EDT -----  Contact: patient  I AM ON [place], DOES IT NEED TO BE CHANGED DUE TO THE RECALL? THANK YOU

## 2019-03-19 NOTE — TELEPHONE ENCOUNTER
Spoke to pharm, pharmacist states the pharmacy notify's the patients when there medication needs to be changed. She said if the patient hasn't received a call then it doesn't need to changed at this point in time. Notified patient

## 2019-03-25 RX ORDER — FLUCONAZOLE 200 MG/1
TABLET ORAL
Qty: 6 TABLET | Refills: 1 | Status: SHIPPED | OUTPATIENT
Start: 2019-03-25 | End: 2019-08-19

## 2019-03-26 ENCOUNTER — OFFICE VISIT (OUTPATIENT)
Dept: NEUROSURGERY | Facility: CLINIC | Age: 49
End: 2019-03-26

## 2019-03-26 ENCOUNTER — LAB (OUTPATIENT)
Dept: LAB | Facility: HOSPITAL | Age: 49
End: 2019-03-26

## 2019-03-26 VITALS
WEIGHT: 182.2 LBS | SYSTOLIC BLOOD PRESSURE: 128 MMHG | DIASTOLIC BLOOD PRESSURE: 72 MMHG | BODY MASS INDEX: 33.53 KG/M2 | HEIGHT: 62 IN | TEMPERATURE: 98.7 F

## 2019-03-26 DIAGNOSIS — R20.2 NUMBNESS AND TINGLING IN LEFT HAND: ICD-10-CM

## 2019-03-26 DIAGNOSIS — M79.602 LEFT ARM PAIN: ICD-10-CM

## 2019-03-26 DIAGNOSIS — M50.30 DEGENERATIVE DISC DISEASE, CERVICAL: Primary | ICD-10-CM

## 2019-03-26 DIAGNOSIS — R20.0 NUMBNESS AND TINGLING IN LEFT HAND: ICD-10-CM

## 2019-03-26 DIAGNOSIS — M50.30 DEGENERATIVE DISC DISEASE, CERVICAL: ICD-10-CM

## 2019-03-26 LAB
ERYTHROCYTE [SEDIMENTATION RATE] IN BLOOD: 13 MM/HR (ref 0–20)
RHEUMATOID FACT SERPL-ACNC: NEGATIVE [IU]/ML

## 2019-03-26 PROCEDURE — 36415 COLL VENOUS BLD VENIPUNCTURE: CPT

## 2019-03-26 PROCEDURE — 99243 OFF/OP CNSLTJ NEW/EST LOW 30: CPT | Performed by: NEUROLOGICAL SURGERY

## 2019-03-26 PROCEDURE — 86430 RHEUMATOID FACTOR TEST QUAL: CPT

## 2019-03-26 RX ORDER — NABUMETONE 750 MG/1
750 TABLET, FILM COATED ORAL 2 TIMES DAILY
Qty: 60 TABLET | Refills: 0 | Status: SHIPPED | OUTPATIENT
Start: 2019-03-26 | End: 2019-04-22 | Stop reason: SDUPTHER

## 2019-03-26 NOTE — PATIENT INSTRUCTIONS
In 2-3 weeks    Call Dr. Mcmahon on a Monday or Tuesday with an update.    Ask for Alvina () and leave a message for  Dr. Mcmahon.   He will call you back at the end of the day as soon as he can.     [phone]

## 2019-03-26 NOTE — PROGRESS NOTES
Trinidad MARIO Neli  1970  [id number]      Chief Complaint   Patient presents with   • Tingling     LUE   • Numbness     LUE   • Neck Pain       HISTORY OF PRESENT ILLNESS: This is a 48-year-old female who has a genetic predisposition for degenerative osteoarthritis.  She has had issues in her lumbar area for some time.  She has been on medications and physical therapy which has helped somewhat.  Within the last several weeks however she developed severe pain in the cervical area which radiates into the left upper extremity.  The pain in the cervical area has predated that of the radiculopathy.  Cervical MRIs been performed and she is referred for neurosurgical consultation.    Past Medical History:   Diagnosis Date   • Depression    • Hyperlipidemia    • Hypertension    • Low back pain        Past Surgical History:   Procedure Laterality Date   • APPENDECTOMY     • CARDIAC CATHETERIZATION     • CARDIAC CATHETERIZATION     •  SECTION     • HAND TENDON SURGERY Right    • PARTIAL HYSTERECTOMY     • TUBAL ABDOMINAL LIGATION         Family History   Problem Relation Age of Onset   • COPD Mother    • COPD Father    • Cancer Other    • Arthritis Other    • Diabetes Other    • Asthma Other    • Heart disease Other         heart trouble   • Hypertension Other         high blood pressure   • Alcohol abuse Other    • Breast cancer Maternal Aunt 50   • Ovarian cancer Neg Hx        Social History     Socioeconomic History   • Marital status:      Spouse name: Not on file   • Number of children: Not on file   • Years of education: Not on file   • Highest education level: Not on file   Tobacco Use   • Smoking status: Former Smoker   • Smokeless tobacco: Never Used   Substance and Sexual Activity   • Alcohol use: No   • Drug use: No   • Sexual activity: Yes     Partners: Male       Allergies   Allergen Reactions   • Bactrim [Sulfamethoxazole-Trimethoprim]          Current Outpatient  "Medications:   •  ALPRAZolam (XANAX) 0.5 MG tablet, Take 1 tablet by mouth 2 (Two) Times a Day for anxiety., Disp: 60 tablet, Rfl: 5  •  atorvastatin (LIPITOR) 40 MG tablet, Take 1 tablet by mouth Daily., Disp: 30 tablet, Rfl: 11  •  cyanocobalamin 1000 MCG/ML injection, Inject 1 mL into the appropriate muscle as directed by prescriber Every 28 (Twenty-Eight) Days., Disp: 3 mL, Rfl: 3  •  dicyclomine (BENTYL) 20 MG tablet, Take 1 tablet by mouth Every 6 (Six) Hours. For diarrhea and stomach cramps, Disp: 20 tablet, Rfl: 1  •  fluconazole (DIFLUCAN) 200 MG tablet, Take 1 weekly for fungal infection, Disp: 6 tablet, Rfl: 1  •  gabapentin (NEURONTIN) 300 MG capsule, Take 1 capsule by mouth 2 (Two) Times a Day for neuropathy., Disp: 60 capsule, Rfl: 5  •  gabapentin (NEURONTIN) 300 MG capsule, Take 1 capsule by mouth in the Morning and 2 capsules at Bedtime for radiculopathy, Disp: 90 capsule, Rfl: 5  •  hydrochlorothiazide (HYDRODIURIL) 12.5 MG tablet, Take 1 tablet by mouth Daily., Disp: 30 tablet, Rfl: 5  •  losartan (COZAAR) 100 MG tablet, Take 1 tablet by mouth Daily., Disp: 30 tablet, Rfl: 5  •  oxyCODONE-acetaminophen (PERCOCET) 5-325 MG per tablet, Take 1 tablet by mouth at Bedtime for Chronic Back & Hip Pain, Disp: 30 tablet, Rfl: 0  •  pantoprazole (PROTONIX) 40 MG EC tablet, Take 1 tablet by mouth Daily., Disp: 30 tablet, Rfl: 11  •  promethazine (PHENERGAN) 25 MG tablet, Take 1 tablet by mouth Every 6 (Six) Hours if needed for nausea and vomiting, Disp: 12 tablet, Rfl: 11  •  sertraline (ZOLOFT) 100 MG tablet, Take 1 tablet by mouth Daily., Disp: 30 tablet, Rfl: 11  •  Syringe/Needle, Disp, (LUER LOCK SAFETY SYRINGES) 25G X 1\" 3 ML misc, Use with B-12 injections IN THE HIP, DELTOID OR THIGH ONCE MONTHLY, Disp: 4 each, Rfl: 4  •  tiZANidine (ZANAFLEX) 4 MG tablet, Take 0.5-1 tablets by mouth 2 (Two) Times a Day As Needed for back pain, Disp: 45 tablet, Rfl: 5  •  traMADol (ULTRAM) 50 MG tablet, Take 1 tablet " "by mouth 3 (Three) Times a Day for chronic pain., Disp: 90 tablet, Rfl: 5    Review of Systems   Constitutional: Positive for fatigue and unexpected weight change.   Respiratory: Positive for apnea.    Musculoskeletal: Positive for arthralgias, back pain, myalgias, neck pain and neck stiffness.   Psychiatric/Behavioral: Positive for dysphoric mood.       Vitals:    03/26/19 0856   BP: 128/72   BP Location: Left arm   Patient Position: Sitting   Temp: 98.7 °F (37.1 °C)   TempSrc: Temporal   Weight: 82.6 kg (182 lb 3.2 oz)   Height: 157.5 cm (62\")       Neurological Examination:    Mental status/speech: The patient is alert and oriented.  Speech is clear without aphysia or dysarthria.  No overt cognitive deficits.    Cranial nerve examination:    Olfaction: Smell is intact.  Vision: Vision is intact without visual field abnormalities.  Funduscopic examination is normal.  No pupillary irregularity.  Ocular motor examination: The extraocular muscles are intact.  There is no diplopia.  The pupil is round and reactive to both light and accommodation.  There is no nystagmus.  Facial movement/sensation: There is no facial weakness.  Sensation is intact in the first, second, and third divisions of the trigeminal nerve.  The corneal reflex is intact.  Auditory: Hearing is intact to finger rub bilaterally.  Cranial nerves IX, X, XI, XII: Phonation is normal.  No dysphagia.  Tongue is protruded in the midline without atrophy.  The gag reflex is intact.  Shoulder shrug is normal.    Musculoligamentous ligamentous examination: She has limitation of range of motion toward her left.  Her strength is intact however.  Sensation is intact.  The deep tendon reflexes are symmetrically preserved.  There is a negative Tinel.  Her gait appears to be normal.    Medical Decision Making:     Diagnostic Data Set: The cervical MRI data sets show the presence of degenerative osteoarthritic changes C5-C6 and C6-C7.  She has osteophyte formation " in the neuroforamen bilaterally more so on the left than the right at particularly C5-C6.      Assessment: Symptomatic cervical spondylosis C5-C6, C6-C7          Recommendations: A conservative approach should be employed initially prior to any consideration of surgical intervention.  She has significant disease which provides clinical correlation to the anatomical changes demonstrated by MRI.  I have sent her to physical therapy; given her prescription of Relafen 750 mg twice daily and will ask her to contact me in the next 3 weeks.  At that time if she is not better I would recommend further diagnostic studies to include myelography and EMG/NCV.  If physical therapy is helpful in the context of resolution of her radicular symptoms facet block or epidural steroid injections would be a therapeutic option.  However, if her radiculopathy continues surgery may will be called for.  I have also obtained a rheumatoid arthritis factor.  I will keep you informed and thank you for allowing me to see her.        I greatly appreciate the opportunity to see and evaluate this individual.  If you have questions or concerns regarding issues that I may have overlooked please call me at any time: [phone].  Dwight Mcmahon M.D.  [facility]  [address] [place] [address]      Scribed for Rogerio Mcmahon MD by Deric Galeas CMA. 3/26/2019 9:12 AM     I have read and concur with the information provided by the scribe.  Rogerio Mcmahon MD

## 2019-04-08 ENCOUNTER — TELEPHONE (OUTPATIENT)
Dept: FAMILY MEDICINE CLINIC | Facility: CLINIC | Age: 49
End: 2019-04-08

## 2019-04-12 ENCOUNTER — TELEPHONE (OUTPATIENT)
Dept: FAMILY MEDICINE CLINIC | Facility: CLINIC | Age: 49
End: 2019-04-12

## 2019-04-12 RX ORDER — BENZONATATE 200 MG/1
200 CAPSULE ORAL 3 TIMES DAILY PRN
Qty: 21 CAPSULE | Refills: 0 | Status: SHIPPED | OUTPATIENT
Start: 2019-04-12 | End: 2019-04-29 | Stop reason: ALTCHOICE

## 2019-04-12 RX ORDER — DICYCLOMINE HCL 20 MG
20 TABLET ORAL EVERY 6 HOURS
Qty: 20 TABLET | Refills: 1 | Status: SHIPPED | OUTPATIENT
Start: 2019-04-12 | End: 2019-05-21 | Stop reason: SDUPTHER

## 2019-04-16 ENCOUNTER — HOSPITAL ENCOUNTER (OUTPATIENT)
Dept: PHYSICAL THERAPY | Facility: HOSPITAL | Age: 49
Setting detail: THERAPIES SERIES
Discharge: HOME | End: 2019-04-16

## 2019-04-16 DIAGNOSIS — R20.0 NUMBNESS AND TINGLING IN LEFT HAND: ICD-10-CM

## 2019-04-16 DIAGNOSIS — M79.602 LEFT ARM PAIN: ICD-10-CM

## 2019-04-16 DIAGNOSIS — R20.2 NUMBNESS AND TINGLING IN LEFT HAND: ICD-10-CM

## 2019-04-16 DIAGNOSIS — M50.30 DEGENERATIVE DISC DISEASE, CERVICAL: Primary | ICD-10-CM

## 2019-04-16 PROCEDURE — 97161 PT EVAL LOW COMPLEX 20 MIN: CPT

## 2019-04-16 NOTE — THERAPY EVALUATION
"    Outpatient Physical Therapy Ortho Initial Evaluation  [facility]     Patient Name: Trinidad Quinteros  : 1970  MRN: [id number]  Today's Date: 2019      Visit Date: 2019    Patient Active Problem List   Diagnosis   • GERD (gastroesophageal reflux disease)   • Depression   • Hyperlipidemia   • Hypertension   • ADRIAN (obstructive sleep apnea)   • Snoring   • Low back pain   • Pain of left hip joint   • Anxiety   • OAB (overactive bladder)   • Onychomycosis   • Degenerative disc disease, cervical        Past Medical History:   Diagnosis Date   • Depression    • Hyperlipidemia    • Hypertension    • Low back pain      Past Surgical History:   Procedure Laterality Date   • APPENDECTOMY     • CARDIAC CATHETERIZATION     • CARDIAC CATHETERIZATION     •  SECTION     • HAND TENDON SURGERY Right    • PARTIAL HYSTERECTOMY     • TUBAL ABDOMINAL LIGATION       Visit Dx:     ICD-10-CM ICD-9-CM   1. Degenerative disc disease, cervical M50.30 722.4   2. Left arm pain M79.602 729.5   3. Numbness and tingling in left hand R20.0 782.0    R20.2      Patient History     Row Name 19 0845             History    Chief Complaint  Pain;Numbness;Tinglings  -AC      Type of Pain  Neck pain;Upper Extremity / Arm  -      Date Current Problem(s) Began  -- One year ago  -      Brief Description of Current Complaint  Pt has neck/LUE pain with onset one year ago, with fluctuating symptoms that now have become constant.  Pain is aggravated by computer work.  Runs down the back of her arm and on the dorsal aspect of last 3 digits, and has a \"hot\" feeling along lateral elbow.  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 Enjoys yardwork and does cleaning, housework, cooking, etc.  -AC      Patient seeing anyone else for problem(s)?  Mcmahon  -AC      How has patient tried to help current problem?  Heat/ice, stretching (mild relief)  -AC      What clinical tests have you had for this problem?  X-ray;MRI  -AC      Results of Clinical Tests  Degenerative OA at C5-6 and C6-7; osteophytes at C5-6 bilaterally  -AC      History of Previous Related Injuries  DDD in neck and lumbar spine  -AC         Pain     Pain Location  Arm;Neck  -AC      Pain at Present  5  -AC      Pain at Best  5  -AC      Pain at Worst  10 Without pain medication  -AC      Pain Frequency  Intermittent  -AC      Pain Description  Aching;Sore;Other (Comment) Hot  -AC      What Performance Factors Make the Current Problem(s) WORSE?  Working at computer, neck rotation to left  -AC      What Performance Factors Make the Current Problem(s) BETTER?  Tramadol, Percocet, sitting back on a ball placed at neck/shoulder  -AC      Is your sleep disturbed?  Yes  -AC      Is medication used to assist with sleep?  Yes Percocet at night  -AC      Total hours of sleep per night  6-8  -AC      What position do you sleep in?  Right sidelying  -AC      Difficulties at work?  Working at a computer aggravates pain, neck rotation  -AC      Difficulties with ADL's?  Putting hair up/putting arm behind head  -AC         Fall Risk Assessment    Any falls in the past year:  No  -AC         Daily Activities    Primary Language  English  -AC      Are you able to read  Yes  -AC      Are you able to write  Yes  -AC      How does patient learn best?  Listening  -AC      Barriers to learning  None  -AC      Pt Participated in POC and Goals  Yes  -AC         Safety    Are you being hurt, hit, or frightened by anyone at home or in your life?  No  -AC      Are you being neglected by a caregiver  No  -AC        User Key  (r) = Recorded By, (t) = Taken By, (c) = Cosigned By    Initials Name Provider  Type    AC Santa Jasso, PT Physical Therapist        PT Ortho     Row Name 04/16/19 0845       Posture/Observations    Alignment Options  Cervical lordosis;Rounded shoulders  -AC    Cervical Lordosis  Increased  -AC    Rounded Shoulders  Mild;Moderate  -AC       Quarter Clearing    Quarter Clearing  Upper Quarter Clearing  -AC       DTR- Upper Quarter Clearing    Biceps (C5/6)  Bilateral:;2- Normal response  -AC    Brachioradialis (C6)  Bilateral:;1- Minimal response  -AC    Triceps (C7)  Bilateral:;1- Minimal response  -AC       Neural Tension Signs- Upper Quarter Clearing    ULNTT 1  Left:;Negative  -AC    ULNTT 3  Left:;Negative  -AC    ULNTT 4  Left:;Postive Positive with elbow flexion  -AC       Sensory Screen for Light Touch- Upper Quarter Clearing    C4 (posterior shoulder)  Bilateral:;Intact  -AC    C5 (lateral upper arm)  Bilateral:;Intact  -AC    C6 (tip of thumb)  Bilateral:;Intact  -AC    C7 (tip of 3rd finger)  Left:;Diminished  -AC    C8 (tip of 5th finger)  Left:;Diminished  -AC    T1 (medial lower arm)  Bilateral:;Intact  -AC       Myotomal Screen- Upper Quarter Clearing    Shoulder flexion (C5)  Right:;4+ (Good +);Left:;4 (Good)  -AC    Elbow flexion/wrist extension (C6)  Right:;4+ (Good +);Left:;4 (Good)  -AC    Elbow extension/wrist flexion (C7)  Right:;4+ (Good +);Left:;4 (Good)  -AC    Finger flexion/ (C8)  Bilateral:;4+ (Good +)  -AC    Finger abduction (T1)  Bilateral:;4 (Good)  -AC       Cervical/Shoulder ROM Screen    Cervical flexion  Normal 48 deg  -AC    Cervical extension  Impaired 30 deg with pain in both neck/shoulders  -AC    Cervical lateral flexion  Impaired 32 deg R, 23 deg L with pain in L shoulder  -AC    Cervical rotation  Impaired 52 deg R, 34 deg L with L sided stretch both ways  -AC       Special Tests/Palpation    Special Tests/Palpation  Cervical/Thoracic Positive R Spurlings and compression, negative distraction  -AC       Cervical Palpation    Cervical  Palpation- Location?  Cervical facets;Spinous process  -AC    Cervical Facets  Tender C4, 5  -AC    Spinous Process  Tender L C7  -AC       General ROM    Head/Neck/Trunk  Neck Extension;Neck Flexion;Neck Lt Lateral Flexion;Neck Rt Lateral Flexion;Neck Lt Rotation;Neck Rt Rotation  -AC       Head/Neck/Trunk    Neck Extension AROM  30  -AC    Neck Flexion AROM  48  -AC    Neck Lt Lateral Flexion AROM  23  -AC    Neck Rt Lateral Flexion AROM  32  -AC    Neck Lt Rotation AROM  34  -AC    Neck Rt Rotation AROM  52  -AC        Strength Right    # Reps  3  -AC    Right Rung  2  -AC    Right  Test 1  76  -AC    Right  Test 2  75  -AC    Right  Test 3  74  -AC     Strength Average Right  75  -AC        Strength Left    # Reps  3  -AC    Left Rung  2  -AC    Left  Test 1  55  -AC    Left  Test 2  55  -AC    Left  Test 3  68  -AC     Strength Average Left  59.33  -AC       Hand  Strength     Strength Affected Side  Bilateral  -AC      User Key  (r) = Recorded By, (t) = Taken By, (c) = Cosigned By    Initials Name Provider Type    AC Santa Jasso, PT Physical Therapist        Therapy Education  Education Details: HEP provided including: chin tucks, cervical extension with towel, towel-assisted rotation stretch to L, and ulnar nerve glide.  Given: HEP  Program: New  How Provided: Verbal, Demonstration, Written  Provided to: Patient  Level of Understanding: Verbalized, Demonstrated     PT OP Goals     Row Name 04/16/19 0845          PT Short Term Goals    STG Date to Achieve  05/07/19  -     STG 1  Decrease neck/LUE pain by > or = 50%.  -AC     STG 1 Progress  New  -     STG 2  Perform independent HEP to improve ROM and manage radicular symptoms.  -     STG 2 Progress  New  -     STG 3  Improve L sidebending & rotation ROM to at least 45 deg.  -     STG 3 Progress  New  -        Long Term Goals    LTG Date to Achieve  05/28/19  -     LTG 1  Decrease neck/LUE  pain by > or = 75%.  -AC     LTG 1 Progress  New  -AC     LTG 2  Improve cervical ROM to be within 10 deg of normal in all directions.  -AC     LTG 2 Progress  New  -AC     LTG 3  Improve QuickDASH score to < or = 30% to reflect significant improvement in performance of functional activities.  -AC     LTG 3 Progress  New  -AC     LTG 4  Enable ability to perform work duties, including typing at a desk, with minimal-no pain (< or = 3/10).  -AC     LTG 4 Progress  New  -AC     LTG 5  Enable restful sleep without requiring medication to mask neck/LUE pain.  -AC     LTG 5 Progress  New  -AC        Time Calculation    PT Goal Re-Cert Due Date  07/15/19  -AC       User Key  (r) = Recorded By, (t) = Taken By, (c) = Cosigned By    Initials Name Provider Type    AC Santa Jasso, PT Physical Therapist        PT Assessment/Plan     Row Name 04/16/19 0845          PT Assessment    Functional Limitations  Limitation in home management;Performance in leisure activities;Performance in work activities;Performance in self-care ADL  -AC     Impairments  Pain;Range of motion;Joint mobility;Joint integrity;Muscle strength;Posture;Impaired muscle length;Sensation;Peripheral nerve integrity  -AC     Assessment Comments  Pt presents with evolving symptoms of low complexity, with signs and symptoms consistent with cervical DDD and radiculopathy along ulnar nerve distribution.  Pt has associated limitations in ROM, myotomal weakness, and positive neural tension.  PT intervention is warranted to restore ROM/strength and alleviate pain to return to PLOF.  -AC     Please refer to paper survey for additional self-reported information  Yes  -AC     Rehab Potential  Good Guarded d/t chronicity of condition  -AC     Patient/caregiver participated in establishment of treatment plan and goals  Yes  -AC     Patient would benefit from skilled therapy intervention  Yes  -AC        PT Plan    PT Frequency  2x/week  -AC     Predicted Duration of  Therapy Intervention (Therapy Eval)  12 visits  -AC     Planned CPT's?  PT EVAL LOW COMPLEXITY: 67862;PT THER PROC EA 15 MIN: 21886;PT MANUAL THERAPY EA 15 MIN: 64915;PT TRACTION CERVICAL: 36753;PT HOT/COLD PACK WC NONMCARE: 59901;PT SELF CARE/MGMT/TRAIN 15 MIN: 26419;PT THER SUPP EA 15 MIN;PT THER MASS EA 15 MIN: 76281;PT RE-EVAL: 34159;PT THER ACT EA 15 MIN: 91901;PT ELECTRICAL STIM UNATTEND:   -AC     PT Plan Comments  Progress ROM/mobility exercises as tolerated, utilizing manual therapy and mechanical traction to assist in symptom reduction.  -AC       User Key  (r) = Recorded By, (t) = Taken By, (c) = Cosigned By    Initials Name Provider Type    AC Santa Jasso, PT Physical Therapist        Outcome Measure Options: Quick DASH  Quick DASH  Open a tight or new jar.: Mild Difficulty  Do heavy household chores (e.g., wash walls, wash floors): Moderate Difficulty  Carry a shopping bag or briefcase: Mild Difficulty  Wash your back: Mild Difficulty  Use a knife to cut food: Mild Difficulty  Recreational activities in which you take some force or impact through your arm, should or hand (e.g. golf, hammering, tennis, etc.): Severe Difficulty  During the past week, to what extent has your arm, shoulder, or hand problem interfered with your normal social activites with family, friends, neighbors or groups?: Moderately  During the past week, were you limited in your work or other regular daily activities as a result of your arm, shoulder or hand problem?: Moderately Limited  Arm, Shoulder, or hand pain: Moderate  Tingling (pins and needles) in your arm, shoulder, or hand: Moderate  During the past week, how much difficulty have you had sleeping because of the pain in your arm, shoulder or hand?: Severe Difficulty  Number of Questions Answered: 11  Quick DASH Score: 45.45     Time Calculation:     Start Time: 0845     Therapy Charges for Today     Code Description Service Date Service Provider Modifiers Qty     88737389676  PT EVAL LOW COMPLEXITY 4 4/16/2019 Santa Jasso, PT GP 1        PT G-Codes  Outcome Measure Options: Quick DASH  Quick DASH Score: 45.45     Santa Jasso, PT  4/16/2019

## 2019-04-22 ENCOUNTER — HOSPITAL ENCOUNTER (OUTPATIENT)
Dept: PHYSICAL THERAPY | Facility: HOSPITAL | Age: 49
Setting detail: THERAPIES SERIES
Discharge: HOME | End: 2019-04-22

## 2019-04-22 DIAGNOSIS — R20.0 NUMBNESS AND TINGLING IN LEFT HAND: ICD-10-CM

## 2019-04-22 DIAGNOSIS — R20.2 NUMBNESS AND TINGLING IN LEFT HAND: ICD-10-CM

## 2019-04-22 DIAGNOSIS — M50.30 DEGENERATIVE DISC DISEASE, CERVICAL: Primary | ICD-10-CM

## 2019-04-22 DIAGNOSIS — M79.602 LEFT ARM PAIN: ICD-10-CM

## 2019-04-22 PROCEDURE — 97140 MANUAL THERAPY 1/> REGIONS: CPT

## 2019-04-22 PROCEDURE — 97110 THERAPEUTIC EXERCISES: CPT

## 2019-04-22 NOTE — THERAPY TREATMENT NOTE
Outpatient Physical Therapy Ortho Treatment Note  [facility]     Patient Name: Trinidad Quinteros  : 1970  MRN: [id number]  Today's Date: 2019      Visit Date: 2019    Visit Dx:    ICD-10-CM ICD-9-CM   1. Degenerative disc disease, cervical M50.30 722.4   2. Left arm pain M79.602 729.5   3. Numbness and tingling in left hand R20.0 782.0    R20.2        Patient Active Problem List   Diagnosis   • GERD (gastroesophageal reflux disease)   • Depression   • Hyperlipidemia   • Hypertension   • ADRIAN (obstructive sleep apnea)   • Snoring   • Low back pain   • Pain of left hip joint   • Anxiety   • OAB (overactive bladder)   • Onychomycosis   • Degenerative disc disease, cervical        Past Medical History:   Diagnosis Date   • Depression    • Hyperlipidemia    • Hypertension    • Low back pain         Past Surgical History:   Procedure Laterality Date   • APPENDECTOMY     • CARDIAC CATHETERIZATION     • CARDIAC CATHETERIZATION     •  SECTION     • HAND TENDON SURGERY Right    • PARTIAL HYSTERECTOMY     • TUBAL ABDOMINAL LIGATION       PT Assessment/Plan     Row Name 19 1400          PT Assessment    Assessment Comments  Client reported tingling in her hand abolished after repeated extension and lateral flexion left.  She still continued to have some discomfort to the left shoulder.  -MM        PT Plan    PT Plan Comments  Continue per plan of treatment with exercise and manual therapy.  -MM       User Key  (r) = Recorded By, (t) = Taken By, (c) = Cosigned By    Initials Name Provider Type    Dm Morrow, PT Physical Therapist            Exercises     Row Name 19 1400             Subjective Comments    Subjective Comments  Client reports neck tightness and pain in the left arm with fingers tingling at 5/10.  She reports pain was worse earlier but improved after taking tramadol.  She notices tingling in digits 3 4 and 5.  -MM         Subjective  Pain    Able to rate subjective pain?  yes  -MM      Pre-Treatment Pain Level  5  -MM      Post-Treatment Pain Level  3  -MM         Total Minutes    24383 - PT Therapeutic Exercise Minutes  15  -MM      72399 - PT Manual Therapy Minutes  15  -MM         Exercise 1    Exercise Name 1  Updated and reviewed home program.  Exercise focused on: Seated chin tuck and extension with towel, chin tuck and lateral flexion left with towel, supine neck extension with head off the table.  -MM      Time 1  15  -MM      Additional Comments  Therapeutic exercise  -MM        User Key  (r) = Recorded By, (t) = Taken By, (c) = Cosigned By    Initials Name Provider Type    Dm Morrow, PT Physical Therapist           Manual Rx (last 36 hours)      Manual Treatments     Row Name 04/22/19 1400             Total Minutes    92008 - PT Manual Therapy Minutes  15  -MM         Manual Rx 1    Manual Rx 1 Location  Cervical  -MM      Manual Rx 1 Type  Included manual traction with moderate pull.  Also included PA mobilization with movement for extension with head off table.  Including mobilization with movement for left lateral flexion.  -MM      Manual Rx 1 Duration  15 minutes  -MM        User Key  (r) = Recorded By, (t) = Taken By, (c) = Cosigned By    Initials Name Provider Type    Dm Morrow, PT Physical Therapist      Time Calculation:   Start Time: 1400  Therapy Charges for Today     Code Description Service Date Service Provider Modifiers Qty    [id number]  PT THER PROC EA 15 MIN 4/22/2019 Dm Steele, PT GP 1    [id number]  PT MANUAL THERAPY EA 15 MIN 4/22/2019 Dm Steele, PT GP 1        Dm Steele PT  4/22/2019

## 2019-04-23 RX ORDER — NABUMETONE 750 MG/1
750 TABLET, FILM COATED ORAL 2 TIMES DAILY
Qty: 60 TABLET | Refills: 0 | Status: SHIPPED | OUTPATIENT
Start: 2019-04-23 | End: 2019-05-21 | Stop reason: SDUPTHER

## 2019-04-24 ENCOUNTER — TELEPHONE (OUTPATIENT)
Dept: FAMILY MEDICINE CLINIC | Facility: CLINIC | Age: 49
End: 2019-04-24

## 2019-04-24 RX ORDER — PREDNISONE 20 MG/1
20 TABLET ORAL 2 TIMES DAILY
Qty: 10 TABLET | Refills: 0 | Status: SHIPPED | OUTPATIENT
Start: 2019-04-24 | End: 2019-05-06 | Stop reason: SDUPTHER

## 2019-04-29 ENCOUNTER — OFFICE VISIT (OUTPATIENT)
Dept: FAMILY MEDICINE CLINIC | Facility: CLINIC | Age: 49
End: 2019-04-29

## 2019-04-29 VITALS
OXYGEN SATURATION: 99 % | BODY MASS INDEX: 33.49 KG/M2 | HEIGHT: 62 IN | TEMPERATURE: 97.8 F | HEART RATE: 71 BPM | DIASTOLIC BLOOD PRESSURE: 86 MMHG | WEIGHT: 182 LBS | SYSTOLIC BLOOD PRESSURE: 142 MMHG

## 2019-04-29 DIAGNOSIS — R05.9 COUGH: ICD-10-CM

## 2019-04-29 DIAGNOSIS — J01.91 ACUTE RECURRENT SINUSITIS, UNSPECIFIED LOCATION: Primary | ICD-10-CM

## 2019-04-29 PROCEDURE — 99213 OFFICE O/P EST LOW 20 MIN: CPT | Performed by: PHYSICIAN ASSISTANT

## 2019-04-29 PROCEDURE — 96372 THER/PROPH/DIAG INJ SC/IM: CPT | Performed by: PHYSICIAN ASSISTANT

## 2019-04-29 RX ORDER — CEFTRIAXONE 1 G/1
1 INJECTION, POWDER, FOR SOLUTION INTRAMUSCULAR; INTRAVENOUS ONCE
Status: COMPLETED | OUTPATIENT
Start: 2019-04-29 | End: 2019-04-29

## 2019-04-29 RX ORDER — DEXTROMETHORPHAN HYDROBROMIDE AND PROMETHAZINE HYDROCHLORIDE 15; 6.25 MG/5ML; MG/5ML
5 SYRUP ORAL EVERY 4 HOURS PRN
Qty: 180 ML | Refills: 0 | Status: SHIPPED | OUTPATIENT
Start: 2019-04-29 | End: 2019-05-06 | Stop reason: SDUPTHER

## 2019-04-29 RX ORDER — METHYLPREDNISOLONE ACETATE 40 MG/ML
40 INJECTION, SUSPENSION INTRA-ARTICULAR; INTRALESIONAL; INTRAMUSCULAR; SOFT TISSUE ONCE
Status: COMPLETED | OUTPATIENT
Start: 2019-04-29 | End: 2019-04-29

## 2019-04-29 RX ORDER — CEFUROXIME AXETIL 500 MG/1
500 TABLET ORAL 2 TIMES DAILY
Qty: 14 TABLET | Refills: 0 | Status: SHIPPED | OUTPATIENT
Start: 2019-04-29 | End: 2019-05-10

## 2019-04-29 RX ADMIN — METHYLPREDNISOLONE ACETATE 40 MG: 40 INJECTION, SUSPENSION INTRA-ARTICULAR; INTRALESIONAL; INTRAMUSCULAR; SOFT TISSUE at 14:56

## 2019-04-29 RX ADMIN — CEFTRIAXONE 1 G: 1 INJECTION, POWDER, FOR SOLUTION INTRAMUSCULAR; INTRAVENOUS at 14:55

## 2019-04-29 NOTE — PROGRESS NOTES
Subjective   Trinidad Quinteros is a 48 y.o. female  Cough (productive cough with green mucus x3 days ) and sinus drainage (sinus drainage x5 days )      History of Present Illness  Patient comes in today concerned with ongoing persistent cough with green mucus and sinus drainage with a cough is keeping her up at night.  This been worsening over the course the past 5 days despite taking prednisone and stay hist.  She is a smoker.  She states her  has been sick lately as well.  The following portions of the patient's history were reviewed and updated as appropriate: allergies, current medications, past social history and problem list    Review of Systems   Constitutional: Negative for chills, fatigue and fever.   HENT: Positive for congestion, ear pain, postnasal drip, rhinorrhea and sinus pressure. Negative for sore throat.    Eyes: Negative for pain.   Respiratory: Positive for cough. Negative for shortness of breath.    Neurological: Positive for headaches. Negative for dizziness.   Hematological: Negative for adenopathy.       Objective     Vitals:    04/29/19 1428   BP: 142/86   Pulse: 71   Temp: 97.8 °F (36.6 °C)   SpO2: 99%       Physical Exam   Constitutional: She appears well-developed and well-nourished. No distress.   HENT:   Head: Normocephalic and atraumatic.   Right Ear: Tympanic membrane and ear canal normal.   Left Ear: Tympanic membrane and ear canal normal.   Nose: Mucosal edema, rhinorrhea and sinus tenderness present. Right sinus exhibits maxillary sinus tenderness and frontal sinus tenderness. Left sinus exhibits maxillary sinus tenderness and frontal sinus tenderness.   Mouth/Throat: Oropharynx is clear and moist. No oropharyngeal exudate.   Eyes: Pupils are equal, round, and reactive to light.   Cardiovascular: Normal rate and regular rhythm.   Pulmonary/Chest: Effort normal and breath sounds normal. No respiratory distress.   Skin: She is not diaphoretic.   Nursing note and vitals  reviewed.      Assessment/Plan     Diagnoses and all orders for this visit:    Acute recurrent sinusitis, unspecified location  -     cefTRIAXone (ROCEPHIN) injection 1 g  -     methylPREDNISolone acetate (DEPO-medrol) injection 40 mg    Cough    Other orders  -     promethazine-dextromethorphan (PROMETHAZINE-DM) 6.25-15 MG/5ML syrup; 2 tsp every night for cough and 1 tsp q4hr during day prn  -     cefuroxime (CEFTIN) 500 MG tablet; Take 1 tablet by mouth 2 (Two) Times a Day.

## 2019-04-30 ENCOUNTER — HOSPITAL ENCOUNTER (OUTPATIENT)
Dept: PHYSICAL THERAPY | Facility: HOSPITAL | Age: 49
Setting detail: THERAPIES SERIES
Discharge: HOME | End: 2019-04-30

## 2019-04-30 DIAGNOSIS — R20.0 NUMBNESS AND TINGLING IN LEFT HAND: ICD-10-CM

## 2019-04-30 DIAGNOSIS — M50.30 DEGENERATIVE DISC DISEASE, CERVICAL: Primary | ICD-10-CM

## 2019-04-30 DIAGNOSIS — M79.602 LEFT ARM PAIN: ICD-10-CM

## 2019-04-30 DIAGNOSIS — R20.2 NUMBNESS AND TINGLING IN LEFT HAND: ICD-10-CM

## 2019-04-30 PROCEDURE — 97110 THERAPEUTIC EXERCISES: CPT

## 2019-04-30 PROCEDURE — 97140 MANUAL THERAPY 1/> REGIONS: CPT

## 2019-05-01 ENCOUNTER — HOSPITAL ENCOUNTER (OUTPATIENT)
Dept: MAMMOGRAPHY | Facility: HOSPITAL | Age: 49
Discharge: HOME | End: 2019-05-01
Admitting: PHYSICIAN ASSISTANT

## 2019-05-01 DIAGNOSIS — Z12.31 VISIT FOR SCREENING MAMMOGRAM: ICD-10-CM

## 2019-05-01 PROCEDURE — 77063 BREAST TOMOSYNTHESIS BI: CPT

## 2019-05-01 PROCEDURE — 77067 SCR MAMMO BI INCL CAD: CPT | Performed by: RADIOLOGY

## 2019-05-01 PROCEDURE — 77063 BREAST TOMOSYNTHESIS BI: CPT | Performed by: RADIOLOGY

## 2019-05-01 PROCEDURE — 77067 SCR MAMMO BI INCL CAD: CPT

## 2019-05-01 NOTE — THERAPY TREATMENT NOTE
Outpatient Physical Therapy Ortho Treatment Note  [facility]     Patient Name: Trinidad Quinteros  : 1970  MRN: [id number]  Today's Date: 2019      Visit Date: 2019    Visit Dx:    ICD-10-CM ICD-9-CM   1. Degenerative disc disease, cervical M50.30 722.4   2. Left arm pain M79.602 729.5   3. Numbness and tingling in left hand R20.0 782.0    R20.2        Patient Active Problem List   Diagnosis   • GERD (gastroesophageal reflux disease)   • Depression   • Hyperlipidemia   • Hypertension   • ADRIAN (obstructive sleep apnea)   • Snoring   • Low back pain   • Pain of left hip joint   • Anxiety   • OAB (overactive bladder)   • Onychomycosis   • Degenerative disc disease, cervical        Past Medical History:   Diagnosis Date   • Depression    • Hyperlipidemia    • Hypertension    • Low back pain         Past Surgical History:   Procedure Laterality Date   • APPENDECTOMY     • CARDIAC CATHETERIZATION     • CARDIAC CATHETERIZATION     •  SECTION     • HAND TENDON SURGERY Right    • PARTIAL HYSTERECTOMY     • TUBAL ABDOMINAL LIGATION           PT Assessment/Plan     Row Name 19 1415          PT Assessment    Assessment Comments  Overall client has noticed a significant amount of improvement. Symptoms are intermittent and less overall pain into the left arm/hand. At times she still notices some tingling into the left hand.   -MM        PT Plan    PT Plan Comments  Continue per plan of treatment with exercises and manual therapy.   -MM       User Key  (r) = Recorded By, (t) = Taken By, (c) = Cosigned By    Initials Name Provider Type    MM Dm Steele, PT Physical Therapist            Exercises     Row Name 19 1415             Subjective Comments    Subjective Comments  Client reports no pain at the time of treatment. She reports symptoms are intermittent. Symtpoms to the left arm have been much improved.   -MM         Subjective Pain    Able to rate  subjective pain?  yes  -MM      Pre-Treatment Pain Level  0  -MM      Post-Treatment Pain Level  0  -MM         Total Minutes    29859 - PT Therapeutic Exercise Minutes  15  -MM      43105 - PT Manual Therapy Minutes  15  -MM         Exercise 1    Exercise Name 1  Reviewed exercises to include: neck extension with towel, sidebending with sheet assist, head turns left with towel, thoracic extension over foam and cervical extension over foam.  -MM      Cueing 1  Verbal  -MM      Time 1  15  -MM      Additional Comments  ther ex  -MM        User Key  (r) = Recorded By, (t) = Taken By, (c) = Cosigned By    Initials Name Provider Type    Dm Morrow, PT Physical Therapist              Manual Rx (last 36 hours)      Manual Treatments     Row Name 04/30/19 1415             Total Minutes    27193 - PT Manual Therapy Minutes  15  -MM         Manual Rx 1    Manual Rx 1 Location  Cervical  -MM      Manual Rx 1 Type  Included manual traction with moderate pull.  Also included PA mobilization with movement for extension with head off table.  Including mobilization with movement for left lateral flexion.  -MM      Manual Rx 1 Duration  15 minutes  -MM        User Key  (r) = Recorded By, (t) = Taken By, (c) = Cosigned By    Initials Name Provider Type    Dm Morrow, PT Physical Therapist        Time Calculation:   Start Time: 1415  Therapy Charges for Today     Code Description Service Date Service Provider Modifiers Qty    80255851689  PT THER PROC EA 15 MIN 4/30/2019 Dm Steele, PT GP 1    [id number]  PT MANUAL THERAPY EA 15 MIN 4/30/2019 Dm Steele, PT GP 1        Dm Steele PT  5/1/2019

## 2019-05-06 ENCOUNTER — TELEPHONE (OUTPATIENT)
Dept: FAMILY MEDICINE CLINIC | Facility: CLINIC | Age: 49
End: 2019-05-06

## 2019-05-06 RX ORDER — PREDNISONE 20 MG/1
20 TABLET ORAL 2 TIMES DAILY
Qty: 10 TABLET | Refills: 0 | Status: SHIPPED | OUTPATIENT
Start: 2019-05-06 | End: 2019-08-01

## 2019-05-06 RX ORDER — DEXTROMETHORPHAN HYDROBROMIDE AND PROMETHAZINE HYDROCHLORIDE 15; 6.25 MG/5ML; MG/5ML
5 SYRUP ORAL EVERY 4 HOURS PRN
Qty: 180 ML | Refills: 1 | Status: SHIPPED | OUTPATIENT
Start: 2019-05-06 | End: 2019-08-01 | Stop reason: SDUPTHER

## 2019-05-06 NOTE — TELEPHONE ENCOUNTER
----- Message from Trinidad Quinteros sent at 5/6/2019  8:20 AM EDT -----  Contact: PATIENT  STILL COUGHING, CONGESTED. NASAL BLOCKAGE, PRESSURE. HAVE TAKEN ALL ANTIBIOTICS. SHOULD I TRY ANOTHER ROUND OR GET ANOTHER SHOT. ALSO CAN I GET A REFILL ON COUGH SYRUP. THANK YOU    [facility]

## 2019-05-06 NOTE — TELEPHONE ENCOUNTER
Please call in a refill of Promethazine DM 1 to 2 teaspoons every 4 hours as needed for cough 180 mL with 1 refill and prednisone 20 mg 1 twice daily for 5 days #10 have her continue on current antibiotic.

## 2019-05-09 RX ORDER — ALPRAZOLAM 0.5 MG/1
0.5 TABLET ORAL 2 TIMES DAILY
Qty: 60 TABLET | Refills: 5 | Status: CANCELLED | OUTPATIENT
Start: 2019-05-09

## 2019-05-09 NOTE — TELEPHONE ENCOUNTER
----- Message from Angela Rahman sent at 5/9/2019 12:14 PM EDT -----  Patient needs a refill on her Percocet 5-325 mg, once nightly for back and hip pain. She uses [facility]..  ThanksAngela..

## 2019-05-10 ENCOUNTER — TELEPHONE (OUTPATIENT)
Dept: FAMILY MEDICINE CLINIC | Facility: CLINIC | Age: 49
End: 2019-05-10

## 2019-05-10 RX ORDER — CEFUROXIME AXETIL 250 MG/1
250 TABLET ORAL 2 TIMES DAILY
Qty: 20 TABLET | Refills: 0 | Status: SHIPPED | OUTPATIENT
Start: 2019-05-10 | End: 2019-08-19

## 2019-05-10 NOTE — TELEPHONE ENCOUNTER
----- Message from Trinidad Quinteros sent at 5/10/2019  9:02 AM EDT -----  FINISHED ANTIBIOTIC ON 5/7/19. COUGH IS BETTER, STILL HAVING SINUS PRESSURE, BLOWING GREEN MUCUS OUT.  STUFFINESS

## 2019-05-10 NOTE — TELEPHONE ENCOUNTER
I dont know where that request for alprazolam came through but the original message is for her Percocet.

## 2019-05-13 ENCOUNTER — HOSPITAL ENCOUNTER (OUTPATIENT)
Dept: PHYSICAL THERAPY | Facility: HOSPITAL | Age: 49
Setting detail: THERAPIES SERIES
Discharge: HOME | End: 2019-05-13

## 2019-05-13 DIAGNOSIS — R20.2 NUMBNESS AND TINGLING IN LEFT HAND: ICD-10-CM

## 2019-05-13 DIAGNOSIS — M79.602 LEFT ARM PAIN: ICD-10-CM

## 2019-05-13 DIAGNOSIS — R20.0 NUMBNESS AND TINGLING IN LEFT HAND: ICD-10-CM

## 2019-05-13 DIAGNOSIS — M50.30 DEGENERATIVE DISC DISEASE, CERVICAL: Primary | ICD-10-CM

## 2019-05-13 PROCEDURE — 97110 THERAPEUTIC EXERCISES: CPT

## 2019-05-13 NOTE — THERAPY DISCHARGE NOTE
Outpatient Physical Therapy Ortho Progress Note/Discharge Summary   [facility]     Patient Name: Trinidad Quinteros  : 1970  MRN: [id number]  Today's Date: 2019      Visit Date: 2019    Visit Dx:    ICD-10-CM ICD-9-CM   1. Degenerative disc disease, cervical M50.30 722.4   2. Left arm pain M79.602 729.5   3. Numbness and tingling in left hand R20.0 782.0    R20.2        Patient Active Problem List   Diagnosis   • GERD (gastroesophageal reflux disease)   • Depression   • Hyperlipidemia   • Hypertension   • ADRIAN (obstructive sleep apnea)   • Snoring   • Low back pain   • Pain of left hip joint   • Anxiety   • OAB (overactive bladder)   • Onychomycosis   • Degenerative disc disease, cervical        Past Medical History:   Diagnosis Date   • Depression    • Hyperlipidemia    • Hypertension    • Low back pain         Past Surgical History:   Procedure Laterality Date   • APPENDECTOMY     • CARDIAC CATHETERIZATION     • CARDIAC CATHETERIZATION     •  SECTION     • HAND TENDON SURGERY Right    • PARTIAL HYSTERECTOMY     • TUBAL ABDOMINAL LIGATION         PT Ortho     Row Name 19 1715       Head/Neck/Trunk    Neck Extension AROM  38  -AC    Neck Flexion AROM  53  -AC    Neck Lt Lateral Flexion AROM  20  -AC    Neck Rt Lateral Flexion AROM  28  -AC    Neck Lt Rotation AROM  48  -AC    Neck Rt Rotation AROM  50  -AC        Strength Right    # Reps  3  -AC    Right Rung  2  -AC    Right  Test 1  82  -AC    Right  Test 2  85  -AC    Right  Test 3  85  -AC     Strength Average Right  84  -AC        Strength Left    # Reps  3  -AC    Left Rung  2  -AC    Left  Test 1  60  -AC    Left  Test 2  62  -AC    Left  Test 3  58  -AC     Strength Average Left  60  -AC      User Key  (r) = Recorded By, (t) = Taken By, (c) = Cosigned By    Initials Name Provider Type    AC Santa Jasso, PT Physical Therapist        Exercises      Row Name 05/13/19 1715             Subjective Comments    Subjective Comments  Pt states she has had no neck pain over the last two weeks. Has had occasional pain into L upper arm/tingling at work, but able to self-resolve with chin tucks and nerve glides.  -AC         Subjective Pain    Able to rate subjective pain?  yes  -AC      Pre-Treatment Pain Level  0  -AC      Post-Treatment Pain Level  0  -AC         Total Minutes    93494 - PT Therapeutic Exercise Minutes  23  -AC         Exercise 1    Exercise Name 1  Reassessment measures included in therex time. Reviewed and provided printout of HEP including: band pull aparts, rows, sweeps with red band; and yellow band chin tucks and tucks/rotation bilaterally.  -AC      Cueing 1  Verbal;Tactile;Demo  -AC      Time 1  23  -AC      Additional Comments  Ther Ex  -AC        User Key  (r) = Recorded By, (t) = Taken By, (c) = Cosigned By    Initials Name Provider Type    AC Santa Jasso, PT Physical Therapist        PT OP Goals     Row Name 05/13/19 1715          PT Short Term Goals    STG Date to Achieve  05/07/19  -AC     STG 1  Decrease neck/LUE pain by > or = 50%.  -AC     STG 1 Progress  Met  -AC     STG 1 Progress Comments  98% per pt report  -AC     STG 2  Perform independent HEP to improve ROM and manage radicular symptoms.  -AC     STG 2 Progress  Met  -     STG 3  Improve L sidebending & rotation ROM to at least 45 deg.  -AC     STG 3 Progress  Partially Met Met for rotation but not sidebending  -        Long Term Goals    LTG Date to Achieve  05/28/19  -AC     LTG 1  Decrease neck/LUE pain by > or = 75%.  -AC     LTG 1 Progress  Met  -AC     LTG 2  Improve cervical ROM to be within 10 deg of normal in all directions.  -AC     LTG 2 Progress  Partially Met  -AC     LTG 3  Improve QuickDASH score to < or = 30% to reflect significant improvement in performance of functional activities.  -AC     LTG 3 Progress  Met  -     LTG 4  Enable ability to  perform work duties, including typing at a desk, with minimal-no pain (< or = 3/10).  -     LTG 4 Progress  Met  -     LTG 4 Progress Comments  3/10  -     LTG 5  Enable restful sleep without requiring medication to mask neck/LUE pain.  -AC     LTG 5 Progress  Not Met  -     LTG 5 Progress Comments  Pt states she was already taking medication for back/hip pain  -        Time Calculation    PT Goal Re-Cert Due Date  07/15/19  -       User Key  (r) = Recorded By, (t) = Taken By, (c) = Cosigned By    Initials Name Provider Type    AC Santa Jasso, PT Physical Therapist        Outcome Measure Options: Quick DASH  Quick DASH  Open a tight or new jar.: Mild Difficulty  Do heavy household chores (e.g., wash walls, wash floors): Moderate Difficulty  Carry a shopping bag or briefcase: No Difficulty  Wash your back: No Difficulty  Use a knife to cut food: No Difficulty  Recreational activities in which you take some force or impact through your arm, should or hand (e.g. golf, hammering, tennis, etc.): Mild Difficulty  During the past week, to what extent has your arm, shoulder, or hand problem interfered with your normal social activites with family, friends, neighbors or groups?: Slightly  During the past week, were you limited in your work or other regular daily activities as a result of your arm, shoulder or hand problem?: Not limited at all  Arm, Shoulder, or hand pain: Mild  Tingling (pins and needles) in your arm, shoulder, or hand: Mild  During the past week, how much difficulty have you had sleeping because of the pain in your arm, shoulder or hand?: No difficulty  Number of Questions Answered: 11  Quick DASH Score: 15.91    Time Calculation:   Start Time: 1715  Therapy Charges for Today     Code Description Service Date Service Provider Modifiers Qty    98332683060 HC PT THER PROC EA 15 MIN 5/13/2019 Santa Jasso, PT GP 2        PT G-Codes  Outcome Measure Options: Quick DASH  Quick DASH  Score: 15.91     OP PT Discharge Summary  Date of Discharge: 05/13/19  Reason for Discharge: Maximum functional potential achieved  Outcomes Achieved: Patient able to partially acheive established goals  Discharge Destination: Home with home program  Discharge Instructions/Additional Comments: Pt was seen for four PT visits to address neck pain/L arm radiculopathy. Pt was able to meet or partially meet all but one LTG, and reports 98% improvement in overall symptoms and significant improvement in function (15% QuickDASH).  Pt only has mild pain into lateral upper arm/hand tingling, however is able to self-resolve with exercises quickly.  Provided pt progressed HEP to build upper quarter and cervical strength/endurance. Pt will be discharged with HEP at this time.       Santa Jasso, PT  5/13/2019

## 2019-05-21 RX ORDER — NABUMETONE 750 MG/1
750 TABLET, FILM COATED ORAL 2 TIMES DAILY
Qty: 60 TABLET | Refills: 0 | Status: SHIPPED | OUTPATIENT
Start: 2019-05-21 | End: 2019-06-23 | Stop reason: SDUPTHER

## 2019-05-21 RX ORDER — DICYCLOMINE HCL 20 MG
20 TABLET ORAL EVERY 6 HOURS
Qty: 20 TABLET | Refills: 1 | Status: SHIPPED | OUTPATIENT
Start: 2019-05-21 | End: 2019-08-19 | Stop reason: SDUPTHER

## 2019-05-22 RX ORDER — GABAPENTIN 300 MG/1
300 CAPSULE ORAL 2 TIMES DAILY
Qty: 60 CAPSULE | Refills: 5 | OUTPATIENT
Start: 2019-05-22

## 2019-05-22 NOTE — TELEPHONE ENCOUNTER
Yes, please call in refill 1 twice daily for radiculopathy/neuropathy please call in a 3-month supply of 180 tablets with 1 refill per Dr. Roth

## 2019-06-07 RX ORDER — TIZANIDINE 4 MG/1
2-4 TABLET ORAL 2 TIMES DAILY PRN
Qty: 45 TABLET | Refills: 5 | Status: SHIPPED | OUTPATIENT
Start: 2019-06-07 | End: 2020-01-05 | Stop reason: SDUPTHER

## 2019-06-10 ENCOUNTER — TELEPHONE (OUTPATIENT)
Dept: FAMILY MEDICINE CLINIC | Facility: CLINIC | Age: 49
End: 2019-06-10

## 2019-06-10 NOTE — TELEPHONE ENCOUNTER
----- Message from Angela Rahman sent at 6/10/2019  8:43 AM EDT -----  Patient needs a refill on her oxycodone 5 - 325mg 1tab  @ night for back / hip pain..  ThanksAngela..

## 2019-06-23 DIAGNOSIS — M50.30 DEGENERATIVE DISC DISEASE, CERVICAL: Primary | ICD-10-CM

## 2019-06-24 RX ORDER — NABUMETONE 750 MG/1
750 TABLET, FILM COATED ORAL 2 TIMES DAILY
Qty: 60 TABLET | Refills: 0 | Status: SHIPPED | OUTPATIENT
Start: 2019-06-24 | End: 2019-07-21 | Stop reason: SDUPTHER

## 2019-06-24 NOTE — TELEPHONE ENCOUNTER
Provider:  Lisandro  Caller: christopher  Time of call:  n/a   Phone #:  n/a  Last visit: 3/26/19    Next visit: none     Reason for call:         Automated refill request.

## 2019-07-09 ENCOUNTER — TELEPHONE (OUTPATIENT)
Dept: FAMILY MEDICINE CLINIC | Facility: CLINIC | Age: 49
End: 2019-07-09

## 2019-07-21 DIAGNOSIS — M50.30 DEGENERATIVE DISC DISEASE, CERVICAL: ICD-10-CM

## 2019-07-22 RX ORDER — NABUMETONE 750 MG/1
750 TABLET, FILM COATED ORAL 2 TIMES DAILY
Qty: 60 TABLET | Refills: 0 | Status: SHIPPED | OUTPATIENT
Start: 2019-07-22 | End: 2019-08-25 | Stop reason: SDUPTHER

## 2019-08-01 ENCOUNTER — TELEPHONE (OUTPATIENT)
Dept: FAMILY MEDICINE CLINIC | Facility: CLINIC | Age: 49
End: 2019-08-01

## 2019-08-01 RX ORDER — AMOXICILLIN 875 MG/1
875 TABLET, COATED ORAL 2 TIMES DAILY
Qty: 20 TABLET | Refills: 0 | Status: SHIPPED | OUTPATIENT
Start: 2019-08-01 | End: 2019-08-19

## 2019-08-01 RX ORDER — PREDNISONE 10 MG/1
10 TABLET ORAL 2 TIMES DAILY
Qty: 10 TABLET | Refills: 0 | Status: SHIPPED | OUTPATIENT
Start: 2019-08-01 | End: 2019-08-19

## 2019-08-01 RX ORDER — DEXTROMETHORPHAN HYDROBROMIDE AND PROMETHAZINE HYDROCHLORIDE 15; 6.25 MG/5ML; MG/5ML
5 SYRUP ORAL EVERY 4 HOURS PRN
Qty: 180 ML | Refills: 1 | Status: SHIPPED | OUTPATIENT
Start: 2019-08-01 | End: 2019-09-05

## 2019-08-01 NOTE — TELEPHONE ENCOUNTER
----- Message from Trinidad Quinteros sent at 8/1/2019  9:56 AM EDT -----  Can I get something called in for sinus pressure, drainage & stuffiness, itchy eyes. Already have some stahist. Also if I can get some cough syrup for night time. When laying down the drainage causes coughing. Thank you    Maya ARZATE

## 2019-08-07 ENCOUNTER — TELEPHONE (OUTPATIENT)
Dept: FAMILY MEDICINE CLINIC | Facility: CLINIC | Age: 49
End: 2019-08-07

## 2019-08-07 NOTE — TELEPHONE ENCOUNTER
----- Message from Angela Rahman sent at 8/7/2019 11:51 AM EDT -----  Patient needs a refill on her oxycodone 5 - 325mg 1tab  @ night for back / hip pain..  ThanksAngela..

## 2019-08-19 ENCOUNTER — OFFICE VISIT (OUTPATIENT)
Dept: FAMILY MEDICINE CLINIC | Facility: CLINIC | Age: 49
End: 2019-08-19

## 2019-08-19 VITALS
HEIGHT: 62 IN | OXYGEN SATURATION: 99 % | BODY MASS INDEX: 31.65 KG/M2 | HEART RATE: 71 BPM | SYSTOLIC BLOOD PRESSURE: 128 MMHG | DIASTOLIC BLOOD PRESSURE: 82 MMHG | TEMPERATURE: 98.4 F | WEIGHT: 172 LBS

## 2019-08-19 DIAGNOSIS — R10.9 ACUTE ABDOMINAL PAIN IN RIGHT FLANK: Primary | ICD-10-CM

## 2019-08-19 PROCEDURE — 99213 OFFICE O/P EST LOW 20 MIN: CPT | Performed by: PHYSICIAN ASSISTANT

## 2019-08-19 RX ORDER — CIPROFLOXACIN 500 MG/1
500 TABLET, FILM COATED ORAL 2 TIMES DAILY
Qty: 10 TABLET | Refills: 0 | Status: SHIPPED | OUTPATIENT
Start: 2019-08-19 | End: 2019-09-05

## 2019-08-19 RX ORDER — DICYCLOMINE HCL 20 MG
20 TABLET ORAL EVERY 6 HOURS
Qty: 20 TABLET | Refills: 1 | Status: SHIPPED | OUTPATIENT
Start: 2019-08-19 | End: 2019-10-30 | Stop reason: SDUPTHER

## 2019-08-19 NOTE — PROGRESS NOTES
Subjective   Trinidad Quinteros is a 48 y.o. female  Abdominal Pain (upper right side abdominal pain x2 days )      History of Present Illness  Patient comes in today for assessment of abdominal pain that started over the weekend.  She states that she started having pain in her right mid quadrant of her abdomen on Saturday and is bothered her on and off throughout the weekend.  Little bit of nausea and some diarrhea, no vomiting.  No fever.  Urinating normally.  The following portions of the patient's history were reviewed and updated as appropriate: allergies, current medications, past social history and problem list    Review of Systems   Constitutional: Positive for appetite change. Negative for chills, diaphoresis, fever and unexpected weight change.   Respiratory: Negative for cough, chest tightness and shortness of breath.    Cardiovascular: Negative for chest pain.   Gastrointestinal: Positive for abdominal pain and diarrhea. Negative for abdominal distention, blood in stool, constipation, nausea and vomiting.   Genitourinary: Negative.  Negative for difficulty urinating and dysuria.   Musculoskeletal: Negative for back pain.   Skin: Negative.  Negative for color change and rash.   Allergic/Immunologic: Negative for food allergies.       Objective     Vitals:    08/19/19 1118   BP: 128/82   Pulse: 71   Temp: 98.4 °F (36.9 °C)   SpO2: 99%       Physical Exam   Constitutional: She is oriented to person, place, and time. She appears well-developed and well-nourished. No distress.   Eyes: Conjunctivae are normal.   Cardiovascular: Normal rate and regular rhythm.   Pulmonary/Chest: Effort normal and breath sounds normal.   Abdominal: Soft. Bowel sounds are normal. She exhibits no distension and no mass. There is tenderness ( Mild tenderness right flank anteriorly, no posterior tenderness). There is no rebound and no guarding. No hernia.   Neurological: She is alert and oriented to person, place, and time.   Skin:  Skin is warm and dry. No rash noted. She is not diaphoretic. No erythema. No pallor.   Psychiatric: She has a normal mood and affect. Her behavior is normal.   Nursing note and vitals reviewed.      Assessment/Plan     Diagnoses and all orders for this visit:    Acute abdominal pain in right flank    Other orders  -     ciprofloxacin (CIPRO) 500 MG tablet; Take 1 tablet by mouth 2 (Two) Times a Day.  -     dicyclomine (BENTYL) 20 MG tablet; Take 1 tablet by mouth Every 6 (Six) Hours. For diarrhea and stomach cramps    Follow-up for further evaluation and treatment if unimproved after 72 hours or if symptoms worsen.

## 2019-08-25 DIAGNOSIS — M50.30 DEGENERATIVE DISC DISEASE, CERVICAL: ICD-10-CM

## 2019-08-26 RX ORDER — NABUMETONE 750 MG/1
750 TABLET, FILM COATED ORAL 2 TIMES DAILY
Qty: 60 TABLET | Refills: 0 | Status: SHIPPED | OUTPATIENT
Start: 2019-08-26 | End: 2019-09-29 | Stop reason: SDUPTHER

## 2019-08-26 NOTE — TELEPHONE ENCOUNTER
Provider:  néstor  Caller:  Automated refill request  Surgery:  na  Surgery Date:  na  Last visit:  3/26/19  Next visit: tbd    Reason for call:         Requested Prescriptions     Pending Prescriptions Disp Refills   • nabumetone (RELAFEN) 750 MG tablet 60 tablet 0     Sig: Take 1 tablet by mouth 2 (Two) Times a Day.

## 2019-09-04 ENCOUNTER — TELEPHONE (OUTPATIENT)
Dept: FAMILY MEDICINE CLINIC | Facility: CLINIC | Age: 49
End: 2019-09-04

## 2019-09-05 ENCOUNTER — OFFICE VISIT (OUTPATIENT)
Dept: FAMILY MEDICINE CLINIC | Facility: CLINIC | Age: 49
End: 2019-09-05

## 2019-09-05 DIAGNOSIS — Z87.898 HISTORY OF PREDIABETES: Primary | ICD-10-CM

## 2019-09-05 DIAGNOSIS — M50.30 DEGENERATIVE DISC DISEASE, CERVICAL: ICD-10-CM

## 2019-09-05 DIAGNOSIS — G47.33 OSA (OBSTRUCTIVE SLEEP APNEA): ICD-10-CM

## 2019-09-05 DIAGNOSIS — G89.29 CHRONIC MIDLINE LOW BACK PAIN WITH SCIATICA, SCIATICA LATERALITY UNSPECIFIED: ICD-10-CM

## 2019-09-05 DIAGNOSIS — E78.5 DYSLIPIDEMIA: ICD-10-CM

## 2019-09-05 DIAGNOSIS — M54.40 CHRONIC MIDLINE LOW BACK PAIN WITH SCIATICA, SCIATICA LATERALITY UNSPECIFIED: ICD-10-CM

## 2019-09-05 DIAGNOSIS — R53.83 FATIGUE, UNSPECIFIED TYPE: ICD-10-CM

## 2019-09-05 LAB
ALBUMIN SERPL-MCNC: 4.5 G/DL (ref 3.5–5.2)
ALBUMIN/GLOB SERPL: 2 G/DL
ALP SERPL-CCNC: 85 U/L (ref 39–117)
ALT SERPL W P-5'-P-CCNC: 14 U/L (ref 1–33)
ANION GAP SERPL CALCULATED.3IONS-SCNC: 14.7 MMOL/L (ref 5–15)
ARTICHOKE IGE QN: 144 MG/DL (ref 0–100)
AST SERPL-CCNC: 14 U/L (ref 1–32)
BASOPHILS # BLD AUTO: 0.05 10*3/MM3 (ref 0–0.2)
BASOPHILS NFR BLD AUTO: 0.6 % (ref 0–1.5)
BILIRUB SERPL-MCNC: 0.3 MG/DL (ref 0.2–1.2)
BUN BLD-MCNC: 10 MG/DL (ref 6–20)
BUN/CREAT SERPL: 13.2 (ref 7–25)
CALCIUM SPEC-SCNC: 10.1 MG/DL (ref 8.6–10.5)
CHLORIDE SERPL-SCNC: 99 MMOL/L (ref 98–107)
CHOLEST SERPL-MCNC: 290 MG/DL (ref 0–200)
CO2 SERPL-SCNC: 26.3 MMOL/L (ref 22–29)
CREAT BLD-MCNC: 0.76 MG/DL (ref 0.57–1)
DEPRECATED RDW RBC AUTO: 46.1 FL (ref 37–54)
EOSINOPHIL # BLD AUTO: 0.13 10*3/MM3 (ref 0–0.4)
EOSINOPHIL NFR BLD AUTO: 1.4 % (ref 0.3–6.2)
ERYTHROCYTE [DISTWIDTH] IN BLOOD BY AUTOMATED COUNT: 13.6 % (ref 12.3–15.4)
GFR SERPL CREATININE-BSD FRML MDRD: 81 ML/MIN/1.73
GLOBULIN UR ELPH-MCNC: 2.2 GM/DL
GLUCOSE BLD-MCNC: 100 MG/DL (ref 65–99)
HBA1C MFR BLD: 5.7 % (ref 4.8–5.6)
HCT VFR BLD AUTO: 43.4 % (ref 34–46.6)
HDLC SERPL-MCNC: 35 MG/DL (ref 40–60)
HGB BLD-MCNC: 13.8 G/DL (ref 12–15.9)
IMM GRANULOCYTES # BLD AUTO: 0.02 10*3/MM3 (ref 0–0.05)
IMM GRANULOCYTES NFR BLD AUTO: 0.2 % (ref 0–0.5)
IRON 24H UR-MRATE: 82 MCG/DL (ref 37–145)
LDLC SERPL CALC-MCNC: ABNORMAL MG/DL
LDLC/HDLC SERPL: ABNORMAL {RATIO}
LYMPHOCYTES # BLD AUTO: 2.52 10*3/MM3 (ref 0.7–3.1)
LYMPHOCYTES NFR BLD AUTO: 28.1 % (ref 19.6–45.3)
MCH RBC QN AUTO: 29.6 PG (ref 26.6–33)
MCHC RBC AUTO-ENTMCNC: 31.8 G/DL (ref 31.5–35.7)
MCV RBC AUTO: 92.9 FL (ref 79–97)
MONOCYTES # BLD AUTO: 0.4 10*3/MM3 (ref 0.1–0.9)
MONOCYTES NFR BLD AUTO: 4.5 % (ref 5–12)
NEUTROPHILS # BLD AUTO: 5.85 10*3/MM3 (ref 1.7–7)
NEUTROPHILS NFR BLD AUTO: 65.2 % (ref 42.7–76)
NRBC BLD AUTO-RTO: 0 /100 WBC (ref 0–0.2)
PLATELET # BLD AUTO: 294 10*3/MM3 (ref 140–450)
PMV BLD AUTO: 11.3 FL (ref 6–12)
POTASSIUM BLD-SCNC: 4.4 MMOL/L (ref 3.5–5.2)
PROT SERPL-MCNC: 6.7 G/DL (ref 6–8.5)
RBC # BLD AUTO: 4.67 10*6/MM3 (ref 3.77–5.28)
SODIUM BLD-SCNC: 140 MMOL/L (ref 136–145)
TRIGL SERPL-MCNC: 862 MG/DL (ref 0–150)
TSH SERPL DL<=0.05 MIU/L-ACNC: 2.49 UIU/ML (ref 0.27–4.2)
VIT B12 BLD-MCNC: 430 PG/ML (ref 211–946)
VLDLC SERPL-MCNC: ABNORMAL MG/DL
WBC NRBC COR # BLD: 8.97 10*3/MM3 (ref 3.4–10.8)

## 2019-09-05 PROCEDURE — 80053 COMPREHEN METABOLIC PANEL: CPT | Performed by: PHYSICIAN ASSISTANT

## 2019-09-05 PROCEDURE — 82607 VITAMIN B-12: CPT | Performed by: PHYSICIAN ASSISTANT

## 2019-09-05 PROCEDURE — 36415 COLL VENOUS BLD VENIPUNCTURE: CPT | Performed by: PHYSICIAN ASSISTANT

## 2019-09-05 PROCEDURE — 83540 ASSAY OF IRON: CPT | Performed by: PHYSICIAN ASSISTANT

## 2019-09-05 PROCEDURE — 85025 COMPLETE CBC W/AUTO DIFF WBC: CPT | Performed by: PHYSICIAN ASSISTANT

## 2019-09-05 PROCEDURE — 80061 LIPID PANEL: CPT | Performed by: PHYSICIAN ASSISTANT

## 2019-09-05 PROCEDURE — 99214 OFFICE O/P EST MOD 30 MIN: CPT | Performed by: PHYSICIAN ASSISTANT

## 2019-09-05 PROCEDURE — 83721 ASSAY OF BLOOD LIPOPROTEIN: CPT | Performed by: PHYSICIAN ASSISTANT

## 2019-09-05 PROCEDURE — 84443 ASSAY THYROID STIM HORMONE: CPT | Performed by: PHYSICIAN ASSISTANT

## 2019-09-05 PROCEDURE — 83036 HEMOGLOBIN GLYCOSYLATED A1C: CPT | Performed by: PHYSICIAN ASSISTANT

## 2019-09-05 RX ORDER — ALPRAZOLAM 0.5 MG/1
0.5 TABLET ORAL 2 TIMES DAILY PRN
Qty: 60 TABLET | Refills: 3 | Status: CANCELLED | OUTPATIENT
Start: 2019-09-05
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
## 2019-09-06 RX ORDER — LOSARTAN POTASSIUM 100 MG/1
100 TABLET ORAL DAILY
Qty: 30 TABLET | Refills: 5 | Status: SHIPPED | OUTPATIENT
Start: 2019-09-06 | End: 2020-02-05 | Stop reason: SDUPTHER

## 2019-09-06 RX ORDER — HYDROCHLOROTHIAZIDE 12.5 MG/1
12.5 TABLET ORAL DAILY
Qty: 30 TABLET | Refills: 5 | Status: SHIPPED | OUTPATIENT
Start: 2019-09-06 | End: 2020-02-05 | Stop reason: SDUPTHER

## 2019-09-20 ENCOUNTER — TELEPHONE (OUTPATIENT)
Dept: FAMILY MEDICINE CLINIC | Facility: CLINIC | Age: 49
End: 2019-09-20

## 2019-09-20 NOTE — TELEPHONE ENCOUNTER
Please call in Xanax 0.5 mg 1 twice daily for anxiety #60 with 5 refills.  Joshua has been reviewed.

## 2019-09-24 ENCOUNTER — OFFICE VISIT (OUTPATIENT)
Dept: FAMILY MEDICINE CLINIC | Facility: CLINIC | Age: 49
End: 2019-09-24

## 2019-09-24 VITALS
OXYGEN SATURATION: 99 % | WEIGHT: 173 LBS | DIASTOLIC BLOOD PRESSURE: 82 MMHG | HEIGHT: 62 IN | TEMPERATURE: 97.9 F | HEART RATE: 74 BPM | SYSTOLIC BLOOD PRESSURE: 136 MMHG | BODY MASS INDEX: 31.83 KG/M2

## 2019-09-24 DIAGNOSIS — J06.9 ACUTE URI: Primary | ICD-10-CM

## 2019-09-24 PROCEDURE — 99213 OFFICE O/P EST LOW 20 MIN: CPT | Performed by: PHYSICIAN ASSISTANT

## 2019-09-24 RX ORDER — CEFDINIR 300 MG/1
300 CAPSULE ORAL 2 TIMES DAILY
Qty: 14 CAPSULE | Refills: 0 | Status: SHIPPED | OUTPATIENT
Start: 2019-09-24 | End: 2019-11-25

## 2019-09-24 RX ORDER — PREDNISONE 20 MG/1
20 TABLET ORAL 2 TIMES DAILY
Qty: 10 TABLET | Refills: 0 | Status: SHIPPED | OUTPATIENT
Start: 2019-09-24 | End: 2020-02-05

## 2019-09-24 NOTE — PROGRESS NOTES
Subjective   Trinidad Quinteros is a 49 y.o. female  Sinus Problem (Sinus pain and pressure since yesterday )      History of Present Illness  Patient comes in today for evaluation of rapidly progressing head and chest congestion.  States that she was feeling fine until yesterday, states she felt very tired last night went to bed early, coughed all night.  She has chest congestion head congestion today.  Her son has been sick with similar symptoms.  The following portions of the patient's history were reviewed and updated as appropriate: allergies, current medications, past social history and problem list    Review of Systems   Constitutional: Negative for fever.   HENT: Positive for congestion, postnasal drip, rhinorrhea, sneezing, sore throat and voice change. Negative for sinus pressure.    Respiratory: Positive for cough.        Objective     Vitals:    09/24/19 1304   BP: 136/82   Pulse: 74   Temp: 97.9 °F (36.6 °C)   SpO2: 99%       Physical Exam   Constitutional: She appears well-developed and well-nourished.  Non-toxic appearance. She has a sickly appearance. No distress.   HENT:   Head: Normocephalic and atraumatic.   Right Ear: Tympanic membrane and ear canal normal.   Left Ear: Tympanic membrane and ear canal normal.   Nose: Mucosal edema, rhinorrhea and sinus tenderness present. Right sinus exhibits maxillary sinus tenderness and frontal sinus tenderness. Left sinus exhibits maxillary sinus tenderness and frontal sinus tenderness.   Mouth/Throat: Oropharynx is clear and moist. No oropharyngeal exudate.   Eyes: Pupils are equal, round, and reactive to light.   Cardiovascular: Normal rate and regular rhythm.   Pulmonary/Chest: Effort normal. She has wheezes.   Skin: She is not diaphoretic.   Nursing note and vitals reviewed.      Assessment/Plan     Diagnoses and all orders for this visit:    Acute URI    Other orders  -     cefdinir (OMNICEF) 300 MG capsule; Take 1 capsule by mouth 2 (Two) Times a Day.  -      predniSONE (DELTASONE) 20 MG tablet; Take 1 tablet by mouth 2 (Two) Times a Day.    Prescription for 120 mL of promethazine with codeine 2 teaspoons at bedtime as needed for cough, 1 refill.  Discussed abuse potential and controlled substance status of medication as well as drowsiness warning and only to be used at bedtime.

## 2019-09-25 RX ORDER — ALPRAZOLAM 0.5 MG/1
0.5 TABLET ORAL 2 TIMES DAILY PRN
Qty: 60 TABLET | Refills: 5 | OUTPATIENT
Start: 2019-09-25 | End: 2020-04-02 | Stop reason: SDUPTHER

## 2019-09-29 DIAGNOSIS — M50.30 DEGENERATIVE DISC DISEASE, CERVICAL: ICD-10-CM

## 2019-09-30 ENCOUNTER — TELEPHONE (OUTPATIENT)
Dept: FAMILY MEDICINE CLINIC | Facility: CLINIC | Age: 49
End: 2019-09-30

## 2019-09-30 RX ORDER — NABUMETONE 750 MG/1
750 TABLET, FILM COATED ORAL 2 TIMES DAILY
Qty: 60 TABLET | Refills: 0 | Status: SHIPPED | OUTPATIENT
Start: 2019-09-30 | End: 2019-10-30 | Stop reason: SDUPTHER

## 2019-10-01 ENCOUNTER — TELEPHONE (OUTPATIENT)
Dept: FAMILY MEDICINE CLINIC | Facility: CLINIC | Age: 49
End: 2019-10-01

## 2019-10-01 RX ORDER — CLARITHROMYCIN 500 MG/1
500 TABLET, COATED ORAL 2 TIMES DAILY
Qty: 14 TABLET | Refills: 0 | Status: SHIPPED | OUTPATIENT
Start: 2019-10-01 | End: 2020-02-05

## 2019-10-01 NOTE — TELEPHONE ENCOUNTER
----- Message from Lisa Anderson sent at 10/1/2019  1:25 PM EDT -----  Contact:  [facility], DANITA, [phone]  RX CALLING RE. A PRESCRIPTION FOR:  CLARITHROMYCIN, 500 MG., TAKEN 2 TABLETS ONCE/DAY.  RX DOES NOT STOCK THIS ONE; CAN FILL FOR:  STANDARD CLARITHROMYCIN WITH TWICE A DAY DOSAGE, IF WANTING TODAY.  (OTHERWISE, WILL BE TOMORROW FOR WHAT WAS ORIGINALLY ORDERED).    CONTACT DANITA CHUA @ [facility] # ABOVE.

## 2019-10-07 ENCOUNTER — TELEPHONE (OUTPATIENT)
Dept: FAMILY MEDICINE CLINIC | Facility: CLINIC | Age: 49
End: 2019-10-07

## 2019-10-14 ENCOUNTER — TELEPHONE (OUTPATIENT)
Dept: FAMILY MEDICINE CLINIC | Facility: CLINIC | Age: 49
End: 2019-10-14

## 2019-10-14 NOTE — TELEPHONE ENCOUNTER
----- Message from Trinidad Quinteros sent at 10/14/2019  1:44 PM EDT -----  I HAVE A RASH ON BOTH ARMS AND LEGS. STARTED ON Friday NIGHT. ITCHY, AS THE DAY HAS WENT ON IT SEAMS TO BE SPREADING AND ITCHING MORE . CAN SOMETHING BE CALLED IN FOR HER.    [facility]

## 2019-10-30 DIAGNOSIS — M50.30 DEGENERATIVE DISC DISEASE, CERVICAL: ICD-10-CM

## 2019-10-31 ENCOUNTER — TELEPHONE (OUTPATIENT)
Dept: FAMILY MEDICINE CLINIC | Facility: CLINIC | Age: 49
End: 2019-10-31

## 2019-10-31 RX ORDER — DICYCLOMINE HCL 20 MG
20 TABLET ORAL EVERY 6 HOURS
Qty: 20 TABLET | Refills: 1 | Status: SHIPPED | OUTPATIENT
Start: 2019-10-31 | End: 2020-01-05 | Stop reason: SDUPTHER

## 2019-10-31 RX ORDER — PROMETHAZINE HYDROCHLORIDE 25 MG/1
25 TABLET ORAL EVERY 6 HOURS PRN
Qty: 12 TABLET | Refills: 11 | Status: SHIPPED | OUTPATIENT
Start: 2019-10-31 | End: 2021-08-17

## 2019-10-31 RX ORDER — NABUMETONE 750 MG/1
750 TABLET, FILM COATED ORAL 2 TIMES DAILY
Qty: 60 TABLET | Refills: 0 | Status: SHIPPED | OUTPATIENT
Start: 2019-10-31 | End: 2019-12-01 | Stop reason: SDUPTHER

## 2019-10-31 RX ORDER — ATORVASTATIN CALCIUM 40 MG/1
40 TABLET, FILM COATED ORAL DAILY
Qty: 30 TABLET | Refills: 11 | Status: SHIPPED | OUTPATIENT
Start: 2019-10-31 | End: 2020-11-10 | Stop reason: SDUPTHER

## 2019-10-31 RX ORDER — PANTOPRAZOLE SODIUM 40 MG/1
40 TABLET, DELAYED RELEASE ORAL DAILY
Qty: 30 TABLET | Refills: 11 | Status: SHIPPED | OUTPATIENT
Start: 2019-10-31 | End: 2020-11-10 | Stop reason: SDUPTHER

## 2019-10-31 RX ORDER — SERTRALINE HYDROCHLORIDE 100 MG/1
100 TABLET, FILM COATED ORAL DAILY
Qty: 30 TABLET | Refills: 11 | Status: SHIPPED | OUTPATIENT
Start: 2019-10-31 | End: 2020-02-19 | Stop reason: DRUGHIGH

## 2019-10-31 NOTE — TELEPHONE ENCOUNTER
----- Message from Angela Rahman sent at 10/30/2019  1:05 PM EDT -----  Patient needs a refill on her oxyCODONE-acetaminophen (PERCOCET) 5-325 MG per tablet (one tablet before bedtime). Patient uses [facility]..  ThanksAngela..

## 2019-10-31 NOTE — TELEPHONE ENCOUNTER
Provider:  Lisandro   Caller:  Automated refill request  Surgery:  na  Surgery Date:  na  Last visit:  3/26/2019  Next visit: tbd    Reason for call:         Requested Prescriptions     Pending Prescriptions Disp Refills   • nabumetone (RELAFEN) 750 MG tablet 60 tablet 0     Sig: Take 1 tablet by mouth 2 (Two) Times a Day.

## 2019-10-31 NOTE — TELEPHONE ENCOUNTER
Please explain to patient that this is a medication that cannot be called in or faxed in.  The prescription has to be written out and she will need to come in person to pick it up.  I have it written out and waiting for her at checkout.

## 2019-11-25 ENCOUNTER — TELEPHONE (OUTPATIENT)
Dept: FAMILY MEDICINE CLINIC | Facility: CLINIC | Age: 49
End: 2019-11-25

## 2019-11-25 RX ORDER — AMOXICILLIN 875 MG/1
875 TABLET, COATED ORAL 2 TIMES DAILY
Qty: 14 TABLET | Refills: 0 | Status: SHIPPED | OUTPATIENT
Start: 2019-11-25 | End: 2020-01-02 | Stop reason: SDUPTHER

## 2019-11-25 NOTE — TELEPHONE ENCOUNTER
----- Message from Angela Rahman sent at 11/25/2019 11:47 AM EST -----  Patient wants to know if she can get something called in for a sinus infection and cough. She has symptoms of drainage, coughing, nasal congestion, and sinus pressure. Patient uses [facility] the hospital..  ThanksAngela..

## 2019-11-25 NOTE — TELEPHONE ENCOUNTER
Please call in amoxicillin 875 1 twice daily for 7 days #14 and stay hist half to 1 every 12 hours as needed for congestion #14 with 1 refill

## 2019-12-01 DIAGNOSIS — M50.30 DEGENERATIVE DISC DISEASE, CERVICAL: ICD-10-CM

## 2019-12-02 ENCOUNTER — TELEPHONE (OUTPATIENT)
Dept: FAMILY MEDICINE CLINIC | Facility: CLINIC | Age: 49
End: 2019-12-02

## 2019-12-02 RX ORDER — NABUMETONE 750 MG/1
750 TABLET, FILM COATED ORAL 2 TIMES DAILY
Qty: 60 TABLET | Refills: 0 | Status: SHIPPED | OUTPATIENT
Start: 2019-12-02 | End: 2020-01-05 | Stop reason: SDUPTHER

## 2019-12-02 NOTE — TELEPHONE ENCOUNTER
Prescription written for patient pickup please notify her that she will be due to come in for an office visit before further refills can be given.

## 2019-12-02 NOTE — TELEPHONE ENCOUNTER
Patient is requesting a refill on Percocet. Pt says she can  on Wed or Friday. jill is on your desk for review

## 2019-12-02 NOTE — TELEPHONE ENCOUNTER
Provider:  Lisandro  Caller:  Automated refill request  Surgery:  NA  Surgery Date:    Last visit:  03/26/19  Next visit:     Reason for call:         Requested Prescriptions     Pending Prescriptions Disp Refills   • nabumetone (RELAFEN) 750 MG tablet 60 tablet 0     Sig: Take 1 tablet by mouth 2 (Two) Times a Day.

## 2019-12-11 RX ORDER — PROMETHAZINE HYDROCHLORIDE AND CODEINE PHOSPHATE 6.25; 1 MG/5ML; MG/5ML
5-10 SOLUTION ORAL
Qty: 120 ML | Refills: 1 | OUTPATIENT
Start: 2019-12-11

## 2020-01-02 ENCOUNTER — TELEPHONE (OUTPATIENT)
Dept: FAMILY MEDICINE CLINIC | Facility: CLINIC | Age: 50
End: 2020-01-02

## 2020-01-02 RX ORDER — AMOXICILLIN 875 MG/1
875 TABLET, COATED ORAL 2 TIMES DAILY
Qty: 14 TABLET | Refills: 0 | Status: SHIPPED | OUTPATIENT
Start: 2020-01-02 | End: 2020-02-05

## 2020-01-02 NOTE — TELEPHONE ENCOUNTER
----- Message from Angela Rahman sent at 1/2/2020 10:50 AM EST -----  Patient says she thinks that she has an ear infection. Her right ear is sore to touch and it aches like a toothache. She wants to know if an antibiotic can be called in to [facility]..  ThanksAngela..

## 2020-01-02 NOTE — TELEPHONE ENCOUNTER
I just sent the amoxicillin have her let me know if it is not feeling better I will work around and take a look at it.

## 2020-01-05 DIAGNOSIS — M50.30 DEGENERATIVE DISC DISEASE, CERVICAL: ICD-10-CM

## 2020-01-06 ENCOUNTER — TELEPHONE (OUTPATIENT)
Dept: FAMILY MEDICINE CLINIC | Facility: CLINIC | Age: 50
End: 2020-01-06

## 2020-01-06 RX ORDER — DICYCLOMINE HCL 20 MG
20 TABLET ORAL EVERY 6 HOURS
Qty: 20 TABLET | Refills: 1 | Status: SHIPPED | OUTPATIENT
Start: 2020-01-06 | End: 2020-04-15 | Stop reason: SDUPTHER

## 2020-01-06 RX ORDER — NABUMETONE 750 MG/1
750 TABLET, FILM COATED ORAL 2 TIMES DAILY
Qty: 60 TABLET | Refills: 1 | Status: SHIPPED | OUTPATIENT
Start: 2020-01-06 | End: 2020-03-16 | Stop reason: SDUPTHER

## 2020-01-06 RX ORDER — TIZANIDINE 4 MG/1
2-4 TABLET ORAL 2 TIMES DAILY PRN
Qty: 45 TABLET | Refills: 5 | Status: SHIPPED | OUTPATIENT
Start: 2020-01-06 | End: 2020-08-05 | Stop reason: SDUPTHER

## 2020-02-05 ENCOUNTER — OFFICE VISIT (OUTPATIENT)
Dept: FAMILY MEDICINE CLINIC | Facility: CLINIC | Age: 50
End: 2020-02-05

## 2020-02-05 VITALS
HEIGHT: 62 IN | DIASTOLIC BLOOD PRESSURE: 96 MMHG | TEMPERATURE: 98.4 F | WEIGHT: 173 LBS | HEART RATE: 82 BPM | SYSTOLIC BLOOD PRESSURE: 144 MMHG | BODY MASS INDEX: 31.83 KG/M2 | OXYGEN SATURATION: 99 %

## 2020-02-05 DIAGNOSIS — G89.29 CHRONIC MIDLINE LOW BACK PAIN WITH SCIATICA, SCIATICA LATERALITY UNSPECIFIED: ICD-10-CM

## 2020-02-05 DIAGNOSIS — M54.40 CHRONIC MIDLINE LOW BACK PAIN WITH SCIATICA, SCIATICA LATERALITY UNSPECIFIED: ICD-10-CM

## 2020-02-05 DIAGNOSIS — M25.552 PAIN OF LEFT HIP JOINT: ICD-10-CM

## 2020-02-05 DIAGNOSIS — I10 ESSENTIAL HYPERTENSION: Primary | ICD-10-CM

## 2020-02-05 DIAGNOSIS — M25.512 ACUTE PAIN OF LEFT SHOULDER: ICD-10-CM

## 2020-02-05 PROCEDURE — 99214 OFFICE O/P EST MOD 30 MIN: CPT | Performed by: PHYSICIAN ASSISTANT

## 2020-02-05 RX ORDER — HYDROCHLOROTHIAZIDE 12.5 MG/1
12.5 TABLET ORAL DAILY
Qty: 90 TABLET | Refills: 3 | Status: SHIPPED | OUTPATIENT
Start: 2020-02-05 | End: 2020-02-12 | Stop reason: SDUPTHER

## 2020-02-05 RX ORDER — LOSARTAN POTASSIUM 100 MG/1
100 TABLET ORAL DAILY
Qty: 90 TABLET | Refills: 3 | Status: SHIPPED | OUTPATIENT
Start: 2020-02-05 | End: 2020-11-10 | Stop reason: SDUPTHER

## 2020-02-05 NOTE — PROGRESS NOTES
Subjective   Trinidad Quinteros is a 49 y.o. female  Hip Pain (Follow up on chronic hip pain req refills on Gabapentin, Tramadol and Oxycodone ) and Hypertension (Follow up HTN, Follow up BP, req refill on HCTZ, Losartan )      History of Present Illness  Patient is here today for follow-up evaluation and treatment and medication refills on chronic hip pain with radiculopathy, hypertension.  Blood pressure is uncontrolled currently.  Patient states she has had a lot of stress at work but also has been off of her medication for the past 2 days after running out of her prescription.  Denies chest pain or shortness of breath but states she has been having more pain in her left shoulder lately.  She has had problems in the past with shoulder pain in association with tendinitis but has not been doing any activities that she feels is injured it recently.  Regarding chronic hip pain and radiculopathy from low back into lower extremity patient states that she is continued to have daily pain and nighttime pain requiring tramadol to be dosed 2-3 times a day, oxycodone at night and gabapentin twice a day.  Continues to have pain but it is tolerable and stable with this medication regimen and she denies adverse effects from medication dosed in this regimen.  The following portions of the patient's history were reviewed and updated as appropriate: allergies, current medications, past social history and problem list    Review of Systems   Constitutional: Negative for fatigue and unexpected weight change.   Respiratory: Negative for cough, chest tightness and shortness of breath.    Cardiovascular: Negative for chest pain, palpitations and leg swelling.   Gastrointestinal: Negative for nausea.   Musculoskeletal: Positive for arthralgias, back pain and myalgias. Negative for joint swelling and neck pain.   Skin: Negative for color change and rash.   Neurological: Positive for numbness. Negative for dizziness, syncope, weakness and  headaches.   Hematological: Negative.    Psychiatric/Behavioral: Negative.        Objective     Vitals:    02/05/20 1101   BP: 144/96   Pulse: 82   Temp: 98.4 °F (36.9 °C)   SpO2: 99%       Physical Exam   Constitutional: She is oriented to person, place, and time. She appears well-developed and well-nourished. No distress.   HENT:   Head: Normocephalic and atraumatic.   Neck: No JVD present.   Cardiovascular: Normal rate, regular rhythm, normal heart sounds and intact distal pulses.   No murmur heard.  Pulmonary/Chest: Effort normal and breath sounds normal. No respiratory distress. She exhibits no tenderness.   Abdominal: Soft. Bowel sounds are normal. She exhibits no distension. There is no tenderness.   Musculoskeletal: She exhibits tenderness ( 6 left shoulder AC joint and with range of motion externally). She exhibits no edema or deformity.        Lumbar back: She exhibits decreased range of motion, tenderness, bony tenderness and pain. She exhibits no swelling, no deformity and no spasm.   Neurological: She is alert and oriented to person, place, and time. She has normal reflexes. She exhibits normal muscle tone. Coordination normal.   Skin: Skin is warm and dry. No rash noted. She is not diaphoretic. No erythema. No pallor.   Psychiatric: She has a normal mood and affect. Her behavior is normal. Judgment and thought content normal.   Nursing note and vitals reviewed.      Assessment/Plan     Trinidad was seen today for hip pain and hypertension.    Diagnoses and all orders for this visit:    Essential hypertension    Pain of left hip joint    Chronic midline low back pain with sciatica, sciatica laterality unspecified    Acute pain of left shoulder    Other orders  -     losartan (COZAAR) 100 MG tablet; Take 1 tablet by mouth Daily.  -     hydroCHLOROthiazide (HYDRODIURIL) 12.5 MG tablet; Take 1 tablet by mouth Daily.    Discussed with patient that I want her to monitor her blood pressure and if after 2 weeks  of monitoring on medication blood pressure still elevated then she follow-up with me for further evaluation and treatment.  Discussed importance of stress reduction.  Refill gabapentin at current dosage of 300 mg twice daily for radiculopathy, 6-month supply given, tramadol 50 mg 1 3 times daily for chronic joint pain and hip and back and for shoulder #90 with 5 refills and Percocet 5/325 1 nightly for chronic hip pain #30 with a second prescription to fill in 30 days, patient will call in 60 days for a third prescription and will follow-up in our office for recheck in 3 months.  Discussed abuse potential controlled substance status of these 3 medications.  Will refer to physical therapy if shoulder pain persists most consistent with acute tendinitis.

## 2020-02-10 ENCOUNTER — TELEPHONE (OUTPATIENT)
Dept: FAMILY MEDICINE CLINIC | Facility: CLINIC | Age: 50
End: 2020-02-10

## 2020-02-10 DIAGNOSIS — M54.50 ACUTE LOW BACK PAIN WITHOUT SCIATICA, UNSPECIFIED BACK PAIN LATERALITY: Primary | ICD-10-CM

## 2020-02-10 NOTE — TELEPHONE ENCOUNTER
Was in last week and BP was 167/96 (had been without bp med for a few days). Started back on the medication that day, sat & Sunday bp was still around 167/96. Also having low back pain, lower leg pain. Pos UTI (frequency) is there anything I can do for now, have appointment scheduled for Wednesday.

## 2020-02-10 NOTE — TELEPHONE ENCOUNTER
Order has been placed for urinalysis please have her leave a urine specimen at the lab and keep appointment for Wednesday we will address blood pressure when she comes in on Wednesday continue on current medication at this time.

## 2020-02-12 ENCOUNTER — LAB (OUTPATIENT)
Dept: LAB | Facility: HOSPITAL | Age: 50
End: 2020-02-12

## 2020-02-12 ENCOUNTER — OFFICE VISIT (OUTPATIENT)
Dept: FAMILY MEDICINE CLINIC | Facility: CLINIC | Age: 50
End: 2020-02-12

## 2020-02-12 VITALS
DIASTOLIC BLOOD PRESSURE: 80 MMHG | HEART RATE: 74 BPM | SYSTOLIC BLOOD PRESSURE: 144 MMHG | OXYGEN SATURATION: 99 % | TEMPERATURE: 98.2 F | WEIGHT: 173 LBS | HEIGHT: 62 IN | BODY MASS INDEX: 31.83 KG/M2

## 2020-02-12 DIAGNOSIS — M54.50 ACUTE MIDLINE LOW BACK PAIN WITHOUT SCIATICA: ICD-10-CM

## 2020-02-12 DIAGNOSIS — I10 ESSENTIAL HYPERTENSION: Primary | ICD-10-CM

## 2020-02-12 DIAGNOSIS — M21.611 BILATERAL BUNIONS: ICD-10-CM

## 2020-02-12 DIAGNOSIS — M21.612 BILATERAL BUNIONS: ICD-10-CM

## 2020-02-12 DIAGNOSIS — R60.9 PERIPHERAL EDEMA: ICD-10-CM

## 2020-02-12 DIAGNOSIS — M54.50 ACUTE LOW BACK PAIN WITHOUT SCIATICA, UNSPECIFIED BACK PAIN LATERALITY: ICD-10-CM

## 2020-02-12 DIAGNOSIS — N95.1 MENOPAUSAL AND FEMALE CLIMACTERIC STATES: ICD-10-CM

## 2020-02-12 PROCEDURE — 81003 URINALYSIS AUTO W/O SCOPE: CPT

## 2020-02-12 PROCEDURE — 99214 OFFICE O/P EST MOD 30 MIN: CPT | Performed by: PHYSICIAN ASSISTANT

## 2020-02-12 RX ORDER — HYDROCHLOROTHIAZIDE 25 MG/1
25 TABLET ORAL DAILY
Qty: 90 TABLET | Refills: 3 | Status: SHIPPED | OUTPATIENT
Start: 2020-02-12 | End: 2020-11-10 | Stop reason: SDUPTHER

## 2020-02-12 NOTE — PROGRESS NOTES
Subjective   Trinidad Quinteros is a 49 y.o. female  Hypertension (Follow up on BP, Taking Losartan and HCTZ, BP running in 160s) and Bunions (painful bunions of both feet )      History of Present Illness  Patient comes in today for evaluation treatment of 4 separate issues.  Unfortunately her blood pressures continue to run high.  She states she is also noticed that she is swelling in her ankles in the afternoons after work.  Currently she is working 10-hour shifts where she sits most of the day, she states by the afternoon her ankle seems swollen with fluid retention.  Also she states her back has been bothering her more in the low back for the last couple of days she left a urine specimen in our lab downstairs and is requesting the lab results.  No pain when she urinates no blood in her urine no abdominal pain no fever.  Next issue is of bunions on both of her feet, she states that gotten worse and she would like to see a podiatrist for further evaluation and treatment.  Last issue is of ongoing persistent hot flashes causing difficulty sleeping at night and disruption during the daytime she would like a referral to her gynecologist Dr. Kincaid.  She is currently taking herbal supplements.  The following portions of the patient's history were reviewed and updated as appropriate: allergies, current medications, past social history and problem list    Review of Systems   Constitutional: Negative for fatigue and unexpected weight change.   Respiratory: Negative for cough, chest tightness and shortness of breath.    Cardiovascular: Positive for leg swelling. Negative for chest pain and palpitations.   Gastrointestinal: Negative for nausea.   Endocrine: Positive for heat intolerance.   Genitourinary: Negative.    Musculoskeletal: Positive for arthralgias, back pain and gait problem.   Skin: Negative for color change and rash.   Neurological: Negative for dizziness, syncope, weakness and headaches.    Psychiatric/Behavioral: Positive for sleep disturbance.       Objective     Vitals:    02/12/20 1423   BP: 144/80   Pulse: 74   Temp: 98.2 °F (36.8 °C)   SpO2: 99%       Physical Exam   Constitutional: She is oriented to person, place, and time. She appears well-developed and well-nourished. No distress.   Neck: No JVD present.   Cardiovascular: Normal rate, regular rhythm, normal heart sounds and intact distal pulses.   No murmur heard.  Pulmonary/Chest: Effort normal and breath sounds normal. No respiratory distress. She exhibits no tenderness.   Abdominal: Soft. She exhibits no distension. There is no tenderness.   Musculoskeletal: She exhibits tenderness and deformity ( Bilateral bunions). She exhibits no edema.   Neurological: She is alert and oriented to person, place, and time. No cranial nerve deficit.   Skin: Skin is warm and dry. She is not diaphoretic. No erythema. No pallor.   Psychiatric: She has a normal mood and affect. Her behavior is normal. Judgment and thought content normal.   Nursing note and vitals reviewed.      Assessment/Plan     Trinidad was seen today for hypertension and bunions.    Diagnoses and all orders for this visit:    Essential hypertension    Bilateral bunions  -     Ambulatory Referral to Podiatry    Menopausal and female climacteric states  -     Ambulatory Referral to Obstetrics / Gynecology    Acute midline low back pain without sciatica    Peripheral edema    Other orders  -     hydroCHLOROthiazide (HYDRODIURIL) 25 MG tablet; Take 1 tablet by mouth Daily.    Increase dosage of hydrochlorothiazide to 25 mg daily for hypertension and edema while continuing on losartan at current dosage, follow-up for recheck of blood pressure and/or edema uncontrolled after 3 weeks.  Refer to OB/GYN, refer to podiatry, discussed with patient that lab results are not back on urine specimen will contact her when I receive this and will determine treatment plan at that time.  She does have  chronic back pain and she may be having an acute exacerbation of that if so she will continue with her ongoing treatment plan for chronic back pain and add on moist heat and stretching.

## 2020-02-13 LAB
BILIRUB UR QL STRIP: NEGATIVE
CLARITY UR: CLEAR
COLOR UR: YELLOW
GLUCOSE UR STRIP-MCNC: NEGATIVE MG/DL
HGB UR QL STRIP.AUTO: NEGATIVE
KETONES UR QL STRIP: NEGATIVE
LEUKOCYTE ESTERASE UR QL STRIP.AUTO: NEGATIVE
NITRITE UR QL STRIP: NEGATIVE
PH UR STRIP.AUTO: 7 [PH] (ref 5–8)
PROT UR QL STRIP: NEGATIVE
SP GR UR STRIP: 1.01 (ref 1–1.03)
UROBILINOGEN UR QL STRIP: NORMAL

## 2020-02-19 ENCOUNTER — TELEPHONE (OUTPATIENT)
Dept: FAMILY MEDICINE CLINIC | Facility: CLINIC | Age: 50
End: 2020-02-19

## 2020-02-19 RX ORDER — SERTRALINE HYDROCHLORIDE 100 MG/1
150 TABLET, FILM COATED ORAL DAILY
Qty: 45 TABLET | Refills: 5 | Status: SHIPPED | OUTPATIENT
Start: 2020-02-19 | End: 2020-09-07 | Stop reason: SDUPTHER

## 2020-02-19 NOTE — TELEPHONE ENCOUNTER
Please call in new dosage of medication for a 3-month supply.  Have patient follow-up with me in the office if unimproved after 4 weeks.

## 2020-02-20 ENCOUNTER — TELEPHONE (OUTPATIENT)
Dept: FAMILY MEDICINE CLINIC | Facility: CLINIC | Age: 50
End: 2020-02-20

## 2020-02-20 RX ORDER — AMOXICILLIN 500 MG/1
500 CAPSULE ORAL 3 TIMES DAILY
Qty: 30 CAPSULE | Refills: 0 | Status: SHIPPED | OUTPATIENT
Start: 2020-02-20 | End: 2020-03-18

## 2020-02-20 RX ORDER — BENZONATATE 200 MG/1
200 CAPSULE ORAL 3 TIMES DAILY PRN
Qty: 30 CAPSULE | Refills: 0 | Status: SHIPPED | OUTPATIENT
Start: 2020-02-20 | End: 2020-05-21 | Stop reason: SDUPTHER

## 2020-02-20 NOTE — TELEPHONE ENCOUNTER
----- Message from Angela Rahman sent at 2/20/2020 11:50 AM EST -----  Patient wants to know if she can get something called in for what she thinks is a respiratory infection/sinus infection. She has been coughing the last couple of days, and lots of drainage with a stuffy nose, and sinus pressure. Patient ask for an antibiotic and cough medicine to be sent to [facility] here at the hospital..  ThanksAngeal..

## 2020-03-16 DIAGNOSIS — M50.30 DEGENERATIVE DISC DISEASE, CERVICAL: ICD-10-CM

## 2020-03-16 RX ORDER — CYANOCOBALAMIN 1000 UG/ML
1000 INJECTION, SOLUTION INTRAMUSCULAR; SUBCUTANEOUS
Qty: 3 ML | Refills: 3 | Status: CANCELLED | OUTPATIENT
Start: 2020-03-16

## 2020-03-16 RX ORDER — DICYCLOMINE HCL 20 MG
20 TABLET ORAL EVERY 6 HOURS
Qty: 20 TABLET | Refills: 1 | Status: CANCELLED | OUTPATIENT
Start: 2020-03-16

## 2020-03-17 RX ORDER — NABUMETONE 750 MG/1
750 TABLET, FILM COATED ORAL 2 TIMES DAILY
Qty: 60 TABLET | Refills: 1 | Status: SHIPPED | OUTPATIENT
Start: 2020-03-17 | End: 2020-05-24 | Stop reason: SDUPTHER

## 2020-03-17 NOTE — TELEPHONE ENCOUNTER
Provider:  Lisandro  Caller: Pharmacy  Time of call: 8:35p 03/16/20  Phone #:  [phone]  Surgery:  n/a  Surgery Date:  n/a   Last visit:   3/26/19  Next visit: PRN       Reason for call:       Requested Prescriptions     Pending Prescriptions Disp Refills   • nabumetone (RELAFEN) 750 MG tablet 60 tablet 1     Sig: Take 1 tablet by mouth 2 (Two) Times a Day.

## 2020-03-18 ENCOUNTER — TELEPHONE (OUTPATIENT)
Dept: FAMILY MEDICINE CLINIC | Facility: CLINIC | Age: 50
End: 2020-03-18

## 2020-03-18 RX ORDER — ONDANSETRON 4 MG/1
4 TABLET, FILM COATED ORAL EVERY 8 HOURS PRN
Qty: 14 TABLET | Refills: 1 | Status: SHIPPED | OUTPATIENT
Start: 2020-03-18 | End: 2020-11-10 | Stop reason: SDUPTHER

## 2020-03-18 RX ORDER — VARENICLINE TARTRATE 1 MG/1
1 TABLET, FILM COATED ORAL 2 TIMES DAILY
Qty: 56 TABLET | Refills: 3 | Status: SHIPPED | OUTPATIENT
Start: 2020-03-18 | End: 2021-01-25

## 2020-03-18 RX ORDER — VARENICLINE TARTRATE 1 MG/1
1 TABLET, FILM COATED ORAL DAILY
Qty: 30 TABLET | Refills: 3 | Status: SHIPPED | OUTPATIENT
Start: 2020-03-18 | End: 2020-03-18 | Stop reason: SDUPTHER

## 2020-03-18 NOTE — TELEPHONE ENCOUNTER
PHARMACY CALLED STATING     varenicline (Chantix Continuing Month Raffy) 1 MG tablet         NEEDS TO BE FIXED TO TWICE A DAY AND 56    PLEASE ADVISE [phone]

## 2020-03-25 RX ORDER — CYANOCOBALAMIN 1000 UG/ML
1000 INJECTION, SOLUTION INTRAMUSCULAR; SUBCUTANEOUS
Qty: 3 ML | Refills: 3 | Status: SHIPPED | OUTPATIENT
Start: 2020-03-25 | End: 2021-08-25

## 2020-04-02 DIAGNOSIS — F41.9 ANXIETY: Primary | ICD-10-CM

## 2020-04-06 RX ORDER — ALPRAZOLAM 0.5 MG/1
0.5 TABLET ORAL 2 TIMES DAILY PRN
Qty: 60 TABLET | Refills: 5 | Status: SHIPPED | OUTPATIENT
Start: 2020-04-06 | End: 2020-08-07 | Stop reason: SDUPTHER

## 2020-04-06 RX ORDER — OXYCODONE HYDROCHLORIDE AND ACETAMINOPHEN 5; 325 MG/1; MG/1
1 TABLET ORAL DAILY
Qty: 30 TABLET | Refills: 0 | Status: CANCELLED | OUTPATIENT
Start: 2020-04-06

## 2020-04-09 DIAGNOSIS — G89.29 CHRONIC MIDLINE LOW BACK PAIN WITH SCIATICA, SCIATICA LATERALITY UNSPECIFIED: Primary | ICD-10-CM

## 2020-04-09 DIAGNOSIS — M54.40 CHRONIC MIDLINE LOW BACK PAIN WITH SCIATICA, SCIATICA LATERALITY UNSPECIFIED: Primary | ICD-10-CM

## 2020-04-09 RX ORDER — OXYCODONE HYDROCHLORIDE AND ACETAMINOPHEN 5; 325 MG/1; MG/1
1 TABLET ORAL DAILY
Qty: 30 TABLET | Refills: 0 | Status: SHIPPED | OUTPATIENT
Start: 2020-04-09 | End: 2020-05-08 | Stop reason: SDUPTHER

## 2020-04-15 RX ORDER — ALPRAZOLAM 0.5 MG/1
0.5 TABLET ORAL 2 TIMES DAILY PRN
Qty: 60 TABLET | Refills: 5 | Status: CANCELLED | OUTPATIENT
Start: 2020-04-15

## 2020-04-15 RX ORDER — DICYCLOMINE HCL 20 MG
20 TABLET ORAL EVERY 6 HOURS
Qty: 20 TABLET | Refills: 1 | Status: SHIPPED | OUTPATIENT
Start: 2020-04-15 | End: 2020-07-15 | Stop reason: SDUPTHER

## 2020-04-15 NOTE — TELEPHONE ENCOUNTER
----- Message from Trinidad Quinteros sent at 4/15/2020 12:30 AM EDT -----  Regarding: Prescription Question  Contact: [phone]  is there something that can be sent in for me for cough & sneezing, started yesterday, no fever; I'm sure its more allergies than anything, I have been in quarantine for  2 weeks, then put on furlough so i have not been any pace other than the house.  If you can call me in something please call it to the [facility] in [place]:  [address] [address] phone [phone] fax [phone]  That way Constantin can pick it up for me in the drive through. All my other stuff is mailed by [facility]. Thank you

## 2020-04-15 NOTE — TELEPHONE ENCOUNTER
----- Message from Trinidad Quinteros sent at 4/15/2020 12:30 AM EDT -----  Regarding: Prescription Question  Contact: [phone]  is there something that can be sent in for me for cough & sneezing, started yesterday, no fever; I'm sure its more allergies than anything, I have been in quarantine for  2 weeks, then put on furlough so i have not been any pace other than the house.  If you can call me in something please call it to the [facility] in [place]:  [address] [place] [address] phone [phone] fax [phone]  That way Constantin can pick it up for me in the drive through. All my other stuff is mailed by [facility]. Thank you

## 2020-05-04 DIAGNOSIS — M54.40 CHRONIC MIDLINE LOW BACK PAIN WITH SCIATICA, SCIATICA LATERALITY UNSPECIFIED: ICD-10-CM

## 2020-05-04 DIAGNOSIS — G89.29 CHRONIC MIDLINE LOW BACK PAIN WITH SCIATICA, SCIATICA LATERALITY UNSPECIFIED: ICD-10-CM

## 2020-05-05 RX ORDER — OXYCODONE HYDROCHLORIDE AND ACETAMINOPHEN 5; 325 MG/1; MG/1
1 TABLET ORAL DAILY
Qty: 30 TABLET | Refills: 0 | OUTPATIENT
Start: 2020-05-05

## 2020-05-08 ENCOUNTER — TELEMEDICINE (OUTPATIENT)
Dept: FAMILY MEDICINE CLINIC | Facility: CLINIC | Age: 50
End: 2020-05-08

## 2020-05-08 DIAGNOSIS — M54.40 CHRONIC MIDLINE LOW BACK PAIN WITH SCIATICA, SCIATICA LATERALITY UNSPECIFIED: ICD-10-CM

## 2020-05-08 DIAGNOSIS — M54.40 CHRONIC MIDLINE LOW BACK PAIN WITH SCIATICA, SCIATICA LATERALITY UNSPECIFIED: Primary | ICD-10-CM

## 2020-05-08 DIAGNOSIS — M21.611 BILATERAL BUNIONS: ICD-10-CM

## 2020-05-08 DIAGNOSIS — G89.29 CHRONIC MIDLINE LOW BACK PAIN WITH SCIATICA, SCIATICA LATERALITY UNSPECIFIED: Primary | ICD-10-CM

## 2020-05-08 DIAGNOSIS — G89.29 CHRONIC MIDLINE LOW BACK PAIN WITH SCIATICA, SCIATICA LATERALITY UNSPECIFIED: ICD-10-CM

## 2020-05-08 DIAGNOSIS — M21.612 BILATERAL BUNIONS: ICD-10-CM

## 2020-05-08 PROCEDURE — 99213 OFFICE O/P EST LOW 20 MIN: CPT | Performed by: PHYSICIAN ASSISTANT

## 2020-05-08 RX ORDER — OXYCODONE HYDROCHLORIDE AND ACETAMINOPHEN 5; 325 MG/1; MG/1
1 TABLET ORAL DAILY
Qty: 30 TABLET | Refills: 0 | Status: SHIPPED | OUTPATIENT
Start: 2020-05-08 | End: 2020-06-09 | Stop reason: SDUPTHER

## 2020-05-08 NOTE — PROGRESS NOTES
Subjective   Trinidad Quinteros is a 49 y.o. female  Med Refill      History of Present Illness  Patient is a pleasant 49-year-old white female who presents today via video visit after giving consent for this to be her office visit.  She is following up on 2 chronic medical problems.  Chronic midline low back pain with sciatica in association degenerative disc disease, she states she had a flareup last week after doing some housecleaning was able to get back to her baseline by resting and applying ice.  She is due for refill of her Percocet which she takes 1 daily.  She does not need refills on other medications at this time.  She is also more difficulty with her bunions.  Today she has pain when she walks.  She was seen by a podiatrist, Dr. Velasco, she states she is interested in surgical correction he was not interested in pursuing this at this time.  She like a second opinion.  She states her mother had very bad bunions and got to where she can walk and patient is already having pain when walking.  Video visit lasted 15 minutes  The following portions of the patient's history were reviewed and updated as appropriate: allergies, current medications, past social history and problem list    Review of Systems   Constitutional: Negative.    Respiratory: Negative.    Gastrointestinal: Negative.    Genitourinary: Negative.    Musculoskeletal: Positive for arthralgias, back pain and gait problem. Negative for joint swelling and myalgias.   Skin: Negative.    Neurological: Negative for dizziness, tremors, weakness and numbness.   Psychiatric/Behavioral: Negative.        Objective     There were no vitals filed for this visit.    Physical Exam   Constitutional: She appears well-developed and well-nourished. She does not appear ill. No distress.   Pulmonary/Chest: Effort normal. No respiratory distress.   Musculoskeletal: She exhibits tenderness ( Patient is able to show me her feet with visible deformities consistent with  bunions) and deformity. She exhibits no edema.   Neurological: She exhibits normal muscle tone. Coordination normal.   Skin: No rash noted. She is not diaphoretic. No erythema. No pallor.   Psychiatric: She has a normal mood and affect. Her behavior is normal. Judgment and thought content normal.       Assessment/Plan     Trinidad was seen today for med refill.    Diagnoses and all orders for this visit:    Chronic midline low back pain with sciatica, sciatica laterality unspecified    Bilateral bunions  -     Ambulatory Referral to Podiatry    Refill will be sent for Percocet 5/325 1 daily for chronic back pain chronic hip pain #30 with 0 refills obesity [facility] pharmacy.  Discussed with patient the controlled substance status of this medication addictive potential and need to follow-up in 1 month for recheck.

## 2020-05-21 ENCOUNTER — TELEMEDICINE (OUTPATIENT)
Dept: FAMILY MEDICINE CLINIC | Facility: CLINIC | Age: 50
End: 2020-05-21

## 2020-05-21 ENCOUNTER — TELEPHONE (OUTPATIENT)
Dept: FAMILY MEDICINE CLINIC | Facility: CLINIC | Age: 50
End: 2020-05-21

## 2020-05-21 DIAGNOSIS — J01.90 ACUTE NON-RECURRENT SINUSITIS, UNSPECIFIED LOCATION: Primary | ICD-10-CM

## 2020-05-21 DIAGNOSIS — R05.9 COUGH: ICD-10-CM

## 2020-05-21 PROCEDURE — 99213 OFFICE O/P EST LOW 20 MIN: CPT | Performed by: PHYSICIAN ASSISTANT

## 2020-05-21 RX ORDER — BENZONATATE 200 MG/1
200 CAPSULE ORAL 3 TIMES DAILY PRN
Qty: 30 CAPSULE | Refills: 1 | Status: SHIPPED | OUTPATIENT
Start: 2020-05-21 | End: 2020-11-10 | Stop reason: SDUPTHER

## 2020-05-21 RX ORDER — AMOXICILLIN 500 MG/1
500 CAPSULE ORAL 3 TIMES DAILY
Qty: 30 CAPSULE | Refills: 1 | Status: SHIPPED | OUTPATIENT
Start: 2020-05-21 | End: 2020-09-11 | Stop reason: SDUPTHER

## 2020-05-21 NOTE — TELEPHONE ENCOUNTER
Appt made for today, patient leaves work at 4:30, CHIKI is aware of this, Patient is using [email or web address]

## 2020-05-21 NOTE — PROGRESS NOTES
Subjective   Trinidad Quinteros is a 49 y.o. female  Sinusitis and Cough      History of Present Illness  Patient is a pleasant 49-year-old white female who presents today via video visit after giving consent for this to be her office visit.  She states that she started having symptoms 2 days ago with sinus pressure over both eyes and cheeks.  Mild nasal congestion, having a lot of postnasal drainage, some cough with clear drainage, no shortness of breath, no chest pain no fever.  Minimal sneezing.  Patient has not tried medications yet.  No body aches no chills. video visit lasted 15 minutes  The following portions of the patient's history were reviewed and updated as appropriate: allergies, current medications, past social history and problem list    Review of Systems   Constitutional: Negative for chills, fatigue and fever.   HENT: Positive for congestion, postnasal drip, rhinorrhea and sinus pressure. Negative for ear pain and sore throat.    Eyes: Negative for pain.   Respiratory: Positive for cough. Negative for shortness of breath and wheezing.    Cardiovascular: Negative.    Gastrointestinal: Negative.    Neurological: Positive for headaches. Negative for dizziness.   Hematological: Negative for adenopathy.   Psychiatric/Behavioral: Positive for sleep disturbance.       Objective     There were no vitals filed for this visit.    Physical Exam   Constitutional: She is oriented to person, place, and time. She appears well-developed and well-nourished. No distress.   HENT:   Head: Normocephalic and atraumatic.   Right Ear: Hearing normal.   Left Ear: Hearing normal.   Nose: Right sinus exhibits maxillary sinus tenderness and frontal sinus tenderness. Left sinus exhibits maxillary sinus tenderness and frontal sinus tenderness.   Eyes: Conjunctivae are normal.   Pulmonary/Chest: Effort normal. No respiratory distress.   Neurological: She is alert and oriented to person, place, and time.   Skin: No rash noted. She  is not diaphoretic. No erythema. No pallor.   Psychiatric: She has a normal mood and affect.       Assessment/Plan     Trinidad was seen today for sinusitis and cough.    Diagnoses and all orders for this visit:    Acute non-recurrent sinusitis, unspecified location    Cough    Other orders  -     amoxicillin (AMOXIL) 500 MG capsule; Take 1 capsule by mouth 3 (Three) Times a Day.  -     Chlorcyclizine-Pseudoephed 25-60 MG tablet; Take 1/2 to 1 every 12 hours as needed for congestion  -     benzonatate (TESSALON) 200 MG capsule; Take 1 capsule by mouth 3 (Three) Times a Day As Needed for Cough.  -     fluticasone (Veramyst) 27.5 MCG/SPRAY nasal spray; 2 sprays into the nostril(s) as directed by provider Daily.

## 2020-05-24 DIAGNOSIS — M50.30 DEGENERATIVE DISC DISEASE, CERVICAL: ICD-10-CM

## 2020-05-26 RX ORDER — NABUMETONE 750 MG/1
750 TABLET, FILM COATED ORAL 2 TIMES DAILY
Qty: 60 TABLET | Refills: 1 | Status: SHIPPED | OUTPATIENT
Start: 2020-05-26 | End: 2020-08-05 | Stop reason: SDUPTHER

## 2020-05-26 NOTE — TELEPHONE ENCOUNTER
Provider:Lisandro    Caller: Pharmacy   Surgery: n/a    Last visit: 3/26/19    Next visit: PRN      Reason for call:       Pt not seen since 3/26/19    Requested Prescriptions     Pending Prescriptions Disp Refills   • nabumetone (RELAFEN) 750 MG tablet 60 tablet 1     Sig: Take 1 tablet by mouth 2 (Two) Times a Day.

## 2020-06-05 RX ORDER — FLUCONAZOLE 150 MG/1
150 TABLET ORAL ONCE
Qty: 1 TABLET | Refills: 1 | Status: SHIPPED | OUTPATIENT
Start: 2020-06-05 | End: 2020-06-06

## 2020-06-09 ENCOUNTER — TELEMEDICINE (OUTPATIENT)
Dept: FAMILY MEDICINE CLINIC | Facility: CLINIC | Age: 50
End: 2020-06-09

## 2020-06-09 DIAGNOSIS — M54.40 CHRONIC MIDLINE LOW BACK PAIN WITH SCIATICA, SCIATICA LATERALITY UNSPECIFIED: Primary | ICD-10-CM

## 2020-06-09 DIAGNOSIS — M54.40 CHRONIC MIDLINE LOW BACK PAIN WITH SCIATICA, SCIATICA LATERALITY UNSPECIFIED: ICD-10-CM

## 2020-06-09 DIAGNOSIS — G89.29 CHRONIC MIDLINE LOW BACK PAIN WITH SCIATICA, SCIATICA LATERALITY UNSPECIFIED: Primary | ICD-10-CM

## 2020-06-09 DIAGNOSIS — G89.29 CHRONIC MIDLINE LOW BACK PAIN WITH SCIATICA, SCIATICA LATERALITY UNSPECIFIED: ICD-10-CM

## 2020-06-09 PROCEDURE — 99212 OFFICE O/P EST SF 10 MIN: CPT | Performed by: PHYSICIAN ASSISTANT

## 2020-06-09 RX ORDER — OXYCODONE HYDROCHLORIDE AND ACETAMINOPHEN 5; 325 MG/1; MG/1
1 TABLET ORAL DAILY
Qty: 30 TABLET | Refills: 0 | Status: SHIPPED | OUTPATIENT
Start: 2020-06-09 | End: 2020-07-09 | Stop reason: SDUPTHER

## 2020-06-09 NOTE — PROGRESS NOTES
Subjective   Trinidad Quinteros is a 49 y.o. female  Med Refill      History of Present Illness  Patient is a pleasant 49-year-old white female who presents today via video visit after her consent for this to be her office visit.  She follow-up on chronic back pain which is currently stable on Percocet once daily.  She is due for refills of this medication.  Video visit lasted 15 minutes  The following portions of the patient's history were reviewed and updated as appropriate: allergies, current medications, past social history and problem list    Review of Systems   Constitutional: Negative.    Respiratory: Negative.    Gastrointestinal: Negative.    Genitourinary: Negative.    Musculoskeletal: Positive for back pain. Negative for arthralgias, gait problem and myalgias.   Neurological: Negative for dizziness, tremors, weakness and numbness.       Objective     There were no vitals filed for this visit.    Physical Exam   Constitutional: She is oriented to person, place, and time. She appears well-developed and well-nourished. She does not have a sickly appearance. She does not appear ill. No distress.   Cardiovascular: Normal rate and regular rhythm.   Pulmonary/Chest: Effort normal. No respiratory distress.   Musculoskeletal:        Lumbar back: She exhibits decreased range of motion, tenderness, bony tenderness and pain. She exhibits no swelling, no deformity and no spasm.   Neurological: She is alert and oriented to person, place, and time. She has normal reflexes.   Skin: No rash noted. She is not diaphoretic. No erythema. No pallor.   Psychiatric: She has a normal mood and affect. Her behavior is normal. Judgment and thought content normal.       Assessment/Plan     Trinidad was seen today for med refill.    Diagnoses and all orders for this visit:    Chronic midline low back pain with sciatica, sciatica laterality unspecified    Prescription will be submitted to patient's pharmacy for Percocet 5/325 1 daily for  chronic back pain #30 with 0 refills.  Discussed abuse potential and controlled substance as of this medication peer discussed need to follow-up in 1 month for recheck

## 2020-06-10 ENCOUNTER — TELEPHONE (OUTPATIENT)
Dept: FAMILY MEDICINE CLINIC | Facility: CLINIC | Age: 50
End: 2020-06-10

## 2020-07-08 ENCOUNTER — TELEPHONE (OUTPATIENT)
Dept: FAMILY MEDICINE CLINIC | Facility: CLINIC | Age: 50
End: 2020-07-08

## 2020-07-08 NOTE — TELEPHONE ENCOUNTER
----- Message from Angela Rahman sent at 7/7/2020  8:54 AM EDT -----  Patient wants to know if she can get a refill on her oxyCODONE-acetaminophen (PERCOCET) 5-325 MG per tablet (take one tablet daily for chronic joint pain) sent to  [facility] #[id number] - [place], [place] - [address] [place] [address] AT [facility] [address] - [phone] [facility] [phone]..

## 2020-07-08 NOTE — TELEPHONE ENCOUNTER
She will have to have a office visit a video visit before we can refill the Percocet it is a controlled substance.

## 2020-07-09 ENCOUNTER — TELEMEDICINE (OUTPATIENT)
Dept: FAMILY MEDICINE CLINIC | Facility: CLINIC | Age: 50
End: 2020-07-09

## 2020-07-09 DIAGNOSIS — M19.90 ARTHRITIS: ICD-10-CM

## 2020-07-09 DIAGNOSIS — M54.40 CHRONIC MIDLINE LOW BACK PAIN WITH SCIATICA, SCIATICA LATERALITY UNSPECIFIED: ICD-10-CM

## 2020-07-09 DIAGNOSIS — G89.29 CHRONIC MIDLINE LOW BACK PAIN WITH SCIATICA, SCIATICA LATERALITY UNSPECIFIED: ICD-10-CM

## 2020-07-09 DIAGNOSIS — M25.552 PAIN OF LEFT HIP JOINT: Primary | ICD-10-CM

## 2020-07-09 PROCEDURE — 99213 OFFICE O/P EST LOW 20 MIN: CPT | Performed by: PHYSICIAN ASSISTANT

## 2020-07-09 RX ORDER — OXYCODONE HYDROCHLORIDE AND ACETAMINOPHEN 5; 325 MG/1; MG/1
1 TABLET ORAL DAILY
Qty: 30 TABLET | Refills: 0 | Status: SHIPPED | OUTPATIENT
Start: 2020-07-09 | End: 2020-08-07 | Stop reason: SDUPTHER

## 2020-07-09 NOTE — PROGRESS NOTES
Subjective   Trinidad Quinteros is a 49 y.o. female  Med Refill      History of Present Illness  Patient is a pleasant 49-year-old white female who presents today via video visit after giving her consent for this to be her office visit.  She is following up on chronic left hip pain associated degenerative joint disease.  She is currently maintaining adequate pain control with Percocet 5/325 mg nightly along with Relafen twice daily, gabapentin twice daily, Voltaren gel as needed.  She is due for refills of Percocet.  Patient takes Percocet once nightly to control pain adequately where she can sleep.  Without Percocet at nighttime patient's pain rises to an 8 out of 10.  With Percocet she is able to control pain at a level where she is not awakened.  She states also helps her with early morning joint pain and stiffness which prevent her from being able to carry out ADLs in the morning at a reasonable speed without this medication.  Video visit length 15 minutes  The following portions of the patient's history were reviewed and updated as appropriate: allergies, current medications, past social history and problem list    Review of Systems   Constitutional: Negative for activity change.   Respiratory: Negative.    Cardiovascular: Negative.    Musculoskeletal: Positive for arthralgias, back pain and myalgias. Negative for gait problem and joint swelling.   Skin: Negative.    Neurological: Negative.  Negative for weakness and numbness.   Psychiatric/Behavioral: Positive for sleep disturbance.       Objective     There were no vitals filed for this visit.    Physical Exam   Constitutional: She is oriented to person, place, and time. She appears well-developed and well-nourished. She does not have a sickly appearance. She does not appear ill. No distress.   Pulmonary/Chest: Effort normal.   Musculoskeletal: She exhibits tenderness. She exhibits no edema or deformity.   Neurological: She is alert and oriented to person,  place, and time. Coordination normal.   Skin: No rash noted. She is not diaphoretic. No erythema. No pallor.   Psychiatric: She has a normal mood and affect. Her behavior is normal. Judgment and thought content normal.       Assessment/Plan     Trinidad was seen today for med refill.    Diagnoses and all orders for this visit:    Pain of left hip joint    Arthritis    Prescription will be sent to patient's pharmacy for current dosage of Percocet 5/325 mg 1 nightly for chronic joint pain associate with arthritis #30 with 0 refills peer discussed abuse potential controlled substance as of this medication.  Advised patient continue all other medications as prescribed and follow-up in 1 month for recheck

## 2020-07-15 RX ORDER — DICYCLOMINE HCL 20 MG
20 TABLET ORAL EVERY 6 HOURS
Qty: 20 TABLET | Refills: 1 | Status: CANCELLED | OUTPATIENT
Start: 2020-07-15

## 2020-07-15 RX ORDER — TRAMADOL HYDROCHLORIDE 50 MG/1
50 TABLET ORAL 3 TIMES DAILY
Qty: 90 TABLET | Refills: 5 | Status: CANCELLED | OUTPATIENT
Start: 2020-07-15

## 2020-07-16 DIAGNOSIS — M54.40 CHRONIC MIDLINE LOW BACK PAIN WITH SCIATICA, SCIATICA LATERALITY UNSPECIFIED: Primary | ICD-10-CM

## 2020-07-16 DIAGNOSIS — G89.29 CHRONIC MIDLINE LOW BACK PAIN WITH SCIATICA, SCIATICA LATERALITY UNSPECIFIED: Primary | ICD-10-CM

## 2020-07-16 RX ORDER — TRAMADOL HYDROCHLORIDE 50 MG/1
50 TABLET ORAL 3 TIMES DAILY
Qty: 90 TABLET | Refills: 0 | Status: SHIPPED | OUTPATIENT
Start: 2020-07-16 | End: 2021-04-05 | Stop reason: SDUPTHER

## 2020-07-16 RX ORDER — DICYCLOMINE HCL 20 MG
20 TABLET ORAL EVERY 6 HOURS
Qty: 20 TABLET | Refills: 1 | Status: SHIPPED | OUTPATIENT
Start: 2020-07-16 | End: 2020-11-10 | Stop reason: SDUPTHER

## 2020-07-16 NOTE — TELEPHONE ENCOUNTER
Can call in 1 month supply only at this time and then when she is due for her Percocet next month we will update the tramadol with refills at the same time.

## 2020-07-29 ENCOUNTER — TELEPHONE (OUTPATIENT)
Dept: FAMILY MEDICINE CLINIC | Facility: CLINIC | Age: 50
End: 2020-07-29

## 2020-08-04 ENCOUNTER — TELEPHONE (OUTPATIENT)
Dept: FAMILY MEDICINE CLINIC | Facility: CLINIC | Age: 50
End: 2020-08-04

## 2020-08-05 ENCOUNTER — TELEPHONE (OUTPATIENT)
Dept: FAMILY MEDICINE CLINIC | Facility: CLINIC | Age: 50
End: 2020-08-05

## 2020-08-05 DIAGNOSIS — M50.30 DEGENERATIVE DISC DISEASE, CERVICAL: ICD-10-CM

## 2020-08-05 RX ORDER — FLUTICASONE PROPIONATE 50 MCG
2 SPRAY, SUSPENSION (ML) NASAL DAILY
Qty: 15.8 ML | Refills: 1 | Status: SHIPPED | OUTPATIENT
Start: 2020-08-05 | End: 2021-05-04 | Stop reason: SDUPTHER

## 2020-08-05 NOTE — TELEPHONE ENCOUNTER
----- Message from Angela Rahman sent at 8/4/2020 12:08 PM EDT -----  Patient went to get her fluticasone (Veramyst) 27.5 MCG/SPRAY nasal spray, and it was 60 dollars. Patient wants to know if something cheaper can be sent to her pharmacy such as [facility]? Her pharmacy is [id number] [address] AT [facility] & [address] - [phone] [facility] [phone]..  Thanks,  Angela..

## 2020-08-06 RX ORDER — GABAPENTIN 300 MG/1
300 CAPSULE ORAL 2 TIMES DAILY
Qty: 60 CAPSULE | Refills: 5 | OUTPATIENT
Start: 2020-08-06

## 2020-08-06 RX ORDER — NABUMETONE 750 MG/1
750 TABLET, FILM COATED ORAL 2 TIMES DAILY
Qty: 60 TABLET | Refills: 1 | Status: SHIPPED | OUTPATIENT
Start: 2020-08-06 | End: 2020-11-10 | Stop reason: SDUPTHER

## 2020-08-06 RX ORDER — TIZANIDINE 4 MG/1
2-4 TABLET ORAL 2 TIMES DAILY PRN
Qty: 45 TABLET | Refills: 5 | Status: SHIPPED | OUTPATIENT
Start: 2020-08-06 | End: 2020-11-10 | Stop reason: SDUPTHER

## 2020-08-06 NOTE — TELEPHONE ENCOUNTER
Provider:  Lisandro  Caller:  Automated refill request  Surgery:  NA  Surgery Date:    Last visit:  03/26/19 (seen 1 time)  Next visit: NA    Reason for call:         Requested Prescriptions     Pending Prescriptions Disp Refills   • nabumetone (RELAFEN) 750 MG tablet 60 tablet 1     Sig: Take 1 tablet by mouth 2 (Two) Times a Day.

## 2020-08-07 ENCOUNTER — TELEMEDICINE (OUTPATIENT)
Dept: FAMILY MEDICINE CLINIC | Facility: CLINIC | Age: 50
End: 2020-08-07

## 2020-08-07 DIAGNOSIS — G89.29 CHRONIC MIDLINE LOW BACK PAIN WITH SCIATICA, SCIATICA LATERALITY UNSPECIFIED: ICD-10-CM

## 2020-08-07 DIAGNOSIS — F41.9 ANXIETY: ICD-10-CM

## 2020-08-07 DIAGNOSIS — G89.29 CHRONIC MIDLINE LOW BACK PAIN WITH SCIATICA, SCIATICA LATERALITY UNSPECIFIED: Primary | ICD-10-CM

## 2020-08-07 DIAGNOSIS — M54.40 CHRONIC MIDLINE LOW BACK PAIN WITH SCIATICA, SCIATICA LATERALITY UNSPECIFIED: ICD-10-CM

## 2020-08-07 DIAGNOSIS — M54.40 CHRONIC MIDLINE LOW BACK PAIN WITH SCIATICA, SCIATICA LATERALITY UNSPECIFIED: Primary | ICD-10-CM

## 2020-08-07 DIAGNOSIS — B00.1 FEVER BLISTER: ICD-10-CM

## 2020-08-07 PROCEDURE — 99214 OFFICE O/P EST MOD 30 MIN: CPT | Performed by: PHYSICIAN ASSISTANT

## 2020-08-07 RX ORDER — OXYCODONE HYDROCHLORIDE AND ACETAMINOPHEN 5; 325 MG/1; MG/1
1 TABLET ORAL NIGHTLY
Qty: 30 TABLET | Refills: 0 | Status: SHIPPED | OUTPATIENT
Start: 2020-08-07 | End: 2020-09-10 | Stop reason: SDUPTHER

## 2020-08-07 RX ORDER — ALPRAZOLAM 0.5 MG/1
0.5 TABLET ORAL 4 TIMES DAILY PRN
Qty: 30 TABLET | Refills: 0 | Status: SHIPPED | OUTPATIENT
Start: 2020-08-07 | End: 2020-11-10 | Stop reason: SDUPTHER

## 2020-08-07 RX ORDER — ACYCLOVIR 400 MG/1
400 TABLET ORAL
Qty: 20 TABLET | Refills: 1 | Status: SHIPPED | OUTPATIENT
Start: 2020-08-07 | End: 2020-11-10 | Stop reason: SDUPTHER

## 2020-08-07 NOTE — PROGRESS NOTES
Subjective   Trinidad Quinteros is a 49 y.o. female  Anxiety      History of Present Illness  Patient is a pleasant 49-year-old white female presents today via video visit after giving her consent for this to be her office visit.  She is following up on chronic hip and low back pain currently stable on opioid therapy.  She is due for refills of her Percocet she continues to require this nightly to be able to sleep due to pain reaching 8 out of 10 without medication.  Additionally patient is having uncontrolled situational anxiety.  Her daughter just had an abruption and lost her child, this would have been patient's first grandchild and it has been very difficult for her.  She is having great difficulty with uncontrollable anxiety and crying throughout the day where she is unable to function.   is tomorrow.  She is requesting a short-term increase of her dosage of Xanax.  She denies any homicidal or suicidal ideations.  She denies adverse effects from Xanax.  Third issue is fever this with developed since being in the hospital with her daughter.  She states it is sore and taking some clear fluid causing her discomfort.  Visit length 15 minutes.  The following portions of the patient's history were reviewed and updated as appropriate: allergies, current medications, past social history and problem list    Review of Systems   Constitutional: Positive for activity change. Negative for appetite change, fatigue and fever.   Respiratory: Negative for chest tightness and shortness of breath.    Cardiovascular: Negative.    Gastrointestinal: Negative for abdominal pain, diarrhea and nausea.   Musculoskeletal: Positive for arthralgias and myalgias. Negative for gait problem and joint swelling.   Skin: Positive for color change and rash. Negative for pallor and wound.   Neurological: Negative for dizziness, tremors, weakness, light-headedness, numbness and headaches.   Psychiatric/Behavioral: Positive for dysphoric mood  and sleep disturbance. Negative for agitation, behavioral problems, confusion, decreased concentration and suicidal ideas. The patient is nervous/anxious.        Objective     There were no vitals filed for this visit.    Physical Exam   Constitutional: She is oriented to person, place, and time. She appears well-developed and well-nourished. No distress.   Neck: No thyroid mass and no thyromegaly present.   Cardiovascular: Normal rate, regular rhythm and normal heart sounds.   Pulmonary/Chest: Effort normal.   Musculoskeletal: She exhibits tenderness. She exhibits no edema or deformity.   Neurological: She is alert and oriented to person, place, and time. She exhibits normal muscle tone. Coordination normal.   Skin: Skin is warm and dry. Rash noted. She is not diaphoretic. There is erythema. No pallor.   Fever blister above lip on face   Psychiatric: Her speech is normal and behavior is normal. Judgment and thought content normal. Her mood appears anxious. Her affect is not angry and not inappropriate. Cognition and memory are normal. She does not exhibit a depressed mood. She is attentive.   Nursing note and vitals reviewed.      Assessment/Plan     Trinidad was seen today for anxiety.    Diagnoses and all orders for this visit:    Chronic midline low back pain with sciatica, sciatica laterality unspecified    Anxiety    Fever blister    Other orders  -     acyclovir (Zovirax) 400 MG tablet; Take 1 tablet by mouth 5 (Five) Times a Day. For fever blister    Prescription will be sent to patient's pharmacy for Percocet 5/325 mg 1 nightly for chronic hip and back pain #30 with no refills.  New dosing on Xanax for short-term usage for situational anxiety, new prescription will be sent to patient's pharmacy for Xanax 0.5 mg 1 4 times daily as needed for anxiety #30 with 0 refills and then resume her twice daily dosing following that.  Follow-up for further evaluation treatment of anxiety uncontrolled after 2 weeks,  discussed abuse potential and controlled substance as of these medications and need follow-up in 1 month for recheck for chronic meds.

## 2020-08-07 NOTE — TELEPHONE ENCOUNTER
Completed video visit with Josephine , new temp dose on alprazolam. Fwd to MD to send to pharm [facility]

## 2020-08-16 RX ORDER — GABAPENTIN 300 MG/1
300 CAPSULE ORAL 2 TIMES DAILY
Qty: 60 CAPSULE | Refills: 5 | Status: CANCELLED | OUTPATIENT
Start: 2020-08-16

## 2020-09-07 DIAGNOSIS — F41.9 ANXIETY: ICD-10-CM

## 2020-09-10 ENCOUNTER — TELEMEDICINE (OUTPATIENT)
Dept: FAMILY MEDICINE CLINIC | Facility: CLINIC | Age: 50
End: 2020-09-10

## 2020-09-10 DIAGNOSIS — G89.29 CHRONIC MIDLINE LOW BACK PAIN WITH SCIATICA, SCIATICA LATERALITY UNSPECIFIED: ICD-10-CM

## 2020-09-10 DIAGNOSIS — M54.40 CHRONIC MIDLINE LOW BACK PAIN WITH SCIATICA, SCIATICA LATERALITY UNSPECIFIED: ICD-10-CM

## 2020-09-10 DIAGNOSIS — G89.29 CHRONIC MIDLINE LOW BACK PAIN WITH SCIATICA, SCIATICA LATERALITY UNSPECIFIED: Primary | ICD-10-CM

## 2020-09-10 DIAGNOSIS — M25.552 PAIN OF LEFT HIP JOINT: ICD-10-CM

## 2020-09-10 DIAGNOSIS — M19.90 ARTHRITIS: ICD-10-CM

## 2020-09-10 DIAGNOSIS — M54.40 CHRONIC MIDLINE LOW BACK PAIN WITH SCIATICA, SCIATICA LATERALITY UNSPECIFIED: Primary | ICD-10-CM

## 2020-09-10 PROCEDURE — 99213 OFFICE O/P EST LOW 20 MIN: CPT | Performed by: PHYSICIAN ASSISTANT

## 2020-09-10 RX ORDER — OXYCODONE HYDROCHLORIDE AND ACETAMINOPHEN 5; 325 MG/1; MG/1
1 TABLET ORAL NIGHTLY
Qty: 30 TABLET | Refills: 0 | Status: SHIPPED | OUTPATIENT
Start: 2020-09-10 | End: 2020-10-07 | Stop reason: SDUPTHER

## 2020-09-10 NOTE — PROGRESS NOTES
Subjective   Trinidad Quinteros is a 49 y.o. female  Med Refill      History of Present Illness  Patient is a pleasant 49-year-old white female who presents today via video visit after giving her consent for this be her office visit.  She is following up on hip pain associated with degenerative joint disease and chronic back pain associated with degenerative disc disease which is currently stable on opioid therapy in addition with NSAID therapy.  She is due for refills of Percocet which she takes once daily for pain management.  Without this medication she struggles to complete ADLs and has difficulty with sleep.  She denies any adverse effects of this medication.  She is continue to take her Relafen daily as well.  Video visit 15 minutes in length.  The following portions of the patient's history were reviewed and updated as appropriate: allergies, current medications, past social history and problem list    Review of Systems   Constitutional: Negative.    Respiratory: Negative.    Gastrointestinal: Negative.    Genitourinary: Negative.    Musculoskeletal: Positive for arthralgias and back pain. Negative for gait problem and myalgias.   Neurological: Negative for dizziness, tremors, weakness and numbness.   Psychiatric/Behavioral: Positive for sleep disturbance. Negative for agitation and dysphoric mood. The patient is not nervous/anxious.        Objective     There were no vitals filed for this visit.    Physical Exam   Constitutional: She is oriented to person, place, and time. She appears well-developed and well-nourished. She does not have a sickly appearance. She does not appear ill. No distress.   HENT:   Head: Normocephalic and atraumatic.   Pulmonary/Chest: Effort normal. No respiratory distress.   Musculoskeletal:        Lumbar back: She exhibits decreased range of motion, tenderness, bony tenderness and pain. She exhibits no swelling, no deformity and no spasm.   Neurological: She is alert and oriented to  person, place, and time. She has normal reflexes. Coordination normal.   Skin: No rash noted. She is not diaphoretic. No erythema. No pallor.   Psychiatric: She has a normal mood and affect. Her behavior is normal. Judgment and thought content normal.       Assessment/Plan     Trinidad was seen today for med refill.    Diagnoses and all orders for this visit:    Chronic midline low back pain with sciatica, sciatica laterality unspecified    Pain of left hip joint    Arthritis    Prescription will be sent to patient's pharmacy for Percocet 5/325 mg tablets 1 nightly for chronic back pain and hip pain.  #30 with 0 refills.  Discussed abuse potential and controlled substance as of this medication. discussed need follow-up in 1 month for recheck.

## 2020-09-11 ENCOUNTER — TELEPHONE (OUTPATIENT)
Dept: FAMILY MEDICINE CLINIC | Facility: CLINIC | Age: 50
End: 2020-09-11

## 2020-09-11 RX ORDER — AMOXICILLIN 500 MG/1
500 CAPSULE ORAL 3 TIMES DAILY
Qty: 30 CAPSULE | Refills: 1 | Status: SHIPPED | OUTPATIENT
Start: 2020-09-11 | End: 2020-11-10

## 2020-09-14 DIAGNOSIS — M54.40 CHRONIC MIDLINE LOW BACK PAIN WITH SCIATICA, SCIATICA LATERALITY UNSPECIFIED: Primary | ICD-10-CM

## 2020-09-14 DIAGNOSIS — G89.29 CHRONIC MIDLINE LOW BACK PAIN WITH SCIATICA, SCIATICA LATERALITY UNSPECIFIED: Primary | ICD-10-CM

## 2020-09-15 RX ORDER — SERTRALINE HYDROCHLORIDE 100 MG/1
150 TABLET, FILM COATED ORAL DAILY
Qty: 45 TABLET | Refills: 5 | Status: SHIPPED | OUTPATIENT
Start: 2020-09-15 | End: 2020-11-10 | Stop reason: SDUPTHER

## 2020-09-17 RX ORDER — GABAPENTIN 300 MG/1
300 CAPSULE ORAL 2 TIMES DAILY
Qty: 60 CAPSULE | Refills: 5 | Status: SHIPPED | OUTPATIENT
Start: 2020-09-17 | End: 2020-11-10 | Stop reason: SDUPTHER

## 2020-09-17 RX ORDER — ALPRAZOLAM 0.5 MG/1
0.5 TABLET ORAL 4 TIMES DAILY PRN
Qty: 30 TABLET | Refills: 0 | OUTPATIENT
Start: 2020-09-17

## 2020-10-07 ENCOUNTER — TELEPHONE (OUTPATIENT)
Dept: FAMILY MEDICINE CLINIC | Facility: CLINIC | Age: 50
End: 2020-10-07

## 2020-10-07 DIAGNOSIS — G89.29 CHRONIC MIDLINE LOW BACK PAIN WITH SCIATICA, SCIATICA LATERALITY UNSPECIFIED: ICD-10-CM

## 2020-10-07 DIAGNOSIS — M54.40 CHRONIC MIDLINE LOW BACK PAIN WITH SCIATICA, SCIATICA LATERALITY UNSPECIFIED: ICD-10-CM

## 2020-10-07 RX ORDER — OXYCODONE HYDROCHLORIDE AND ACETAMINOPHEN 5; 325 MG/1; MG/1
1 TABLET ORAL NIGHTLY
Qty: 30 TABLET | Refills: 0 | Status: SHIPPED | OUTPATIENT
Start: 2020-10-07 | End: 2020-11-10 | Stop reason: SDUPTHER

## 2020-10-07 NOTE — TELEPHONE ENCOUNTER
----- Message from Angela Rahman sent at 10/7/2020  9:31 AM EDT -----  Regarding: MED REFILL  Patient wants to know if she can get a refill on her oxyCODONE-acetaminophen (PERCOCET) 5-325 MG per tablet (take one tablet daily for chronic joint pain) sent to [facility]/pharmacy #[id number] - [place], [place] - [address] AT NEXT TO [facility] - [phone]  - [phone]...  Thanks,  Cortez.

## 2020-11-10 ENCOUNTER — TELEMEDICINE (OUTPATIENT)
Dept: FAMILY MEDICINE CLINIC | Facility: CLINIC | Age: 50
End: 2020-11-10

## 2020-11-10 DIAGNOSIS — K21.9 GASTROESOPHAGEAL REFLUX DISEASE WITHOUT ESOPHAGITIS: ICD-10-CM

## 2020-11-10 DIAGNOSIS — R05.9 COUGH: ICD-10-CM

## 2020-11-10 DIAGNOSIS — M19.90 ARTHRITIS: ICD-10-CM

## 2020-11-10 DIAGNOSIS — F32.9 REACTIVE DEPRESSION: ICD-10-CM

## 2020-11-10 DIAGNOSIS — B00.1 FEVER BLISTER: ICD-10-CM

## 2020-11-10 DIAGNOSIS — F41.9 ANXIETY: ICD-10-CM

## 2020-11-10 DIAGNOSIS — E78.49 OTHER HYPERLIPIDEMIA: ICD-10-CM

## 2020-11-10 DIAGNOSIS — M25.552 PAIN OF LEFT HIP JOINT: ICD-10-CM

## 2020-11-10 DIAGNOSIS — M54.40 CHRONIC MIDLINE LOW BACK PAIN WITH SCIATICA, SCIATICA LATERALITY UNSPECIFIED: ICD-10-CM

## 2020-11-10 DIAGNOSIS — K58.0 IRRITABLE BOWEL SYNDROME WITH DIARRHEA: ICD-10-CM

## 2020-11-10 DIAGNOSIS — I10 ESSENTIAL HYPERTENSION: ICD-10-CM

## 2020-11-10 DIAGNOSIS — M50.30 DEGENERATIVE DISC DISEASE, CERVICAL: Primary | ICD-10-CM

## 2020-11-10 DIAGNOSIS — G89.29 CHRONIC MIDLINE LOW BACK PAIN WITH SCIATICA, SCIATICA LATERALITY UNSPECIFIED: ICD-10-CM

## 2020-11-10 PROCEDURE — 99214 OFFICE O/P EST MOD 30 MIN: CPT | Performed by: PHYSICIAN ASSISTANT

## 2020-11-10 RX ORDER — DICYCLOMINE HCL 20 MG
20 TABLET ORAL EVERY 6 HOURS
Qty: 60 TABLET | Refills: 11 | Status: SHIPPED | OUTPATIENT
Start: 2020-11-10 | End: 2021-04-22 | Stop reason: ALTCHOICE

## 2020-11-10 RX ORDER — ACYCLOVIR 400 MG/1
400 TABLET ORAL
Qty: 20 TABLET | Refills: 1 | Status: SHIPPED | OUTPATIENT
Start: 2020-11-10 | End: 2021-08-17

## 2020-11-10 RX ORDER — PANTOPRAZOLE SODIUM 40 MG/1
40 TABLET, DELAYED RELEASE ORAL DAILY
Qty: 30 TABLET | Refills: 9 | Status: SHIPPED | OUTPATIENT
Start: 2020-11-10 | End: 2021-08-17

## 2020-11-10 RX ORDER — TIZANIDINE 4 MG/1
2-4 TABLET ORAL 2 TIMES DAILY PRN
Qty: 60 TABLET | Refills: 9 | Status: SHIPPED | OUTPATIENT
Start: 2020-11-10 | End: 2021-11-18 | Stop reason: SDUPTHER

## 2020-11-10 RX ORDER — ONDANSETRON 4 MG/1
4 TABLET, FILM COATED ORAL EVERY 8 HOURS PRN
Qty: 20 TABLET | Refills: 8 | Status: SHIPPED | OUTPATIENT
Start: 2020-11-10 | End: 2021-12-18 | Stop reason: SDUPTHER

## 2020-11-10 RX ORDER — BENZONATATE 200 MG/1
200 CAPSULE ORAL 3 TIMES DAILY PRN
Qty: 30 CAPSULE | Refills: 1 | Status: SHIPPED | OUTPATIENT
Start: 2020-11-10 | End: 2021-03-01

## 2020-11-10 RX ORDER — LOSARTAN POTASSIUM 100 MG/1
100 TABLET ORAL DAILY
Qty: 90 TABLET | Refills: 2 | Status: SHIPPED | OUTPATIENT
Start: 2020-11-10 | End: 2021-10-14 | Stop reason: SDUPTHER

## 2020-11-10 RX ORDER — SERTRALINE HYDROCHLORIDE 100 MG/1
200 TABLET, FILM COATED ORAL DAILY
Qty: 60 TABLET | Refills: 9 | Status: SHIPPED | OUTPATIENT
Start: 2020-11-10 | End: 2021-11-18 | Stop reason: SDUPTHER

## 2020-11-10 RX ORDER — GABAPENTIN 300 MG/1
300 CAPSULE ORAL 2 TIMES DAILY
Qty: 60 CAPSULE | Refills: 5 | Status: SHIPPED | OUTPATIENT
Start: 2020-11-10 | End: 2021-08-25 | Stop reason: SDUPTHER

## 2020-11-10 RX ORDER — HYDROCHLOROTHIAZIDE 25 MG/1
25 TABLET ORAL DAILY
Qty: 90 TABLET | Refills: 2 | Status: SHIPPED | OUTPATIENT
Start: 2020-11-10 | End: 2021-10-14 | Stop reason: SDUPTHER

## 2020-11-10 RX ORDER — ALPRAZOLAM 0.5 MG/1
0.5 TABLET ORAL 3 TIMES DAILY
Qty: 90 TABLET | Refills: 5 | Status: SHIPPED | OUTPATIENT
Start: 2020-11-10 | End: 2021-04-05 | Stop reason: SDUPTHER

## 2020-11-10 RX ORDER — NABUMETONE 750 MG/1
750 TABLET, FILM COATED ORAL 2 TIMES DAILY
Qty: 60 TABLET | Refills: 5 | Status: SHIPPED | OUTPATIENT
Start: 2020-11-10 | End: 2021-04-28

## 2020-11-10 RX ORDER — ATORVASTATIN CALCIUM 40 MG/1
40 TABLET, FILM COATED ORAL DAILY
Qty: 90 TABLET | Refills: 2 | Status: SHIPPED | OUTPATIENT
Start: 2020-11-10 | End: 2021-10-14 | Stop reason: SDUPTHER

## 2020-11-10 RX ORDER — OXYCODONE HYDROCHLORIDE AND ACETAMINOPHEN 5; 325 MG/1; MG/1
1 TABLET ORAL NIGHTLY
Qty: 30 TABLET | Refills: 0 | Status: SHIPPED | OUTPATIENT
Start: 2020-11-10 | End: 2020-12-10 | Stop reason: SDUPTHER

## 2020-11-10 NOTE — PROGRESS NOTES
Subjective   Trinidad Quinteros is a 50 y.o. female  Med Refill      History of Present Illness  Patient is a pleasant 50-year-old white female who presents today via video visit after giving her consent for this to be her office visit.  She is needing follow-up on 11 separate medical conditions today and is due for refills on 14 medications.  She states she has a lot of family stressors going on right now her daughter struggling with depression and patient is worrying a lot about her.  She states that currently her anxiety and depression are not well controlled.  Patient denies any homicidal or suicidal ideations.  She does feel that her current dosage of Xanax 0.5 mg 3 times daily is helpful and does feel the Zoloft 150 mg is helpful but she is still having breakthrough symptoms.  She continues to have daily pain in her neck hip and back due to arthritis and is due for refills of Percocet and gabapentin.  She states that these medications are helpful in managing her pain.  She continues to have some mild cough occasionally requires Tessalon Perles, cough related to allergies.  IBS responds well to Bentyl having more flareup lately with anxiety.  Continues to have some GERD symptoms on Protonix thinks is more related to anxiety.  Her appetite has been down to her anxiety and she is been eating as well.  She has continued to explain some nausea with her anxiety.  She is due for refills of medications for hypertension hyperlipidemia denies any adverse effects of these medications.  Video visit 20 minutes in length  The following portions of the patient's history were reviewed and updated as appropriate: allergies, current medications, past social history and problem list    Review of Systems   Constitutional: Positive for activity change. Negative for appetite change, fatigue, fever and unexpected weight change.   HENT: Positive for congestion.    Eyes: Negative.    Respiratory: Positive for cough. Negative for chest  tightness and shortness of breath.    Cardiovascular: Negative for chest pain.   Gastrointestinal: Positive for diarrhea and nausea. Negative for abdominal distention and abdominal pain.        Patient experiencing heartburn/acid reflux     Genitourinary: Negative.    Musculoskeletal: Positive for arthralgias, back pain and myalgias. Negative for gait problem and joint swelling.   Skin: Negative.         Occasional fever blisters request med refill, no symptoms today   Allergic/Immunologic: Positive for environmental allergies. Negative for immunocompromised state.   Neurological: Negative for dizziness, tremors, weakness, light-headedness, numbness and headaches.   Hematological: Negative for adenopathy.   Psychiatric/Behavioral: Positive for dysphoric mood and sleep disturbance. Negative for agitation, behavioral problems, confusion, decreased concentration and suicidal ideas. The patient is nervous/anxious.        Objective     There were no vitals filed for this visit.    Physical Exam  Vitals signs and nursing note reviewed.   Constitutional:       General: She is not in acute distress.     Appearance: Normal appearance. She is well-developed. She is not ill-appearing, toxic-appearing or diaphoretic.   HENT:      Head: Normocephalic and atraumatic.   Eyes:      Conjunctiva/sclera: Conjunctivae normal.   Neck:      Thyroid: No thyroid mass or thyromegaly.   Cardiovascular:      Rate and Rhythm: Normal rate and regular rhythm.      Heart sounds: Normal heart sounds.   Pulmonary:      Effort: Pulmonary effort is normal. No respiratory distress.   Skin:     General: Skin is dry.      Coloration: Skin is not jaundiced or pale.      Findings: No bruising, erythema or rash.   Neurological:      Mental Status: She is alert and oriented to person, place, and time.      Coordination: Coordination normal.   Psychiatric:         Attention and Perception: Attention normal. She is attentive.         Mood and Affect: Mood is  not anxious or depressed. Affect is blunt and flat. Affect is not angry or inappropriate.         Speech: Speech normal.         Behavior: Behavior normal. Behavior is cooperative.         Thought Content: Thought content normal.         Cognition and Memory: Cognition normal.         Judgment: Judgment normal.         Assessment/Plan     Diagnoses and all orders for this visit:    1. Degenerative disc disease, cervical (Primary)  -     nabumetone (RELAFEN) 750 MG tablet; Take 1 tablet by mouth 2 (Two) Times a Day.  Dispense: 60 tablet; Refill: 5    2. Other hyperlipidemia    3. Essential hypertension    4. Gastroesophageal reflux disease without esophagitis    5. Chronic midline low back pain with sciatica, sciatica laterality unspecified    6. Pain of left hip joint    7. Anxiety    8. Reactive depression    9. Cough    10. Arthritis    11. Irritable bowel syndrome with diarrhea    12. Fever blister    Other orders  -     tiZANidine (ZANAFLEX) 4 MG tablet; Take 0.5-1 tablet by mouth 2  Times a Day As Needed for back pain  Dispense: 60 tablet; Refill: 11  -     sertraline (ZOLOFT) 100 MG tablet; Take 2 daily, new dose  Dispense: 60 tablet; Refill: 11  -     pantoprazole (PROTONIX) 40 MG EC tablet; Take 1 tablet by mouth Daily.  Dispense: 30 tablet; Refill: 11  -     ondansetron (Zofran) 4 MG tablet; Take 1 tablet by mouth Every 8 (Eight) Hours As Needed for Nausea or Vomiting.  Dispense: 20 tablet; Refill: 11  -     losartan (COZAAR) 100 MG tablet; Take 1 tablet by mouth Daily.  Dispense: 90 tablet; Refill: 3  -     hydroCHLOROthiazide (HYDRODIURIL) 25 MG tablet; Take 1 tablet by mouth Daily.  Dispense: 90 tablet; Refill: 3  -     dicyclomine (Bentyl) 20 MG tablet; Take 1 tablet by mouth Every 6 Hours as needed for diarrhea and stomach cramps  Dispense: 60 tablet; Refill: 11  -     atorvastatin (LIPITOR) 40 MG tablet; Take 1 tablet by mouth Daily.  Dispense: 90 tablet; Refill: 3  -     benzonatate (TESSALON) 200 MG  capsule; Take 1 capsule by mouth 3 (Three) Times a Day As Needed for Cough.  Dispense: 30 capsule; Refill: 1  -     acyclovir (Zovirax) 400 MG tablet; Take 1 tablet by mouth 5 (Five) Times a Day. For fever blister  Dispense: 20 tablet; Refill: 1    Prescriptions will be sent as well for gabapentin 300 mg 1 twice daily for neuropathy #60 with 5 refills, Xanax 0.5 mg 1 3 times daily for anxiety #90 with 5 refills and Percocet 5/325 mg 1 nightly for arthritic hip and back pain #30 with 0 refills.  Discussed abuse potential and controlled substance as of these 3 medications need to follow-up in 6 months for refills of Xanax and gabapentin in 1 month for refills and recheck on Percocet.

## 2020-11-12 ENCOUNTER — TELEPHONE (OUTPATIENT)
Dept: FAMILY MEDICINE CLINIC | Facility: CLINIC | Age: 50
End: 2020-11-12

## 2020-11-12 NOTE — TELEPHONE ENCOUNTER
----- Message from Angela Rahman sent at 11/11/2020  1:47 PM EST -----  Regarding: MEDICATION PRE AUTH  Patient said that her medication, oxyCODONE-acetaminophen (PERCOCET) 5-325 MG per tablet will need a PA from her insurance. She is wondering if it's been approved yet?    ThanksAngela..

## 2020-12-10 ENCOUNTER — TELEMEDICINE (OUTPATIENT)
Dept: FAMILY MEDICINE CLINIC | Facility: CLINIC | Age: 50
End: 2020-12-10

## 2020-12-10 DIAGNOSIS — M54.40 CHRONIC MIDLINE LOW BACK PAIN WITH SCIATICA, SCIATICA LATERALITY UNSPECIFIED: ICD-10-CM

## 2020-12-10 DIAGNOSIS — G89.29 CHRONIC MIDLINE LOW BACK PAIN WITH SCIATICA, SCIATICA LATERALITY UNSPECIFIED: ICD-10-CM

## 2020-12-10 DIAGNOSIS — G89.29 CHRONIC MIDLINE LOW BACK PAIN WITH SCIATICA, SCIATICA LATERALITY UNSPECIFIED: Primary | ICD-10-CM

## 2020-12-10 DIAGNOSIS — M25.552 PAIN OF LEFT HIP JOINT: ICD-10-CM

## 2020-12-10 DIAGNOSIS — M54.40 CHRONIC MIDLINE LOW BACK PAIN WITH SCIATICA, SCIATICA LATERALITY UNSPECIFIED: Primary | ICD-10-CM

## 2020-12-10 PROCEDURE — 99213 OFFICE O/P EST LOW 20 MIN: CPT | Performed by: PHYSICIAN ASSISTANT

## 2020-12-10 RX ORDER — OXYCODONE HYDROCHLORIDE AND ACETAMINOPHEN 5; 325 MG/1; MG/1
1 TABLET ORAL NIGHTLY
Qty: 30 TABLET | Refills: 0 | Status: SHIPPED | OUTPATIENT
Start: 2020-12-10 | End: 2021-09-16 | Stop reason: SDUPTHER

## 2020-12-10 NOTE — TELEPHONE ENCOUNTER
Completed video visit with Kar for chronic joint pain, needs meds sent to [facility] pako

## 2020-12-10 NOTE — PROGRESS NOTES
Subjective   Trinidad Quinteros is a 50 y.o. female  Med Refill      History of Present Illness  Patient is a pleasant 50-year-old white female who presents today via video visit after giving her consent for this to be her office visit.  She is following up on chronic back and hip pain associate degenerative disc degenerative joint disease currently stable on a combination of NSAID daily, muscle relaxant daily and opioid therapy.  She is due for refills of her opioid, taking Percocet 5/325 mg tablets 1 nightly at that time.  Without this medication her pain reaches an intolerable level of greater than 7 out of 10 awakening her from sleep or bending her from being able to sleep.  Patient denies any adverse effects from medication.  Video visit 15 minutes in length.  The following portions of the patient's history were reviewed and updated as appropriate: allergies, current medications, past social history and problem list    Review of Systems   Constitutional: Negative.    Respiratory: Negative.    Gastrointestinal: Negative.    Genitourinary: Negative.    Musculoskeletal: Positive for arthralgias and back pain. Negative for gait problem and myalgias.   Neurological: Negative for dizziness, tremors, weakness and numbness.   Psychiatric/Behavioral: Positive for sleep disturbance.       Objective     There were no vitals filed for this visit.    Physical Exam  Constitutional:       General: She is not in acute distress.     Appearance: Normal appearance. She is well-developed and normal weight. She is not ill-appearing, toxic-appearing or diaphoretic.   Musculoskeletal: Normal range of motion.         General: No tenderness or deformity.   Skin:     General: Skin is dry.      Coloration: Skin is not pale.      Findings: No erythema or rash.   Neurological:      Mental Status: She is alert.      Motor: No abnormal muscle tone.      Coordination: Coordination normal.   Psychiatric:         Mood and Affect: Mood normal.          Behavior: Behavior normal.         Thought Content: Thought content normal.         Judgment: Judgment normal.         Assessment/Plan     Diagnoses and all orders for this visit:    1. Chronic midline low back pain with sciatica, sciatica laterality unspecified (Primary)    2. Pain of left hip joint    Prescription will be sent to patient's pharmacy for refill of current dosage of Percocet 5/325 mg tablets 1 nightly for chronic pain in hip and back #30 with 0 refills.  Discussed abuse potential controlled substance as of this medication peer discussed need follow-up appointment 1 month for refills.

## 2020-12-30 ENCOUNTER — TELEPHONE (OUTPATIENT)
Dept: FAMILY MEDICINE CLINIC | Facility: CLINIC | Age: 50
End: 2020-12-30

## 2020-12-30 NOTE — TELEPHONE ENCOUNTER
----- Message from Angela Escudero sent at 12/30/2020  1:21 PM EST -----  Regarding: MEDICATION  Patient wants to know if she can get an antibiotic sent in for a sinus infection. She says she has runny nose, sinus pressure w/congestion. Her pharmacy is [facility]/pharmacy #[id number] [address] [phone] [facility] [phone]..  Thanks,Ly.

## 2020-12-30 NOTE — TELEPHONE ENCOUNTER
We are not sending in antibiotics for sinus symptoms without evaluation at this point due to Covid, I would recommend getting a Covid test and we recommend going to an urgent care where she can actually get a test and be examined to make sure she gets the right medication

## 2021-01-14 ENCOUNTER — TELEPHONE (OUTPATIENT)
Dept: FAMILY MEDICINE CLINIC | Facility: CLINIC | Age: 51
End: 2021-01-14

## 2021-01-25 ENCOUNTER — OFFICE VISIT (OUTPATIENT)
Dept: FAMILY MEDICINE CLINIC | Facility: CLINIC | Age: 51
End: 2021-01-25

## 2021-01-25 VITALS
DIASTOLIC BLOOD PRESSURE: 82 MMHG | OXYGEN SATURATION: 99 % | SYSTOLIC BLOOD PRESSURE: 138 MMHG | HEART RATE: 81 BPM | BODY MASS INDEX: 30.73 KG/M2 | WEIGHT: 167 LBS | TEMPERATURE: 97.5 F | HEIGHT: 62 IN

## 2021-01-25 DIAGNOSIS — F41.1 GAD (GENERALIZED ANXIETY DISORDER): ICD-10-CM

## 2021-01-25 DIAGNOSIS — M54.40 CHRONIC MIDLINE LOW BACK PAIN WITH SCIATICA, SCIATICA LATERALITY UNSPECIFIED: Primary | ICD-10-CM

## 2021-01-25 DIAGNOSIS — G89.29 CHRONIC MIDLINE LOW BACK PAIN WITH SCIATICA, SCIATICA LATERALITY UNSPECIFIED: Primary | ICD-10-CM

## 2021-01-25 PROCEDURE — 99213 OFFICE O/P EST LOW 20 MIN: CPT | Performed by: PHYSICIAN ASSISTANT

## 2021-01-25 NOTE — PROGRESS NOTES
Subjective   Trinidad Quinteros is a 50 y.o. female  Back Pain (Follow up on back pain, req refills on Oxycodone, needs new RX for Gabapentin to new pharm ) and Anxiety (Follow up on anxiety, rf on alprazolam )      History of Present Illness  Patient is a pleasant 50-year-old white female who presents today for follow-up on 2 chronic medical problems currently stable on controlled substances and due for refills.    Generalized anxiety disorder currently stable on combination of Zoloft daily along with Xanax daily.  Patient has dealt with a lot of extra stress over the past year with the premature death of her granddaughter who is a stillbirth and dealing with the impact of this on her daughter.  Patient states that things are looking a little brighter things have improved a little bit in her family stress and she does feel that her anxiety is currently stable medication due for refills of Xanax.  Denies any adverse effects of this medication.  Regarding chronic back pain she continues experience daily back pain at times keeping her up at night still has problems with going into her leg but it is stable she is functional able to carry out ADLs on current medication regimen.  She alternates tramadol during the day for mild to moderate pain and add on Percocet at night for more severe pain, currently taking gabapentin twice a day does find to be beneficial for sciatica but feels it wears off throughout the day.  No adverse effects noted from this medication.  The following portions of the patient's history were reviewed and updated as appropriate: allergies, current medications, past social history and problem list    Review of Systems   Constitutional: Negative.  Negative for appetite change and fatigue.   Respiratory: Negative.  Negative for chest tightness and shortness of breath.    Gastrointestinal: Negative.  Negative for abdominal pain, diarrhea and nausea.   Genitourinary: Negative.    Musculoskeletal: Positive  for back pain and myalgias. Negative for arthralgias and gait problem.   Neurological: Positive for numbness. Negative for dizziness, tremors, weakness, light-headedness and headaches.   Psychiatric/Behavioral: Positive for dysphoric mood and sleep disturbance. Negative for agitation, behavioral problems, confusion, decreased concentration and suicidal ideas. The patient is nervous/anxious.        Objective     Vitals:    01/25/21 1300   BP: 138/82   Pulse: 81   Temp: 97.5 °F (36.4 °C)   SpO2: 99%       Physical Exam  Vitals signs and nursing note reviewed.   Constitutional:       General: She is not in acute distress.     Appearance: Normal appearance. She is well-developed. She is not ill-appearing, toxic-appearing or diaphoretic.   HENT:      Head: Normocephalic and atraumatic.   Cardiovascular:      Rate and Rhythm: Normal rate and regular rhythm.      Heart sounds: Normal heart sounds.   Pulmonary:      Effort: Pulmonary effort is normal. No respiratory distress.      Breath sounds: Normal breath sounds.   Abdominal:      General: Bowel sounds are normal.      Palpations: Abdomen is soft.   Musculoskeletal:      Lumbar back: She exhibits decreased range of motion, tenderness, bony tenderness and pain. She exhibits no swelling, no deformity and no spasm.   Skin:     General: Skin is warm and dry.      Coloration: Skin is not pale.      Findings: No bruising or rash.   Neurological:      Mental Status: She is alert and oriented to person, place, and time.      Cranial Nerves: No cranial nerve deficit.      Deep Tendon Reflexes: Reflexes are normal and symmetric.   Psychiatric:         Attention and Perception: She is attentive.         Mood and Affect: Mood normal.         Speech: Speech normal.         Behavior: Behavior normal.         Thought Content: Thought content normal.         Judgment: Judgment normal.         Assessment/Plan     Diagnoses and all orders for this visit:    1. Chronic midline low back  pain with sciatica, sciatica laterality unspecified (Primary)    2. IVON (generalized anxiety disorder)    Refill of Xanax at current dosage 0.5 mg 3 times daily for anxiety #90 with 5 refills while continuing on Zoloft at current dosage.  Increase dosage of gabapentin 300 mg 3 times daily #90 with 5 refills, discussed abuse potential and controlled substance status of this medication.  Refill Percocet at current dosage 5/325 1 nightly for chronic pain #30 with a second prescription to fill in 30 days, refill tramadol 50 mg 3 times daily as needed for moderate pain associated with chronic back disorder #90 with 5 refills.  Discussed abuse potential controlled substance as of this medication.  Follow-up in 2 months for recheck and as needed.

## 2021-02-04 ENCOUNTER — TELEPHONE (OUTPATIENT)
Dept: FAMILY MEDICINE CLINIC | Facility: CLINIC | Age: 51
End: 2021-02-04

## 2021-02-04 NOTE — TELEPHONE ENCOUNTER
----- Message from Angela Escudero sent at 2/4/2021  3:10 PM EST -----  Regarding: MEDICATION PA/AUTH  Patient said that these two medications require a PA from her insurance:  ALPRAZolam (XANAX) 0.5 MG tablet  traMADol (ULTRAM) 50 MG tablet   ThanksSandra.

## 2021-02-19 RX ORDER — AMOXICILLIN 500 MG/1
CAPSULE ORAL
Qty: 30 CAPSULE | Refills: 1 | OUTPATIENT
Start: 2021-02-19

## 2021-02-25 ENCOUNTER — TELEPHONE (OUTPATIENT)
Dept: FAMILY MEDICINE CLINIC | Facility: CLINIC | Age: 51
End: 2021-02-25

## 2021-02-25 NOTE — TELEPHONE ENCOUNTER
I would be glad to if you can write out exactly what supplies she needs and how it needs to be ordered.

## 2021-02-25 NOTE — TELEPHONE ENCOUNTER
----- Message from Angela Escudero sent at 2/23/2021 10:36 AM EST -----  Regarding: cpap supplies  Patient wants to know if an order/or script can be sent to [facility] medical supply for CPAP supplies. You can reach patient for more information at [phone]..  Thanks,  Belinda..

## 2021-02-25 NOTE — TELEPHONE ENCOUNTER
Sent message in eva Miller, can you ask her where she is needing the order sent and what supplies she needs?

## 2021-03-01 ENCOUNTER — PROCEDURE VISIT (OUTPATIENT)
Dept: FAMILY MEDICINE CLINIC | Facility: CLINIC | Age: 51
End: 2021-03-01

## 2021-03-01 VITALS
WEIGHT: 165 LBS | HEART RATE: 78 BPM | DIASTOLIC BLOOD PRESSURE: 84 MMHG | BODY MASS INDEX: 30.36 KG/M2 | OXYGEN SATURATION: 98 % | RESPIRATION RATE: 14 BRPM | TEMPERATURE: 97.1 F | SYSTOLIC BLOOD PRESSURE: 126 MMHG | HEIGHT: 62 IN

## 2021-03-01 DIAGNOSIS — L98.9 SKIN LESION OF FACE: Primary | ICD-10-CM

## 2021-03-01 PROCEDURE — 11440 EXC FACE-MM B9+MARG 0.5 CM/<: CPT | Performed by: PHYSICIAN ASSISTANT

## 2021-03-01 PROCEDURE — 99212 OFFICE O/P EST SF 10 MIN: CPT | Performed by: PHYSICIAN ASSISTANT

## 2021-03-01 RX ORDER — CEPHALEXIN 500 MG/1
500 CAPSULE ORAL 3 TIMES DAILY
Qty: 21 CAPSULE | Refills: 1 | Status: SHIPPED | OUTPATIENT
Start: 2021-03-01 | End: 2021-03-30

## 2021-03-01 RX ORDER — CHLORCYCLIZINE HYDROCHLORIDE AND PSEUDOEPHEDRINE HYDROCHLORIDE 25; 60 MG/1; MG/1
TABLET ORAL
COMMUNITY
Start: 2021-02-19 | End: 2021-03-30 | Stop reason: ALTCHOICE

## 2021-03-01 RX ORDER — BENZONATATE 200 MG/1
CAPSULE ORAL
Qty: 30 CAPSULE | Refills: 1 | Status: SHIPPED | OUTPATIENT
Start: 2021-03-01 | End: 2021-09-09 | Stop reason: SDUPTHER

## 2021-03-01 NOTE — PROGRESS NOTES
Subjective   Trinidad Quinteros is a 50 y.o. female  Skin Lesion (Near Lt nostril )      History of Present Illness  Patient is a 50-year-old white female who comes in complaining of enlarging skin lesion on just below the left nostril patient is dates as changes size and shape over the last couple months like it removed  The following portions of the patient's history were reviewed and updated as appropriate: allergies, current medications, past social history and problem list    Review of Systems   Constitutional: Negative for fatigue and unexpected weight change.   Respiratory: Negative for cough, chest tightness and shortness of breath.    Cardiovascular: Negative for chest pain, palpitations and leg swelling.   Gastrointestinal: Negative for nausea.   Skin: Negative for color change and rash.        Abnormal skin lesion   Neurological: Negative for dizziness, syncope, weakness and headaches.       Objective     Vitals:    03/01/21 1451   BP: 126/84   Pulse: 78   Resp: 14   Temp: 97.1 °F (36.2 °C)   SpO2: 98%       Physical Exam  Vitals signs and nursing note reviewed.   Constitutional:       Appearance: She is well-developed.   HENT:      Head:     Neck:      Vascular: No JVD.   Cardiovascular:      Rate and Rhythm: Normal rate and regular rhythm.      Pulses: Normal pulses.      Heart sounds: Normal heart sounds. No murmur.   Pulmonary:      Effort: Pulmonary effort is normal. No respiratory distress.      Breath sounds: Normal breath sounds.   Abdominal:      General: Bowel sounds are normal.      Palpations: Abdomen is soft.      Tenderness: There is no abdominal tenderness.   Skin:     General: Skin is warm and dry.         Assessment/Plan     Diagnoses and all orders for this visit:    1. Skin lesion of face (Primary)    Other orders  -     cephalexin (Keflex) 500 MG capsule; Take 1 capsule by mouth 3 (Three) Times a Day.  Dispense: 21 capsule; Refill: 1    Lesion was removed under sterile technique without  difficulty patient tolerated procedure well, was warned about possible scarring reaction, infection etc. patient understands risk, signs waiver

## 2021-03-09 RX ORDER — FLUCONAZOLE 150 MG/1
150 TABLET ORAL ONCE
Qty: 1 TABLET | Refills: 2 | Status: SHIPPED | OUTPATIENT
Start: 2021-03-09 | End: 2021-06-10

## 2021-03-30 ENCOUNTER — OFFICE VISIT (OUTPATIENT)
Dept: FAMILY MEDICINE CLINIC | Facility: CLINIC | Age: 51
End: 2021-03-30

## 2021-03-30 VITALS
TEMPERATURE: 97.3 F | BODY MASS INDEX: 30.96 KG/M2 | DIASTOLIC BLOOD PRESSURE: 82 MMHG | WEIGHT: 164 LBS | HEIGHT: 61 IN | HEART RATE: 89 BPM | SYSTOLIC BLOOD PRESSURE: 148 MMHG | OXYGEN SATURATION: 99 %

## 2021-03-30 DIAGNOSIS — M54.40 CHRONIC MIDLINE LOW BACK PAIN WITH SCIATICA, SCIATICA LATERALITY UNSPECIFIED: ICD-10-CM

## 2021-03-30 DIAGNOSIS — M25.552 PAIN OF LEFT HIP JOINT: Primary | ICD-10-CM

## 2021-03-30 DIAGNOSIS — G89.29 CHRONIC MIDLINE LOW BACK PAIN WITH SCIATICA, SCIATICA LATERALITY UNSPECIFIED: ICD-10-CM

## 2021-03-30 DIAGNOSIS — J30.2 SEASONAL ALLERGIC RHINITIS, UNSPECIFIED TRIGGER: ICD-10-CM

## 2021-03-30 PROCEDURE — 99214 OFFICE O/P EST MOD 30 MIN: CPT | Performed by: PHYSICIAN ASSISTANT

## 2021-03-30 RX ORDER — LEVOCETIRIZINE DIHYDROCHLORIDE 5 MG/1
5 TABLET, FILM COATED ORAL EVERY EVENING
Qty: 30 TABLET | Refills: 5 | Status: SHIPPED | OUTPATIENT
Start: 2021-03-30 | End: 2021-10-14 | Stop reason: SDUPTHER

## 2021-03-30 NOTE — PROGRESS NOTES
Subjective   Trinidad Quinteros is a 50 y.o. female  Back Pain (Follow up on back pain, req refill on oxycodone) and Sinus Problem (runny nose, ear fullness/pain, possible sinus infection)      History of Present Illness  Patient is a pleasant 50-year-old white female who presents today for evaluation treatment of 2 separate medical conditions which is following up on chronic back and hip pain associated with degenerative joint disease degenerative disc disease currently managed with 1 Percocet nightly in combination with tizanidine and Relafen.  Patient is due for refill of Percocet.  Without this opioid medication she is kept up at night unable to sleep at night due to hip and leg pain.  Currently stable on medication regimen.  Patient denies any adverse effects from Percocet.  Second issue is new symptoms that have been ongoing for the past week with clear runny nose, ear fullness, nasal stuffiness, she is been using Flonase and Stahist without any resolution of symptoms.  No fever no shortness of breath no cough no chest pain no sore throat.  The following portions of the patient's history were reviewed and updated as appropriate: allergies, current medications, past social history and problem list    Review of Systems   Constitutional: Negative for activity change, chills, fatigue and fever.   HENT: Positive for congestion, postnasal drip, rhinorrhea and sneezing. Negative for ear pain, hearing loss, sinus pressure, sinus pain, sore throat and trouble swallowing.    Eyes: Negative for itching.   Respiratory: Negative for cough.    Cardiovascular: Negative for chest pain.   Gastrointestinal: Negative.    Musculoskeletal: Positive for arthralgias and myalgias. Negative for gait problem and joint swelling.   Skin: Negative.    Neurological: Negative.  Negative for weakness, numbness and headaches.   Psychiatric/Behavioral: Positive for sleep disturbance. Negative for confusion.       Objective     Vitals:     03/30/21 1029   BP: 148/82   Pulse: 89   Temp: 97.3 °F (36.3 °C)   SpO2: 99%       Physical Exam  Vitals and nursing note reviewed.   Constitutional:       General: She is not in acute distress.     Appearance: Normal appearance. She is well-developed. She is not ill-appearing, toxic-appearing or diaphoretic.   HENT:      Head: Normocephalic and atraumatic.      Right Ear: External ear normal.      Left Ear: External ear normal.      Nose: Nose normal.   Eyes:      Conjunctiva/sclera: Conjunctivae normal.   Cardiovascular:      Rate and Rhythm: Normal rate and regular rhythm.      Heart sounds: Normal heart sounds.   Pulmonary:      Effort: Pulmonary effort is normal.      Breath sounds: Normal breath sounds.   Musculoskeletal:         General: Tenderness ( Hips and low back) present. No deformity.   Skin:     General: Skin is warm and dry.      Coloration: Skin is not jaundiced or pale.      Findings: No bruising, erythema or rash.   Neurological:      Mental Status: She is alert and oriented to person, place, and time.      Motor: No abnormal muscle tone.      Coordination: Coordination normal.      Gait: Gait normal.   Psychiatric:         Mood and Affect: Mood normal.         Behavior: Behavior normal.         Thought Content: Thought content normal.         Judgment: Judgment normal.         Assessment/Plan     Diagnoses and all orders for this visit:    1. Pain of left hip joint (Primary)    2. Chronic midline low back pain with sciatica, sciatica laterality unspecified    3. Seasonal allergic rhinitis, unspecified trigger    Other orders  -     levocetirizine (Xyzal) 5 MG tablet; Take 1 tablet by mouth Every Evening. For allergies  Dispense: 30 tablet; Refill: 5    Hold states at this time replace with Xyzal, continue on Flonase, follow-up if sinus symptoms unresolved in 1 week.  Continue on current dosages of Relafen, Zanaflex and tramadol, no refills needed for these medications at this time, refill given  of Percocet 5/325 mg tablets 1 nightly for chronic hip and leg/low back pain #90 with 0 refills.  Discussed abuse potential controlled substance as this medication need follow-up for reevaluation appointment 3 months receive refills.

## 2021-04-04 DIAGNOSIS — M54.40 CHRONIC MIDLINE LOW BACK PAIN WITH SCIATICA, SCIATICA LATERALITY UNSPECIFIED: ICD-10-CM

## 2021-04-04 DIAGNOSIS — F41.9 ANXIETY: ICD-10-CM

## 2021-04-04 DIAGNOSIS — G89.29 CHRONIC MIDLINE LOW BACK PAIN WITH SCIATICA, SCIATICA LATERALITY UNSPECIFIED: ICD-10-CM

## 2021-04-04 RX ORDER — ALPRAZOLAM 0.5 MG/1
0.5 TABLET ORAL 3 TIMES DAILY
Qty: 90 TABLET | Refills: 5 | Status: CANCELLED | OUTPATIENT
Start: 2021-04-04

## 2021-04-04 RX ORDER — TRAMADOL HYDROCHLORIDE 50 MG/1
50 TABLET ORAL 3 TIMES DAILY
Qty: 90 TABLET | Refills: 0 | Status: CANCELLED | OUTPATIENT
Start: 2021-04-04

## 2021-04-05 DIAGNOSIS — M54.40 CHRONIC MIDLINE LOW BACK PAIN WITH SCIATICA, SCIATICA LATERALITY UNSPECIFIED: ICD-10-CM

## 2021-04-05 DIAGNOSIS — F41.9 ANXIETY: ICD-10-CM

## 2021-04-05 DIAGNOSIS — G89.29 CHRONIC MIDLINE LOW BACK PAIN WITH SCIATICA, SCIATICA LATERALITY UNSPECIFIED: ICD-10-CM

## 2021-04-05 RX ORDER — TRAMADOL HYDROCHLORIDE 50 MG/1
50 TABLET ORAL 3 TIMES DAILY PRN
Qty: 90 TABLET | Refills: 1 | Status: SHIPPED | OUTPATIENT
Start: 2021-04-05 | End: 2021-10-14 | Stop reason: SDUPTHER

## 2021-04-05 RX ORDER — ALPRAZOLAM 0.5 MG/1
0.5 TABLET ORAL 3 TIMES DAILY
Qty: 90 TABLET | Refills: 2 | Status: SHIPPED | OUTPATIENT
Start: 2021-04-05 | End: 2021-08-25 | Stop reason: SDUPTHER

## 2021-04-05 NOTE — TELEPHONE ENCOUNTER
I am still not sure how she is taking the tramadol because it looks like the last time she had it filled was July so check with patient and ask her how she has been taking this if she does take that occasionally  and have you been able to see the pharmacy if she has refills on Xanax?  It looks to me like she does have a couple refills already.

## 2021-04-05 NOTE — TELEPHONE ENCOUNTER
When I look at the med list it looks like she has refills of Xanax through May and I can't see what dosage she takes on tramadol can you check on that and get that information for me please

## 2021-04-05 NOTE — TELEPHONE ENCOUNTER
Patient is needing Alprazolam 0.5mg 1 po TID resent to [facility] due to insurance, she is only taking Tramadol 50mg 1 po as needed PRN, needs this sent to [facility] as well due to insurance

## 2021-04-22 ENCOUNTER — OFFICE VISIT (OUTPATIENT)
Dept: FAMILY MEDICINE CLINIC | Facility: CLINIC | Age: 51
End: 2021-04-22

## 2021-04-22 VITALS
HEART RATE: 64 BPM | TEMPERATURE: 97 F | WEIGHT: 160 LBS | BODY MASS INDEX: 30.21 KG/M2 | OXYGEN SATURATION: 99 % | HEIGHT: 61 IN | SYSTOLIC BLOOD PRESSURE: 146 MMHG | DIASTOLIC BLOOD PRESSURE: 88 MMHG

## 2021-04-22 DIAGNOSIS — R10.30 LOWER ABDOMINAL PAIN: ICD-10-CM

## 2021-04-22 DIAGNOSIS — R19.7 DIARRHEA, UNSPECIFIED TYPE: Primary | ICD-10-CM

## 2021-04-22 DIAGNOSIS — Z12.11 ENCOUNTER FOR SCREENING COLONOSCOPY: ICD-10-CM

## 2021-04-22 DIAGNOSIS — K21.9 GASTROESOPHAGEAL REFLUX DISEASE WITHOUT ESOPHAGITIS: ICD-10-CM

## 2021-04-22 PROCEDURE — 99213 OFFICE O/P EST LOW 20 MIN: CPT | Performed by: PHYSICIAN ASSISTANT

## 2021-04-22 RX ORDER — GLYCOPYRROLATE 2 MG/1
2 TABLET ORAL 2 TIMES DAILY
Qty: 60 TABLET | Refills: 2 | Status: SHIPPED | OUTPATIENT
Start: 2021-04-22 | End: 2023-02-16 | Stop reason: SDUPTHER

## 2021-04-22 RX ORDER — METRONIDAZOLE 500 MG/1
500 TABLET ORAL 2 TIMES DAILY
Qty: 10 TABLET | Refills: 0 | Status: SHIPPED | OUTPATIENT
Start: 2021-04-22 | End: 2021-05-17

## 2021-04-22 NOTE — PROGRESS NOTES
Subjective   Trinidad Quinteros is a 50 y.o. female  Abdominal Pain (abdominal pain and cramping, worse after eating ) and Diarrhea (intermittent diarrhea/nausea x3 months )      History of Present Illness  Patient is a pleasant 50-year-old white female who presents today for evaluation of persistent worsening abdominal pain and diarrhea.  She states is been experiencing symptoms of intermittent diarrhea and constipation over the course of the past 6 months but over the past month she is experiencing only diarrhea and finds it to be happening after each meal.  She states that she has urgent need to defecate with explosive diarrhea and abdominal cramping after eating anything that is more than a very small snack.  She states on Monday she start developing pain into her shoulder blades and had associated nausea and vomiting but has not vomited since.  No blood in stool no blood in emesis.  No fever.  She is able to drink liquids without pain or difficulty and without causing diarrhea.  Her last fluoroscopy was 2018 result showed poor prep and instructions were to repeat colonoscopy in 18 months which is overdue.  She has been taking Bentyl this week with minimal relief from abdominal pain and diarrhea.  No recent antibiotics.  Her acid reflux is stable on Protonix.  The following portions of the patient's history were reviewed and updated as appropriate: allergies, current medications, past social history and problem list    Review of Systems   Constitutional: Positive for appetite change. Negative for chills, diaphoresis, fatigue, fever and unexpected weight change.   Respiratory: Negative for cough, chest tightness and shortness of breath.    Cardiovascular: Negative for chest pain.   Gastrointestinal: Positive for abdominal distention, abdominal pain, diarrhea and vomiting. Negative for anal bleeding, blood in stool, constipation, nausea and rectal pain.   Genitourinary: Negative for difficulty urinating and dysuria.    Musculoskeletal: Negative for back pain.   Skin: Negative for color change and rash.   Allergic/Immunologic: Negative for food allergies.       Objective     Vitals:    04/22/21 1606   BP: 146/88   Pulse: 64   Temp: 97 °F (36.1 °C)   SpO2: 99%       Physical Exam  Vitals and nursing note reviewed.   Constitutional:       General: She is not in acute distress.     Appearance: Normal appearance. She is well-developed. She is not ill-appearing, toxic-appearing or diaphoretic.   HENT:      Head: Normocephalic and atraumatic.   Eyes:      General: No scleral icterus.     Conjunctiva/sclera: Conjunctivae normal.   Cardiovascular:      Rate and Rhythm: Normal rate and regular rhythm.   Pulmonary:      Effort: Pulmonary effort is normal.      Breath sounds: Normal breath sounds.   Abdominal:      General: Bowel sounds are normal. There is no distension.      Palpations: Abdomen is soft. There is no mass.      Tenderness: There is no abdominal tenderness. There is no guarding or rebound.      Hernia: No hernia is present.   Skin:     General: Skin is warm and dry.      Coloration: Skin is not jaundiced or pale.      Findings: No erythema or rash.   Neurological:      Mental Status: She is alert and oriented to person, place, and time.   Psychiatric:         Mood and Affect: Mood normal.         Behavior: Behavior normal.         Thought Content: Thought content normal.         Judgment: Judgment normal.         Assessment/Plan     Diagnoses and all orders for this visit:    1. Diarrhea, unspecified type (Primary)    2. Lower abdominal pain    3. Encounter for screening colonoscopy  -     Ambulatory Referral For Screening Colonoscopy    4. Gastroesophageal reflux disease without esophagitis    Other orders  -     glycopyrrolate (Robinul-Forte) 2 MG tablet; Take 1 tablet by mouth 2 (Two) Times a Day. For diarrhea  Dispense: 60 tablet; Refill: 2  -     metroNIDAZOLE (Flagyl) 500 MG tablet; Take 1 tablet by mouth 2 (Two) Times  a Day.  Dispense: 10 tablet; Refill: 0

## 2021-04-25 DIAGNOSIS — M50.30 DEGENERATIVE DISC DISEASE, CERVICAL: ICD-10-CM

## 2021-04-28 DIAGNOSIS — Z51.81 MEDICATION MONITORING ENCOUNTER: Primary | ICD-10-CM

## 2021-04-28 RX ORDER — NABUMETONE 750 MG/1
750 TABLET, FILM COATED ORAL 2 TIMES DAILY
Qty: 60 TABLET | Refills: 0 | Status: SHIPPED | OUTPATIENT
Start: 2021-04-28 | End: 2021-08-17

## 2021-04-29 NOTE — TELEPHONE ENCOUNTER
oh, thanks. Will she order labs for me from my visit last week or do i need to schedule another one. I am scheduled for the colonoscopy on may 28th.

## 2021-04-29 NOTE — TELEPHONE ENCOUNTER
Patient overdue for labs monitoring kidney function on this medication I refilled 1 months worth and ordered a BMP she needs to have that done and let me review it before further refills can be given

## 2021-05-04 RX ORDER — CEFDINIR 300 MG/1
300 CAPSULE ORAL 2 TIMES DAILY
Qty: 20 CAPSULE | Refills: 0 | Status: SHIPPED | OUTPATIENT
Start: 2021-05-04 | End: 2021-06-18

## 2021-05-04 RX ORDER — CEFDINIR 300 MG/1
300 CAPSULE ORAL 2 TIMES DAILY
Qty: 20 CAPSULE | Refills: 0 | Status: SHIPPED | OUTPATIENT
Start: 2021-05-04 | End: 2021-05-04 | Stop reason: SDUPTHER

## 2021-05-04 RX ORDER — PREDNISONE 20 MG/1
20 TABLET ORAL 2 TIMES DAILY
Qty: 14 TABLET | Refills: 0 | Status: SHIPPED | OUTPATIENT
Start: 2021-05-04 | End: 2021-06-18

## 2021-05-04 RX ORDER — PREDNISONE 20 MG/1
20 TABLET ORAL 2 TIMES DAILY
Qty: 14 TABLET | Refills: 0 | Status: SHIPPED | OUTPATIENT
Start: 2021-05-04 | End: 2021-05-04 | Stop reason: SDUPTHER

## 2021-05-05 RX ORDER — FLUTICASONE PROPIONATE 50 MCG
2 SPRAY, SUSPENSION (ML) NASAL DAILY
Qty: 16 G | Refills: 11 | Status: SHIPPED | OUTPATIENT
Start: 2021-05-05 | End: 2022-09-08 | Stop reason: SDUPTHER

## 2021-05-17 ENCOUNTER — OFFICE VISIT (OUTPATIENT)
Dept: FAMILY MEDICINE CLINIC | Facility: CLINIC | Age: 51
End: 2021-05-17

## 2021-05-17 VITALS
WEIGHT: 166 LBS | HEART RATE: 66 BPM | DIASTOLIC BLOOD PRESSURE: 60 MMHG | TEMPERATURE: 97.2 F | BODY MASS INDEX: 31.34 KG/M2 | OXYGEN SATURATION: 99 % | HEIGHT: 61 IN | SYSTOLIC BLOOD PRESSURE: 108 MMHG

## 2021-05-17 DIAGNOSIS — L23.7 ALLERGIC CONTACT DERMATITIS DUE TO PLANTS, EXCEPT FOOD: Primary | ICD-10-CM

## 2021-05-17 DIAGNOSIS — Z12.11 SCREENING FOR COLON CANCER: Primary | ICD-10-CM

## 2021-05-17 DIAGNOSIS — H92.01 RIGHT EAR PAIN: ICD-10-CM

## 2021-05-17 PROCEDURE — 96372 THER/PROPH/DIAG INJ SC/IM: CPT | Performed by: PHYSICIAN ASSISTANT

## 2021-05-17 PROCEDURE — 99213 OFFICE O/P EST LOW 20 MIN: CPT | Performed by: PHYSICIAN ASSISTANT

## 2021-05-17 RX ORDER — TRIAMCINOLONE ACETONIDE 40 MG/ML
40 INJECTION, SUSPENSION INTRA-ARTICULAR; INTRAMUSCULAR ONCE
Status: COMPLETED | OUTPATIENT
Start: 2021-05-17 | End: 2021-05-17

## 2021-05-17 RX ADMIN — TRIAMCINOLONE ACETONIDE 40 MG: 40 INJECTION, SUSPENSION INTRA-ARTICULAR; INTRAMUSCULAR at 16:33

## 2021-05-17 NOTE — PROGRESS NOTES
Subjective   Trinidad Quinteros is a 50 y.o. female  Rash (rash on arms and legs x2 days ) and Ear Fullness (right ear fullness with some mild pain)      History of Present Illness  Patient is a very pleasant 50-year-old white female who comes in today for evaluation treatment of a rash that started on her bilateral forearms and bilateral thighs on Friday and has been itching and spreading little bit.  She is tried some over-the-counter topical ointments without any resolution.  She was on prednisone about a week ago for some fullness/fluid in her right ear like have her ear checked today.  She states it still hurts occasionally but is feeling better.  She was originally having ringing in her ear and she no longer experiences that.  She takes an allergy pill daily, Xyzal.  Has been doing some yard work but nothing different than she typically does.  The following portions of the patient's history were reviewed and updated as appropriate: allergies, current medications, past social history and problem list    Review of Systems   Constitutional: Negative for fever.   HENT: Positive for ear pain. Negative for congestion, dental problem, ear discharge, facial swelling, sinus pressure, sinus pain and tinnitus.    Musculoskeletal: Negative for arthralgias.   Skin: Positive for color change and rash. Negative for pallor and wound.   Neurological: Negative.        Objective     Vitals:    05/17/21 1553   BP: 108/60   Pulse: 66   Temp: 97.2 °F (36.2 °C)   SpO2: 99%       Physical Exam  Vitals and nursing note reviewed.   Constitutional:       General: She is not in acute distress.     Appearance: Normal appearance. She is well-developed. She is not ill-appearing, toxic-appearing or diaphoretic.   HENT:      Head: Normocephalic and atraumatic.      Right Ear: Tympanic membrane, ear canal and external ear normal. There is no impacted cerumen.   Skin:     General: Skin is warm and dry.      Coloration: Skin is not pale.       Findings: Erythema and rash present.      Comments: Blanching mildly erythematous slightly linear subdermal macular rash bilateral forearms and minimally on thighs.  No vesicles no crusts, no papules.   Neurological:      Mental Status: She is alert.         Assessment/Plan     Diagnoses and all orders for this visit:    1. Allergic contact dermatitis due to plants, except food (Primary)  -     triamcinolone acetonide (KENALOG-40) injection 40 mg    2. Right ear pain    Kenalog 40 mg IM, continue on Xyzal daily, use over-the-counter topical Benadryl gel to rash as needed, reassurance given regarding ear, most likely due to eustachian tube dysfunction.  If ear pain persist would recommend evaluation with dentist.

## 2021-05-25 ENCOUNTER — APPOINTMENT (OUTPATIENT)
Dept: PREADMISSION TESTING | Facility: HOSPITAL | Age: 51
End: 2021-05-25

## 2021-05-28 ENCOUNTER — OUTSIDE FACILITY SERVICE (OUTPATIENT)
Dept: GASTROENTEROLOGY | Facility: CLINIC | Age: 51
End: 2021-05-28

## 2021-05-28 PROCEDURE — 45380 COLONOSCOPY AND BIOPSY: CPT | Performed by: INTERNAL MEDICINE

## 2021-05-28 PROCEDURE — 88305 TISSUE EXAM BY PATHOLOGIST: CPT | Performed by: INTERNAL MEDICINE

## 2021-05-28 PROCEDURE — 45385 COLONOSCOPY W/LESION REMOVAL: CPT | Performed by: INTERNAL MEDICINE

## 2021-06-01 ENCOUNTER — LAB REQUISITION (OUTPATIENT)
Dept: LAB | Facility: HOSPITAL | Age: 51
End: 2021-06-01

## 2021-06-01 DIAGNOSIS — Z12.11 ENCOUNTER FOR SCREENING FOR MALIGNANT NEOPLASM OF COLON: ICD-10-CM

## 2021-06-10 ENCOUNTER — TELEPHONE (OUTPATIENT)
Dept: FAMILY MEDICINE CLINIC | Facility: CLINIC | Age: 51
End: 2021-06-10

## 2021-06-10 DIAGNOSIS — B37.9 CANDIDIASIS: Primary | ICD-10-CM

## 2021-06-10 RX ORDER — FLUCONAZOLE 150 MG/1
TABLET ORAL
Qty: 1 TABLET | Refills: 0 | Status: SHIPPED | OUTPATIENT
Start: 2021-06-10 | End: 2021-08-25

## 2021-06-18 ENCOUNTER — OFFICE VISIT (OUTPATIENT)
Dept: FAMILY MEDICINE CLINIC | Facility: CLINIC | Age: 51
End: 2021-06-18

## 2021-06-18 VITALS
TEMPERATURE: 97 F | HEIGHT: 61 IN | DIASTOLIC BLOOD PRESSURE: 76 MMHG | WEIGHT: 162 LBS | OXYGEN SATURATION: 99 % | BODY MASS INDEX: 30.58 KG/M2 | HEART RATE: 66 BPM | SYSTOLIC BLOOD PRESSURE: 122 MMHG

## 2021-06-18 DIAGNOSIS — M54.40 CHRONIC MIDLINE LOW BACK PAIN WITH SCIATICA, SCIATICA LATERALITY UNSPECIFIED: ICD-10-CM

## 2021-06-18 DIAGNOSIS — M54.12 CERVICAL RADICULOPATHY: ICD-10-CM

## 2021-06-18 DIAGNOSIS — M50.30 DEGENERATIVE DISC DISEASE, CERVICAL: Primary | ICD-10-CM

## 2021-06-18 DIAGNOSIS — G89.29 CHRONIC MIDLINE LOW BACK PAIN WITH SCIATICA, SCIATICA LATERALITY UNSPECIFIED: ICD-10-CM

## 2021-06-18 PROCEDURE — 99214 OFFICE O/P EST MOD 30 MIN: CPT | Performed by: PHYSICIAN ASSISTANT

## 2021-06-18 RX ORDER — METHYLPREDNISOLONE 4 MG/1
TABLET ORAL
Qty: 1 EACH | Refills: 0 | Status: SHIPPED | OUTPATIENT
Start: 2021-06-18 | End: 2021-07-13

## 2021-06-18 NOTE — PROGRESS NOTES
Subjective   Trinidad Quinteros is a 50 y.o. female  Back Pain (follow up on back pain and DDD, req refills on Oxycodone ) and Tingling (increased tingling sensation from hands up to shoulder x1 week )      History of Present Illness  Patient is a pleasant 50-year-old white female comes in today for follow-up on chronic back pain in association degenerative disc disease for refills of opioid therapy.  She is currently managed with combination of a daily NSAID, Relafen, muscle relaxant, tizanidine, tramadol throughout the day and a dose of Percocet at night.  She states her chronic back pain is stable but she has been having more problems over the last 7 to 10 days with a new symptom of continuous tingling in her entire right arm starting the back of her right shoulder, right lateral neck and going down her entire right arm to her hand she tingles in all digits of her right hand except for her fifth digit.  She states it is continuous.  Does not seem to improve after dosing her gabapentin in the morning or evening or have any improvement after taking her anti-inflammatory or opioid pain medication.  She denies any weakness or numbness in the hand states that she just tingling continuously.  She had a similar symptom which is associated with more pain 2 years ago in her left arm, MRI of C-spine was done at that time showing significant osteophytes and degenerative changes.  She denies any symptoms of tingling in her left arm currently.  The following portions of the patient's history were reviewed and updated as appropriate: allergies, current medications, past social history and problem list    Review of Systems   Constitutional: Negative.  Negative for activity change.   Respiratory: Negative.    Cardiovascular: Negative.    Musculoskeletal: Positive for back pain ( Chronically stable on medication) and neck pain. Negative for neck stiffness.   Skin: Negative.    Neurological: Positive for numbness ( tingling entire  right arm and hand). Negative for weakness.   Psychiatric/Behavioral: Negative.        Objective     Vitals:    06/18/21 1559   BP: 122/76   Pulse: 66   Temp: 97 °F (36.1 °C)   SpO2: 99%       Physical Exam  Vitals and nursing note reviewed.   Constitutional:       General: She is not in acute distress.     Appearance: Normal appearance. She is well-developed. She is not ill-appearing, toxic-appearing or diaphoretic.   HENT:      Head: Normocephalic and atraumatic.   Neck:      Thyroid: No thyromegaly.      Vascular: No JVD.      Trachea: No tracheal deviation.   Cardiovascular:      Rate and Rhythm: Normal rate and regular rhythm.   Pulmonary:      Effort: Pulmonary effort is normal.      Breath sounds: Normal breath sounds. No stridor.   Abdominal:      General: Bowel sounds are normal.      Palpations: Abdomen is soft.   Musculoskeletal:         General: No tenderness.      Cervical back: Muscular tenderness ( Minimal right lateral) present. No spinous process tenderness. Normal range of motion.      Lumbar back: Bony tenderness present. No swelling, deformity or spasms. Decreased range of motion.   Lymphadenopathy:      Cervical: No cervical adenopathy.   Skin:     General: Skin is warm and dry.      Coloration: Skin is not pale.      Findings: No erythema or rash.   Neurological:      General: No focal deficit present.      Mental Status: She is alert and oriented to person, place, and time.      Sensory: No sensory deficit.      Motor: No weakness.      Deep Tendon Reflexes: Reflexes are normal and symmetric.   Psychiatric:         Mood and Affect: Mood normal.         Behavior: Behavior normal.         Thought Content: Thought content normal.         Judgment: Judgment normal.         Assessment/Plan     Diagnoses and all orders for this visit:    1. Degenerative disc disease, cervical (Primary)    2. Cervical radiculopathy    3. Chronic midline low back pain with sciatica, sciatica laterality  unspecified    Other orders  -     methylPREDNISolone (MEDROL) 4 MG dose pack; Take as directed on package instructions.  Dispense: 1 each; Refill: 0    Refill given for 3-month supply of Percocet 5/325 mg tablets 1 nightly for chronic back pain #90 with 0 refills.  Discussed abuse potential and controlled substance as of this medication.  Discussed recommendation continue gabapentin other medications as currently prescribed and if sensation of paresthesias/radiculopathy of right upper extremity unimproved after Medrol Dosepak would recommend repeating MRI of C-spine, reviewed findings from 2019 MRI of C-spine with patient today.

## 2021-07-01 ENCOUNTER — TRANSCRIBE ORDERS (OUTPATIENT)
Dept: FAMILY MEDICINE CLINIC | Facility: CLINIC | Age: 51
End: 2021-07-01

## 2021-07-01 DIAGNOSIS — M25.511 ACUTE PAIN OF RIGHT SHOULDER: Primary | ICD-10-CM

## 2021-07-02 ENCOUNTER — HOSPITAL ENCOUNTER (OUTPATIENT)
Dept: MRI IMAGING | Facility: HOSPITAL | Age: 51
Discharge: HOME | End: 2021-07-02
Admitting: PHYSICIAN ASSISTANT

## 2021-07-02 PROCEDURE — 73221 MRI JOINT UPR EXTREM W/O DYE: CPT

## 2021-07-06 ENCOUNTER — TELEPHONE (OUTPATIENT)
Dept: FAMILY MEDICINE CLINIC | Facility: CLINIC | Age: 51
End: 2021-07-06

## 2021-07-06 ENCOUNTER — TRANSCRIBE ORDERS (OUTPATIENT)
Dept: FAMILY MEDICINE CLINIC | Facility: CLINIC | Age: 51
End: 2021-07-06

## 2021-07-06 DIAGNOSIS — M25.511 CHRONIC RIGHT SHOULDER PAIN: Primary | ICD-10-CM

## 2021-07-06 DIAGNOSIS — G89.29 CHRONIC RIGHT SHOULDER PAIN: Primary | ICD-10-CM

## 2021-07-06 NOTE — TELEPHONE ENCOUNTER
Patient wants her MRI results of the right shoulder she had done last week.   Thanks,   Angela.....

## 2021-07-08 ENCOUNTER — TELEPHONE (OUTPATIENT)
Dept: ORTHOPEDIC SURGERY | Facility: CLINIC | Age: 51
End: 2021-07-08

## 2021-07-08 NOTE — TELEPHONE ENCOUNTER
Caller: MRS RIVERA     Relationship to patient: PATIENT      Best call back number: [phone]    Chief complaint: RIGHT SHOULDER PAIN URGENT REFERRAL     Type of visit: NEW PATIENT     Requested date: ASAP URGENT     If rescheduling, when is the original appointment: N/A     Additional notes:URGENT REFERRAL FOR NEW PATIENT- INTERNAL REFERRAL - DX R SHOULDER PAIN - NOTE 6.18.21 - MRI 7.2.21 - UNKNOWN SX FOR DR NORRIS FIRST AVAILABLE IS 7/13/21 PLEASE CALL PATIENT AND ADVISE WHERE SHE CAN BE WORKED IN AT.

## 2021-07-13 ENCOUNTER — OFFICE VISIT (OUTPATIENT)
Dept: ORTHOPEDIC SURGERY | Facility: CLINIC | Age: 51
End: 2021-07-13

## 2021-07-13 VITALS
HEART RATE: 68 BPM | DIASTOLIC BLOOD PRESSURE: 73 MMHG | WEIGHT: 162.04 LBS | SYSTOLIC BLOOD PRESSURE: 157 MMHG | BODY MASS INDEX: 30.59 KG/M2 | HEIGHT: 61 IN

## 2021-07-13 DIAGNOSIS — M75.21 BICEPS TENDINITIS OF RIGHT UPPER EXTREMITY: ICD-10-CM

## 2021-07-13 DIAGNOSIS — M75.51 BURSITIS OF RIGHT SHOULDER: ICD-10-CM

## 2021-07-13 DIAGNOSIS — R20.2 NUMBNESS AND TINGLING OF RIGHT HAND: ICD-10-CM

## 2021-07-13 DIAGNOSIS — M75.41 IMPINGEMENT SYNDROME OF RIGHT SHOULDER: Primary | ICD-10-CM

## 2021-07-13 DIAGNOSIS — R20.0 NUMBNESS AND TINGLING OF RIGHT HAND: ICD-10-CM

## 2021-07-13 PROCEDURE — 99204 OFFICE O/P NEW MOD 45 MIN: CPT | Performed by: ORTHOPAEDIC SURGERY

## 2021-07-13 RX ORDER — METHYLPREDNISOLONE 4 MG/1
TABLET ORAL
Qty: 21 EACH | Refills: 0 | Status: SHIPPED | OUTPATIENT
Start: 2021-07-13 | End: 2021-08-17

## 2021-07-13 NOTE — PROGRESS NOTES
[facility] [place] Orthopaedic Surgery Office Visit - Fidel oGodman MD    Office Visit       Patient Name: Trinidad Quinteros    Chief Complaint:   Chief Complaint   Patient presents with   • Right Shoulder - Pain       Referring Physician: Josephine Delacruz PA-C    History of Present Illness:   Trinidad Quinteros is a 50 y.o. female who presents with right body part: shoulder Reason: pain.  Onset:Onset: atraumatic and gradual in nature. The issue has been ongoing for 6 week(s). Pain is a 9/10 on the pain scale. Pain is described as Pain Characterization: shooting. Associated symptoms include Symptoms: pain. The pain is worse with working and leisure; pain medication and/or NSAID improve the pain. Previous treatments have included: NSAIDS and oral steroids. I have reviewed the patient's history of present illness as noted/entered above.    I have reviewed the patient's past medical history, surgical history, social history, family history, medications, and allergies as noted in the electronic medical record and as noted/entered.  I have reviewed the patient's review of systems as noted/enter and updated as noted in the patient's HPI.      RIGHT SHOULDER  And right hand numbness and tingling -- whole hand, more on radial side  Treated: advil, gabapention  Patient  with Josephine Delgado PA    Enjoys: watching TV, classics      Subjective   Subjective      Review of Systems   Constitutional: Negative.  Negative for chills, fatigue and fever.   HENT: Negative.  Negative for congestion and dental problem.    Eyes: Negative.  Negative for blurred vision.   Respiratory: Negative.  Negative for shortness of breath.    Cardiovascular: Negative.  Negative for leg swelling.   Gastrointestinal: Negative.  Negative for abdominal pain.   Endocrine: Negative.  Negative for polyuria.   Genitourinary: Negative.  Negative for difficulty urinating.    Musculoskeletal: Positive for arthralgias, back pain, neck pain and neck stiffness.   Skin: Negative.    Allergic/Immunologic: Negative.    Neurological: Negative.    Hematological: Negative.  Negative for adenopathy.   Psychiatric/Behavioral: Negative.  Negative for behavioral problems.        Past Medical History:   Past Medical History:   Diagnosis Date   • Depression    • Hyperlipidemia    • Hypertension    • Low back pain        Past Surgical History:   Past Surgical History:   Procedure Laterality Date   • APPENDECTOMY     • CARDIAC CATHETERIZATION     • CARDIAC CATHETERIZATION     •  SECTION     • HAND TENDON SURGERY Right    • SUBTOTAL HYSTERECTOMY     • TUBAL ABDOMINAL LIGATION         Family History:   Family History   Problem Relation Age of Onset   • COPD Mother    • COPD Father    • Cancer Other    • Arthritis Other    • Diabetes Other    • Asthma Other    • Heart disease Other         heart trouble   • Hypertension Other         high blood pressure   • Alcohol abuse Other    • Breast cancer Maternal Aunt 50   • Ovarian cancer Neg Hx        Social History:   Social History     Socioeconomic History   • Marital status:      Spouse name: Not on file   • Number of children: Not on file   • Years of education: Not on file   • Highest education level: Not on file   Tobacco Use   • Smoking status: Current Every Day Smoker     Packs/day: 0.25     Years: 20.00     Pack years: 5.00     Types: Cigarettes   • Smokeless tobacco: Never Used   Substance and Sexual Activity   • Alcohol use: No   • Drug use: No   • Sexual activity: Yes     Partners: Male       Medications:   Current Outpatient Medications:   •  acyclovir (Zovirax) 400 MG tablet, Take 1 tablet by mouth 5 (Five) Times a Day. For fever blister, Disp: 20 tablet, Rfl: 1  •  ALPRAZolam (XANAX) 0.5 MG tablet, Take 1 tablet by mouth 3 (Three) Times a Day., Disp: 90 tablet, Rfl: 2  •  ALPRAZolam (XANAX) 0.5 MG  tablet, Take 1 tablet by mouth 3 (Three) Times a Day As Needed., Disp: 90 tablet, Rfl: 3  •  atorvastatin (LIPITOR) 40 MG tablet, Take 1 tablet by mouth Daily., Disp: 90 tablet, Rfl: 2  •  benzonatate (TESSALON) 200 MG capsule, TAKE ONE CAPSULE BY MOUTH THREE TIMES DAILY AS NEEDED FOR COUGH, Disp: 30 capsule, Rfl: 1  •  cyanocobalamin 1000 MCG/ML injection, Inject 1 mL into the appropriate muscle as directed by prescriber Every 28 (Twenty-Eight) Days., Disp: 3 mL, Rfl: 3  •  diclofenac (VOLTAREN) 1 % gel gel, Apply 2g topically to the appropriate area as directed 2 (Two) Times a Day., Disp: 100 g, Rfl: 5  •  dicyclomine (BENTYL) 20 MG tablet, Take 1 tablet by mouth Every 6 (Six) Hours As Needed for diarrhea and stomach cramps., Disp: 60 tablet, Rfl: 4  •  fluconazole (DIFLUCAN) 150 MG tablet, TAKE 1 TABLET BY MOUTH 1 TIME FOR 1 DOSE, Disp: 1 tablet, Rfl: 0  •  fluticasone (Flonase) 50 MCG/ACT nasal spray, Use 2 sprays into each nostril as directed by provider Daily., Disp: 16 g, Rfl: 11  •  gabapentin (NEURONTIN) 300 MG capsule, Take 1 capsule by mouth 2 (Two) Times a Day for neroupathy, Disp: 60 capsule, Rfl: 5  •  gabapentin (NEURONTIN) 300 MG capsule, Take 1 capsule by mouth 3 (Three) Times a Day., Disp: 90 capsule, Rfl: 3  •  glycopyrrolate (Robinul-Forte) 2 MG tablet, Take 1 tablet by mouth 2 (Two) Times a Day for diarrhea, Disp: 60 tablet, Rfl: 2  •  hydroCHLOROthiazide (HYDRODIURIL) 25 MG tablet, Take 1 tablet by mouth Daily., Disp: 90 tablet, Rfl: 2  •  ketoconazole (NIZORAL) 2 % cream, Apply to feet twice daily., Disp: 60 g, Rfl: 3  •  levocetirizine (Xyzal) 5 MG tablet, Take 1 tablet by mouth Every Evening for allergies, Disp: 30 tablet, Rfl: 5  •  losartan (COZAAR) 100 MG tablet, Take 1 tablet by mouth Daily., Disp: 90 tablet, Rfl: 2  •  nabumetone (RELAFEN) 750 MG tablet, Take 1 tablet by mouth 2 (Two) Times a Day. Must have labs before further refills, lab order placed, Disp: 60 tablet, Rfl: 0  •   "ondansetron (Zofran) 4 MG tablet, Take 1 tablet by mouth Every 8 (Eight) Hours As Needed for Nausea or Vomiting., Disp: 20 tablet, Rfl: 8  •  oxyCODONE-acetaminophen (PERCOCET) 5-325 MG per tablet, Take 1 tablet by mouth Every Night. For chronic joint pain, Disp: 30 tablet, Rfl: 0  •  pantoprazole (PROTONIX) 40 MG EC tablet, Take 1 tablet by mouth Daily., Disp: 30 tablet, Rfl: 9  •  promethazine (PHENERGAN) 25 MG tablet, Take 1 tablet by mouth Every 6 (Six) Hours if needed for nausea and vomiting, Disp: 12 tablet, Rfl: 11  •  sertraline (ZOLOFT) 100 MG tablet, Take 2 tablets by mouth Daily. New dose., Disp: 60 tablet, Rfl: 9  •  Syringe/Needle, Disp, (Luer Lock Safety Syringes) 25G X 1\" 3 ML misc, Use with B-12 injections IN THE HIP, DELTOID OR THIGH ONCE MONTHLY, Disp: 4 each, Rfl: 4  •  tiZANidine (ZANAFLEX) 4 MG tablet, Take 0.5-1 tablet by mouth 2  Times a Day As Needed for back pain, Disp: 60 tablet, Rfl: 9  •  traMADol (ULTRAM) 50 MG tablet, Take 1 tablet by mouth 3 (Three) Times a Day As Needed for Moderate Pain ., Disp: 90 tablet, Rfl: 1  •  methylPREDNISolone (MEDROL) 4 MG dose pack, Use as directed by package instructions, Disp: 1 each, Rfl: 0    Allergies:   Allergies   Allergen Reactions   • Bactrim [Sulfamethoxazole-Trimethoprim]        The following portions of the patient's history were reviewed and updated as appropriate: allergies, current medications, past family history, past medical history, past social history, past surgical history and problem list.        Objective    Objective      Vital Signs:   Vitals:    07/13/21 1325   BP: 157/73   Pulse: 68   Weight: 73.5 kg (162 lb 0.6 oz)   Height: 154.9 cm (60.98\")       Ortho Exam:  RIGHT shoulder +impingement testing  RC intact  Full AROM/PROM right shoulder, positive impingement testing with Neer and Robles excellent rotator cuff strength anterior biceps pain  No neck pain  +carpal tunnel testing right    Results Review:   Imaging Results (Last 24 " Hours)     ** No results found for the last 24 hours. **        MRI Shoulder Right Without Contrast    Result Date: 7/6/2021  Mild to moderate degenerative changes of the AC joint greatest superiorly where there is adjacent soft tissue thickening of inflammation and edema along with degenerative changes of the greater tuberosity humerus however no intrinsic findings or rotator cuff signal findings/tearing evident.  No gross labral tearing however if there is further concern, arthrogram evaluation may be considered.  DICTATED:   07/02/2021 EDITED/ls :   07/02/2021    This report was finalized on 7/6/2021 4:49 PM by Dr. Surendra Ontiveros.        I personally reviewed the right shoulder MRI as noted above personal interpretation AC joint arthritis but asymptomatic on clinical exam.  Rotator cuff is intact perhaps small partial tearing supraspinatus and bursitis noted.    Procedures           Assessment / Plan      Assessment/Plan:   Problem List Items Addressed This Visit        Musculoskeletal and Injuries    Impingement syndrome of right shoulder - Primary    Bursitis of right shoulder    Biceps tendinitis of right upper extremity       Symptoms and Signs    Numbness and tingling of right hand    Relevant Medications    methylPREDNISolone (MEDROL) 4 MG dose pack    Other Relevant Orders    Ambulatory Referral to Physical Therapy Ortho, Evaluate and treat    EMG & Nerve Conduction Test          Right shoulder I think will respond well to conservative course, Medrol Dosepak for the hand numbness tingling and bursitis.  Physical therapy will help with the shoulder and we will consider a steroid injection at her next visit.  I would like to see her back after EMG/NCV.  Appears that her right hand numbness and tingling is most bothersome to her and this could be consistent with carpal tunnel syndrome.    Follow Up: AFTER EMG/NCV right upper extremity        Fidel Goodman MD, FAAOS  Orthopedic Surgeon  Fellowship Trained  Shoulder and Elbow Surgeon  [facility]  Orthopedics and Sports Medicine  [facility], [address]  [place] [address]    07/13/21  14:04 EDT    Please note that portions of this note may have been completed with a voice recognition program. Efforts were made to edit the dictations, but occasionally words are mistranscribed.

## 2021-07-19 ENCOUNTER — HOSPITAL ENCOUNTER (OUTPATIENT)
Dept: PHYSICAL THERAPY | Facility: HOSPITAL | Age: 51
Setting detail: THERAPIES SERIES
Discharge: HOME | End: 2021-07-19

## 2021-07-19 DIAGNOSIS — R20.2 NUMBNESS AND TINGLING OF RIGHT HAND: ICD-10-CM

## 2021-07-19 DIAGNOSIS — R20.0 NUMBNESS AND TINGLING OF RIGHT HAND: ICD-10-CM

## 2021-07-19 PROCEDURE — 97161 PT EVAL LOW COMPLEX 20 MIN: CPT

## 2021-07-20 NOTE — THERAPY EVALUATION
Outpatient Physical Therapy Ortho Initial Evaluation  [facility]     Patient Name: Trinidad Quinteros  : 1970  MRN: [id number]  Today's Date: 2021      Visit Date: 2021    Patient Active Problem List   Diagnosis   • GERD (gastroesophageal reflux disease)   • Depression   • Hyperlipidemia   • Hypertension   • ADRIAN (obstructive sleep apnea)   • Snoring   • Low back pain   • Pain of left hip joint   • Anxiety   • OAB (overactive bladder)   • Onychomycosis   • Degenerative disc disease, cervical   • Impingement syndrome of right shoulder   • Bursitis of right shoulder   • Biceps tendinitis of right upper extremity   • Numbness and tingling of right hand        Past Medical History:   Diagnosis Date   • Depression    • Hyperlipidemia    • Hypertension    • Low back pain         Past Surgical History:   Procedure Laterality Date   • APPENDECTOMY     • CARDIAC CATHETERIZATION     • CARDIAC CATHETERIZATION     •  SECTION     • HAND TENDON SURGERY Right    • SUBTOTAL HYSTERECTOMY     • TUBAL ABDOMINAL LIGATION         Visit Dx:     ICD-10-CM ICD-9-CM   1. Numbness and tingling of right hand  R20.0 782.0    R20.2        Patient History     Row Name 21 1600             History    Chief Complaint  Pain;Tinglings  -MM (r) WL (t) MM (c)      Type of Pain  Shoulder pain  -MM (r) WL (t) MM (c)      Date Current Problem(s) Began  21  -MM (r) WL (t) MM (c)      Brief Description of Current Complaint  Client reports that she began having pain in her shoulder in May, but is unsure of the cause. She reports that she woke up one morning and her right shoulder was hurting. Client reports that she began to experience numbness and tingling shortly after it began hurting, but it is intermittent into her right hand.  -MM      Patient/Caregiver Goals Comment  to stop hurting  -MM (r) WL (t) MM (c)      Hand Dominance  right-handed  -MM (r) WL (t) MM (c)      What clinical  tests have you had for this problem?  MRI  -MM (r) WL (t) MM (c)      Results of Clinical Tests  some signs of arthritis in AC joint, RC looked okay. history of cervical MRI 2019 w prominent disc osteophyte complex C5/C6 and C6/7.  -MM      History of Previous Related Injuries  no  -MM (r) WL (t) MM (c)         Pain     Pain Location  Shoulder  -MM (r) WL (t) MM (c)      Pain at Present  5  -MM (r) WL (t) MM (c)      Pain at Best  4  -MM (r) WL (t) MM (c)      Pain at Worst  10  -MM (r) WL (t) MM (c)      Pain Frequency  Intermittent  -MM (r) WL (t) MM (c)      Pain Description  Sore  -MM (r) WL (t) MM (c)      What Performance Factors Make the Current Problem(s) WORSE?  increased use  -MM (r) WL (t) MM (c)      What Performance Factors Make the Current Problem(s) BETTER?  braces, heat, ice  -MM (r) WL (t) MM (c)      Pain Comments  numbness/tingling is constant, but pain is intermittent  -MM (r) WL (t) MM (c)      Is your sleep disturbed?  No  -MM (r) WL (t) MM (c)      Difficulties at work?  no  -MM (r) WL (t) MM (c)      Difficulties with ADL's?  deep cleaning  -MM (r) WL (t) MM (c)      Difficulties with recreational activities?  gardening  -MM (r) WL (t) MM (c)         Fall Risk Assessment    Any falls in the past year:  No  -MM         Daily Activities    Primary Language  English  -MM (r) WL (t) MM (c)      Barriers to learning  None  -MM (r) WL (t) MM (c)      Pt Participated in POC and Goals  Yes  -MM (r) WL (t) MM (c)        User Key  (r) = Recorded By, (t) = Taken By, (c) = Cosigned By    Initials Name Provider Type    MM Dm Steele, PT Physical Therapist    Rogerio Rodriguez, PT Student PT Student          PT Ortho     Row Name 07/19/21 1600       Precautions and Contraindications    Precautions/Limitations  no known precautions/limitations  -MM (r) WL (t) MM (c)       Subjective Pain    Able to rate subjective pain?  yes  -MM (r) WL (t) MM (c)    Pre-Treatment Pain Level  5  -MM (r) WL (t)  MM (c)    Post-Treatment Pain Level  5  -MM (r) WL (t) MM (c)       Posture/Observations    Posture/Observations Comments  Posture: slumped sitting posture, forward head, protracted shoulder, increased thoracic kyphosis. Palpation: no TTP in lateral or posterior shoulder, upper trap, cervical spine or paraspinals, mild TTP to biceps tendon at long head insertion  -MM       Cervical/Thoracic Special Tests    Spurlings (Foraminal Compression)  Right:;Negative  -MM (r) WL (t) MM (c)    Cervical Compression (Forarminal Compression vs. Facet Pain)  Negative  -MM (r) WL (t) MM (c)    Tinel's Sign (TOS)  Negative;Positive negative at cubital tunnel; positive at carpal tunnel  -MM (r) WL (t) MM (c)       General ROM    Head/Neck/Trunk  Neck Extension;Neck Flexion;Neck Lt Lateral Flexion;Neck Rt Lateral Flexion;Neck Lt Rotation;Neck Rt Rotation  -MM       Head/Neck/Trunk    Neck Extension AROM  moderate loss  -MM    Neck Flexion AROM  min loss  -MM    Neck Lt Lateral Flexion AROM  mod loss  -MM    Neck Rt Lateral Flexion AROM  mod loss  -MM    Neck Lt Rotation AROM  mod loss  -MM    Neck Rt Rotation AROM  mod loss  -MM       Right Upper Ext    Rt Shoulder Abduction AROM  thumb up:146 w pain palm down: 135 w pain  -MM (r) WL (t) MM (c)    Rt Shoulder Flexion AROM  163 w pulling  -MM (r) WL (t) MM (c)       MMT (Manual Muscle Testing)    General MMT Comments  UE MMT 5/5 bilaterally  -MM (r) WL (t) MM (c)       MMT Right Upper Ext    Rt Shoulder External Rotation MMT, Gross Movement  (5/5) normal tingling with resisted ER  -MM (r) WL (t) MM (c)        Strength Right    Right  Test 1  57  -MM (r) WL (t) MM (c)    Right  Test 2  65  -MM (r) WL (t) MM (c)     Strength Average Right  61  -MM (r) WL (t)        Strength Left    Left  Test 1  61  -MM (r) WL (t) MM (c)    Left  Test 2  60  -MM (r) WL (t) MM (c)     Strength Average Left  60.5  -MM (r) WL (t)       Sensation    Sensation WNL?  WNL  -MM  (r) WL (t) MM (c)       Hand  Strength     Strength Affected Side  Right;Left  -MM (r) WL (t) MM (c)      User Key  (r) = Recorded By, (t) = Taken By, (c) = Cosigned By    Initials Name Provider Type    Dm Morrow, PT Physical Therapist    Rogerio Rodriguez, PT Student PT Student        Therapy Education  Education Details: HEP includes: median nerve glides, shoulder flexion stretch, no moneys with theraband, rows with theraband, cervical extension with towel, upper trap stretch bilaterally, and carpal tunnel stretch  Given: HEP, Symptoms/condition management, Pain management  Program: New  How Provided: Verbal  Provided to: Patient  Level of Understanding: Teach back education performed     PT OP Goals     Row Name 07/19/21 1715          PT Short Term Goals    STG Date to Achieve  08/10/21  -MM (r) WL (t) MM (c)     STG 1  Client will report improvement in symptoms by 50% or better for improved mobility and tolerance to activity.  -MM (r) WL (t) MM (c)     STG 1 Progress  New  -MM (r) WL (t) MM (c)     STG 2  Client will improve score on QuickDash to 25 or better for improved activity tolerance.  -MM (r) WL (t) MM (c)     STG 2 Progress  New  -MM (r) WL (t) MM (c)     STG 3  Client will report decrease in numbness/tingling to 20% or less during activities for improved activity tolerance  -MM (r) WL (t) MM (c)     STG 3 Progress  New  -MM (r) WL (t) MM (c)        Long Term Goals    LTG Date to Achieve  08/31/21  -MM (r) WL (t) MM (c)     LTG 1  Client will report improvement in symptoms by 90% or better for improved mobility and tolerance to activity.  -MM (r) WL (t) MM (c)     LTG 1 Progress  New  -MM (r) WL (t) MM (c)     LTG 2  Client will improve score on QuickDash to 10 or better for improved activity tolerance.  -MM (r) WL (t) MM (c)     LTG 2 Progress  New  -MM (r) WL (t) MM (c)     LTG 3  Client will improve AROM right shoulder flexion and abduction to WNL without pain for improved  functional mobility.  -MM (r) WL (t) MM (c)     LTG 3 Progress  New  -MM (r) WL (t) MM (c)        Time Calculation    PT Goal Re-Cert Due Date  10/18/21  -MM (r) WL (t) MM (c)       User Key  (r) = Recorded By, (t) = Taken By, (c) = Cosigned By    Initials Name Provider Type    MM Dm Steele, PT Physical Therapist    WL Rogerio Lamas, PT Student PT Student          PT Assessment/Plan     Row Name 07/19/21 8667          PT Assessment    Functional Limitations  Limitation in home management;Limitations in functional capacity and performance;Performance in leisure activities;Performance in self-care ADL;Performance in work activities  -MM (r) WL (t) MM (c)     Impairments  Joint mobility;Pain;Posture;Poor body mechanics;Range of motion;Sensation;Peripheral nerve integrity  -MM (r) WL (t) MM (c)     Assessment Comments  Client presents with evolving symptoms of low complexity consistent with right shoulder impingment with radicular symptoms. She also has history of significant degenerative changes at C5/6 and C6/7. Client demonstrates good AROM of right shoulder overall, but experiences occasional pain, numbness, and tingling with elevation. Testing for nerve involvement in UE were negative, ecept for Tinel's at carpal tunnel, which was positive for median nerve involvement. This was unable to be determined if this was due to shoulder issue or client's previous history of carpal tunnel syndrome. Client demonstrates good strength in bilateral UEs, and  strength is unaffected bilaterally. Client is limited by pain with elevation, numbness and tingling which are worsened with activity. CClient would benefit from skilled PT services to address these deficits for improved activity tolerance, functional mobility, and ability to perform all work and home activities.  -MM     Please refer to paper survey for additional self-reported information  Yes  -MM (r) WL (t) MM (c)     Rehab Potential  Good  -MM (r) WL  (t) MM (c)     Patient/caregiver participated in establishment of treatment plan and goals  Yes  -MM (r) WL (t) MM (c)     Patient would benefit from skilled therapy intervention  Yes  -MM (r) WL (t) MM (c)        PT Plan    PT Frequency  1x/week;2x/week  -MM (r) WL (t) MM (c)     Predicted Duration of Therapy Intervention (PT)  8-10 visits  -MM (r) WL (t) MM (c)     Planned CPT's?  PT EVAL LOW COMPLEXITY: 93594;PT RE-EVAL: 96421;PT THER PROC EA 15 MIN: 67319;PT THER ACT EA 15 MIN: 08436;PT MANUAL THERAPY EA 15 MIN: 90205;PT NEUROMUSC RE-EDUCATION EA 15 MIN: 03734  -MM (r) WL (t) MM (c)     PT Plan Comments  Plan of care to include stretching, ROM, strengthening, and manual therapy as indicated. Work on mobility deficits in cervical spine and shoulder. Include postural exercises and consider manual therapy as needed.   -MM       User Key  (r) = Recorded By, (t) = Taken By, (c) = Cosigned By    Initials Name Provider Type    Dm Morrow, PT Physical Therapist    Rogerio Rodriguez, PT Student PT Student        OP Exercises     Row Name 07/19/21 1600             Subjective Pain    Able to rate subjective pain?  yes  -MM (r) WL (t) MM (c)      Pre-Treatment Pain Level  5  -MM (r) WL (t) MM (c)      Post-Treatment Pain Level  5  -MM (r) WL (t) MM (c)        User Key  (r) = Recorded By, (t) = Taken By, (c) = Cosigned By    Initials Name Provider Type    Dm Morrow, PT Physical Therapist    Rogerio Rodriguez, PT Student PT Student        Outcome Measure Options: Quick DASH  Quick DASH  Open a tight or new jar.: Mild Difficulty  Do heavy household chores (e.g., wash walls, wash floors): Moderate Difficulty  Carry a shopping bag or briefcase: Mild Difficulty  Wash your back: Mild Difficulty  Use a knife to cut food: Mild Difficulty  Recreational activities in which you take some force or impact through your arm, should or hand (e.g. golf, hammering, tennis, etc.): Moderate Difficulty  During  the past week, to what extent has your arm, shoulder, or hand problem interfered with your normal social activites with family, friends, neighbors or groups?: Moderately  During the past week, were you limited in your work or other regular daily activities as a result of your arm, shoulder or hand problem?: Slightly Limited  Arm, Shoulder, or hand pain: Severe  Tingling (pins and needles) in your arm, shoulder, or hand: Severe  During the past week, how much difficulty have you had sleeping because of the pain in your arm, shoulder or hand?: Mild Difficulty  Number of Questions Answered: 11  Quick DASH Score: 40.91         Time Calculation:     Start Time: 1715  Untimed Charges  PT Eval/Re-eval Minutes: 60  Total Minutes  Untimed Charges Total Minutes: 60   Total Minutes: 60     Therapy Charges for Today     Code Description Service Date Service Provider Modifiers Qty    24324566749 HC PT EVAL LOW COMPLEXITY 4 7/19/2021 Dm Steele, PT GP 1          PT G-Codes  Outcome Measure Options: Quick DASH  Quick DASH Score: 40.91         Dm Steele, PT  7/20/2021

## 2021-07-28 ENCOUNTER — HOSPITAL ENCOUNTER (OUTPATIENT)
Dept: PHYSICAL THERAPY | Facility: HOSPITAL | Age: 51
Setting detail: THERAPIES SERIES
Discharge: HOME | End: 2021-07-28

## 2021-07-28 ENCOUNTER — TELEPHONE (OUTPATIENT)
Dept: FAMILY MEDICINE CLINIC | Facility: CLINIC | Age: 51
End: 2021-07-28

## 2021-07-28 DIAGNOSIS — R20.2 NUMBNESS AND TINGLING OF RIGHT HAND: Primary | ICD-10-CM

## 2021-07-28 DIAGNOSIS — R20.0 NUMBNESS AND TINGLING OF RIGHT HAND: Primary | ICD-10-CM

## 2021-07-28 PROCEDURE — 97110 THERAPEUTIC EXERCISES: CPT

## 2021-07-28 NOTE — THERAPY TREATMENT NOTE
Outpatient Physical Therapy Ortho Treatment Note  [facility]     Patient Name: Trinidad Quinteros  : 1970  MRN: [id number]  Today's Date: 2021      Visit Date: 2021    Visit Dx:    ICD-10-CM ICD-9-CM   1. Numbness and tingling of right hand  R20.0 782.0    R20.2        Patient Active Problem List   Diagnosis   • GERD (gastroesophageal reflux disease)   • Depression   • Hyperlipidemia   • Hypertension   • ADRIAN (obstructive sleep apnea)   • Snoring   • Low back pain   • Pain of left hip joint   • Anxiety   • OAB (overactive bladder)   • Onychomycosis   • Degenerative disc disease, cervical   • Impingement syndrome of right shoulder   • Bursitis of right shoulder   • Biceps tendinitis of right upper extremity   • Numbness and tingling of right hand        Past Medical History:   Diagnosis Date   • Depression    • Hyperlipidemia    • Hypertension    • Low back pain         Past Surgical History:   Procedure Laterality Date   • APPENDECTOMY     • CARDIAC CATHETERIZATION     • CARDIAC CATHETERIZATION     •  SECTION     • HAND TENDON SURGERY Right    • SUBTOTAL HYSTERECTOMY     • TUBAL ABDOMINAL LIGATION         PT Assessment/Plan     Row Name 21 1630          PT Assessment    Assessment Comments  Client tolerated all activities well today. Client demonstrated improved shoulder and neck ROM during exercises. Client demonstrated improved scapular strength with additional exercise performed. Client had no numbness or tingling by the end of the session and pain was decreased as well.  (Pended)   -WL        PT Plan    PT Plan Comments  Continue per POC.  (Pended)   -CYDNEY       User Key  (r) = Recorded By, (t) = Taken By, (c) = Cosigned By    Initials Name Provider Type    Rogerio Rodriguez, PT Student PT Student          OP Exercises     Row Name 21 1630             Subjective Comments    Subjective Comments  Client reports that she has been compliant  with HEP and has not had any issues. Client reports she had been doing well and her pain had decreased until she gave her dog a bath over the weekend. She lifted her dog, which is 55 lbs, and has had increased pain since then, although it is decreasing again.  (Pended)   -WL         Subjective Pain    Able to rate subjective pain?  yes  (Pended)   -WL      Pre-Treatment Pain Level  4  (Pended)   -WL      Post-Treatment Pain Level  2  (Pended)   -WL      Subjective Pain Comment  *under right scapula. denied numbness and tingling by end of session.  (Pended)   -WL         Total Minutes    98727 - PT Therapeutic Exercise Minutes  45  (Pended)   -WL         Exercise 1    Exercise Name 1  UBE  (Pended)   -WL      Time 1  2 min/2 min  (Pended)   -WL      Additional Comments  L7  (Pended)   -WL         Exercise 2    Exercise Name 2  cervical nodding  (Pended)   -WL      Sets 2  2  (Pended)   -WL      Reps 2  10  (Pended)   -WL         Exercise 3    Exercise Name 3  cervical extension w strap  (Pended)   -WL      Sets 3  2  (Pended)   -WL      Reps 3  10  (Pended)   -WL         Exercise 4    Exercise Name 4  ball rolls abduction  (Pended)   -WL      Sets 4  2  (Pended)   -WL      Reps 4  10  (Pended)   -WL         Exercise 5    Exercise Name 5  no $ w band  (Pended)   -WL      Sets 5  2  (Pended)   -WL      Reps 5  10  (Pended)   -WL      Additional Comments  yellow theraband  (Pended)   -WL         Exercise 6    Exercise Name 6  upper trap stretch bilaterally  (Pended)   -WL      Reps 6  2  (Pended)   -WL      Time 6  30 sec  (Pended)   -WL         Exercise 7    Exercise Name 7  rows w band  (Pended)   -WL      Sets 7  2  (Pended)   -WL      Reps 7  10  (Pended)   -WL      Additional Comments  yellow theraband  (Pended)   -WL        User Key  (r) = Recorded By, (t) = Taken By, (c) = Cosigned By    Initials Name Provider Type    Rogerio Rodriguez, PT Student PT Student            Time Calculation:   Start Time: (P)  1630  Timed Charges  42588 - PT Therapeutic Exercise Minutes: (P) 45  Total Minutes  Timed Charges Total Minutes: (P) 45   Total Minutes: (P) 45  Therapy Charges for Today     Code Description Service Date Service Provider Modifiers Qty    17214506730  PT THER PROC EA 15 MIN 7/28/2021 Rogerio Lamas, PT Student GP 3        Rogerio Lamas, PT Student  7/28/2021

## 2021-07-29 ENCOUNTER — TELEPHONE (OUTPATIENT)
Dept: FAMILY MEDICINE CLINIC | Facility: CLINIC | Age: 51
End: 2021-07-29

## 2021-07-29 RX ORDER — PREDNISONE 10 MG/1
10 TABLET ORAL DAILY
Qty: 14 TABLET | Refills: 1 | Status: SHIPPED | OUTPATIENT
Start: 2021-07-29 | End: 2021-08-25

## 2021-07-29 RX ORDER — CEFDINIR 300 MG/1
300 CAPSULE ORAL 2 TIMES DAILY
Qty: 20 CAPSULE | Refills: 0 | Status: SHIPPED | OUTPATIENT
Start: 2021-07-29 | End: 2021-08-17

## 2021-08-05 ENCOUNTER — HOSPITAL ENCOUNTER (OUTPATIENT)
Dept: PHYSICAL THERAPY | Facility: HOSPITAL | Age: 51
Setting detail: THERAPIES SERIES
Discharge: HOME | End: 2021-08-05

## 2021-08-05 DIAGNOSIS — R20.0 NUMBNESS AND TINGLING OF RIGHT HAND: Primary | ICD-10-CM

## 2021-08-05 DIAGNOSIS — R20.2 NUMBNESS AND TINGLING OF RIGHT HAND: Primary | ICD-10-CM

## 2021-08-05 PROCEDURE — 97140 MANUAL THERAPY 1/> REGIONS: CPT

## 2021-08-05 PROCEDURE — 97110 THERAPEUTIC EXERCISES: CPT

## 2021-08-05 NOTE — THERAPY TREATMENT NOTE
Outpatient Physical Therapy Ortho Treatment Note  [facility]     Patient Name: Trinidad Quinteros  : 1970  MRN: [id number]  Today's Date: 2021      Visit Date: 2021    Visit Dx:    ICD-10-CM ICD-9-CM   1. Numbness and tingling of right hand  R20.0 782.0    R20.2        Patient Active Problem List   Diagnosis   • GERD (gastroesophageal reflux disease)   • Depression   • Hyperlipidemia   • Hypertension   • ADRIAN (obstructive sleep apnea)   • Snoring   • Low back pain   • Pain of left hip joint   • Anxiety   • OAB (overactive bladder)   • Onychomycosis   • Degenerative disc disease, cervical   • Impingement syndrome of right shoulder   • Bursitis of right shoulder   • Biceps tendinitis of right upper extremity   • Numbness and tingling of right hand        Past Medical History:   Diagnosis Date   • Depression    • Hyperlipidemia    • Hypertension    • Low back pain         Past Surgical History:   Procedure Laterality Date   • APPENDECTOMY     • CARDIAC CATHETERIZATION     • CARDIAC CATHETERIZATION     •  SECTION     • HAND TENDON SURGERY Right    • SUBTOTAL HYSTERECTOMY     • TUBAL ABDOMINAL LIGATION         PT Assessment/Plan     Row Name 21 1630          PT Assessment    Assessment Comments  Client tolerated activities well today, demonstrating decreased pain and muscle tightness with exercises adn manual stretching. Client had reporduction of tingling in RUE with abduction to 90 degrees, but it went away quickly upon completion of the exercise.  (Pended)   -WL        PT Plan    PT Plan Comments  Continue per POC.  (Pended)   -CYDNEY       User Key  (r) = Recorded By, (t) = Taken By, (c) = Cosigned By    Initials Name Provider Type    Rogerio Rodriguez, PT Student PT Student          OP Exercises     Row Name 21 1630             Subjective Comments    Subjective Comments  Client reports that she does not have tingling in her RUE, but has pain in  her shoulder and periscapular region.  (Pended)   -WL         Subjective Pain    Able to rate subjective pain?  yes  (Pended)   -WL      Pre-Treatment Pain Level  4  (Pended)   -WL      Post-Treatment Pain Level  2  (Pended)   -WL         Total Minutes    25261 - PT Therapeutic Exercise Minutes  15  (Pended)   -WL      40496 - PT Manual Therapy Minutes  15  (Pended)   -WL         Exercise 1    Exercise Name 1  UBE  (Pended)   -WL      Time 1  2 min/2 min  (Pended)   -WL         Exercise 3    Exercise Name 3  cervical extension w strap  (Pended)   -WL      Sets 3  2  (Pended)   -WL      Reps 3  10  (Pended)   -WL         Exercise 4    Exercise Name 4  ball rolls abduction  (Pended)   -WL      Sets 4  2  (Pended)   -WL      Reps 4  10  (Pended)   -WL        User Key  (r) = Recorded By, (t) = Taken By, (c) = Cosigned By    Initials Name Provider Type    Rogerio Rodriguez, PT Student PT Student              Manual Rx (last 36 hours)      Manual Treatments     Row Name 08/05/21 1630             Total Minutes    17186 - PT Manual Therapy Minutes  15  (Pended)   -WL         Manual Rx 1    Manual Rx 1 Location  cervical spine and musculature, shoulder  (Pended)   -WL      Manual Rx 1 Type  Cervical distraction, gentle stretching and soft tissue mobilization with hands. Client was supine and gentle pressure was utilized.  (Pended)   -WL      Manual Rx 1 Grade  I,II  (Pended)   -WL      Manual Rx 1 Duration  15  (Pended)   -WL        User Key  (r) = Recorded By, (t) = Taken By, (c) = Cosigned By    Initials Name Provider Type    Rogerio Rodriguez, PT Student PT Student        Time Calculation:   Start Time: (P) 1630  Timed Charges  60747 - PT Therapeutic Exercise Minutes: (P) 15  17499 - PT Manual Therapy Minutes: (P) 15  Total Minutes  Timed Charges Total Minutes: (P) 30   Total Minutes: (P) 30  Therapy Charges for Today     Code Description Service Date Service Provider Modifiers Qty    27729654652  PT THER  PROC EA 15 MIN 8/5/2021 Rogerio Lamas, PT Student GP 1    [id number]  PT MANUAL THERAPY EA 15 MIN 8/5/2021 Rogerio Lamas, PT Student GP 1        Rogerio Lamas, PT Student  8/5/2021

## 2021-08-11 ENCOUNTER — APPOINTMENT (OUTPATIENT)
Dept: PHYSICAL THERAPY | Facility: HOSPITAL | Age: 51
End: 2021-08-11

## 2021-08-16 RX ORDER — ALPRAZOLAM 0.5 MG/1
TABLET ORAL
Qty: 90 TABLET | Refills: 3 | OUTPATIENT
Start: 2021-08-16

## 2021-08-17 ENCOUNTER — OFFICE VISIT (OUTPATIENT)
Dept: ORTHOPEDIC SURGERY | Facility: CLINIC | Age: 51
End: 2021-08-17

## 2021-08-17 ENCOUNTER — HOSPITAL ENCOUNTER (OUTPATIENT)
Dept: NEUROLOGY | Facility: HOSPITAL | Age: 51
Discharge: HOME | End: 2021-08-17
Admitting: ORTHOPAEDIC SURGERY

## 2021-08-17 VITALS
HEIGHT: 61 IN | BODY MASS INDEX: 29.94 KG/M2 | HEART RATE: 83 BPM | WEIGHT: 158.6 LBS | SYSTOLIC BLOOD PRESSURE: 149 MMHG | DIASTOLIC BLOOD PRESSURE: 81 MMHG

## 2021-08-17 DIAGNOSIS — M75.41 IMPINGEMENT SYNDROME OF RIGHT SHOULDER: Primary | ICD-10-CM

## 2021-08-17 DIAGNOSIS — M75.21 BICEPS TENDINITIS OF RIGHT UPPER EXTREMITY: ICD-10-CM

## 2021-08-17 DIAGNOSIS — M75.51 BURSITIS OF RIGHT SHOULDER: ICD-10-CM

## 2021-08-17 DIAGNOSIS — R20.0 NUMBNESS AND TINGLING OF RIGHT HAND: ICD-10-CM

## 2021-08-17 DIAGNOSIS — R20.2 NUMBNESS AND TINGLING OF RIGHT HAND: ICD-10-CM

## 2021-08-17 PROCEDURE — 99213 OFFICE O/P EST LOW 20 MIN: CPT | Performed by: ORTHOPAEDIC SURGERY

## 2021-08-17 PROCEDURE — 95908 NRV CNDJ TST 3-4 STUDIES: CPT

## 2021-08-17 PROCEDURE — 95886 MUSC TEST DONE W/N TEST COMP: CPT

## 2021-08-17 RX ORDER — MELOXICAM 7.5 MG/1
7.5 TABLET ORAL DAILY
Qty: 30 TABLET | Refills: 1 | Status: SHIPPED | OUTPATIENT
Start: 2021-08-17 | End: 2021-10-14 | Stop reason: SDUPTHER

## 2021-08-17 NOTE — PROGRESS NOTES
[facility] Office Follow Up       Office Follow Up Visit       Patient Name: Trinidad Quinteros    Chief Complaint:   Chief Complaint   Patient presents with   • Follow-up     EMG right upper extremity 8/17/21       Referring Physician: No ref. provider found    History of Present Illness:   It has been 5  week(s) since Trinidad Quinteros's last visit. Trinidad Quinteros returns to clinic today for F/U: follow-up of rightBody Part: upper extremity EMG 8/17/21. The issue has been ongoing for 11 week(s). Trinidad Quinteros rates HIS/HER: herpain at 7/10 on the pain scale. Previous/current treatments: physical therapy and oral steroids. Current symptoms:Symptoms: same as prior visit. The pain is worse with working, leisure and any movement of the joint; resting, ice and heat improves the pain. Overall, he/she: sheis doing better.  I have reviewed the patient's history of present illness as noted/entered above.    I have reviewed the patient's past medical history, surgical history, social history, family history, medications, and allergies as noted in the electronic medical record and as noted/entered.  I have reviewed the patient's review of systems as noted/enter and updated as noted in the patient's HPI.    RIGHT SHOULDER:  Right hand numbness tingling.  EMG/NCV performed incidentally noted mild early cubital tunnel was noted but not carpal tunnel based on the studies.  Counseled patient on this fortunately she appears to be showing some improvements. Exercises helped  Still some shoulder pain      Prior:  And right hand numbness and tingling -- whole hand, more on radial side  Treated: advil, gabapention  Patient  with Dr. Roth, FADI Castorena     Enjoys: watching TV, classics    MRI:  Result Date: 7/6/2021  Mild to moderate degenerative changes of the AC joint greatest superiorly where there is adjacent soft tissue  thickening of inflammation and edema along with degenerative changes of the greater tuberosity humerus however no intrinsic findings or rotator cuff signal findings/tearing evident.  No gross labral tearing however if there is further concern, arthrogram evaluation may be considered.  DICTATED:   2021 EDITED/ls :   2021    This report was finalized on 2021 4:49 PM by Dr. Surendra Ontiveros.          I personally reviewed the right shoulder MRI as noted above personal interpretation AC joint arthritis but asymptomatic on clinical exam.  Rotator cuff is intact perhaps small partial tearing supraspinatus and bursitis noted.      Subjective   Subjective      Review of Systems   Constitutional: Negative.  Negative for chills, fatigue and fever.   HENT: Negative.  Negative for congestion and dental problem.    Eyes: Negative.  Negative for blurred vision.   Respiratory: Negative.  Negative for shortness of breath.    Cardiovascular: Negative.  Negative for leg swelling.   Gastrointestinal: Negative.  Negative for abdominal pain.   Endocrine: Negative.  Negative for polyuria.   Genitourinary: Negative.  Negative for difficulty urinating.   Musculoskeletal: Positive for arthralgias.   Skin: Negative.    Allergic/Immunologic: Negative.    Neurological: Negative.    Hematological: Negative.  Negative for adenopathy.   Psychiatric/Behavioral: Negative.  Negative for behavioral problems.        Past Medical History:   Past Medical History:   Diagnosis Date   • Depression    • Hyperlipidemia    • Hypertension    • Low back pain        Past Surgical History:   Past Surgical History:   Procedure Laterality Date   • APPENDECTOMY     • CARDIAC CATHETERIZATION     • CARDIAC CATHETERIZATION     •  SECTION     • HAND TENDON SURGERY Right    • SUBTOTAL HYSTERECTOMY     • TUBAL ABDOMINAL LIGATION         Family History:   Family History   Problem Relation Age of Onset   • COPD Mother    •  COPD Father    • Cancer Other    • Arthritis Other    • Diabetes Other    • Asthma Other    • Heart disease Other         heart trouble   • Hypertension Other         high blood pressure   • Alcohol abuse Other    • Breast cancer Maternal Aunt 50   • Ovarian cancer Neg Hx        Social History:   Social History     Socioeconomic History   • Marital status:      Spouse name: Not on file   • Number of children: Not on file   • Years of education: Not on file   • Highest education level: Not on file   Tobacco Use   • Smoking status: Current Every Day Smoker     Packs/day: 0.25     Years: 20.00     Pack years: 5.00     Types: Cigarettes   • Smokeless tobacco: Never Used   Substance and Sexual Activity   • Alcohol use: No   • Drug use: No   • Sexual activity: Yes     Partners: Male       Medications:   Current Outpatient Medications:   •  ALPRAZolam (XANAX) 0.5 MG tablet, Take 1 tablet by mouth 3 (Three) Times a Day., Disp: 90 tablet, Rfl: 2  •  ALPRAZolam (XANAX) 0.5 MG tablet, Take 1 tablet by mouth 3 (Three) Times a Day As Needed., Disp: 90 tablet, Rfl: 3  •  atorvastatin (LIPITOR) 40 MG tablet, Take 1 tablet by mouth Daily., Disp: 90 tablet, Rfl: 2  •  benzonatate (TESSALON) 200 MG capsule, TAKE ONE CAPSULE BY MOUTH THREE TIMES DAILY AS NEEDED FOR COUGH, Disp: 30 capsule, Rfl: 1  •  Chlorcyclizine-Pseudoephed 25-60 MG tablet, Take 1/2 to 1 tablet by mouth every 12 hours as needed for congestion., Disp: 30 tablet, Rfl: 0  •  cyanocobalamin 1000 MCG/ML injection, Inject 1 mL into the appropriate muscle as directed by prescriber Every 28 (Twenty-Eight) Days., Disp: 3 mL, Rfl: 3  •  diclofenac (VOLTAREN) 1 % gel gel, Apply 2g topically to the appropriate area as directed 2 (Two) Times a Day., Disp: 100 g, Rfl: 5  •  fluconazole (DIFLUCAN) 150 MG tablet, TAKE 1 TABLET BY MOUTH 1 TIME FOR 1 DOSE, Disp: 1 tablet, Rfl: 0  •  fluticasone (Flonase) 50 MCG/ACT nasal spray, Use 2 sprays into each nostril as directed by  "provider Daily., Disp: 16 g, Rfl: 11  •  gabapentin (NEURONTIN) 300 MG capsule, Take 1 capsule by mouth 2 (Two) Times a Day for neroupathy, Disp: 60 capsule, Rfl: 5  •  gabapentin (NEURONTIN) 300 MG capsule, Take 1 capsule by mouth 3 (Three) Times a Day., Disp: 90 capsule, Rfl: 3  •  glycopyrrolate (Robinul-Forte) 2 MG tablet, Take 1 tablet by mouth 2 (Two) Times a Day for diarrhea, Disp: 60 tablet, Rfl: 2  •  hydroCHLOROthiazide (HYDRODIURIL) 25 MG tablet, Take 1 tablet by mouth Daily., Disp: 90 tablet, Rfl: 2  •  ketoconazole (NIZORAL) 2 % cream, Apply to feet twice daily., Disp: 60 g, Rfl: 3  •  levocetirizine (Xyzal) 5 MG tablet, Take 1 tablet by mouth Every Evening for allergies, Disp: 30 tablet, Rfl: 5  •  losartan (COZAAR) 100 MG tablet, Take 1 tablet by mouth Daily., Disp: 90 tablet, Rfl: 2  •  ondansetron (Zofran) 4 MG tablet, Take 1 tablet by mouth Every 8 (Eight) Hours As Needed for Nausea or Vomiting., Disp: 20 tablet, Rfl: 8  •  oxyCODONE-acetaminophen (PERCOCET) 5-325 MG per tablet, Take 1 tablet by mouth Every Night. For chronic joint pain, Disp: 30 tablet, Rfl: 0  •  predniSONE (DELTASONE) 10 MG tablet, Take 1 tablet by mouth Daily., Disp: 14 tablet, Rfl: 1  •  sertraline (ZOLOFT) 100 MG tablet, Take 2 tablets by mouth Daily. New dose., Disp: 60 tablet, Rfl: 9  •  Syringe/Needle, Disp, (Luer Lock Safety Syringes) 25G X 1\" 3 ML misc, Use with B-12 injections IN THE HIP, DELTOID OR THIGH ONCE MONTHLY, Disp: 4 each, Rfl: 4  •  tiZANidine (ZANAFLEX) 4 MG tablet, Take 0.5-1 tablet by mouth 2  Times a Day As Needed for back pain, Disp: 60 tablet, Rfl: 9  •  traMADol (ULTRAM) 50 MG tablet, Take 1 tablet by mouth 3 (Three) Times a Day As Needed for Moderate Pain ., Disp: 90 tablet, Rfl: 1  •  meloxicam (MOBIC) 7.5 MG tablet, 1 Oral Daily with food., Disp: 30 tablet, Rfl: 1    Allergies:   Allergies   Allergen Reactions   • Bactrim [Sulfamethoxazole-Trimethoprim]        The following portions of the patient's " "history were reviewed and updated as appropriate: allergies, current medications, past family history, past medical history, past social history, past surgical history and problem list.        Objective    Objective      Vital Signs:   Vitals:    08/17/21 1344   BP: 149/81   Pulse: 83   Weight: 71.9 kg (158 lb 9.6 oz)   Height: 154.9 cm (60.98\")       Ortho Exam:  RIGHT SHOULDER  Impingement syndrome, soreness  Good RC strength  Hand seems better  Negative tinel's at elbow    Results Review:  Imaging Results (Last 24 Hours)     ** No results found for the last 24 hours. **          MRI Shoulder Right Without Contrast    Result Date: 7/6/2021  Mild to moderate degenerative changes of the AC joint greatest superiorly where there is adjacent soft tissue thickening of inflammation and edema along with degenerative changes of the greater tuberosity humerus however no intrinsic findings or rotator cuff signal findings/tearing evident.  No gross labral tearing however if there is further concern, arthrogram evaluation may be considered.  DICTATED:   07/02/2021 EDITED/ls :   07/02/2021    This report was finalized on 7/6/2021 4:49 PM by Dr. Surendra Ontiveros.      EMG & Nerve Conduction Test    Result Date: 8/17/2021  Ulnar neuropathy at the elbow on the right, mild (mostly incidental finding) Specifically, no electrophysiologic evidence for median nerve entrapment is seen on the right No evidence for radiculopathy or brachial plexopathy is seen This report is transcribed using the Dragon dictation system.     Reviewed    Procedures          Assessment / Plan      Assessment/Plan:   Problem List Items Addressed This Visit        Musculoskeletal and Injuries    Impingement syndrome of right shoulder - Primary    Relevant Medications    meloxicam (MOBIC) 7.5 MG tablet    Other Relevant Orders    Ambulatory Referral to Physical Therapy Ortho, Evaluate and treat    Bursitis of right shoulder    Relevant Medications    meloxicam " (MOBIC) 7.5 MG tablet    Other Relevant Orders    Ambulatory Referral to Physical Therapy Ortho, Evaluate and treat    Biceps tendinitis of right upper extremity    Relevant Medications    meloxicam (MOBIC) 7.5 MG tablet    Other Relevant Orders    Ambulatory Referral to Physical Therapy Ortho, Evaluate and treat       Symptoms and Signs    Numbness and tingling of right hand    Relevant Medications    meloxicam (MOBIC) 7.5 MG tablet    Other Relevant Orders    Ambulatory Referral to Physical Therapy Ortho, Evaluate and treat          RIGHT SHOULDER  PT recommended  NSAIDS - risks and benefits of these medications were discussed.  These medications are certainly not without risks, but they can provide relief of pain caused due to inflammation.    Follow Up: RIGHT SHOULDER PRN      Fidel Goodman MD, FAAOS  Orthopedic Surgeon  Fellowship Trained Shoulder and Elbow Surgeon  [facility]  [address] [place] [address], [address]  [place] [address]    08/17/21  14:26 EDT    Please note that portions of this note may have been completed with a voice recognition program. Efforts were made to edit the dictations, but occasionally words are mistranscribed.

## 2021-08-18 ENCOUNTER — APPOINTMENT (OUTPATIENT)
Dept: PHYSICAL THERAPY | Facility: HOSPITAL | Age: 51
End: 2021-08-18

## 2021-08-19 RX ORDER — ALPRAZOLAM 0.5 MG/1
TABLET ORAL
Qty: 90 TABLET | Refills: 3 | OUTPATIENT
Start: 2021-08-19

## 2021-08-25 ENCOUNTER — OFFICE VISIT (OUTPATIENT)
Dept: FAMILY MEDICINE CLINIC | Facility: CLINIC | Age: 51
End: 2021-08-25

## 2021-08-25 VITALS
WEIGHT: 162 LBS | DIASTOLIC BLOOD PRESSURE: 72 MMHG | OXYGEN SATURATION: 99 % | TEMPERATURE: 97.3 F | HEIGHT: 61 IN | HEART RATE: 80 BPM | SYSTOLIC BLOOD PRESSURE: 116 MMHG | BODY MASS INDEX: 30.58 KG/M2

## 2021-08-25 DIAGNOSIS — F41.9 ANXIETY: ICD-10-CM

## 2021-08-25 DIAGNOSIS — F41.1 GAD (GENERALIZED ANXIETY DISORDER): Primary | ICD-10-CM

## 2021-08-25 DIAGNOSIS — G47.33 OSA (OBSTRUCTIVE SLEEP APNEA): ICD-10-CM

## 2021-08-25 DIAGNOSIS — H92.02 LEFT EAR PAIN: ICD-10-CM

## 2021-08-25 PROCEDURE — 99213 OFFICE O/P EST LOW 20 MIN: CPT | Performed by: PHYSICIAN ASSISTANT

## 2021-08-25 RX ORDER — ALPRAZOLAM 0.5 MG/1
0.5 TABLET ORAL 3 TIMES DAILY
Qty: 90 TABLET | Refills: 5 | Status: SHIPPED | OUTPATIENT
Start: 2021-08-25 | End: 2022-04-01 | Stop reason: SDUPTHER

## 2021-08-25 NOTE — PROGRESS NOTES
Subjective   Trinidad Quinteros is a 50 y.o. female  Anxiety (Follow up on anxiety, req refill on alprazolam )      History of Present Illness  Patient is a pleasant 50-year-old female comes today for follow-up on generalized anxiety disorder as well as obstructive sleep apnea.  She states her machine was recalled and she needs a new prescription and she has not been able to have a CPAP sheen to sleep with lately and is having fatigue due to this.  Denies any adverse effects of anxiety medicines as below anxiety is currently stable on combination of Xanax and Zoloft.  The following portions of the patient's history were reviewed and updated as appropriate: allergies, current medications, past social history and problem list    Review of Systems   Constitutional: Positive for fatigue. Negative for appetite change.   Respiratory: Negative for chest tightness and shortness of breath.    Gastrointestinal: Negative for abdominal pain, diarrhea and nausea.   Neurological: Negative for dizziness, tremors, weakness, light-headedness and headaches.   Psychiatric/Behavioral: Positive for sleep disturbance. Negative for agitation, behavioral problems, confusion, decreased concentration, dysphoric mood, self-injury and suicidal ideas. The patient is nervous/anxious ( Stable on medication).        Objective     Vitals:    08/25/21 1506   BP: 116/72   Pulse: 80   Temp: 97.3 °F (36.3 °C)   SpO2: 99%       Physical Exam  Vitals and nursing note reviewed.   Constitutional:       General: She is not in acute distress.     Appearance: Normal appearance. She is well-developed. She is not ill-appearing, toxic-appearing or diaphoretic.   HENT:      Head: Normocephalic and atraumatic.   Cardiovascular:      Rate and Rhythm: Normal rate and regular rhythm.      Heart sounds: Normal heart sounds.   Pulmonary:      Effort: Pulmonary effort is normal.   Neurological:      Mental Status: She is alert and oriented to person, place, and time.       Cranial Nerves: No cranial nerve deficit.      Coordination: Coordination normal.   Psychiatric:         Attention and Perception: She is attentive.         Mood and Affect: Mood normal.         Speech: Speech normal.         Behavior: Behavior normal.         Thought Content: Thought content normal.         Judgment: Judgment normal.         Assessment/Plan     Diagnoses and all orders for this visit:    1. IVON (generalized anxiety disorder) (Primary)    2. ADRIAN (obstructive sleep apnea)    3. Left ear pain     + Refill will be sent for current dosage of Xanax 0.5 mg 3 times daily for anxiety #90, continue on Zoloft at current dosage.  New prescription given for new CPAP machine and supplies all follow-up with dentist tomorrow for further evaluation of dental abnormality on left jaw most likely cause of left ear pain.    As part of this patient's treatment plan, patient will be prescribed controlled substances. The patient has been made aware of appropriate use of such medications, including potential risk of somnolence, limited ability to drive and /or work safely, and potential for dependence or overdose. It has also been made clear that these medications are for use by this patient only, without concomitant use of alcohol or other substances unless prescribed.Controlled substance status of medication discussed with patient, discussed risks of medication including abuse potential and diversion potential and need to follow up for reevaluation appointment in order to receive further refills.    Please note that portions of this document were completed with a voice recognition program. Efforts were made to edit the dictations, but occasionally words are mis-transcribed     Answers for HPI/ROS submitted by the patient on 8/25/2021  Please describe your symptoms.: medication refills, ear pain  Have you had these symptoms before?: Yes  How long have you been having these symptoms?: 5-7 days  What is the primary reason  for your visit?: Other

## 2021-09-01 ENCOUNTER — TELEPHONE (OUTPATIENT)
Dept: FAMILY MEDICINE CLINIC | Facility: CLINIC | Age: 51
End: 2021-09-01

## 2021-09-01 RX ORDER — AMOXICILLIN 500 MG/1
500 CAPSULE ORAL 2 TIMES DAILY
Qty: 10 CAPSULE | Refills: 1 | Status: SHIPPED | OUTPATIENT
Start: 2021-09-01 | End: 2021-09-20

## 2021-09-01 NOTE — TELEPHONE ENCOUNTER
Trinidad had some teeth pulled last week and she's afraid of getting an infection. She wants to know if she can get an antibiotic sent in to the [facility]. Not allergic to anything...   Thanks,   Angela.....

## 2021-09-10 RX ORDER — BENZONATATE 200 MG/1
200 CAPSULE ORAL 3 TIMES DAILY PRN
Qty: 30 CAPSULE | Refills: 1 | Status: SHIPPED | OUTPATIENT
Start: 2021-09-10 | End: 2021-10-21

## 2021-09-16 DIAGNOSIS — G89.29 CHRONIC MIDLINE LOW BACK PAIN WITH SCIATICA, SCIATICA LATERALITY UNSPECIFIED: ICD-10-CM

## 2021-09-16 DIAGNOSIS — M54.40 CHRONIC MIDLINE LOW BACK PAIN WITH SCIATICA, SCIATICA LATERALITY UNSPECIFIED: ICD-10-CM

## 2021-09-16 RX ORDER — OXYCODONE HYDROCHLORIDE AND ACETAMINOPHEN 5; 325 MG/1; MG/1
1 TABLET ORAL NIGHTLY
Qty: 30 TABLET | Refills: 0 | Status: SHIPPED | OUTPATIENT
Start: 2021-09-16 | End: 2022-01-17 | Stop reason: SDUPTHER

## 2021-09-20 ENCOUNTER — TELEPHONE (OUTPATIENT)
Dept: FAMILY MEDICINE CLINIC | Facility: CLINIC | Age: 51
End: 2021-09-20

## 2021-09-20 RX ORDER — CEFDINIR 300 MG/1
300 CAPSULE ORAL 2 TIMES DAILY
Qty: 20 CAPSULE | Refills: 0 | Status: SHIPPED | OUTPATIENT
Start: 2021-09-20 | End: 2021-10-21

## 2021-10-14 DIAGNOSIS — M75.21 BICEPS TENDINITIS OF RIGHT UPPER EXTREMITY: ICD-10-CM

## 2021-10-14 DIAGNOSIS — G89.29 CHRONIC MIDLINE LOW BACK PAIN WITH SCIATICA, SCIATICA LATERALITY UNSPECIFIED: ICD-10-CM

## 2021-10-14 DIAGNOSIS — M75.41 IMPINGEMENT SYNDROME OF RIGHT SHOULDER: ICD-10-CM

## 2021-10-14 DIAGNOSIS — R20.0 NUMBNESS AND TINGLING OF RIGHT HAND: ICD-10-CM

## 2021-10-14 DIAGNOSIS — R20.2 NUMBNESS AND TINGLING OF RIGHT HAND: ICD-10-CM

## 2021-10-14 DIAGNOSIS — M75.51 BURSITIS OF RIGHT SHOULDER: ICD-10-CM

## 2021-10-14 DIAGNOSIS — M54.40 CHRONIC MIDLINE LOW BACK PAIN WITH SCIATICA, SCIATICA LATERALITY UNSPECIFIED: ICD-10-CM

## 2021-10-14 DIAGNOSIS — G60.9 IDIOPATHIC PERIPHERAL NEUROPATHY: Primary | ICD-10-CM

## 2021-10-14 RX ORDER — MELOXICAM 7.5 MG/1
7.5 TABLET ORAL DAILY
Qty: 30 TABLET | Refills: 1 | Status: SHIPPED | OUTPATIENT
Start: 2021-10-14 | End: 2021-12-18 | Stop reason: SDUPTHER

## 2021-10-14 RX ORDER — LOSARTAN POTASSIUM 100 MG/1
100 TABLET ORAL DAILY
Qty: 90 TABLET | Refills: 2 | Status: SHIPPED | OUTPATIENT
Start: 2021-10-14 | End: 2022-04-14 | Stop reason: SDUPTHER

## 2021-10-14 RX ORDER — GABAPENTIN 300 MG/1
300 CAPSULE ORAL 3 TIMES DAILY
Qty: 90 CAPSULE | Refills: 3 | Status: SHIPPED | OUTPATIENT
Start: 2021-10-14 | End: 2022-04-01 | Stop reason: SDUPTHER

## 2021-10-14 RX ORDER — ATORVASTATIN CALCIUM 40 MG/1
40 TABLET, FILM COATED ORAL DAILY
Qty: 90 TABLET | Refills: 2 | Status: SHIPPED | OUTPATIENT
Start: 2021-10-14 | End: 2022-06-05 | Stop reason: SDUPTHER

## 2021-10-14 RX ORDER — HYDROCHLOROTHIAZIDE 25 MG/1
25 TABLET ORAL DAILY
Qty: 90 TABLET | Refills: 2 | Status: SHIPPED | OUTPATIENT
Start: 2021-10-14 | End: 2022-07-08 | Stop reason: SDUPTHER

## 2021-10-14 RX ORDER — TRAMADOL HYDROCHLORIDE 50 MG/1
50 TABLET ORAL 3 TIMES DAILY PRN
Qty: 90 TABLET | Refills: 1 | Status: SHIPPED | OUTPATIENT
Start: 2021-10-14 | End: 2022-02-16 | Stop reason: SDUPTHER

## 2021-10-14 RX ORDER — LEVOCETIRIZINE DIHYDROCHLORIDE 5 MG/1
5 TABLET, FILM COATED ORAL EVERY EVENING
Qty: 30 TABLET | Refills: 5 | Status: SHIPPED | OUTPATIENT
Start: 2021-10-14 | End: 2022-04-14 | Stop reason: SDUPTHER

## 2021-10-21 ENCOUNTER — OFFICE VISIT (OUTPATIENT)
Dept: FAMILY MEDICINE CLINIC | Facility: CLINIC | Age: 51
End: 2021-10-21

## 2021-10-21 VITALS
DIASTOLIC BLOOD PRESSURE: 82 MMHG | SYSTOLIC BLOOD PRESSURE: 144 MMHG | HEART RATE: 84 BPM | BODY MASS INDEX: 30.02 KG/M2 | TEMPERATURE: 97.3 F | WEIGHT: 159 LBS | HEIGHT: 61 IN | OXYGEN SATURATION: 99 %

## 2021-10-21 DIAGNOSIS — G89.29 CHRONIC MIDLINE LOW BACK PAIN WITH SCIATICA, SCIATICA LATERALITY UNSPECIFIED: Primary | ICD-10-CM

## 2021-10-21 DIAGNOSIS — K02.9 DENTAL CARIES: ICD-10-CM

## 2021-10-21 DIAGNOSIS — M54.40 CHRONIC MIDLINE LOW BACK PAIN WITH SCIATICA, SCIATICA LATERALITY UNSPECIFIED: Primary | ICD-10-CM

## 2021-10-21 DIAGNOSIS — S80.819A ABRASION, LEG W/O INFECTION: ICD-10-CM

## 2021-10-21 PROCEDURE — 99214 OFFICE O/P EST MOD 30 MIN: CPT | Performed by: PHYSICIAN ASSISTANT

## 2021-10-21 NOTE — PROGRESS NOTES
Subjective   Trinidad Quinteros is a 51 y.o. female  Back Pain (follow up on back pain, req refill on percocet ) and Abrasion (abrasion on right lower ankle on sunday, not healing )      History of Present Illness  Patient is pleasant 51-year-old female comes today for follow-up of chronic midline low back pain for which she is on chronic opioid therapy Percocet 1 nightly along with muscle relaxer and anti-inflammatory.  She is due for refills for Percocet.  She has 1 nightly.  Denies any adverse effects with medication advised to be very helpful in reducing her pain and allow her to increase her ability to carry out ADLs and to rest well at night.  Additionally she is scheduled to have multiple teeth removed within the next week due to dental caries and is requesting an increase in her quality Percocet so that she would not have to mix opioids from the dentist and our office.  Patient has a another new issue which is of an abrasion on her right lower ankle where she scraped it against a chair in her kitchen.  It is not draining but is not healing either.  She is keeping it covered with a bandage and using over-the-counter topical antibacterial ointment.  The following portions of the patient's history were reviewed and updated as appropriate: allergies, current medications, past social history and problem list    Review of Systems   Constitutional: Negative.  Negative for fever.   HENT: Positive for dental problem.    Respiratory: Negative.    Gastrointestinal: Negative.    Genitourinary: Negative.    Musculoskeletal: Positive for back pain. Negative for arthralgias, gait problem and myalgias.   Skin: Positive for color change and wound. Negative for pallor and rash.        Abrasion on right ankle medial malleolus no drainage no surrounding erythema abrasion measures approximately 1 by half centimeter size   Neurological: Negative for dizziness, tremors, weakness and numbness.       Objective     Vitals:    10/21/21  1557   BP: 144/82   Pulse: 84   Temp: 97.3 °F (36.3 °C)   SpO2: 99%       Physical Exam  Vitals and nursing note reviewed.   Constitutional:       General: She is not in acute distress.     Appearance: Normal appearance. She is well-developed. She is not ill-appearing, toxic-appearing or diaphoretic.   HENT:      Head: Normocephalic and atraumatic.      Mouth/Throat:      Dentition: Abnormal dentition. Dental caries present.   Cardiovascular:      Rate and Rhythm: Normal rate and regular rhythm.   Pulmonary:      Effort: Pulmonary effort is normal.      Breath sounds: Normal breath sounds.   Abdominal:      General: Bowel sounds are normal.      Palpations: Abdomen is soft.   Musculoskeletal:         General: Tenderness present.      Lumbar back: Tenderness and bony tenderness present. No swelling, deformity or spasms. Decreased range of motion.   Skin:     Findings: Erythema and lesion present.      Comments: Abrasion noted right ankle medial malleolus half by 1 cm   Neurological:      Mental Status: She is alert and oriented to person, place, and time.      Deep Tendon Reflexes: Reflexes are normal and symmetric.   Psychiatric:         Mood and Affect: Mood normal.         Behavior: Behavior normal.         Thought Content: Thought content normal.         Judgment: Judgment normal.         Assessment/Plan     Diagnoses and all orders for this visit:    1. Chronic midline low back pain with sciatica, sciatica laterality unspecified (Primary)    2. Dental caries    3. Abrasion, leg w/o infection    Other orders  -     mupirocin (Bactroban) 2 % ointment; Apply 1 application topically to the appropriate area as directed 2 (Two) Times a Day.  Dispense: 15 g; Refill: 1    New prescription given for Percocet increased dosage for this month 5/325 1 twice daily as needed due to chronic back pain with upcoming dental procedure.  We will lower dosage back down to 1 a day at next month's appointment    As part of this  patient's treatment plan, patient will be prescribed controlled substances. The patient has been made aware of appropriate use of such medications, including potential risk of somnolence, limited ability to drive and /or work safely, and potential for dependence or overdose. It has also been made clear that these medications are for use by this patient only, without concomitant use of alcohol or other substances unless prescribed.Controlled substance status of medication discussed with patient, discussed risks of medication including abuse potential and diversion potential and need to follow up for reevaluation appointment in order to receive further refills.    Part of this note may be an electronic transcription/translation of spoken language to printed text using the Dragon Dictation System.

## 2021-11-19 ENCOUNTER — OFFICE VISIT (OUTPATIENT)
Dept: FAMILY MEDICINE CLINIC | Facility: CLINIC | Age: 51
End: 2021-11-19

## 2021-11-19 VITALS
BODY MASS INDEX: 30.21 KG/M2 | SYSTOLIC BLOOD PRESSURE: 140 MMHG | OXYGEN SATURATION: 99 % | HEIGHT: 61 IN | DIASTOLIC BLOOD PRESSURE: 86 MMHG | WEIGHT: 160 LBS | HEART RATE: 87 BPM | TEMPERATURE: 97 F

## 2021-11-19 DIAGNOSIS — G89.29 CHRONIC DENTAL PAIN: ICD-10-CM

## 2021-11-19 DIAGNOSIS — M54.40 CHRONIC MIDLINE LOW BACK PAIN WITH SCIATICA, SCIATICA LATERALITY UNSPECIFIED: Primary | ICD-10-CM

## 2021-11-19 DIAGNOSIS — K08.9 CHRONIC DENTAL PAIN: ICD-10-CM

## 2021-11-19 DIAGNOSIS — G89.29 CHRONIC MIDLINE LOW BACK PAIN WITH SCIATICA, SCIATICA LATERALITY UNSPECIFIED: Primary | ICD-10-CM

## 2021-11-19 PROCEDURE — 99213 OFFICE O/P EST LOW 20 MIN: CPT | Performed by: PHYSICIAN ASSISTANT

## 2021-11-19 RX ORDER — SERTRALINE HYDROCHLORIDE 100 MG/1
200 TABLET, FILM COATED ORAL DAILY
Qty: 60 TABLET | Refills: 5 | Status: SHIPPED | OUTPATIENT
Start: 2021-11-19 | End: 2022-06-05 | Stop reason: SDUPTHER

## 2021-11-19 RX ORDER — TIZANIDINE 4 MG/1
2-4 TABLET ORAL 2 TIMES DAILY PRN
Qty: 60 TABLET | Refills: 5 | Status: SHIPPED | OUTPATIENT
Start: 2021-11-19 | End: 2022-02-17 | Stop reason: SDUPTHER

## 2021-11-19 NOTE — PROGRESS NOTES
Subjective   Trinidad Quinteros is a 51 y.o. female  Joint Pain (follow up on chronic joint pain, req refill on Oxycodone )      History of Present Illness  Patient is a 51-year-old female who comes today for follow-up of chronic back pain associate degenerative disc disease managed with NSAIDs, muscle actions and daily opioid therapy due for refills of Percocet.  She has been having increased pain due to ongoing extensive dental work as well therefore dosage of Percocet was increased to twice daily as needed at last appointment.  She is going to be continued to have dental work including dental implants taking place on and off through the next month.  Pain is moderately severe with breakthrough pain on 1 Percocet daily.  We are going to continue to manage her dental pain with opioids from our office alone as to avoid any conflict with multiple prescriptions being written from her dentist and our office.  The following portions of the patient's history were reviewed and updated as appropriate: allergies, current medications, past social history and problem list    Review of Systems   Constitutional: Negative.    HENT: Positive for dental problem.    Respiratory: Negative.    Gastrointestinal: Negative.  Negative for constipation.   Genitourinary: Negative.    Musculoskeletal: Positive for back pain. Negative for arthralgias, gait problem and myalgias.   Neurological: Negative for dizziness, tremors, weakness and numbness.   Psychiatric/Behavioral: Negative.        Objective     Vitals:    11/19/21 1554   BP: 140/86   Pulse: 87   Temp: 97 °F (36.1 °C)   SpO2: 99%       Physical Exam  Vitals and nursing note reviewed.   Constitutional:       General: She is not in acute distress.     Appearance: Normal appearance. She is well-developed. She is not ill-appearing, toxic-appearing or diaphoretic.   HENT:      Head: Normocephalic and atraumatic.      Mouth/Throat:      Dentition: Abnormal dentition.      Comments: Currently  process of having all of her teeth on the lower jaw removed and dental work on upper jaw as well  Cardiovascular:      Rate and Rhythm: Normal rate and regular rhythm.      Heart sounds: Normal heart sounds.   Pulmonary:      Effort: Pulmonary effort is normal.   Neurological:      Mental Status: She is alert and oriented to person, place, and time.      Cranial Nerves: No cranial nerve deficit.   Psychiatric:         Attention and Perception: She is attentive.         Speech: Speech normal.         Behavior: Behavior normal.         Thought Content: Thought content normal.         Judgment: Judgment normal.         Assessment/Plan     Diagnoses and all orders for this visit:    1. Chronic midline low back pain with sciatica, sciatica laterality unspecified (Primary)    2. Chronic dental pain    Prescription written for Percocet 5/325 1 twice daily as needed for chronic back pain and for as needed breakthrough dental pain 1 month supply no refills follow-up in 1 month for recheck continue follow-up with oral surgeon as well.    As part of this patient's treatment plan, patient will be prescribed controlled substances. The patient has been made aware of appropriate use of such medications, including potential risk of somnolence, limited ability to drive and /or work safely, and potential for dependence or overdose. It has also been made clear that these medications are for use by this patient only, without concomitant use of alcohol or other substances unless prescribed.Controlled substance status of medication discussed with patient, discussed risks of medication including abuse potential and diversion potential and need to follow up for reevaluation appointment in order to receive further refills.    Part of this note may be an electronic transcription/translation of spoken language to printed text using the Dragon Dictation System.

## 2021-12-09 ENCOUNTER — TELEPHONE (OUTPATIENT)
Dept: FAMILY MEDICINE CLINIC | Facility: CLINIC | Age: 51
End: 2021-12-09

## 2021-12-09 NOTE — TELEPHONE ENCOUNTER
She is concerned that she may have an infection where her tooth was pulled I think it is important that she see her dentist this week when is she seen the dentist again?

## 2021-12-09 NOTE — TELEPHONE ENCOUNTER
Patient had a tooth pulled, and may have an infection. She wants to know if an antibiotic can be sent in or she needs to be seen.Her pharmacy is, [id number] - [facility] [address] [place] [address] AT [facility] [place] [facility] .[phone] [facility] [phone]..   Thanks,   Angela

## 2021-12-16 ENCOUNTER — DOCUMENTATION (OUTPATIENT)
Dept: PHYSICAL THERAPY | Facility: HOSPITAL | Age: 51
End: 2021-12-16

## 2021-12-16 ENCOUNTER — OFFICE VISIT (OUTPATIENT)
Dept: FAMILY MEDICINE CLINIC | Facility: CLINIC | Age: 51
End: 2021-12-16

## 2021-12-16 VITALS
OXYGEN SATURATION: 98 % | HEART RATE: 91 BPM | DIASTOLIC BLOOD PRESSURE: 84 MMHG | WEIGHT: 160 LBS | BODY MASS INDEX: 30.21 KG/M2 | HEIGHT: 61 IN | SYSTOLIC BLOOD PRESSURE: 130 MMHG | TEMPERATURE: 97.1 F

## 2021-12-16 DIAGNOSIS — M54.40 CHRONIC MIDLINE LOW BACK PAIN WITH SCIATICA, SCIATICA LATERALITY UNSPECIFIED: Primary | ICD-10-CM

## 2021-12-16 DIAGNOSIS — R20.0 NUMBNESS AND TINGLING OF RIGHT HAND: Primary | ICD-10-CM

## 2021-12-16 DIAGNOSIS — G89.29 CHRONIC MIDLINE LOW BACK PAIN WITH SCIATICA, SCIATICA LATERALITY UNSPECIFIED: Primary | ICD-10-CM

## 2021-12-16 DIAGNOSIS — G89.29 CHRONIC DENTAL PAIN: ICD-10-CM

## 2021-12-16 DIAGNOSIS — K08.9 CHRONIC DENTAL PAIN: ICD-10-CM

## 2021-12-16 DIAGNOSIS — R20.2 NUMBNESS AND TINGLING OF RIGHT HAND: Primary | ICD-10-CM

## 2021-12-16 PROCEDURE — 99213 OFFICE O/P EST LOW 20 MIN: CPT | Performed by: PHYSICIAN ASSISTANT

## 2021-12-16 NOTE — THERAPY DISCHARGE NOTE
Outpatient Physical Therapy Discharge Summary         Patient Name: Trinidad Quinteros  : 1970  MRN: [id number]    Today's Date: 2021    Visit Dx:    ICD-10-CM ICD-9-CM   1. Numbness and tingling of right hand  R20.0 782.0    R20.2        Client was treated for 3 visits for numbness and tingling in her right hand.  She also has shoulder pain and neck pain.  She was making some progress with overall pain and tolerance to activity at last visit.  She did continue with pain that was mild to moderate.  Prognosis at discharge is fair.    Dm Steele, PT  2021

## 2021-12-16 NOTE — PROGRESS NOTES
Subjective   Trinidad Quinteros is a 51 y.o. female  Back Pain (Follow up on back pain, refill on Oxycodone )      History of Present Illness  Patient comes in today for follow-up on chronic back pain associate with degenerative disc disease as well as for pain management regarding upcoming oral surgery tomorrow.    The patient is a 51-year-old female who presents today for follow-up on pain management associated with chronic back pain. She is due for refills of her controlled substance of Percocet. She has been taking her Percocet for her back pain and dental pain. Prior to her dental pain, she was requiring Percocet 1 tablet a night. The patient does not experience any adverse side effects. Her dentist is not providing her with any pain medications and is leaving that for us to manage. She is scheduled for a dental procedure including 3 teeth extractions and 3 dental implants put in tomorrow, 12/17/2021. She has been started on antibiotics in preparation for her dental procedure.     The following portions of the patient's history were reviewed and updated as appropriate: allergies, current medications, past social history and problem list    Review of Systems   Constitutional: Negative.    HENT:        Dental pain   Respiratory: Negative.    Gastrointestinal: Negative.    Genitourinary: Negative.    Musculoskeletal: Positive for back pain (chronic). Negative for arthralgias, gait problem and myalgias.   Neurological: Negative for dizziness, tremors, weakness and numbness.       Objective     Vitals:    12/16/21 1614   BP: 130/84   Pulse: 91   Temp: 97.1 °F (36.2 °C)   SpO2: 98%       Physical Exam  Vitals and nursing note reviewed.   Constitutional:       General: She is not in acute distress.     Appearance: Normal appearance. She is well-developed. She is not ill-appearing, toxic-appearing or diaphoretic.   Cardiovascular:      Rate and Rhythm: Normal rate and regular rhythm.   Pulmonary:      Effort: Pulmonary  effort is normal. No respiratory distress.      Breath sounds: Normal breath sounds.   Abdominal:      General: Bowel sounds are normal.      Palpations: Abdomen is soft.   Musculoskeletal:         General: Tenderness present. No deformity.      Lumbar back: Tenderness present. No swelling, deformity, spasms or bony tenderness. Decreased range of motion.   Skin:     General: Skin is warm and dry.      Findings: No bruising or rash.   Neurological:      Mental Status: She is alert and oriented to person, place, and time.      Sensory: No sensory deficit.      Coordination: Coordination normal.      Gait: Gait normal.      Deep Tendon Reflexes: Reflexes are normal and symmetric.   Psychiatric:         Mood and Affect: Mood normal.         Behavior: Behavior normal.       No physical exam macro noted.       Assessment/Plan     Diagnoses and all orders for this visit:    1. Chronic midline low back pain with sciatica, sciatica laterality unspecified (Primary)  - Continues to experience back pain on current medications, on med list requiring Percocet 1 nightly.   - Encouraged the usage of MiraLAX to avoid constipation.     2. Chronic dental pain  - The patient is getting ready to have dental surgery tomorrow.   - We are going to be managing her pain management on that regard.   - Prescribed Percocet for a total of 80 tablets for her to take 1 tablet 4 times a day as needed for the first 10 days following her oral surgery staring tomorrow, and then 1 tablet 2 times a day for the next 20 days.    Follow-up with me in 1 month. She will continue to be under the care of her oral surgeon and dentist.   Written prescription given for Percocet 5/325 1 every 4-6 hours as needed for the first 10 days following oral surgery and then 1 twice daily to 3 times daily thereafter for the remainder of the month to cover for dental pain and chronic back pain #80 with 0 refills.    As part of this patient's treatment plan, patient will be  prescribed controlled substances. The patient has been made aware of appropriate use of such medications, including potential risk of somnolence, limited ability to drive and /or work safely, and potential for dependence or overdose. It has also been made clear that these medications are for use by this patient only, without concomitant use of alcohol or other substances unless prescribed.Controlled substance status of medication discussed with patient, discussed risks of medication including abuse potential and diversion potential and need to follow up for reevaluation appointment in order to receive further refills.    Part of this note may be an electronic transcription/translation of spoken language to printed text using the Dragon Dictation System.        Transcribed from ambient dictation for Josephine Delacruz PA-C by Damaris Hernandez, [facility] Rep.  12/17/21   10:50 EST    Patient verbalized consent to the visit recording.  I have personally performed the services described in this document as transcribed by the above individual, and it is both accurate and complete.  Josephine Delacruz PA-C  12/17/2021  16:45 EST

## 2021-12-17 ENCOUNTER — TELEPHONE (OUTPATIENT)
Dept: FAMILY MEDICINE CLINIC | Facility: CLINIC | Age: 51
End: 2021-12-17

## 2021-12-18 DIAGNOSIS — M75.41 IMPINGEMENT SYNDROME OF RIGHT SHOULDER: ICD-10-CM

## 2021-12-18 DIAGNOSIS — M75.51 BURSITIS OF RIGHT SHOULDER: ICD-10-CM

## 2021-12-18 DIAGNOSIS — M75.21 BICEPS TENDINITIS OF RIGHT UPPER EXTREMITY: ICD-10-CM

## 2021-12-18 DIAGNOSIS — R20.0 NUMBNESS AND TINGLING OF RIGHT HAND: ICD-10-CM

## 2021-12-18 DIAGNOSIS — R20.2 NUMBNESS AND TINGLING OF RIGHT HAND: ICD-10-CM

## 2021-12-20 ENCOUNTER — TELEPHONE (OUTPATIENT)
Dept: FAMILY MEDICINE CLINIC | Facility: CLINIC | Age: 51
End: 2021-12-20

## 2021-12-20 RX ORDER — ONDANSETRON 4 MG/1
4 TABLET, FILM COATED ORAL EVERY 8 HOURS PRN
Qty: 20 TABLET | Refills: 1 | Status: SHIPPED | OUTPATIENT
Start: 2021-12-20 | End: 2022-06-28 | Stop reason: SDUPTHER

## 2021-12-20 NOTE — TELEPHONE ENCOUNTER
Patient is in a lot of pain due to oral surgery this past Friday. She wants to know how often she can take the gabapentin (NEURONTIN) 300 MG capsules. Currently she is taking them three times a day for pain. You can reach her at [phone].....   Thanks,   Angela.....

## 2021-12-20 NOTE — TELEPHONE ENCOUNTER
I do not recommend raising the dose of the gabapentin I do not think that will help her oral pain.  I want her to talk to her oral surgeon about her suggestion for her pain management and do not recommend changing her pain medication at this time any higher than that we have prescribed.

## 2021-12-21 RX ORDER — MELOXICAM 7.5 MG/1
7.5 TABLET ORAL DAILY
Qty: 30 TABLET | Refills: 0 | Status: SHIPPED | OUTPATIENT
Start: 2021-12-21 | End: 2022-02-16 | Stop reason: SDUPTHER

## 2022-01-17 ENCOUNTER — OFFICE VISIT (OUTPATIENT)
Dept: FAMILY MEDICINE CLINIC | Facility: CLINIC | Age: 52
End: 2022-01-17

## 2022-01-17 VITALS
TEMPERATURE: 97 F | SYSTOLIC BLOOD PRESSURE: 134 MMHG | RESPIRATION RATE: 14 BRPM | HEART RATE: 71 BPM | BODY MASS INDEX: 29.07 KG/M2 | OXYGEN SATURATION: 99 % | DIASTOLIC BLOOD PRESSURE: 82 MMHG | HEIGHT: 61 IN | WEIGHT: 154 LBS

## 2022-01-17 DIAGNOSIS — M50.30 DEGENERATIVE DISC DISEASE, CERVICAL: ICD-10-CM

## 2022-01-17 DIAGNOSIS — J01.00 ACUTE NON-RECURRENT MAXILLARY SINUSITIS: Primary | ICD-10-CM

## 2022-01-17 DIAGNOSIS — M54.40 CHRONIC MIDLINE LOW BACK PAIN WITH SCIATICA, SCIATICA LATERALITY UNSPECIFIED: ICD-10-CM

## 2022-01-17 DIAGNOSIS — G89.29 CHRONIC MIDLINE LOW BACK PAIN WITH SCIATICA, SCIATICA LATERALITY UNSPECIFIED: ICD-10-CM

## 2022-01-17 PROCEDURE — 99214 OFFICE O/P EST MOD 30 MIN: CPT | Performed by: PHYSICIAN ASSISTANT

## 2022-01-17 RX ORDER — CEFDINIR 300 MG/1
300 CAPSULE ORAL 2 TIMES DAILY
Qty: 20 CAPSULE | Refills: 0 | Status: SHIPPED | OUTPATIENT
Start: 2022-01-17 | End: 2022-02-16

## 2022-01-17 RX ORDER — OXYCODONE HYDROCHLORIDE AND ACETAMINOPHEN 5; 325 MG/1; MG/1
1 TABLET ORAL NIGHTLY
Qty: 30 TABLET | Refills: 0 | Status: SHIPPED | OUTPATIENT
Start: 2022-01-17 | End: 2022-02-16 | Stop reason: SDUPTHER

## 2022-01-17 RX ORDER — PREDNISONE 20 MG/1
20 TABLET ORAL 2 TIMES DAILY
Qty: 14 TABLET | Refills: 0 | Status: SHIPPED | OUTPATIENT
Start: 2022-01-17 | End: 2022-02-16

## 2022-01-17 NOTE — PROGRESS NOTES
Subjective   Trinidad Quinteros is a 51 y.o. female  Back Pain (F/U. RF Percocet 5/325) and Nasal Congestion      History of Present Illness  Patient is a pleasant 51-year-old white female who presents for chronic back pain needs refill of Percocet 5/325 1 p.o. every 6-8 hours meds working well no problems complaints    Patient claims sinus pressure sinus congestion blowing green-yellow drainage from nose mild sore throat symptoms x3 days no shortness breath no chest pain no nausea vomiting  The following portions of the patient's history were reviewed and updated as appropriate: allergies, current medications, past social history and problem list    Review of Systems   Constitutional: Negative.  Negative for chills, fatigue and fever.   HENT: Positive for congestion, ear pain, postnasal drip, rhinorrhea and sinus pressure. Negative for sore throat.    Eyes: Positive for discharge and itching. Negative for pain.   Respiratory: Negative.  Negative for shortness of breath.    Gastrointestinal: Negative.    Genitourinary: Negative.    Musculoskeletal: Positive for back pain. Negative for arthralgias, gait problem and myalgias.   Neurological: Positive for light-headedness and headaches. Negative for dizziness, tremors, weakness and numbness.   Hematological: Negative for adenopathy.   Psychiatric/Behavioral: Positive for sleep disturbance. Negative for behavioral problems and dysphoric mood. The patient is not nervous/anxious.        Objective     Vitals:    01/17/22 1135   BP: 134/82   Pulse: 71   Resp: 14   Temp: 97 °F (36.1 °C)   SpO2: 99%       Physical Exam  Vitals and nursing note reviewed.   Constitutional:       Appearance: She is well-developed.   HENT:      Head: Normocephalic and atraumatic.      Right Ear: Tympanic membrane and ear canal normal.      Left Ear: Tympanic membrane and ear canal normal.      Nose: Mucosal edema and rhinorrhea present.      Right Sinus: Maxillary sinus tenderness and frontal sinus  tenderness present.      Left Sinus: Maxillary sinus tenderness and frontal sinus tenderness present.      Mouth/Throat:      Pharynx: No oropharyngeal exudate.   Eyes:      Pupils: Pupils are equal, round, and reactive to light.   Cardiovascular:      Rate and Rhythm: Normal rate and regular rhythm.   Pulmonary:      Effort: Pulmonary effort is normal.      Breath sounds: Normal breath sounds.   Abdominal:      General: Bowel sounds are normal.      Palpations: Abdomen is soft.   Musculoskeletal:      Lumbar back: Tenderness and bony tenderness present. No swelling, deformity or spasms. Decreased range of motion.   Neurological:      Mental Status: She is alert and oriented to person, place, and time.      Deep Tendon Reflexes: Reflexes are normal and symmetric.         Assessment/Plan     Diagnoses and all orders for this visit:    1. Acute non-recurrent maxillary sinusitis (Primary)  -     cefdinir (OMNICEF) 300 MG capsule; Take 1 capsule by mouth 2 (Two) Times a Day.  Dispense: 20 capsule; Refill: 0  -     predniSONE (DELTASONE) 20 MG tablet; Take 1 tablet by mouth 2 (Two) Times a Day.  Dispense: 14 tablet; Refill: 0    2. Degenerative disc disease, cervical    3. Chronic midline low back pain with sciatica, sciatica laterality unspecified    Other orders  -     cefdinir (OMNICEF) 300 MG capsule; Take 1 capsule by mouth 2 (Two) Times a Day.  Dispense: 20 capsule; Refill: 0    Percocet 10/325 1 p.o. 4 times daily as needed pain dispense 30    As part of this patient's treatment plan, patient will be prescribed controlled substances. The patient has been made aware of appropriate use of such medications, including potential risk of somnolence, limited ability to drive and /or work safely, and potential for dependence or overdose. It has also been made clear that these medications are for use by this patient only, without concomitant use of alcohol or other substances unless prescribed.Controlled substance status  of medication discussed with patient, discussed risks of medication including abuse potential and diversion potential and need to follow up for reevaluation appointment in order to receive further refills.    Part of this note may be an electronic transcription/translation of spoken language to printed text using the Dragon Dictation System.

## 2022-02-07 ENCOUNTER — TELEPHONE (OUTPATIENT)
Dept: FAMILY MEDICINE CLINIC | Facility: CLINIC | Age: 52
End: 2022-02-07

## 2022-02-07 RX ORDER — AMOXICILLIN 500 MG/1
500 CAPSULE ORAL 3 TIMES DAILY
Qty: 21 CAPSULE | Refills: 0 | Status: SHIPPED | OUTPATIENT
Start: 2022-02-07 | End: 2022-03-17

## 2022-02-07 NOTE — TELEPHONE ENCOUNTER
Absolutely needs to test for covid today, home test is fine with me. I will send rx for antibiotic and congestion medication also

## 2022-02-16 ENCOUNTER — OFFICE VISIT (OUTPATIENT)
Dept: FAMILY MEDICINE CLINIC | Facility: CLINIC | Age: 52
End: 2022-02-16

## 2022-02-16 VITALS
OXYGEN SATURATION: 97 % | TEMPERATURE: 97 F | WEIGHT: 154 LBS | HEART RATE: 81 BPM | BODY MASS INDEX: 29.07 KG/M2 | SYSTOLIC BLOOD PRESSURE: 108 MMHG | DIASTOLIC BLOOD PRESSURE: 66 MMHG | HEIGHT: 61 IN

## 2022-02-16 DIAGNOSIS — G89.29 CHRONIC MIDLINE LOW BACK PAIN WITH SCIATICA, SCIATICA LATERALITY UNSPECIFIED: Primary | ICD-10-CM

## 2022-02-16 DIAGNOSIS — M75.41 IMPINGEMENT SYNDROME OF RIGHT SHOULDER: ICD-10-CM

## 2022-02-16 DIAGNOSIS — M75.51 BURSITIS OF RIGHT SHOULDER: ICD-10-CM

## 2022-02-16 DIAGNOSIS — R20.2 NUMBNESS AND TINGLING OF RIGHT HAND: ICD-10-CM

## 2022-02-16 DIAGNOSIS — G89.29 CHRONIC MIDLINE LOW BACK PAIN WITH SCIATICA, SCIATICA LATERALITY UNSPECIFIED: ICD-10-CM

## 2022-02-16 DIAGNOSIS — M54.40 CHRONIC MIDLINE LOW BACK PAIN WITH SCIATICA, SCIATICA LATERALITY UNSPECIFIED: ICD-10-CM

## 2022-02-16 DIAGNOSIS — M54.40 CHRONIC MIDLINE LOW BACK PAIN WITH SCIATICA, SCIATICA LATERALITY UNSPECIFIED: Primary | ICD-10-CM

## 2022-02-16 DIAGNOSIS — G47.33 OSA (OBSTRUCTIVE SLEEP APNEA): ICD-10-CM

## 2022-02-16 DIAGNOSIS — R20.0 NUMBNESS AND TINGLING OF RIGHT HAND: ICD-10-CM

## 2022-02-16 PROCEDURE — 99213 OFFICE O/P EST LOW 20 MIN: CPT | Performed by: PHYSICIAN ASSISTANT

## 2022-02-16 RX ORDER — OXYCODONE HYDROCHLORIDE AND ACETAMINOPHEN 5; 325 MG/1; MG/1
1 TABLET ORAL NIGHTLY
Qty: 30 TABLET | Refills: 0 | Status: SHIPPED | OUTPATIENT
Start: 2022-02-16 | End: 2022-03-17 | Stop reason: SDUPTHER

## 2022-02-16 RX ORDER — TRAMADOL HYDROCHLORIDE 50 MG/1
50 TABLET ORAL 3 TIMES DAILY PRN
Qty: 90 TABLET | Refills: 3 | Status: SHIPPED | OUTPATIENT
Start: 2022-02-16 | End: 2022-07-08 | Stop reason: SDUPTHER

## 2022-02-16 RX ORDER — MELOXICAM 7.5 MG/1
7.5 TABLET ORAL DAILY
Qty: 30 TABLET | Refills: 5 | Status: SHIPPED | OUTPATIENT
Start: 2022-02-16 | End: 2022-07-20 | Stop reason: SDUPTHER

## 2022-02-16 NOTE — PROGRESS NOTES
Subjective   Trinidad Quinteros is a 51 y.o. female  Back Pain (Follow up on back pain and DDD, refill on Oxycodone)      History of Present Illness  The patient is coming in for follow-up of chronic back pain managed with opioid therapy.     The patient reports she is back to her standard dose of one a day of Percocet. She states she will have dental work probably not until March or April. She reports she takes tramadol three times a day as needed. The patient is currently taking meloxicam for her arm pain.     The following portions of the patient's history were reviewed and updated as appropriate: allergies, current medications, past social history and problem list    Review of Systems   Constitutional: Positive for fatigue.   Respiratory: Positive for apnea ( Needing a new CPAP machine).    Gastrointestinal: Negative.    Genitourinary: Negative.    Musculoskeletal: Positive for arthralgias, back pain, myalgias and neck pain. Negative for gait problem.        Chronic back and neck pain.   Neurological: Negative for dizziness, tremors, weakness and numbness.       Objective     Vitals:    02/16/22 1607   BP: 108/66   Pulse: 81   Temp: 97 °F (36.1 °C)   SpO2: 97%       Physical Exam  Vitals and nursing note reviewed.   Constitutional:       General: She is not in acute distress.     Appearance: Normal appearance. She is well-developed. She is not ill-appearing, toxic-appearing or diaphoretic.   Cardiovascular:      Rate and Rhythm: Normal rate and regular rhythm.   Pulmonary:      Effort: Pulmonary effort is normal. No respiratory distress.      Breath sounds: Normal breath sounds.   Abdominal:      General: Bowel sounds are normal.      Palpations: Abdomen is soft.   Musculoskeletal:         General: Tenderness present. No deformity.      Lumbar back: Tenderness present. No swelling, deformity, spasms or bony tenderness. Decreased range of motion.   Skin:     General: Skin is warm and dry.      Findings: No bruising  or rash.   Neurological:      Mental Status: She is alert and oriented to person, place, and time.      Sensory: No sensory deficit.      Coordination: Coordination normal.      Gait: Gait normal.      Deep Tendon Reflexes: Reflexes are normal and symmetric.   Psychiatric:         Mood and Affect: Mood normal.         Behavior: Behavior normal.         Thought Content: Thought content normal.         Judgment: Judgment normal.         Assessment/Plan     Diagnoses and all orders for this visit:    1. Chronic midline low back pain with sciatica, sciatica laterality unspecified  -     traMADol (ULTRAM) 50 MG tablet; Take 1 tablet by mouth 3 (Three) Times a Day As Needed for Moderate Pain .  Dispense: 90 tablet; Refill: 3    2. Impingement syndrome of right shoulder  -     meloxicam (MOBIC) 7.5 MG tablet; Take 1 tablet by mouth Daily with food  Dispense: 30 tablet; Refill: 5    3. Bursitis of right shoulder  -     meloxicam (MOBIC) 7.5 MG tablet; Take 1 tablet by mouth Daily with food  Dispense: 30 tablet; Refill: 5    4. Biceps tendinitis of right upper extremity  -     meloxicam (MOBIC) 7.5 MG tablet; Take 1 tablet by mouth Daily with food  Dispense: 30 tablet; Refill: 5    5. Numbness and tingling of right hand  -     meloxicam (MOBIC) 7.5 MG tablet; Take 1 tablet by mouth Daily with food  Dispense: 30 tablet; Refill: 5      Discussed with patient the importance of continued use of CPAP machine to help with treatment of obstructive sleep apnea and importance and getting a new CPAP machine for proper treatment.       As part of this patient's treatment plan, patient will be prescribed controlled substances. The patient has been made aware of appropriate use of such medications, including potential risk of somnolence, limited ability to drive and /or work safely, and potential for dependence or overdose. It has also been made clear that these medications are for use by this patient only, without concomitant use of  alcohol or other substances unless prescribed.Controlled substance status of medication discussed with patient, discussed risks of medication including abuse potential and diversion potential and need to follow up for reevaluation appointment in order to receive further refills.    Part of this note may be an electronic transcription/translation of spoken language to printed text using the Dragon Dictation System.          Transcribed from ambient dictation for Josephine Delacruz PA-C by Jose Vigil.  02/17/22   13:00 EST    Patient verbalized consent to the visit recording.  I have personally performed the services described in this document as transcribed by the above individual, and it is both accurate and complete.  Josephine Delacruz PA-C  3/2/2022  10:44 EST

## 2022-02-18 RX ORDER — TIZANIDINE 4 MG/1
2-4 TABLET ORAL 2 TIMES DAILY PRN
Qty: 60 TABLET | Refills: 5 | Status: SHIPPED | OUTPATIENT
Start: 2022-02-18 | End: 2022-10-09 | Stop reason: SDUPTHER

## 2022-03-01 RX ORDER — BENZONATATE 200 MG/1
200 CAPSULE ORAL 3 TIMES DAILY PRN
Qty: 30 CAPSULE | Refills: 0 | Status: SHIPPED | OUTPATIENT
Start: 2022-03-01 | End: 2022-03-17

## 2022-03-01 NOTE — TELEPHONE ENCOUNTER
Patient wants to know if she can get some Tessalon Perles sent in to her pharmacy here at [facility] for a cough?   Thanks,   Angela.....

## 2022-03-17 ENCOUNTER — OFFICE VISIT (OUTPATIENT)
Dept: FAMILY MEDICINE CLINIC | Facility: CLINIC | Age: 52
End: 2022-03-17

## 2022-03-17 VITALS
DIASTOLIC BLOOD PRESSURE: 82 MMHG | WEIGHT: 158 LBS | SYSTOLIC BLOOD PRESSURE: 140 MMHG | HEIGHT: 61 IN | HEART RATE: 77 BPM | TEMPERATURE: 97.2 F | BODY MASS INDEX: 29.83 KG/M2 | OXYGEN SATURATION: 98 %

## 2022-03-17 DIAGNOSIS — N95.1 MENOPAUSAL AND FEMALE CLIMACTERIC STATES: ICD-10-CM

## 2022-03-17 DIAGNOSIS — Z12.31 BREAST CANCER SCREENING BY MAMMOGRAM: ICD-10-CM

## 2022-03-17 DIAGNOSIS — G89.29 CHRONIC MIDLINE LOW BACK PAIN WITH SCIATICA, SCIATICA LATERALITY UNSPECIFIED: ICD-10-CM

## 2022-03-17 DIAGNOSIS — J40 BRONCHITIS: ICD-10-CM

## 2022-03-17 DIAGNOSIS — R05.9 COUGH: Primary | ICD-10-CM

## 2022-03-17 DIAGNOSIS — F17.209 TOBACCO USE DISORDER, CONTINUOUS: ICD-10-CM

## 2022-03-17 DIAGNOSIS — M25.551 HIP PAIN, RIGHT: ICD-10-CM

## 2022-03-17 DIAGNOSIS — M54.40 CHRONIC MIDLINE LOW BACK PAIN WITH SCIATICA, SCIATICA LATERALITY UNSPECIFIED: ICD-10-CM

## 2022-03-17 PROCEDURE — 99214 OFFICE O/P EST MOD 30 MIN: CPT | Performed by: PHYSICIAN ASSISTANT

## 2022-03-17 RX ORDER — CEFDINIR 300 MG/1
300 CAPSULE ORAL 2 TIMES DAILY
Qty: 20 CAPSULE | Refills: 0 | Status: SHIPPED | OUTPATIENT
Start: 2022-03-17 | End: 2022-03-25 | Stop reason: ALTCHOICE

## 2022-03-17 RX ORDER — ALBUTEROL SULFATE 90 UG/1
2 AEROSOL, METERED RESPIRATORY (INHALATION) EVERY 4 HOURS PRN
Qty: 18 G | Refills: 5 | Status: SHIPPED | OUTPATIENT
Start: 2022-03-17 | End: 2022-05-05 | Stop reason: SDUPTHER

## 2022-03-17 RX ORDER — DEXTROMETHORPHAN HYDROBROMIDE AND PROMETHAZINE HYDROCHLORIDE 15; 6.25 MG/5ML; MG/5ML
5 SYRUP ORAL 4 TIMES DAILY PRN
Qty: 120 ML | Refills: 0 | Status: SHIPPED | OUTPATIENT
Start: 2022-03-17 | End: 2022-04-01 | Stop reason: SDUPTHER

## 2022-03-17 NOTE — PROGRESS NOTES
Subjective   Trinidad Quinteros is a 51 y.o. female  Back Pain (Follow up on chronic back pain, refill on oxycodone ) and Cough (Ongoing mild productive cough )      History of Present Illness     The patient verbally consented to being recorded.     Patient is a 51-year-old female seen today for follow-up on chronic pain management. She is due for refills of oxycodone, which she takes nightly for chronic back pain associated with degenerative disc disease. She is also having trouble with an ongoing cough.    The patient reports that she has been coughing for 2 weeks. She noted that the cough is worse at night, it usually wakes her up. She denies any shortness of breath from activity, no wheezing, no dyspnea on exertion. Her last chest x-ray was in 2016. She had bronchitis a few weeks ago and was on amoxicillin. She reports coughing up mucous. She denies any problem with acid reflux, no feeling of acid coming up, no belching. She has not been taking any acid reflux medication. She continues to take Xyzal and Flonase for allergies. She is smoking half a pack a day.    She has been experiencing menopausal symptoms of hot flashes and cold sweat for about 3 to 4 months now. She noted waking up with clothes soaked. No history of blood clots, no hot flashes during the day. It has been 20 years since her last menstrual period. She has history of partial hysterectomy. She is not taking any hormones as her ovaries are intact. She reports trying Cohosh previously. No breast lumps reported. She is due for mammogram.    The patient reports worsening right hip pain. She noted difficulty walking. She states that her right leg and right hip tightens up. She is currently taking Percocet once a dayand tramadol for chronic pain.    The patient is allergic to BACTRIM    The following portions of the patient's history were reviewed and updated as appropriate: allergies, current medications, past social history and problem  list        Review of Systems   Constitutional: Negative.    HENT: Positive for congestion.    Respiratory: Positive for cough.    Gastrointestinal: Negative.    Endocrine: Positive for heat intolerance.   Genitourinary: Negative.    Musculoskeletal: Positive for arthralgias, back pain and gait problem. Negative for joint swelling and myalgias.   Skin: Negative.    Neurological: Negative for dizziness, tremors, weakness and numbness.   Psychiatric/Behavioral: Positive for sleep disturbance.       Objective     Vitals:    03/17/22 1538   BP: 140/82   Pulse: 77   Temp: 97.2 °F (36.2 °C)   SpO2: 98%         Physical Exam  Vitals and nursing note reviewed.   Constitutional:       General: She is not in acute distress.     Appearance: Normal appearance. She is well-developed. She is not ill-appearing, toxic-appearing or diaphoretic.   HENT:      Head: Normocephalic and atraumatic.      Nose: Nose normal.   Neck:      Vascular: No JVD.   Cardiovascular:      Rate and Rhythm: Normal rate and regular rhythm.      Heart sounds: Normal heart sounds. No murmur heard.  Pulmonary:      Effort: Pulmonary effort is normal. No respiratory distress.      Breath sounds: No stridor. Wheezing present.   Abdominal:      General: Bowel sounds are normal.      Palpations: Abdomen is soft.   Musculoskeletal:         General: Tenderness ( hip and back) present. No deformity. Normal range of motion.      Cervical back: Neck supple.      Lumbar back: Tenderness present. No swelling, deformity, spasms or bony tenderness.   Lymphadenopathy:      Cervical: No cervical adenopathy.   Skin:     General: Skin is warm and dry.      Coloration: Skin is not pale.      Findings: No bruising or rash.   Neurological:      Mental Status: She is alert and oriented to person, place, and time.      Sensory: No sensory deficit.      Coordination: Coordination normal.      Gait: Gait normal.      Deep Tendon Reflexes: Reflexes are normal and symmetric.    Psychiatric:         Mood and Affect: Mood normal.         Behavior: Behavior normal.          1. Bronchitis  We will do a round of different antibiotic. It seems like her bronchitis has not resolved. Amoxicillin was ineffective. She is allergic to Bactrim. We will start her on Omnicef. I will prescribe albuterol inhaler to use 2 puffs 30 minutes prior to going to bed for the next week and cough syrup to suppress her night time cough. If her cough does not improve after 1 week, she will do a chest x-ray. I strongly encouraged patient to reduce her smoking.    2. Menopausal symptoms  She has history of partial hysterectomy. She is currently not on any hormone therapy. She denies any blood clot, and breast lumps. I have placed an order for a mammogram. Once we get the result, we discussed starting the patient on estrogen to relieve menopausal symptoms.     3. Right hip pain  She noted worsening hip pain. She is currently taking tramadol and Percocet for chronic pain. I have ordered an x-ray for further evaluation. We discussed that depending on the result, we may need to do physical therapy or referral for injections. I have refilled her Percocet.    Assessment/Plan     Diagnoses and all orders for this visit:    1. Cough (Primary)  -     XR Chest PA & Lateral; Future    2. Bronchitis    3. Hip pain, right  -     XR Hip With or Without Pelvis 2 - 3 View Right; Future    4. Breast cancer screening by mammogram  -     Mammo Screening Digital Tomosynthesis Bilateral With CAD; Future    5. Menopausal and female climacteric states    6. Tobacco use disorder, continuous    7. Chronic midline low back pain with sciatica, sciatica laterality unspecified    Other orders  -     cefdinir (OMNICEF) 300 MG capsule; Take 1 capsule by mouth 2 (Two) Times a Day.  Dispense: 20 capsule; Refill: 0  -     promethazine-dextromethorphan (PROMETHAZINE-DM) 6.25-15 MG/5ML syrup; Take 5 mL by mouth 4 (Four) Times a Day As Needed for Cough.   Dispense: 120 mL; Refill: 0  -     albuterol sulfate  (90 Base) MCG/ACT inhaler; Inhale 2 puffs Every 4 (Four) Hours As Needed for Wheezing or Shortness of Air (cough).  Dispense: 18 g; Refill: 5    Will send in 1 month prescription of current dosage of Percocet 5/325 1 nightly for chronic arthritic pain in back and hip #30 with no refills.  Follow-up in 1 month for recheck discussed starting estrogen replacement therapy after patient has mammogram done.    As part of this patient's treatment plan, patient will be prescribed controlled substances. The patient has been made aware of appropriate use of such medications, including potential risk of somnolence, limited ability to drive and /or work safely, and potential for dependence or overdose. It has also been made clear that these medications are for use by this patient only, without concomitant use of alcohol or other substances unless prescribed.Controlled substance status of medication discussed with patient, discussed risks of medication including abuse potential and diversion potential and need to follow up for reevaluation appointment in order to receive further refills.    Part of this note may be an electronic transcription/translation of spoken language to printed text using the Dragon Dictation System.

## 2022-03-18 RX ORDER — OXYCODONE HYDROCHLORIDE AND ACETAMINOPHEN 5; 325 MG/1; MG/1
1 TABLET ORAL NIGHTLY
Qty: 30 TABLET | Refills: 0 | Status: SHIPPED | OUTPATIENT
Start: 2022-03-18 | End: 2022-04-15 | Stop reason: SDUPTHER

## 2022-03-21 DIAGNOSIS — G60.9 IDIOPATHIC PERIPHERAL NEUROPATHY: ICD-10-CM

## 2022-03-21 DIAGNOSIS — F41.9 ANXIETY: ICD-10-CM

## 2022-03-21 RX ORDER — ALPRAZOLAM 0.5 MG/1
0.5 TABLET ORAL 3 TIMES DAILY
Qty: 90 TABLET | Refills: 5 | Status: CANCELLED | OUTPATIENT
Start: 2022-03-21

## 2022-03-21 RX ORDER — GABAPENTIN 300 MG/1
300 CAPSULE ORAL 3 TIMES DAILY
Qty: 90 CAPSULE | Refills: 3 | Status: CANCELLED | OUTPATIENT
Start: 2022-03-21

## 2022-03-24 ENCOUNTER — HOSPITAL ENCOUNTER (OUTPATIENT)
Dept: GENERAL RADIOLOGY | Facility: HOSPITAL | Age: 52
Discharge: HOME | End: 2022-03-24

## 2022-03-24 ENCOUNTER — HOSPITAL ENCOUNTER (OUTPATIENT)
Dept: MAMMOGRAPHY | Facility: HOSPITAL | Age: 52
Discharge: HOME | End: 2022-03-24

## 2022-03-24 DIAGNOSIS — Z12.31 BREAST CANCER SCREENING BY MAMMOGRAM: ICD-10-CM

## 2022-03-24 DIAGNOSIS — R05.9 COUGH: ICD-10-CM

## 2022-03-24 DIAGNOSIS — M25.551 HIP PAIN, RIGHT: ICD-10-CM

## 2022-03-24 PROCEDURE — 77063 BREAST TOMOSYNTHESIS BI: CPT | Performed by: RADIOLOGY

## 2022-03-24 PROCEDURE — 73502 X-RAY EXAM HIP UNI 2-3 VIEWS: CPT

## 2022-03-24 PROCEDURE — 77067 SCR MAMMO BI INCL CAD: CPT | Performed by: RADIOLOGY

## 2022-03-24 PROCEDURE — 77063 BREAST TOMOSYNTHESIS BI: CPT

## 2022-03-24 PROCEDURE — 77067 SCR MAMMO BI INCL CAD: CPT

## 2022-03-24 PROCEDURE — 71046 X-RAY EXAM CHEST 2 VIEWS: CPT

## 2022-03-25 DIAGNOSIS — J84.9 INTERSTITIAL LUNG DISORDERS: ICD-10-CM

## 2022-03-25 DIAGNOSIS — R93.89 ABNORMAL CXR: ICD-10-CM

## 2022-03-25 DIAGNOSIS — R05.3 CHRONIC COUGH: Primary | ICD-10-CM

## 2022-03-25 RX ORDER — NICOTINE 21 MG/24HR
1 PATCH, TRANSDERMAL 24 HOURS TRANSDERMAL EVERY 24 HOURS
Qty: 21 PATCH | Refills: 2 | Status: SHIPPED | OUTPATIENT
Start: 2022-03-25

## 2022-03-25 RX ORDER — CLARITHROMYCIN 500 MG/1
500 TABLET, COATED ORAL 2 TIMES DAILY
Qty: 14 TABLET | Refills: 0 | Status: SHIPPED | OUTPATIENT
Start: 2022-03-25 | End: 2022-04-14

## 2022-03-29 ENCOUNTER — TELEPHONE (OUTPATIENT)
Dept: FAMILY MEDICINE CLINIC | Facility: CLINIC | Age: 52
End: 2022-03-29

## 2022-03-29 RX ORDER — DOXYCYCLINE 100 MG/1
100 CAPSULE ORAL 2 TIMES DAILY
Qty: 14 CAPSULE | Refills: 0 | Status: SHIPPED | OUTPATIENT
Start: 2022-03-29 | End: 2022-04-14

## 2022-04-01 DIAGNOSIS — F41.9 ANXIETY: ICD-10-CM

## 2022-04-01 DIAGNOSIS — G60.9 IDIOPATHIC PERIPHERAL NEUROPATHY: ICD-10-CM

## 2022-04-01 RX ORDER — DEXTROMETHORPHAN HYDROBROMIDE AND PROMETHAZINE HYDROCHLORIDE 15; 6.25 MG/5ML; MG/5ML
5 SYRUP ORAL 4 TIMES DAILY PRN
Qty: 120 ML | Refills: 0 | Status: SHIPPED | OUTPATIENT
Start: 2022-04-01 | End: 2022-06-16

## 2022-04-02 RX ORDER — ALPRAZOLAM 0.5 MG/1
0.5 TABLET ORAL 3 TIMES DAILY
Qty: 90 TABLET | Refills: 5 | Status: SHIPPED | OUTPATIENT
Start: 2022-04-02 | End: 2022-09-16 | Stop reason: SDUPTHER

## 2022-04-02 RX ORDER — GABAPENTIN 300 MG/1
300 CAPSULE ORAL 3 TIMES DAILY
Qty: 90 CAPSULE | Refills: 3 | Status: SHIPPED | OUTPATIENT
Start: 2022-04-02 | End: 2022-08-28 | Stop reason: SDUPTHER

## 2022-04-07 RX ORDER — CEFDINIR 300 MG/1
300 CAPSULE ORAL 2 TIMES DAILY
Qty: 20 CAPSULE | Refills: 0 | Status: SHIPPED | OUTPATIENT
Start: 2022-04-07 | End: 2022-05-16

## 2022-04-12 RX ORDER — LEVOCETIRIZINE DIHYDROCHLORIDE 5 MG/1
5 TABLET, FILM COATED ORAL EVERY EVENING
Qty: 30 TABLET | Refills: 5 | Status: CANCELLED | OUTPATIENT
Start: 2022-04-12

## 2022-04-14 ENCOUNTER — OFFICE VISIT (OUTPATIENT)
Dept: FAMILY MEDICINE CLINIC | Facility: CLINIC | Age: 52
End: 2022-04-14

## 2022-04-14 VITALS
BODY MASS INDEX: 29.45 KG/M2 | DIASTOLIC BLOOD PRESSURE: 86 MMHG | SYSTOLIC BLOOD PRESSURE: 142 MMHG | HEIGHT: 61 IN | TEMPERATURE: 97.2 F | HEART RATE: 68 BPM | WEIGHT: 156 LBS | OXYGEN SATURATION: 99 %

## 2022-04-14 DIAGNOSIS — M54.40 CHRONIC MIDLINE LOW BACK PAIN WITH SCIATICA, SCIATICA LATERALITY UNSPECIFIED: ICD-10-CM

## 2022-04-14 DIAGNOSIS — G89.29 CHRONIC MIDLINE LOW BACK PAIN WITH SCIATICA, SCIATICA LATERALITY UNSPECIFIED: ICD-10-CM

## 2022-04-14 DIAGNOSIS — R93.89 ABNORMAL CXR: ICD-10-CM

## 2022-04-14 DIAGNOSIS — F17.200 TOBACCO USE DISORDER: ICD-10-CM

## 2022-04-14 DIAGNOSIS — N95.1 MENOPAUSAL AND FEMALE CLIMACTERIC STATES: ICD-10-CM

## 2022-04-14 DIAGNOSIS — R05.3 CHRONIC COUGH: Primary | ICD-10-CM

## 2022-04-14 DIAGNOSIS — I10 PRIMARY HYPERTENSION: ICD-10-CM

## 2022-04-14 PROCEDURE — 99214 OFFICE O/P EST MOD 30 MIN: CPT | Performed by: PHYSICIAN ASSISTANT

## 2022-04-14 RX ORDER — NICOTINE 10 MG
1 CARTRIDGE (EA) INHALATION AS NEEDED
Qty: 168 EACH | Refills: 5 | Status: SHIPPED | OUTPATIENT
Start: 2022-04-14

## 2022-04-14 RX ORDER — LEVOCETIRIZINE DIHYDROCHLORIDE 5 MG/1
5 TABLET, FILM COATED ORAL EVERY EVENING
Qty: 30 TABLET | Refills: 11 | Status: SHIPPED | OUTPATIENT
Start: 2022-04-14

## 2022-04-14 RX ORDER — HYDROCHLOROTHIAZIDE 25 MG/1
25 TABLET ORAL DAILY
Qty: 90 TABLET | Refills: 3 | Status: SHIPPED | OUTPATIENT
Start: 2022-04-14

## 2022-04-14 RX ORDER — ESTRADIOL 0.5 MG/1
0.5 TABLET ORAL DAILY
Qty: 30 TABLET | Refills: 11 | Status: SHIPPED | OUTPATIENT
Start: 2022-04-14

## 2022-04-14 RX ORDER — LOSARTAN POTASSIUM 100 MG/1
100 TABLET ORAL DAILY
Qty: 90 TABLET | Refills: 3 | Status: SHIPPED | OUTPATIENT
Start: 2022-04-14 | End: 2023-02-16 | Stop reason: SDUPTHER

## 2022-04-14 NOTE — PROGRESS NOTES
Subjective   Trinidad Quinteros is a 51 y.o. female  Back Pain (Follow up on back pain, refill on oxycodone ), Med Refill (Med refill on losartan, HCTZ and Xyzal ), and hormone therapy (Follow up on mammogram for hormone replacement therapy)      History of Present Illness     Patient is a 51-year-old female seen today for follow-up on hypertension, seasonal allergies, chronic pain in back related to degenerative disc disease managed with opioid therapy (due for refills), and also to discuss recent mammogram results and treatment for menopausal symptoms which are currently uncontrolled.    The patient states she is currently taking losartan and hydrochlorothiazide regularly for her hypertension.    The patient notes she is scheduled for dental work next months and she will be having teeth pulled and dentures placed at that time. She notes she is still only able to eat soft foods currently so she has been struggling with what she can eat.    The patient notes improvement in her seasonal allergy symptoms. She states her sneezing and nasal drainage has improved. The patient is still taking Xyzal and Flonase regularly.     The patient states her chronic cough is mildly improved. She states she is coughing a little less but she still has associated sputum that is light yellow in color. The patient notes she is still taking the course of antibiotics prescribed to her during her last visit, using her Advair inhaler twice per day, and she is scheduled to see pulmonology next month. She denies any wheezing, shortness of breath, or fever.    The patient notes she has been experiencing intermittent menopausal symptoms of night sweats and hot flashes during the day. The patient denies a history of blood clots or clotting disorders. She recently had a mammogram and results were normal. She also notes a history of a partial hysterectomy, they only removed her uterus.    The patient states that she is smoking 10 cigarettes a day.  The patient states that she has not started using the nicotine patches because she has lost them at home. She reports that she just found them yesterday and plans to start using them now.     The following portions of the patient's history were reviewed and updated as appropriate: allergies, current medications, past social history and problem list    Review of Systems   Constitutional: Negative.  Negative for chills, fatigue and fever.   HENT: Positive for dental problem.    Respiratory: Positive for cough and wheezing. Negative for chest tightness and shortness of breath.    Cardiovascular: Negative for chest pain.   Gastrointestinal: Negative.  Negative for nausea.   Endocrine: Positive for heat intolerance.   Genitourinary: Negative.    Musculoskeletal: Positive for back pain. Negative for arthralgias, gait problem and myalgias.   Neurological: Negative for dizziness, tremors, weakness and numbness.   Psychiatric/Behavioral: Positive for sleep disturbance.       Objective     Vitals:    04/14/22 1638   BP: 142/86   Pulse: 68   Temp: 97.2 °F (36.2 °C)   SpO2: 99%       Physical Exam  Vitals and nursing note reviewed.   Constitutional:       General: She is not in acute distress.     Appearance: Normal appearance. She is well-developed and normal weight. She is not ill-appearing, toxic-appearing or diaphoretic.   HENT:      Head: Normocephalic and atraumatic.      Nose: Nose normal.   Neck:      Vascular: No JVD.   Cardiovascular:      Rate and Rhythm: Normal rate and regular rhythm.      Heart sounds: Normal heart sounds. No murmur heard.  Pulmonary:      Effort: Pulmonary effort is normal. No respiratory distress.      Breath sounds: No stridor. Wheezing present.   Abdominal:      General: Bowel sounds are normal.      Palpations: Abdomen is soft.   Musculoskeletal:         General: Tenderness present. No deformity.      Cervical back: Neck supple.      Lumbar back: Tenderness present. No swelling, deformity,  spasms or bony tenderness. Decreased range of motion.   Lymphadenopathy:      Cervical: No cervical adenopathy.   Skin:     General: Skin is warm and dry.      Findings: No bruising or rash.   Neurological:      Mental Status: She is alert and oriented to person, place, and time.      Sensory: No sensory deficit.      Coordination: Coordination normal.      Gait: Gait normal.      Deep Tendon Reflexes: Reflexes are normal and symmetric.   Psychiatric:         Mood and Affect: Mood normal.         Behavior: Behavior normal.         Assessment/Plan     Diagnoses and all orders for this visit:    1. Chronic cough (Primary)  -     XR Chest PA & Lateral; Future    2. Tobacco use disorder    3. Abnormal CXR    4. Chronic midline low back pain with sciatica, sciatica laterality unspecified    5. Primary hypertension    6. Menopausal and female climacteric states    Other orders  -     losartan (COZAAR) 100 MG tablet; Take 1 tablet by mouth Daily.  Dispense: 90 tablet; Refill: 3  -     levocetirizine (Xyzal) 5 MG tablet; Take 1 tablet by mouth Every Evening for allergies  Dispense: 30 tablet; Refill: 11  -     hydroCHLOROthiazide (HYDRODIURIL) 25 MG tablet; Take 1 tablet by mouth Daily.  Dispense: 90 tablet; Refill: 3  -     nicotine (Nicotrol) 10 MG inhaler; Inhale 1 puff As Needed for Smoking Cessation.  Dispense: 168 each; Refill: 5  -     estradiol (Estrace) 0.5 MG tablet; Take 1 tablet by mouth Daily. For menopausal hot flashes  Dispense: 30 tablet; Refill: 11    Will electronically send in a 1 month supply of current dosage of Percocet 5/325 1 nightly for chronic back pain #30 with no refills follow-up in 1 month for recheck.    As part of this patient's treatment plan, patient will be prescribed controlled substances. The patient has been made aware of appropriate use of such medications, including potential risk of somnolence, limited ability to drive and /or work safely, and potential for dependence or overdose. It  has also been made clear that these medications are for use by this patient only, without concomitant use of alcohol or other substances unless prescribed.Controlled substance status of medication discussed with patient, discussed risks of medication including abuse potential and diversion potential and need to follow up for reevaluation appointment in order to receive further refills.    Part of this note may be an electronic transcription/translation of spoken language to printed text using the Dragon Dictation System.      1. Hypertension  - The patient's blood pressure is well controlled at this time. I will send in refills of her losartan and hydrochlorothiazide.     2. Seasonal allergies  - The patient's allergy symptoms are well controlled at this time. I will send in refills for her Xyzal and Flonase.    3. Chronic pain in back related to degenerative disc disease  - The patient will continue her opioid therapy regimen. I will send in refills for her oxycodone.    4. Chronic cough  - I will order a repeat chest x-ray for the patient to get once she finished her current course of antibiotics.    5. Smoking cessation  - The patient will continue her current regimen of nicotine patches.  -  I encouraged the patient to stop smoking because she is also taking hormones, which further increases her risk for blood clots.  - I will also send a prescription for a Nicotrol inhaler.    6. Menopausal symptoms  - I will send in a prescription for estrogen.    Transcribed from ambient dictation for Josephine Delacruz PA-C by SHER LENTZ.  04/15/22   10:07 EDT    Patient verbalized consent to the visit recording.

## 2022-04-15 DIAGNOSIS — M54.40 CHRONIC MIDLINE LOW BACK PAIN WITH SCIATICA, SCIATICA LATERALITY UNSPECIFIED: ICD-10-CM

## 2022-04-15 DIAGNOSIS — G89.29 CHRONIC MIDLINE LOW BACK PAIN WITH SCIATICA, SCIATICA LATERALITY UNSPECIFIED: ICD-10-CM

## 2022-04-15 RX ORDER — OXYCODONE HYDROCHLORIDE AND ACETAMINOPHEN 5; 325 MG/1; MG/1
1 TABLET ORAL NIGHTLY
Qty: 30 TABLET | Refills: 0 | Status: SHIPPED | OUTPATIENT
Start: 2022-04-15 | End: 2022-05-16

## 2022-05-06 RX ORDER — ALBUTEROL SULFATE 90 UG/1
2 AEROSOL, METERED RESPIRATORY (INHALATION) EVERY 4 HOURS PRN
Qty: 18 G | Refills: 5 | Status: SHIPPED | OUTPATIENT
Start: 2022-05-06

## 2022-05-16 ENCOUNTER — OFFICE VISIT (OUTPATIENT)
Dept: FAMILY MEDICINE CLINIC | Facility: CLINIC | Age: 52
End: 2022-05-16

## 2022-05-16 VITALS
TEMPERATURE: 97.2 F | DIASTOLIC BLOOD PRESSURE: 72 MMHG | HEART RATE: 78 BPM | OXYGEN SATURATION: 99 % | SYSTOLIC BLOOD PRESSURE: 124 MMHG | HEIGHT: 61 IN | WEIGHT: 150 LBS | BODY MASS INDEX: 28.32 KG/M2

## 2022-05-16 DIAGNOSIS — G89.29 CHRONIC MIDLINE LOW BACK PAIN WITH SCIATICA, SCIATICA LATERALITY UNSPECIFIED: ICD-10-CM

## 2022-05-16 DIAGNOSIS — G89.29 CHRONIC MIDLINE LOW BACK PAIN WITH SCIATICA, SCIATICA LATERALITY UNSPECIFIED: Primary | ICD-10-CM

## 2022-05-16 DIAGNOSIS — M25.552 PAIN OF LEFT HIP JOINT: ICD-10-CM

## 2022-05-16 DIAGNOSIS — M54.40 CHRONIC MIDLINE LOW BACK PAIN WITH SCIATICA, SCIATICA LATERALITY UNSPECIFIED: Primary | ICD-10-CM

## 2022-05-16 DIAGNOSIS — M54.40 CHRONIC MIDLINE LOW BACK PAIN WITH SCIATICA, SCIATICA LATERALITY UNSPECIFIED: ICD-10-CM

## 2022-05-16 DIAGNOSIS — K02.9 COMPLEX DENTAL CARIES: ICD-10-CM

## 2022-05-16 PROCEDURE — 99213 OFFICE O/P EST LOW 20 MIN: CPT | Performed by: PHYSICIAN ASSISTANT

## 2022-05-16 RX ORDER — OXYCODONE HYDROCHLORIDE AND ACETAMINOPHEN 5; 325 MG/1; MG/1
1 TABLET ORAL 3 TIMES DAILY
Qty: 90 TABLET | Refills: 0 | Status: SHIPPED | OUTPATIENT
Start: 2022-05-16 | End: 2022-06-16 | Stop reason: SDUPTHER

## 2022-05-16 NOTE — PROGRESS NOTES
Subjective   Trinidad Quinteros is a 51 y.o. female  Pain (Follow up on chronic pain, req increase on Oxycodone due to Dental procedure tomorrow)      History of Present Illness     The patient is a 51-year-old female seen today for follow-up on chronic low back and hip pain in association with degenerative joint disease managed with chronic opioid therapy. She is due for refills on oxycodone-acetaminophen. Additionally, she is having a dental procedure done tomorrow and her dentist would like for us to manage her pain related to her dental procedure by adjusting her oxycodone to an increased dosage as needed while healing from her dental procedure.    The patient reports that she is having all of her top teeth pulled tomorrow. She states that they are not doing any implants immediately. She reports the denture is ready and they are going to put it in as soon as they take them out. She states that she has to keep it in for as long as she can. The patient reports that she had 2 implants on the bottom and she was needing to dose the hydrocodone 1(patient is on oxycodone) every 4 to 6 hours, especially the first 2 to 3 days, and then she started backing down.      The following portions of the patient's history were reviewed and updated as appropriate: allergies, current medications, past social history and problem list    Review of Systems   Constitutional: Negative.    HENT: Positive for dental problem.    Respiratory: Negative.    Gastrointestinal: Negative.    Genitourinary: Negative.    Musculoskeletal: Positive for back pain. Negative for arthralgias, gait problem and myalgias.   Neurological: Negative for dizziness, tremors, weakness and numbness.       Objective     Vitals:    05/16/22 0929   BP: 124/72   Pulse: 78   Temp: 97.2 °F (36.2 °C)   SpO2: 99%       Physical Exam  Vitals and nursing note reviewed.   Constitutional:       General: She is not in acute distress.     Appearance: Normal appearance. She is  well-developed. She is not ill-appearing, toxic-appearing or diaphoretic.   Cardiovascular:      Rate and Rhythm: Normal rate and regular rhythm.   Pulmonary:      Effort: Pulmonary effort is normal. No respiratory distress.      Breath sounds: Normal breath sounds.   Abdominal:      General: Bowel sounds are normal.      Palpations: Abdomen is soft.   Musculoskeletal:         General: Tenderness present. No deformity.      Lumbar back: Tenderness present. No swelling, deformity, spasms or bony tenderness. Decreased range of motion.   Skin:     General: Skin is warm and dry.      Findings: No bruising or rash.   Neurological:      Mental Status: She is alert and oriented to person, place, and time.      Sensory: No sensory deficit.      Coordination: Coordination normal.      Gait: Gait normal.      Deep Tendon Reflexes: Reflexes are normal and symmetric.   Psychiatric:         Mood and Affect: Mood normal.         Behavior: Behavior normal.         Assessment & Plan     Diagnoses and all orders for this visit:    1. Chronic midline low back pain with sciatica, sciatica laterality unspecified (Primary)    2. Pain of left hip joint    3. Complex dental caries      Refilled hydrocodone 3 times daily for 1 month. If she needs to she can take it every 4 to 6 hours, then after the first week, she can try to back down to every 8 hours and then the next week, twice a day. If she needs to take it more than 3 times a day, she can do it up to 4 times a day the first week, and then down to twice a day and then back by the fourth week down to just her regular.    Prescription will be sent for oxycodone/acetaminophen 5/325 increased dosage of 1 3 times daily as needed #90.  Instructed patient that she could take this at a dosage up to 1 4 times daily for the first week after dental procedure then decrease down to 1 3 times daily second week, decrease down to twice daily as needed the third weight by the fourth week be back to  her daily dosing for chronic back and hip pain.  Follow-up in 1 month for recheck.    As part of this patient's treatment plan, patient will be prescribed controlled substances. The patient has been made aware of appropriate use of such medications, including potential risk of somnolence, limited ability to drive and /or work safely, and potential for dependence or overdose. It has also been made clear that these medications are for use by this patient only, without concomitant use of alcohol or other substances unless prescribed.Controlled substance status of medication discussed with patient, discussed risks of medication including abuse potential and diversion potential and need to follow up for reevaluation appointment in order to receive further refills.    Part of this note may be an electronic transcription/translation of spoken language to printed text using the Dragon Dictation System.      Transcribed from ambient dictation for Josephine Delacruz PA-C by JOSSY SHEIKH.  05/16/22   10:40 EDT    Patient verbalized consent to the visit recording.

## 2022-06-07 RX ORDER — ATORVASTATIN CALCIUM 40 MG/1
40 TABLET, FILM COATED ORAL DAILY
Qty: 90 TABLET | Refills: 2 | Status: SHIPPED | OUTPATIENT
Start: 2022-06-07 | End: 2023-02-16 | Stop reason: SDUPTHER

## 2022-06-07 RX ORDER — SERTRALINE HYDROCHLORIDE 100 MG/1
200 TABLET, FILM COATED ORAL DAILY
Qty: 60 TABLET | Refills: 5 | Status: SHIPPED | OUTPATIENT
Start: 2022-06-07 | End: 2022-12-15 | Stop reason: SDUPTHER

## 2022-06-16 ENCOUNTER — OFFICE VISIT (OUTPATIENT)
Dept: FAMILY MEDICINE CLINIC | Facility: CLINIC | Age: 52
End: 2022-06-16

## 2022-06-16 VITALS
HEART RATE: 76 BPM | SYSTOLIC BLOOD PRESSURE: 148 MMHG | OXYGEN SATURATION: 98 % | HEIGHT: 61 IN | BODY MASS INDEX: 29.27 KG/M2 | TEMPERATURE: 97.2 F | DIASTOLIC BLOOD PRESSURE: 82 MMHG | WEIGHT: 155 LBS

## 2022-06-16 DIAGNOSIS — K02.9 COMPLEX DENTAL CARIES: ICD-10-CM

## 2022-06-16 DIAGNOSIS — F17.200 TOBACCO USE DISORDER: ICD-10-CM

## 2022-06-16 DIAGNOSIS — G89.29 CHRONIC MIDLINE LOW BACK PAIN WITH SCIATICA, SCIATICA LATERALITY UNSPECIFIED: ICD-10-CM

## 2022-06-16 DIAGNOSIS — M54.40 CHRONIC MIDLINE LOW BACK PAIN WITH SCIATICA, SCIATICA LATERALITY UNSPECIFIED: ICD-10-CM

## 2022-06-16 DIAGNOSIS — J41.1 CHRONIC BRONCHITIS WITH PRODUCTIVE MUCOPURULENT COUGH: Primary | ICD-10-CM

## 2022-06-16 PROCEDURE — 99214 OFFICE O/P EST MOD 30 MIN: CPT | Performed by: PHYSICIAN ASSISTANT

## 2022-06-16 RX ORDER — OXYCODONE HYDROCHLORIDE AND ACETAMINOPHEN 5; 325 MG/1; MG/1
1 TABLET ORAL 2 TIMES DAILY
Qty: 60 TABLET | Refills: 0 | Status: SHIPPED | OUTPATIENT
Start: 2022-06-16 | End: 2022-07-14 | Stop reason: SDUPTHER

## 2022-06-16 NOTE — PROGRESS NOTES
"Subjective   Trinidad Quinteros is a 51 y.o. female  Back Pain (Follow up on chronic back pain, refill on oxycodone only)      History of Present Illness   The patient is a 51-year-old female seen today to follow up on chronic back pain, and is due for a medication refill of oxycodone at this time. She is currently managing chronic back pain with a combination of NSAID, Mobic, muscle relaxant, tizanidine, tramadol once daily, and Percocet once nightly. She has recently had some significant dental work performed, and due to this we had to increase her Percocet/oxycodone for control of dental pain as well. She is also still taking her gabapentin 3 times daily as prescribed. Additionally, she is following up on previous pneumonia.    The patient states she is scheduled to have two more dental implant-posts placed in her lower gums, and currently using upper gum denture. The patient denies currently having any upper gum dental implant-posts in place or planning to have any placed in the near future. The patient reports experiencing persistent upper gum infection after upper gum dental implant-post removal, and recent infection debridement performed by Dentist on approximately 06/10/2022. She notes she can \"feel\" the infection when she takes her upper gum dentures out, but has been able to eat soft foods without issues. The patient states she has to contact her Dentist for antibiotic medication refill.    The patient is currently taking tramadol 50 mg 3 times daily as needed for moderate pain, anti-inflammatory, muscle relaxant, and gabapentin 300 mg 3 times daily for idiopathic peripheral neuropathy. The patient denies experiencing any fever, constipation, or medication side effects of the higher dose of oxycodone.    The patient reports experiencing persistent cough with mild improvement, and intermittent coughing up phlegm. The patient denies attempting to treat symptoms with Nicotrol inhaler. She notes that she was " able to receive the Nicotrol inhaler prescription, but has just not used it.    Social History:  The patient reports currently using tobacco nicotine products.    Family History:  The patient's brother is currently hospitalized for a rare autoimmune illness, with a history of COVID-19.      The following portions of the patient's history were reviewed and updated as appropriate: allergies, current medications, past social history and problem list    Review of Systems   Constitutional: Positive for appetite change. Negative for fever.   HENT: Positive for dental problem.    Respiratory: Positive for cough and wheezing. Negative for shortness of breath.    Cardiovascular: Negative.    Gastrointestinal: Negative.    Genitourinary: Negative.    Musculoskeletal: Positive for back pain.   Neurological: Negative.    Psychiatric/Behavioral: Positive for sleep disturbance.       Objective     Vitals:    06/16/22 1149   BP: 148/82   Pulse: 76   Temp: 97.2 °F (36.2 °C)   SpO2: 98%       Physical Exam  Vitals and nursing note reviewed.   Constitutional:       General: She is not in acute distress.     Appearance: Normal appearance. She is well-developed. She is not ill-appearing, toxic-appearing or diaphoretic.   HENT:      Head: Normocephalic and atraumatic.   Cardiovascular:      Rate and Rhythm: Normal rate and regular rhythm.      Heart sounds: Normal heart sounds. No murmur heard.  Pulmonary:      Effort: Pulmonary effort is normal. No respiratory distress.      Breath sounds: Wheezing ( light wheezes) present. No rales.   Chest:      Chest wall: No tenderness.   Abdominal:      General: Bowel sounds are normal.      Palpations: Abdomen is soft.   Musculoskeletal:         General: Tenderness present. No deformity.      Lumbar back: Tenderness present. No swelling, deformity, spasms or bony tenderness. Decreased range of motion.   Skin:     General: Skin is warm and dry.      Findings: No bruising or rash.   Neurological:       Mental Status: She is alert and oriented to person, place, and time.      Sensory: No sensory deficit.      Coordination: Coordination normal.      Gait: Gait normal.      Deep Tendon Reflexes: Reflexes are normal and symmetric.   Psychiatric:         Mood and Affect: Mood normal.         Behavior: Behavior normal.         Thought Content: Thought content normal.         Judgment: Judgment normal.         Assessment & Plan    1. Chronic bronchitis with productive mucopurulent cough  - Orders to perform XR Chest PA & Lateral placed at this time. Assured the patient that the office will contact her with the results of this testing when available within the next 1 day to 2 days.  - The patient is to continue as directed as per plan with Dental/Orthodontics.    2. Chronic midline low back pain with sciatica and complex dental caries  - The patient will continue taking traMADol (ULTRAM) 50 MG tablet 3 times daily as needed for moderate pain, muscle relaxant, and gabapentin (NEURONTIN) 300 MG capsule 3 times daily for idiopathic peripheral neuropathy as directed with no change to current medication treatment plan.  - The patient will continue taking oxycodone twice daily for the next month, then reevaluate to see if she is able to resume oxycodone once daily for the following month.  - As part of this patient's treatment plan, the patient will be prescribed controlled substances. The patient has been made aware of the appropriate use of such medications, including the potential risk of somnolence, limited ability to drive and /or work safely, and potential for dependence or overdose. It has also been made clear that these medications are for use by this patient only, without concomitant use of alcohol or other substances unless prescribed. Controlled substance status of medication discussed with the patient, discussed risks of medication including abuse potential and diversion potential, and need to follow up for  reevaluation appointment in order to receive further refills.   - We will electronically send in prescription for Percocet 5/325 1 twice daily for chronic back pain and dental pain #60 no refills follow-up in 1 month for recheck with goal to reduce Percocet back down to 1 nightly at that time.  - Medication refill orders to continue oxyCODONE-acetaminophen (PERCOCET) 5-325 MG per tablet 3 times daily for chronic midline low back pain with sciatica and oral dental pain electronically sent to her preferred pharmacy at this time.  - The patient is to continue as directed as per plan with Dental/Orthodontics.    3. Tobacco use disorder and smoking cessation  - The patient will continue to use the Nicotrol inhaler and get herself down off the cigarettes.    Follow-up in 1 month for medication recheck visit.      Diagnoses and all orders for this visit:    1. Chronic bronchitis with productive mucopurulent cough (HCC) (Primary)  -     XR Chest PA & Lateral; Future    2. Chronic midline low back pain with sciatica, sciatica laterality unspecified    3. Complex dental caries    4. Tobacco use disorder      Part of this note may be an electronic transcription/translation of spoken language to printed text using the Dragon Dictation System.          Transcribed from ambient dictation for Josephine Delacruz PA-C by WALTER ELIAS.  06/16/22   13:24 EDT    Patient verbalized consent to the visit recording.

## 2022-06-28 RX ORDER — ONDANSETRON 4 MG/1
4 TABLET, FILM COATED ORAL EVERY 8 HOURS PRN
Qty: 20 TABLET | Refills: 1 | Status: SHIPPED | OUTPATIENT
Start: 2022-06-28 | End: 2023-01-12 | Stop reason: SDUPTHER

## 2022-07-08 DIAGNOSIS — G89.29 CHRONIC MIDLINE LOW BACK PAIN WITH SCIATICA, SCIATICA LATERALITY UNSPECIFIED: ICD-10-CM

## 2022-07-08 DIAGNOSIS — M54.40 CHRONIC MIDLINE LOW BACK PAIN WITH SCIATICA, SCIATICA LATERALITY UNSPECIFIED: ICD-10-CM

## 2022-07-08 RX ORDER — TRAMADOL HYDROCHLORIDE 50 MG/1
50 TABLET ORAL 3 TIMES DAILY PRN
Qty: 90 TABLET | Refills: 2 | Status: SHIPPED | OUTPATIENT
Start: 2022-07-08 | End: 2022-09-16 | Stop reason: SDUPTHER

## 2022-07-08 RX ORDER — HYDROCHLOROTHIAZIDE 25 MG/1
25 TABLET ORAL DAILY
Qty: 90 TABLET | Refills: 2 | Status: SHIPPED | OUTPATIENT
Start: 2022-07-08

## 2022-07-13 ENCOUNTER — HOSPITAL ENCOUNTER (OUTPATIENT)
Dept: GENERAL RADIOLOGY | Facility: HOSPITAL | Age: 52
Discharge: HOME | End: 2022-07-13
Admitting: PHYSICIAN ASSISTANT

## 2022-07-13 DIAGNOSIS — J41.1 CHRONIC BRONCHITIS WITH PRODUCTIVE MUCOPURULENT COUGH: ICD-10-CM

## 2022-07-13 PROCEDURE — 71046 X-RAY EXAM CHEST 2 VIEWS: CPT

## 2022-07-14 ENCOUNTER — OFFICE VISIT (OUTPATIENT)
Dept: FAMILY MEDICINE CLINIC | Facility: CLINIC | Age: 52
End: 2022-07-14

## 2022-07-14 VITALS
HEIGHT: 61 IN | WEIGHT: 155 LBS | DIASTOLIC BLOOD PRESSURE: 72 MMHG | SYSTOLIC BLOOD PRESSURE: 130 MMHG | OXYGEN SATURATION: 98 % | BODY MASS INDEX: 29.27 KG/M2 | HEART RATE: 77 BPM | TEMPERATURE: 97 F

## 2022-07-14 DIAGNOSIS — G89.29 CHRONIC MIDLINE LOW BACK PAIN WITH SCIATICA, SCIATICA LATERALITY UNSPECIFIED: ICD-10-CM

## 2022-07-14 DIAGNOSIS — M54.40 CHRONIC MIDLINE LOW BACK PAIN WITH SCIATICA, SCIATICA LATERALITY UNSPECIFIED: ICD-10-CM

## 2022-07-14 DIAGNOSIS — J01.90 ACUTE NON-RECURRENT SINUSITIS, UNSPECIFIED LOCATION: ICD-10-CM

## 2022-07-14 DIAGNOSIS — G89.29 CHRONIC MIDLINE LOW BACK PAIN WITH SCIATICA, SCIATICA LATERALITY UNSPECIFIED: Primary | ICD-10-CM

## 2022-07-14 DIAGNOSIS — M54.40 CHRONIC MIDLINE LOW BACK PAIN WITH SCIATICA, SCIATICA LATERALITY UNSPECIFIED: Primary | ICD-10-CM

## 2022-07-14 PROCEDURE — 99214 OFFICE O/P EST MOD 30 MIN: CPT | Performed by: PHYSICIAN ASSISTANT

## 2022-07-14 RX ORDER — VALACYCLOVIR HYDROCHLORIDE 500 MG/1
TABLET, FILM COATED ORAL
COMMUNITY
Start: 2022-06-07 | End: 2023-02-16 | Stop reason: SDUPTHER

## 2022-07-14 RX ORDER — CEFDINIR 300 MG/1
300 CAPSULE ORAL 2 TIMES DAILY
Qty: 14 CAPSULE | Refills: 0 | Status: SHIPPED | OUTPATIENT
Start: 2022-07-14 | End: 2022-08-15

## 2022-07-14 RX ORDER — OXYCODONE HYDROCHLORIDE AND ACETAMINOPHEN 5; 325 MG/1; MG/1
1 TABLET ORAL DAILY
Qty: 30 TABLET | Refills: 0 | Status: SHIPPED | OUTPATIENT
Start: 2022-07-14 | End: 2022-08-15 | Stop reason: SDUPTHER

## 2022-07-20 DIAGNOSIS — R20.0 NUMBNESS AND TINGLING OF RIGHT HAND: ICD-10-CM

## 2022-07-20 DIAGNOSIS — M75.41 IMPINGEMENT SYNDROME OF RIGHT SHOULDER: ICD-10-CM

## 2022-07-20 DIAGNOSIS — R20.2 NUMBNESS AND TINGLING OF RIGHT HAND: ICD-10-CM

## 2022-07-20 DIAGNOSIS — M75.51 BURSITIS OF RIGHT SHOULDER: ICD-10-CM

## 2022-07-20 DIAGNOSIS — M19.90 ARTHRITIS: Primary | ICD-10-CM

## 2022-07-20 RX ORDER — MELOXICAM 7.5 MG/1
7.5 TABLET ORAL DAILY
Qty: 30 TABLET | Refills: 0 | Status: SHIPPED | OUTPATIENT
Start: 2022-07-20 | End: 2022-09-29 | Stop reason: SDUPTHER

## 2022-08-08 DIAGNOSIS — G60.9 IDIOPATHIC PERIPHERAL NEUROPATHY: ICD-10-CM

## 2022-08-10 RX ORDER — GABAPENTIN 300 MG/1
300 CAPSULE ORAL 3 TIMES DAILY
Qty: 90 CAPSULE | Refills: 3 | OUTPATIENT
Start: 2022-08-10

## 2022-08-15 ENCOUNTER — OFFICE VISIT (OUTPATIENT)
Dept: FAMILY MEDICINE CLINIC | Facility: CLINIC | Age: 52
End: 2022-08-15

## 2022-08-15 VITALS
TEMPERATURE: 97.2 F | HEIGHT: 61 IN | OXYGEN SATURATION: 100 % | BODY MASS INDEX: 29.27 KG/M2 | DIASTOLIC BLOOD PRESSURE: 80 MMHG | WEIGHT: 155 LBS | SYSTOLIC BLOOD PRESSURE: 130 MMHG | HEART RATE: 74 BPM

## 2022-08-15 DIAGNOSIS — D51.0 PERNICIOUS ANEMIA: Primary | ICD-10-CM

## 2022-08-15 DIAGNOSIS — G89.29 CHRONIC MIDLINE LOW BACK PAIN WITH SCIATICA, SCIATICA LATERALITY UNSPECIFIED: ICD-10-CM

## 2022-08-15 DIAGNOSIS — M54.40 CHRONIC MIDLINE LOW BACK PAIN WITH SCIATICA, SCIATICA LATERALITY UNSPECIFIED: ICD-10-CM

## 2022-08-15 DIAGNOSIS — E78.5 DYSLIPIDEMIA: ICD-10-CM

## 2022-08-15 PROCEDURE — 99213 OFFICE O/P EST LOW 20 MIN: CPT | Performed by: PHYSICIAN ASSISTANT

## 2022-08-15 NOTE — PROGRESS NOTES
Subjective   Trinidad Quinteros is a 51 y.o. female  Back Pain (Refill on oxycodone for back pain ), Arthritis (Req labs for refills on meloxicam and labs ), and lab order (Hasnt had b12 injection in a year, possible labs to restart injections )      History of Present Illness     The patient presents for follow-up on chronic back pain management as well as history of pernicious anemia and arthritis.    The patient states she has not received a vitamin B12 injection in a long time. She states she is not up to date on lab work. She has been taking Mobic. The patient denies a history of pancreatitis. She notes she needs to have a colonoscopy performed. The patient states she is down to 1 oxycodone a day at bedtime which is providing good relief. She is taking tramadol as needed. Arnol most recent triglyceride reading was 862.     The following portions of the patient's history were reviewed and updated as appropriate: allergies, current medications, past social history and problem list    Review of Systems   Constitutional: Positive for fatigue.   Respiratory: Negative.    Gastrointestinal: Negative.    Genitourinary: Negative.    Musculoskeletal: Positive for back pain ( chronic stable pattern). Negative for arthralgias, gait problem and myalgias.   Neurological: Negative for dizziness, tremors, weakness and numbness.   Psychiatric/Behavioral: Negative.        Objective     Vitals:    08/15/22 1614   BP: 130/80   Pulse: 74   Temp: 97.2 °F (36.2 °C)   SpO2: 100%       Physical Exam  Vitals and nursing note reviewed.   Constitutional:       General: She is not in acute distress.     Appearance: Normal appearance. She is well-developed. She is not ill-appearing, toxic-appearing or diaphoretic.   Cardiovascular:      Rate and Rhythm: Normal rate and regular rhythm.   Pulmonary:      Effort: Pulmonary effort is normal. No respiratory distress.      Breath sounds: Normal breath sounds.   Abdominal:      General: Bowel sounds  are normal.      Palpations: Abdomen is soft.   Musculoskeletal:         General: Tenderness present. No deformity.      Lumbar back: Tenderness present. No swelling, deformity, spasms or bony tenderness. Decreased range of motion.   Skin:     General: Skin is warm and dry.      Findings: No bruising or rash.   Neurological:      Mental Status: She is alert and oriented to person, place, and time.      Sensory: No sensory deficit.      Coordination: Coordination normal.      Gait: Gait normal.      Deep Tendon Reflexes: Reflexes are normal and symmetric.   Psychiatric:         Mood and Affect: Mood normal.         Behavior: Behavior normal.         Thought Content: Thought content normal.         Judgment: Judgment normal.         Assessment & Plan     Diagnoses and all orders for this visit:    1. Pernicious anemia (Primary)  -     Vitamin B12; Future  -     Folate; Future  -     CBC (No Diff); Future    2. Dyslipidemia  -     Lipid Panel; Future    3. Chronic midline low back pain with sciatica, sciatica laterality unspecified    Will send a 1 month prescription of current dosage of Percocet 5/325 1 daily #30 with no refills to pharmacy electronically.  Follow-up in 1 month and as needed.  We will contact patient with lab results and receive them determine further treatment plan.    As part of this patient's treatment plan, patient will be prescribed controlled substances. The patient has been made aware of appropriate use of such medications, including potential risk of somnolence, limited ability to drive and /or work safely, and potential for dependence or overdose. It has also been made clear that these medications are for use by this patient only, without concomitant use of alcohol or other substances unless prescribed.Controlled substance status of medication discussed with patient, discussed risks of medication including abuse potential and diversion potential and need to follow up for reevaluation  appointment in order to receive further refills.    Part of this note may be an electronic transcription/translation of spoken language to printed text using the Dragon Dictation System.      Transcribed from ambient dictation for Josephine Delacruz PA-C by JOSE SERNA.  08/15/22   17:13 EDT    Patient verbalized consent to the visit recording.  I have personally performed the services described in this document as transcribed by the above individual, and it is both accurate and complete.  Josephine Delacruz PA-C  8/16/2022  10:24 EDT

## 2022-08-16 ENCOUNTER — LAB (OUTPATIENT)
Dept: LAB | Facility: HOSPITAL | Age: 52
End: 2022-08-16

## 2022-08-16 DIAGNOSIS — D51.0 PERNICIOUS ANEMIA: ICD-10-CM

## 2022-08-16 DIAGNOSIS — E78.5 DYSLIPIDEMIA: ICD-10-CM

## 2022-08-16 DIAGNOSIS — M19.90 ARTHRITIS: ICD-10-CM

## 2022-08-16 LAB
ANION GAP SERPL CALCULATED.3IONS-SCNC: 9.9 MMOL/L (ref 5–15)
BUN SERPL-MCNC: 9 MG/DL (ref 6–20)
BUN/CREAT SERPL: 9.6 (ref 7–25)
CALCIUM SPEC-SCNC: 9.4 MG/DL (ref 8.6–10.5)
CHLORIDE SERPL-SCNC: 100 MMOL/L (ref 98–107)
CHOLEST SERPL-MCNC: 238 MG/DL (ref 0–200)
CO2 SERPL-SCNC: 28.1 MMOL/L (ref 22–29)
CREAT SERPL-MCNC: 0.94 MG/DL (ref 0.57–1)
DEPRECATED RDW RBC AUTO: 42.6 FL (ref 37–54)
EGFRCR SERPLBLD CKD-EPI 2021: 73.2 ML/MIN/1.73
ERYTHROCYTE [DISTWIDTH] IN BLOOD BY AUTOMATED COUNT: 13.5 % (ref 12.3–15.4)
FOLATE SERPL-MCNC: 3.59 NG/ML (ref 4.78–24.2)
GLUCOSE SERPL-MCNC: 93 MG/DL (ref 65–99)
HCT VFR BLD AUTO: 43.1 % (ref 34–46.6)
HDLC SERPL-MCNC: 44 MG/DL (ref 40–60)
HGB BLD-MCNC: 14.8 G/DL (ref 12–15.9)
LDLC SERPL CALC-MCNC: 126 MG/DL (ref 0–100)
LDLC/HDLC SERPL: 2.66 {RATIO}
MCH RBC QN AUTO: 29.8 PG (ref 26.6–33)
MCHC RBC AUTO-ENTMCNC: 34.3 G/DL (ref 31.5–35.7)
MCV RBC AUTO: 86.9 FL (ref 79–97)
PLATELET # BLD AUTO: 267 10*3/MM3 (ref 140–450)
PMV BLD AUTO: 10.1 FL (ref 6–12)
POTASSIUM SERPL-SCNC: 4.2 MMOL/L (ref 3.5–5.2)
RBC # BLD AUTO: 4.96 10*6/MM3 (ref 3.77–5.28)
SODIUM SERPL-SCNC: 138 MMOL/L (ref 136–145)
TRIGL SERPL-MCNC: 385 MG/DL (ref 0–150)
VIT B12 BLD-MCNC: 325 PG/ML (ref 211–946)
VLDLC SERPL-MCNC: 68 MG/DL (ref 5–40)
WBC NRBC COR # BLD: 7.93 10*3/MM3 (ref 3.4–10.8)

## 2022-08-16 PROCEDURE — 82607 VITAMIN B-12: CPT

## 2022-08-16 PROCEDURE — 36415 COLL VENOUS BLD VENIPUNCTURE: CPT

## 2022-08-16 PROCEDURE — 82746 ASSAY OF FOLIC ACID SERUM: CPT

## 2022-08-16 PROCEDURE — 80048 BASIC METABOLIC PNL TOTAL CA: CPT

## 2022-08-16 PROCEDURE — 80061 LIPID PANEL: CPT

## 2022-08-16 PROCEDURE — 85027 COMPLETE CBC AUTOMATED: CPT

## 2022-08-16 RX ORDER — FOLIC ACID 1 MG/1
1 TABLET ORAL DAILY
Qty: 30 TABLET | Refills: 11 | Status: SHIPPED | OUTPATIENT
Start: 2022-08-16

## 2022-08-16 RX ORDER — OXYCODONE HYDROCHLORIDE AND ACETAMINOPHEN 5; 325 MG/1; MG/1
1 TABLET ORAL DAILY
Qty: 30 TABLET | Refills: 0 | Status: SHIPPED | OUTPATIENT
Start: 2022-08-16 | End: 2022-09-16 | Stop reason: SDUPTHER

## 2022-08-17 ENCOUNTER — CLINICAL SUPPORT (OUTPATIENT)
Dept: FAMILY MEDICINE CLINIC | Facility: CLINIC | Age: 52
End: 2022-08-17

## 2022-08-17 DIAGNOSIS — E53.8 B12 DEFICIENCY: Primary | ICD-10-CM

## 2022-08-17 PROCEDURE — 96372 THER/PROPH/DIAG INJ SC/IM: CPT | Performed by: PHYSICIAN ASSISTANT

## 2022-08-17 RX ORDER — CYANOCOBALAMIN 1000 UG/ML
1000 INJECTION, SOLUTION INTRAMUSCULAR; SUBCUTANEOUS ONCE
Status: COMPLETED | OUTPATIENT
Start: 2022-08-17 | End: 2022-08-17

## 2022-08-17 RX ADMIN — CYANOCOBALAMIN 1000 MCG: 1000 INJECTION, SOLUTION INTRAMUSCULAR; SUBCUTANEOUS at 11:03

## 2022-08-26 ENCOUNTER — CLINICAL SUPPORT (OUTPATIENT)
Dept: FAMILY MEDICINE CLINIC | Facility: CLINIC | Age: 52
End: 2022-08-26

## 2022-08-26 DIAGNOSIS — D51.0 PERNICIOUS ANEMIA: Primary | ICD-10-CM

## 2022-08-26 PROCEDURE — 96372 THER/PROPH/DIAG INJ SC/IM: CPT | Performed by: PHYSICIAN ASSISTANT

## 2022-08-26 RX ORDER — CYANOCOBALAMIN 1000 UG/ML
1000 INJECTION, SOLUTION INTRAMUSCULAR; SUBCUTANEOUS ONCE
Status: COMPLETED | OUTPATIENT
Start: 2022-08-26 | End: 2022-08-26

## 2022-08-26 RX ADMIN — CYANOCOBALAMIN 1000 MCG: 1000 INJECTION, SOLUTION INTRAMUSCULAR; SUBCUTANEOUS at 08:55

## 2022-08-28 DIAGNOSIS — G60.9 IDIOPATHIC PERIPHERAL NEUROPATHY: ICD-10-CM

## 2022-08-29 RX ORDER — GABAPENTIN 300 MG/1
300 CAPSULE ORAL 3 TIMES DAILY
Qty: 90 CAPSULE | Refills: 5 | Status: SHIPPED | OUTPATIENT
Start: 2022-08-29 | End: 2022-10-14 | Stop reason: SDUPTHER

## 2022-09-02 ENCOUNTER — CLINICAL SUPPORT (OUTPATIENT)
Dept: FAMILY MEDICINE CLINIC | Facility: CLINIC | Age: 52
End: 2022-09-02

## 2022-09-02 DIAGNOSIS — E53.8 B12 DEFICIENCY: Primary | ICD-10-CM

## 2022-09-02 PROCEDURE — 96372 THER/PROPH/DIAG INJ SC/IM: CPT | Performed by: FAMILY MEDICINE

## 2022-09-02 RX ORDER — CYANOCOBALAMIN 1000 UG/ML
1000 INJECTION, SOLUTION INTRAMUSCULAR; SUBCUTANEOUS
Status: DISCONTINUED | OUTPATIENT
Start: 2022-09-02 | End: 2022-11-29

## 2022-09-02 RX ADMIN — CYANOCOBALAMIN 1000 MCG: 1000 INJECTION, SOLUTION INTRAMUSCULAR; SUBCUTANEOUS at 09:41

## 2022-09-08 RX ORDER — FLUTICASONE PROPIONATE 50 MCG
2 SPRAY, SUSPENSION (ML) NASAL DAILY
Qty: 16 G | Refills: 11 | Status: SHIPPED | OUTPATIENT
Start: 2022-09-08

## 2022-09-09 ENCOUNTER — CLINICAL SUPPORT (OUTPATIENT)
Dept: FAMILY MEDICINE CLINIC | Facility: CLINIC | Age: 52
End: 2022-09-09

## 2022-09-09 DIAGNOSIS — E53.8 B12 DEFICIENCY: Primary | ICD-10-CM

## 2022-09-09 PROCEDURE — 96372 THER/PROPH/DIAG INJ SC/IM: CPT | Performed by: PHYSICIAN ASSISTANT

## 2022-09-09 RX ORDER — CYANOCOBALAMIN 1000 UG/ML
1000 INJECTION, SOLUTION INTRAMUSCULAR; SUBCUTANEOUS
Status: DISCONTINUED | OUTPATIENT
Start: 2022-09-09 | End: 2022-11-29

## 2022-09-09 RX ADMIN — CYANOCOBALAMIN 1000 MCG: 1000 INJECTION, SOLUTION INTRAMUSCULAR; SUBCUTANEOUS at 11:44

## 2022-09-16 ENCOUNTER — OFFICE VISIT (OUTPATIENT)
Dept: FAMILY MEDICINE CLINIC | Facility: CLINIC | Age: 52
End: 2022-09-16

## 2022-09-16 VITALS
DIASTOLIC BLOOD PRESSURE: 70 MMHG | OXYGEN SATURATION: 97 % | HEIGHT: 61 IN | BODY MASS INDEX: 29.45 KG/M2 | HEART RATE: 80 BPM | WEIGHT: 156 LBS | TEMPERATURE: 97 F | SYSTOLIC BLOOD PRESSURE: 126 MMHG

## 2022-09-16 DIAGNOSIS — E53.8 VITAMIN B12 DEFICIENCY: ICD-10-CM

## 2022-09-16 DIAGNOSIS — F41.1 GAD (GENERALIZED ANXIETY DISORDER): ICD-10-CM

## 2022-09-16 DIAGNOSIS — Z51.81 ENCOUNTER FOR THERAPEUTIC DRUG MONITORING: ICD-10-CM

## 2022-09-16 DIAGNOSIS — G89.29 CHRONIC MIDLINE LOW BACK PAIN WITH SCIATICA, SCIATICA LATERALITY UNSPECIFIED: Primary | ICD-10-CM

## 2022-09-16 DIAGNOSIS — M54.40 CHRONIC MIDLINE LOW BACK PAIN WITH SCIATICA, SCIATICA LATERALITY UNSPECIFIED: ICD-10-CM

## 2022-09-16 DIAGNOSIS — M54.40 CHRONIC MIDLINE LOW BACK PAIN WITH SCIATICA, SCIATICA LATERALITY UNSPECIFIED: Primary | ICD-10-CM

## 2022-09-16 DIAGNOSIS — M19.90 ARTHRITIS: ICD-10-CM

## 2022-09-16 DIAGNOSIS — F41.9 ANXIETY: ICD-10-CM

## 2022-09-16 DIAGNOSIS — G89.29 CHRONIC MIDLINE LOW BACK PAIN WITH SCIATICA, SCIATICA LATERALITY UNSPECIFIED: ICD-10-CM

## 2022-09-16 PROCEDURE — 99213 OFFICE O/P EST LOW 20 MIN: CPT | Performed by: PHYSICIAN ASSISTANT

## 2022-09-16 PROCEDURE — 96372 THER/PROPH/DIAG INJ SC/IM: CPT | Performed by: PHYSICIAN ASSISTANT

## 2022-09-16 RX ORDER — CYANOCOBALAMIN 1000 UG/ML
1000 INJECTION, SOLUTION INTRAMUSCULAR; SUBCUTANEOUS ONCE
Status: COMPLETED | OUTPATIENT
Start: 2022-09-16 | End: 2022-09-16

## 2022-09-16 RX ORDER — OXYCODONE HYDROCHLORIDE AND ACETAMINOPHEN 5; 325 MG/1; MG/1
1 TABLET ORAL DAILY
Qty: 30 TABLET | Refills: 0 | Status: SHIPPED | OUTPATIENT
Start: 2022-09-16 | End: 2022-10-14 | Stop reason: SDUPTHER

## 2022-09-16 RX ORDER — ALPRAZOLAM 0.5 MG/1
0.5 TABLET ORAL 3 TIMES DAILY
Qty: 90 TABLET | Refills: 5 | Status: SHIPPED | OUTPATIENT
Start: 2022-09-16 | End: 2023-02-16 | Stop reason: SDUPTHER

## 2022-09-16 RX ORDER — TRAMADOL HYDROCHLORIDE 50 MG/1
50 TABLET ORAL 3 TIMES DAILY PRN
Qty: 90 TABLET | Refills: 2 | Status: SHIPPED | OUTPATIENT
Start: 2022-09-16 | End: 2023-02-16

## 2022-09-16 RX ADMIN — CYANOCOBALAMIN 1000 MCG: 1000 INJECTION, SOLUTION INTRAMUSCULAR; SUBCUTANEOUS at 09:53

## 2022-09-16 NOTE — PROGRESS NOTES
Subjective   Trinidad Quinteros is a 51 y.o. female  Back Pain (Follow up on back pain, refill on oxycodone )      History of Present Illness     Trinidad Quinteros, 1970, presents today for follow-up regarding chronic back pain with opioid management, B12 deficiency, and medication refills. The patient shares that 1 of her brothers recently passed away and another is terminally ill.     The following portions of the patient's history were reviewed and updated as appropriate: allergies, current medications, past social history and problem list    Review of Systems   Constitutional: Negative.  Negative for appetite change and fatigue.   Respiratory: Negative.  Negative for chest tightness and shortness of breath.    Gastrointestinal: Negative.  Negative for abdominal pain, diarrhea and nausea.   Musculoskeletal: Positive for back pain. Negative for arthralgias, gait problem and myalgias.   Neurological: Negative for dizziness, tremors, weakness, light-headedness, numbness and headaches.   Psychiatric/Behavioral: Positive for sleep disturbance. Negative for agitation, behavioral problems, confusion, decreased concentration, dysphoric mood and suicidal ideas. The patient is not nervous/anxious.        Objective     Vitals:    09/16/22 0920   BP: 126/70   Pulse: 80   Temp: 97 °F (36.1 °C)   SpO2: 97%       Physical Exam  Vitals and nursing note reviewed.   Constitutional:       General: She is not in acute distress.     Appearance: Normal appearance. She is well-developed. She is not ill-appearing, toxic-appearing or diaphoretic.   HENT:      Head: Normocephalic and atraumatic.   Neck:      Thyroid: No thyroid mass or thyromegaly.   Cardiovascular:      Rate and Rhythm: Normal rate and regular rhythm.      Heart sounds: Normal heart sounds.   Pulmonary:      Effort: Pulmonary effort is normal. No respiratory distress.   Skin:     General: Skin is warm and dry.   Neurological:      Mental Status: She is alert and  oriented to person, place, and time.   Psychiatric:         Attention and Perception: Attention and perception normal. She is attentive.         Mood and Affect: Mood is anxious. Mood is not depressed. Affect is not angry or inappropriate.         Speech: Speech normal.         Behavior: Behavior normal.         Thought Content: Thought content normal.         Cognition and Memory: Cognition normal.         Judgment: Judgment normal.         Assessment & Plan     Diagnoses and all orders for this visit:    1. Chronic midline low back pain with sciatica, sciatica laterality unspecified (Primary)  -     traMADol (ULTRAM) 50 MG tablet; Take 1 tablet by mouth 3 (Three) Times a Day As Needed for Moderate Pain .  Dispense: 90 tablet; Refill: 2    2. Vitamin B12 deficiency  -     cyanocobalamin injection 1,000 mcg    3. Arthritis    4. IVON (generalized anxiety disorder)    5. Encounter for therapeutic drug monitoring  -     Drug Analysis,Comp,Oral Fluid - Saliva,; Future      Orders for refills have been sent in, including 6-month supply of Xanax at the current dosage, 1-month supply of oxycodone at current dosage, and 3-month supply of tramadol. The patient will follow-up in 1-month for recheck. Her drug screen and controlled substance agreement have been updated today.      As part of this patient's treatment plan, patient will be prescribed controlled substances. The patient has been made aware of appropriate use of such medications, including potential risk of somnolence, limited ability to drive and /or work safely, and potential for dependence or overdose. It has also been made clear that these medications are for use by this patient only, without concomitant use of alcohol or other substances unless prescribed.Controlled substance status of medication discussed with patient, discussed risks of medication including abuse potential and diversion potential and need to follow up for reevaluation appointment in order to  receive further refills.    Part of this note may be an electronic transcription/translation of spoken language to printed text using the Dragon Dictation System.      Transcribed from ambient dictation for Josephine Delacruz PA-C by Herberth Paulson.  09/16/22   10:31 EDT    Patient verbalized consent to the visit recording.  I have personally performed the services described in this document as transcribed by the above individual, and it is both accurate and complete.  Josephine Delacruz PA-C  9/16/2022  13:42 EDT

## 2022-09-23 ENCOUNTER — CLINICAL SUPPORT (OUTPATIENT)
Dept: FAMILY MEDICINE CLINIC | Facility: CLINIC | Age: 52
End: 2022-09-23

## 2022-09-23 DIAGNOSIS — E53.8 VITAMIN B12 DEFICIENCY: Primary | ICD-10-CM

## 2022-09-23 PROCEDURE — 96372 THER/PROPH/DIAG INJ SC/IM: CPT | Performed by: PHYSICIAN ASSISTANT

## 2022-09-23 RX ORDER — CYANOCOBALAMIN 1000 UG/ML
1000 INJECTION, SOLUTION INTRAMUSCULAR; SUBCUTANEOUS
Status: DISCONTINUED | OUTPATIENT
Start: 2022-09-23 | End: 2022-11-29

## 2022-09-23 RX ADMIN — CYANOCOBALAMIN 1000 MCG: 1000 INJECTION, SOLUTION INTRAMUSCULAR; SUBCUTANEOUS at 14:51

## 2022-09-29 DIAGNOSIS — Z51.81 ENCOUNTER FOR THERAPEUTIC DRUG MONITORING: Primary | ICD-10-CM

## 2022-09-29 DIAGNOSIS — M75.51 BURSITIS OF RIGHT SHOULDER: ICD-10-CM

## 2022-09-29 DIAGNOSIS — M75.41 IMPINGEMENT SYNDROME OF RIGHT SHOULDER: ICD-10-CM

## 2022-09-29 DIAGNOSIS — R20.0 NUMBNESS AND TINGLING OF RIGHT HAND: ICD-10-CM

## 2022-09-29 DIAGNOSIS — R20.2 NUMBNESS AND TINGLING OF RIGHT HAND: ICD-10-CM

## 2022-09-29 RX ORDER — CHLORCYCLIZINE HYDROCHLORIDE AND PSEUDOEPHEDRINE HYDROCHLORIDE 25; 60 MG/1; MG/1
.5-1 TABLET ORAL EVERY 12 HOURS PRN
Qty: 30 TABLET | Refills: 0 | Status: SHIPPED | OUTPATIENT
Start: 2022-09-29 | End: 2022-10-31 | Stop reason: SDUPTHER

## 2022-09-29 RX ORDER — MELOXICAM 7.5 MG/1
7.5 TABLET ORAL DAILY
Qty: 30 TABLET | Refills: 2 | Status: SHIPPED | OUTPATIENT
Start: 2022-09-29 | End: 2022-10-14 | Stop reason: SDUPTHER

## 2022-09-30 ENCOUNTER — CLINICAL SUPPORT (OUTPATIENT)
Dept: FAMILY MEDICINE CLINIC | Facility: CLINIC | Age: 52
End: 2022-09-30

## 2022-09-30 DIAGNOSIS — E53.8 VITAMIN B12 DEFICIENCY: Primary | ICD-10-CM

## 2022-09-30 PROCEDURE — 96372 THER/PROPH/DIAG INJ SC/IM: CPT | Performed by: PHYSICIAN ASSISTANT

## 2022-09-30 RX ORDER — CYANOCOBALAMIN 1000 UG/ML
1000 INJECTION, SOLUTION INTRAMUSCULAR; SUBCUTANEOUS
Status: DISCONTINUED | OUTPATIENT
Start: 2022-09-30 | End: 2022-11-29

## 2022-09-30 RX ADMIN — CYANOCOBALAMIN 1000 MCG: 1000 INJECTION, SOLUTION INTRAMUSCULAR; SUBCUTANEOUS at 16:13

## 2022-10-06 LAB — DRUGS UR: NORMAL

## 2022-10-07 ENCOUNTER — CLINICAL SUPPORT (OUTPATIENT)
Dept: FAMILY MEDICINE CLINIC | Facility: CLINIC | Age: 52
End: 2022-10-07

## 2022-10-07 PROCEDURE — 96372 THER/PROPH/DIAG INJ SC/IM: CPT | Performed by: PHYSICIAN ASSISTANT

## 2022-10-07 RX ADMIN — CYANOCOBALAMIN 1000 MCG: 1000 INJECTION, SOLUTION INTRAMUSCULAR; SUBCUTANEOUS at 11:10

## 2022-10-12 RX ORDER — TIZANIDINE 4 MG/1
2-4 TABLET ORAL 2 TIMES DAILY PRN
Qty: 60 TABLET | Refills: 5 | Status: SHIPPED | OUTPATIENT
Start: 2022-10-12 | End: 2023-03-21 | Stop reason: SDUPTHER

## 2022-10-14 ENCOUNTER — OFFICE VISIT (OUTPATIENT)
Dept: FAMILY MEDICINE CLINIC | Facility: CLINIC | Age: 52
End: 2022-10-14

## 2022-10-14 VITALS
DIASTOLIC BLOOD PRESSURE: 72 MMHG | WEIGHT: 160 LBS | OXYGEN SATURATION: 99 % | BODY MASS INDEX: 30.21 KG/M2 | HEART RATE: 79 BPM | HEIGHT: 61 IN | SYSTOLIC BLOOD PRESSURE: 124 MMHG | TEMPERATURE: 97.2 F

## 2022-10-14 DIAGNOSIS — M51.36 DDD (DEGENERATIVE DISC DISEASE), LUMBAR: ICD-10-CM

## 2022-10-14 DIAGNOSIS — G89.29 CHRONIC MIDLINE LOW BACK PAIN WITH SCIATICA, SCIATICA LATERALITY UNSPECIFIED: ICD-10-CM

## 2022-10-14 DIAGNOSIS — M54.40 CHRONIC MIDLINE LOW BACK PAIN WITH SCIATICA, SCIATICA LATERALITY UNSPECIFIED: ICD-10-CM

## 2022-10-14 DIAGNOSIS — F17.200 TOBACCO USE DISORDER: ICD-10-CM

## 2022-10-14 DIAGNOSIS — M75.41 IMPINGEMENT SYNDROME OF RIGHT SHOULDER: ICD-10-CM

## 2022-10-14 DIAGNOSIS — M54.41 CHRONIC RIGHT-SIDED LOW BACK PAIN WITH RIGHT-SIDED SCIATICA: ICD-10-CM

## 2022-10-14 DIAGNOSIS — E53.8 VITAMIN B12 DEFICIENCY: ICD-10-CM

## 2022-10-14 DIAGNOSIS — G60.9 IDIOPATHIC PERIPHERAL NEUROPATHY: ICD-10-CM

## 2022-10-14 DIAGNOSIS — M54.17 LUMBOSACRAL RADICULOPATHY: Primary | ICD-10-CM

## 2022-10-14 DIAGNOSIS — G89.29 CHRONIC RIGHT-SIDED LOW BACK PAIN WITH RIGHT-SIDED SCIATICA: ICD-10-CM

## 2022-10-14 PROCEDURE — 99213 OFFICE O/P EST LOW 20 MIN: CPT | Performed by: PHYSICIAN ASSISTANT

## 2022-10-14 RX ORDER — MELOXICAM 7.5 MG/1
7.5 TABLET ORAL DAILY
Qty: 30 TABLET | Refills: 5 | Status: SHIPPED | OUTPATIENT
Start: 2022-10-14

## 2022-10-14 RX ORDER — OXYCODONE HYDROCHLORIDE AND ACETAMINOPHEN 5; 325 MG/1; MG/1
1 TABLET ORAL DAILY
Qty: 30 TABLET | Refills: 0 | Status: SHIPPED | OUTPATIENT
Start: 2022-10-14 | End: 2022-11-16 | Stop reason: SDUPTHER

## 2022-10-14 RX ORDER — GABAPENTIN 300 MG/1
300 CAPSULE ORAL 4 TIMES DAILY
Qty: 120 CAPSULE | Refills: 5 | Status: SHIPPED | OUTPATIENT
Start: 2022-10-14

## 2022-10-14 NOTE — PROGRESS NOTES
Subjective   Trinidad Quinteros is a 52 y.o. female  Back Pain (Follow up back pain, refill on oxycodone)      History of Present Illness     The patient reports that she has had significant back pain for approximately 3 years. She states that years ago her pelvis was out of place and she had to go to physical therapy to readjust it. She denies any injury or fracture. The patient reports that she has had trouble with her hips on and off for decades. She states that the pain starts in her low back and radiates down her bilateral hips. The patient reports that she is unable to sleep at night due to the pain. She states that when she is walking, her right leg starts hurting so bad that she can not move it. The patient reports that the further she walks, it will start tightening. She states that it gets to the point where she can not move it and she has to stop. The patient reports that it feels like it is going to give out on her if she does not stop. She states that it does hurt when she is just sitting, but it gets worse if she walks very far. The patient reports that if she did not have any pain medicine, her pain would be 10 out of 10. She states that she is not taking the tramadol every day because she takes the gabapentin 300 mg 3 times a day. The patient reports that it does help it more than the tramadol. She states that she completed her 8 weeks of the B12 shot last week. The patient reports that she has numbness in her feet, and has bunions on her right worse than left. She states that it has not helped or improved with the B12 shots. The patient reports that she is trying to get another continuous positive airway pressure. She states that her continuous positive airway pressure was recalled. The patient confirms she still uses nicotine patches.     The patient reports that her neck is tolerable. She states that her right shoulder bothers her most days. The patient reports that she was put on Mobic for it. She  states that her arm was going numb. The patient reports that she can move all around okay. She states that she had an MRI of her neck because it was going into her shoulders. The patient reports that her left shoulder was bothering her 3 years ago.    The following portions of the patient's history were reviewed and updated as appropriate: allergies, current medications, past social history and problem list    Review of Systems   Respiratory: Negative.    Cardiovascular: Negative.    Gastrointestinal: Negative.    Genitourinary: Negative.    Musculoskeletal: Positive for arthralgias, back pain, gait problem, myalgias and neck pain.   Neurological: Positive for weakness. Negative for dizziness, tremors and numbness.   Psychiatric/Behavioral: Positive for sleep disturbance.       Objective     Vitals:    10/14/22 0919   BP: 124/72   Pulse: 79   Temp: 97.2 °F (36.2 °C)   SpO2: 99%       Physical Exam  Vitals and nursing note reviewed.   Constitutional:       General: She is not in acute distress.     Appearance: Normal appearance. She is well-developed. She is not ill-appearing, toxic-appearing or diaphoretic.   Cardiovascular:      Rate and Rhythm: Normal rate and regular rhythm.   Pulmonary:      Effort: Pulmonary effort is normal. No respiratory distress.      Breath sounds: Normal breath sounds.   Abdominal:      General: Bowel sounds are normal.      Palpations: Abdomen is soft.   Musculoskeletal:         General: Tenderness ( right SLR) present. No deformity.      Lumbar back: Tenderness present. No swelling, deformity, spasms or bony tenderness. Decreased range of motion.      Right lower leg: No edema.      Left lower leg: No edema.   Skin:     General: Skin is warm and dry.      Findings: No bruising or rash.   Neurological:      Mental Status: She is alert and oriented to person, place, and time.      Sensory: No sensory deficit.      Coordination: Coordination normal.      Gait: Gait normal.      Deep Tendon  Reflexes: Reflexes are normal and symmetric.   Psychiatric:         Mood and Affect: Mood normal.         Behavior: Behavior normal.         Assessment & Plan     Diagnoses and all orders for this visit:    1. Lumbosacral radiculopathy (Primary)  -     MRI Lumbar Spine Without Contrast; Future    2. Idiopathic peripheral neuropathy  -     gabapentin (NEURONTIN) 300 MG capsule; Take 1 capsule by mouth 4 (Four) Times a Day. For neuropathy, new dose  Dispense: 120 capsule; Refill: 5    3. Impingement syndrome of right shoulder  -     meloxicam (MOBIC) 7.5 MG tablet; Take 1 tablet by mouth Daily.  Dispense: 30 tablet; Refill: 5    4. DDD (degenerative disc disease), lumbar  -     MRI Lumbar Spine Without Contrast; Future    5. Chronic right-sided low back pain with right-sided sciatica  -     MRI Lumbar Spine Without Contrast; Future    6. Vitamin B12 deficiency    7. Tobacco use disorder     ++ Controlled substance agreement and urine drug screen up-to-date, Joshua has been reviewed, appropriate.  We will electronically submit a 1 month prescription for Percocet 5/325 1 nightly for chronic back pain #30 with no refills, per Dr. Roth.  Increase gabapentin to 4 times daily, schedule MRI of lumbar spine for further evaluation.  Follow-up in 1 month for recheck.    As part of this patient's treatment plan, patient will be prescribed controlled substances. The patient has been made aware of appropriate use of such medications, including potential risk of somnolence, limited ability to drive and /or work safely, and potential for dependence or overdose. It has also been made clear that these medications are for use by this patient only, without concomitant use of alcohol or other substances unless prescribed.Controlled substance status of medication discussed with patient, discussed risks of medication including abuse potential and diversion potential and need to follow up for reevaluation appointment in order to  receive further refills.    The patient will obtain a magnetic resonance imaging of the lumbar spine. The patient will increase her gabapentin to 4 times a day. She will take 1 Percocet at bedtime. The patient will refill her Mobic. The patient is advised to reduces the use of her B12 injection to every other week. The patient is advised to take her nicotine patch off when she has her magnetic resonance imaging.  Part of this note may be an electronic transcription/translation of spoken language to printed text using the Dragon Dictation System.      Transcribed from ambient dictation for Josephine Delacruz PA-C by Sandra Andrade.  10/14/22   13:43 EDT    Patient or patient representative verbalized consent to the visit recording.  I have personally performed the services described in this document as transcribed by the above individual, and it is both accurate and complete.  Josephine Delacruz PA-C  10/14/2022  16:41 EDT

## 2022-10-21 ENCOUNTER — TRANSCRIBE ORDERS (OUTPATIENT)
Dept: FAMILY MEDICINE CLINIC | Facility: CLINIC | Age: 52
End: 2022-10-21

## 2022-10-21 ENCOUNTER — TELEPHONE (OUTPATIENT)
Dept: FAMILY MEDICINE CLINIC | Facility: CLINIC | Age: 52
End: 2022-10-21

## 2022-10-21 ENCOUNTER — CLINICAL SUPPORT (OUTPATIENT)
Dept: FAMILY MEDICINE CLINIC | Facility: CLINIC | Age: 52
End: 2022-10-21

## 2022-10-21 DIAGNOSIS — M54.40 ACUTE RIGHT-SIDED LOW BACK PAIN WITH SCIATICA, SCIATICA LATERALITY UNSPECIFIED: Primary | ICD-10-CM

## 2022-10-21 DIAGNOSIS — E53.8 VITAMIN B12 DEFICIENCY: Primary | ICD-10-CM

## 2022-10-21 PROCEDURE — 96372 THER/PROPH/DIAG INJ SC/IM: CPT | Performed by: PHYSICIAN ASSISTANT

## 2022-10-21 RX ORDER — CYANOCOBALAMIN 1000 UG/ML
1000 INJECTION, SOLUTION INTRAMUSCULAR; SUBCUTANEOUS
Status: SHIPPED | OUTPATIENT
Start: 2022-10-21

## 2022-10-21 RX ADMIN — CYANOCOBALAMIN 1000 MCG: 1000 INJECTION, SOLUTION INTRAMUSCULAR; SUBCUTANEOUS at 13:24

## 2022-10-31 ENCOUNTER — OFFICE VISIT (OUTPATIENT)
Dept: FAMILY MEDICINE CLINIC | Facility: CLINIC | Age: 52
End: 2022-10-31

## 2022-10-31 VITALS
OXYGEN SATURATION: 99 % | BODY MASS INDEX: 30.21 KG/M2 | DIASTOLIC BLOOD PRESSURE: 78 MMHG | TEMPERATURE: 97.2 F | SYSTOLIC BLOOD PRESSURE: 122 MMHG | WEIGHT: 160 LBS | HEART RATE: 69 BPM | HEIGHT: 61 IN

## 2022-10-31 DIAGNOSIS — J01.10 ACUTE NON-RECURRENT FRONTAL SINUSITIS: ICD-10-CM

## 2022-10-31 DIAGNOSIS — R05.9 COUGH, UNSPECIFIED TYPE: Primary | ICD-10-CM

## 2022-10-31 DIAGNOSIS — F17.200 TOBACCO USE DISORDER: ICD-10-CM

## 2022-10-31 LAB
EXPIRATION DATE: NORMAL
FLUAV AG UPPER RESP QL IA.RAPID: NOT DETECTED
FLUBV AG UPPER RESP QL IA.RAPID: NOT DETECTED
INTERNAL CONTROL: NORMAL
Lab: NORMAL
SARS-COV-2 AG UPPER RESP QL IA.RAPID: NORMAL

## 2022-10-31 PROCEDURE — 99213 OFFICE O/P EST LOW 20 MIN: CPT | Performed by: PHYSICIAN ASSISTANT

## 2022-10-31 PROCEDURE — 87428 SARSCOV & INF VIR A&B AG IA: CPT | Performed by: PHYSICIAN ASSISTANT

## 2022-10-31 RX ORDER — CEFUROXIME AXETIL 250 MG/1
250 TABLET ORAL 2 TIMES DAILY
Qty: 20 TABLET | Refills: 0 | Status: SHIPPED | OUTPATIENT
Start: 2022-10-31 | End: 2022-11-16

## 2022-10-31 RX ORDER — CHLORCYCLIZINE HYDROCHLORIDE AND PSEUDOEPHEDRINE HYDROCHLORIDE 25; 60 MG/1; MG/1
.5-1 TABLET ORAL EVERY 12 HOURS PRN
Qty: 30 TABLET | Refills: 0 | Status: SHIPPED | OUTPATIENT
Start: 2022-10-31 | End: 2022-11-16

## 2022-10-31 NOTE — PROGRESS NOTES
Subjective   Trinidad Quinteros is a 52 y.o. female  Cough (Cough and congestion)      History of Present Illness   The patient presents today for evaluation of a sinus infection.     She states that she has sinus pressure, postnasal drip, and a productive cough with brownish sputum. She denies any burning in her chest, shortness of breath, or fever. She notes she is experiencing fatigue. The patient reports that her symptoms began on 10/27/2022. She states that she has been using a nasal spray and an inhaler.     The patient reports that she has been smoking less.    The following portions of the patient's history were reviewed and updated as appropriate: allergies, current medications, past social history and problem list    Review of Systems   Constitutional: Negative for chills, diaphoresis, fatigue, fever and unexpected weight change.   HENT: Positive for congestion.    Respiratory: Positive for cough. Negative for choking, chest tightness, shortness of breath and wheezing.    Cardiovascular: Negative for chest pain and leg swelling.   Gastrointestinal: Negative.  Negative for nausea.   Musculoskeletal: Negative for back pain.   Allergic/Immunologic: Negative for environmental allergies, food allergies and immunocompromised state.       Objective     Vitals:    10/31/22 1318   BP: 122/78   Pulse: 69   Temp: 97.2 °F (36.2 °C)   SpO2: 99%       Physical Exam  Vitals and nursing note reviewed.   Constitutional:       General: She is not in acute distress.     Appearance: Normal appearance. She is well-developed. She is not ill-appearing, toxic-appearing or diaphoretic.   HENT:      Head: Normocephalic and atraumatic.      Right Ear: Tympanic membrane and ear canal normal.      Left Ear: Tympanic membrane and ear canal normal.      Nose: Mucosal edema present. No rhinorrhea.      Right Sinus: Maxillary sinus tenderness and frontal sinus tenderness present.      Left Sinus: Maxillary sinus tenderness and frontal sinus  tenderness present.      Mouth/Throat:      Pharynx: No oropharyngeal exudate.   Eyes:      Pupils: Pupils are equal, round, and reactive to light.   Cardiovascular:      Rate and Rhythm: Normal rate and regular rhythm.   Pulmonary:      Effort: Pulmonary effort is normal.      Breath sounds: Normal breath sounds.   Neurological:      Mental Status: She is alert.         Assessment & Plan     Diagnoses and all orders for this visit:    1. Cough, unspecified type (Primary)  -     POCT SARS-CoV-2 Antigen [place] + Flu    2. Acute non-recurrent frontal sinusitis    3. Tobacco use disorder    Other orders  -     cefuroxime (CEFTIN) 250 MG tablet; Take 1 tablet by mouth 2 (Two) Times a Day.  Dispense: 20 tablet; Refill: 0  -     Chlorcyclizine-Pseudoephed (Stahist AD) 25-60 MG tablet; Take 0.5-1 tablet by mouth Every 12 (Twelve) Hours As Needed for congestion.  Dispense: 30 tablet; Refill: 0    The patient has a history of sinus infection. I will prescribe Ceftin twice a day for 10 days. She will continue Advair and Flonase.    Transcribed from ambient dictation for Josephine Delacruz PA-C by Jennifer Ayoub.  10/31/22   15:04 EDT    Patient or patient representative verbalized consent to the visit recording.  I have personally performed the services described in this document as transcribed by the above individual, and it is both accurate and complete.  Josephine Delacruz PA-C  10/31/2022  16:52 EDT

## 2022-11-04 RX ORDER — DEXTROMETHORPHAN HYDROBROMIDE AND PROMETHAZINE HYDROCHLORIDE 15; 6.25 MG/5ML; MG/5ML
5 SYRUP ORAL 4 TIMES DAILY PRN
Qty: 118 ML | Refills: 0 | Status: SHIPPED | OUTPATIENT
Start: 2022-11-04 | End: 2022-11-16

## 2022-11-16 ENCOUNTER — OFFICE VISIT (OUTPATIENT)
Dept: FAMILY MEDICINE CLINIC | Facility: CLINIC | Age: 52
End: 2022-11-16

## 2022-11-16 VITALS
WEIGHT: 160 LBS | DIASTOLIC BLOOD PRESSURE: 78 MMHG | HEIGHT: 61 IN | TEMPERATURE: 97 F | SYSTOLIC BLOOD PRESSURE: 132 MMHG | BODY MASS INDEX: 30.21 KG/M2 | OXYGEN SATURATION: 99 % | HEART RATE: 68 BPM

## 2022-11-16 DIAGNOSIS — M51.36 DDD (DEGENERATIVE DISC DISEASE), LUMBAR: ICD-10-CM

## 2022-11-16 DIAGNOSIS — Z23 IMMUNIZATION DUE: ICD-10-CM

## 2022-11-16 DIAGNOSIS — M54.41 CHRONIC RIGHT-SIDED LOW BACK PAIN WITH RIGHT-SIDED SCIATICA: ICD-10-CM

## 2022-11-16 DIAGNOSIS — M54.17 LUMBOSACRAL RADICULOPATHY: Primary | ICD-10-CM

## 2022-11-16 DIAGNOSIS — G89.29 CHRONIC RIGHT-SIDED LOW BACK PAIN WITH RIGHT-SIDED SCIATICA: ICD-10-CM

## 2022-11-16 DIAGNOSIS — G89.29 CHRONIC MIDLINE LOW BACK PAIN WITH SCIATICA, SCIATICA LATERALITY UNSPECIFIED: ICD-10-CM

## 2022-11-16 DIAGNOSIS — H92.01 RIGHT EAR PAIN: ICD-10-CM

## 2022-11-16 DIAGNOSIS — M54.40 CHRONIC MIDLINE LOW BACK PAIN WITH SCIATICA, SCIATICA LATERALITY UNSPECIFIED: ICD-10-CM

## 2022-11-16 PROCEDURE — 90677 PCV20 VACCINE IM: CPT | Performed by: PHYSICIAN ASSISTANT

## 2022-11-16 PROCEDURE — 90471 IMMUNIZATION ADMIN: CPT | Performed by: PHYSICIAN ASSISTANT

## 2022-11-16 PROCEDURE — 99213 OFFICE O/P EST LOW 20 MIN: CPT | Performed by: PHYSICIAN ASSISTANT

## 2022-11-16 RX ORDER — OXYCODONE HYDROCHLORIDE AND ACETAMINOPHEN 5; 325 MG/1; MG/1
1 TABLET ORAL DAILY
Qty: 30 TABLET | Refills: 0 | Status: SHIPPED | OUTPATIENT
Start: 2022-11-16 | End: 2022-12-15 | Stop reason: SDUPTHER

## 2022-11-16 NOTE — PROGRESS NOTES
Subjective   Trinidad Quinteros is a 52 y.o. female  Back Pain (Follow up on back pain, refill on oxycodone ) and Earache (Right ear pain x1 day)      History of Present Illness   The patient is a 52-year-old female seen on 11/16/2022 for follow-up on chronic back pain associated with degenerative disc disease and also to discuss new onset right ear pain.     The patient reports that she has an MRI scheduled for 11/19/2022. She notes that her back pain is worse at night and is worsening. She reports she has pain in her hips bilaterally. She denies injections or surgery on her back. The patient notes that she is taking gabapentin 4 times a day and it helps during the day. She denies taking tramadol. She reports that she is administering tizanidine which helps.    The patient reports that she started experiencing throbbing and pain in the right ear on 11/15/2022. She notes that she experienced some ear pain this morning, but not as bad as it was yesterday. The patient reports that she does not sleep in her dentures.      The following portions of the patient's history were reviewed and updated as appropriate: allergies, current medications, past social history and problem list    Review of Systems   Constitutional: Negative for fever.   HENT: Positive for ear pain.    Cardiovascular: Negative for chest pain.   Gastrointestinal: Negative for abdominal pain.   Genitourinary: Positive for pelvic pain. Negative for dysuria.   Musculoskeletal: Positive for back pain.   Neurological: Positive for weakness and numbness. Negative for headaches.   Psychiatric/Behavioral: Positive for sleep disturbance.       Objective     Vitals:    11/16/22 0916   BP: 132/78   Pulse: 68   Temp: 97 °F (36.1 °C)   SpO2: 99%       Physical Exam  Vitals and nursing note reviewed.   Constitutional:       General: She is not in acute distress.     Appearance: Normal appearance. She is well-developed. She is not ill-appearing, toxic-appearing or  diaphoretic.   HENT:      Head: Normocephalic and atraumatic.      Right Ear: Tympanic membrane, ear canal and external ear normal.      Left Ear: Tympanic membrane, ear canal and external ear normal.   Cardiovascular:      Rate and Rhythm: Normal rate and regular rhythm.   Pulmonary:      Effort: Pulmonary effort is normal. No respiratory distress.      Breath sounds: Normal breath sounds.   Abdominal:      General: Bowel sounds are normal.      Palpations: Abdomen is soft.   Musculoskeletal:         General: Tenderness present. No deformity.      Lumbar back: Tenderness present. No swelling, deformity, spasms or bony tenderness. Decreased range of motion.   Skin:     General: Skin is warm and dry.      Findings: No bruising or rash.   Neurological:      Mental Status: She is alert and oriented to person, place, and time.      Sensory: No sensory deficit.      Coordination: Coordination normal.      Gait: Gait normal.      Deep Tendon Reflexes: Reflexes are normal and symmetric.   Psychiatric:         Mood and Affect: Mood normal.         Behavior: Behavior normal.         Assessment & Plan     Diagnoses and all orders for this visit:    1. Lumbosacral radiculopathy (Primary)    2. DDD (degenerative disc disease), lumbar    3. Chronic right-sided low back pain with right-sided sciatica    4. Right ear pain    5. Immunization due    The patient is scheduled for an MRI of the lumbar spine on 11/19/2022. She will continue taking gabapentin 600 mg at night and Percocet and tizanidine as prescribed. She may also try heat or ice around her neck.     Answers for HPI/ROS submitted by the patient on 11/15/2022  What is the primary reason for your visit?: Back Pain    Will electronically submit a 1 month prescription for current dosage of Percocet 5/325 1 nightly for chronic back and hip pain #30 per Dr. Roth.  MRI is scheduled for this coming Saturday, will follow-up with patient after I receive radiology report, if  significant degenerative changes noted we will consider referral to [facility] pain [place] with Dr. Mark for injections.  Follow-up in 1 month for recheck.    Transcribed from ambient dictation for Josephine Delacruz PA-C by Jennifer Ayoub.  11/16/22   10:18 EST    Patient or patient representative verbalized consent to the visit recording.  I have personally performed the services described in this document as transcribed by the above individual, and it is both accurate and complete.  Josephine Delacruz PA-C  11/16/2022  12:10 EST

## 2022-11-18 ENCOUNTER — TELEPHONE (OUTPATIENT)
Dept: FAMILY MEDICINE CLINIC | Facility: CLINIC | Age: 52
End: 2022-11-18

## 2022-11-18 ENCOUNTER — TRANSCRIBE ORDERS (OUTPATIENT)
Dept: FAMILY MEDICINE CLINIC | Facility: CLINIC | Age: 52
End: 2022-11-18

## 2022-11-18 DIAGNOSIS — M21.611 BILATERAL BUNIONS: Primary | ICD-10-CM

## 2022-11-18 DIAGNOSIS — M21.612 BILATERAL BUNIONS: Primary | ICD-10-CM

## 2022-11-19 ENCOUNTER — HOSPITAL ENCOUNTER (OUTPATIENT)
Dept: MRI IMAGING | Facility: HOSPITAL | Age: 52
Discharge: HOME | End: 2022-11-19
Admitting: PHYSICIAN ASSISTANT

## 2022-11-19 DIAGNOSIS — M54.40 ACUTE RIGHT-SIDED LOW BACK PAIN WITH SCIATICA, SCIATICA LATERALITY UNSPECIFIED: ICD-10-CM

## 2022-11-19 PROCEDURE — 72148 MRI LUMBAR SPINE W/O DYE: CPT

## 2022-11-21 ENCOUNTER — TELEPHONE (OUTPATIENT)
Dept: FAMILY MEDICINE CLINIC | Facility: CLINIC | Age: 52
End: 2022-11-21

## 2022-11-21 ENCOUNTER — TRANSCRIBE ORDERS (OUTPATIENT)
Dept: FAMILY MEDICINE CLINIC | Facility: CLINIC | Age: 52
End: 2022-11-21

## 2022-11-21 DIAGNOSIS — M51.36 DDD (DEGENERATIVE DISC DISEASE), LUMBAR: Primary | ICD-10-CM

## 2022-11-21 NOTE — TELEPHONE ENCOUNTER
----- Message from Josephine Delacruz PA-C sent at 11/21/2022 11:03 AM EST -----  Trinidad has advanced arthritis in her lower lumbar spine.  I would recommend a referral to Dr. Mark for spinal injections please see if you can put in this order and I will talk to her.

## 2022-11-28 ENCOUNTER — OFFICE VISIT (OUTPATIENT)
Dept: FAMILY MEDICINE CLINIC | Facility: CLINIC | Age: 52
End: 2022-11-28

## 2022-11-28 VITALS
HEIGHT: 61 IN | TEMPERATURE: 97.2 F | WEIGHT: 160 LBS | OXYGEN SATURATION: 98 % | HEART RATE: 72 BPM | DIASTOLIC BLOOD PRESSURE: 72 MMHG | SYSTOLIC BLOOD PRESSURE: 124 MMHG | BODY MASS INDEX: 30.21 KG/M2

## 2022-11-28 DIAGNOSIS — E53.8 VITAMIN B12 DEFICIENCY: ICD-10-CM

## 2022-11-28 DIAGNOSIS — J01.01 ACUTE RECURRENT MAXILLARY SINUSITIS: Primary | ICD-10-CM

## 2022-11-28 PROCEDURE — 99213 OFFICE O/P EST LOW 20 MIN: CPT | Performed by: PHYSICIAN ASSISTANT

## 2022-11-28 PROCEDURE — 96372 THER/PROPH/DIAG INJ SC/IM: CPT | Performed by: PHYSICIAN ASSISTANT

## 2022-11-28 RX ORDER — DEXTROMETHORPHAN HYDROBROMIDE AND PROMETHAZINE HYDROCHLORIDE 15; 6.25 MG/5ML; MG/5ML
5 SYRUP ORAL 4 TIMES DAILY PRN
Qty: 120 ML | Refills: 0 | Status: SHIPPED | OUTPATIENT
Start: 2022-11-28 | End: 2022-12-15

## 2022-11-28 RX ORDER — AMOXICILLIN AND CLAVULANATE POTASSIUM 875; 125 MG/1; MG/1
1 TABLET, FILM COATED ORAL 2 TIMES DAILY
Qty: 14 TABLET | Refills: 0 | Status: SHIPPED | OUTPATIENT
Start: 2022-11-28 | End: 2022-12-15

## 2022-11-28 RX ORDER — PREDNISONE 10 MG/1
10 TABLET ORAL 2 TIMES DAILY
Qty: 6 TABLET | Refills: 0 | Status: SHIPPED | OUTPATIENT
Start: 2022-11-28 | End: 2022-12-15

## 2022-11-28 RX ADMIN — CYANOCOBALAMIN 1000 MCG: 1000 INJECTION, SOLUTION INTRAMUSCULAR; SUBCUTANEOUS at 10:37

## 2022-11-29 RX ORDER — CYANOCOBALAMIN 1000 UG/ML
1000 INJECTION, SOLUTION INTRAMUSCULAR; SUBCUTANEOUS ONCE
Status: COMPLETED | OUTPATIENT
Start: 2022-11-29 | End: 2022-11-28

## 2022-12-02 DIAGNOSIS — J01.91 ACUTE RECURRENT SINUSITIS, UNSPECIFIED LOCATION: Primary | ICD-10-CM

## 2022-12-02 RX ORDER — METHYLPREDNISOLONE ACETATE 40 MG/ML
40 INJECTION, SUSPENSION INTRA-ARTICULAR; INTRALESIONAL; INTRAMUSCULAR; SOFT TISSUE ONCE
Status: DISCONTINUED | OUTPATIENT
Start: 2022-12-02 | End: 2022-12-15

## 2022-12-15 ENCOUNTER — OFFICE VISIT (OUTPATIENT)
Dept: FAMILY MEDICINE CLINIC | Facility: CLINIC | Age: 52
End: 2022-12-15

## 2022-12-15 VITALS
BODY MASS INDEX: 30.21 KG/M2 | SYSTOLIC BLOOD PRESSURE: 118 MMHG | HEART RATE: 69 BPM | WEIGHT: 160 LBS | OXYGEN SATURATION: 98 % | DIASTOLIC BLOOD PRESSURE: 68 MMHG | HEIGHT: 61 IN | TEMPERATURE: 97 F

## 2022-12-15 DIAGNOSIS — M54.40 CHRONIC MIDLINE LOW BACK PAIN WITH SCIATICA, SCIATICA LATERALITY UNSPECIFIED: ICD-10-CM

## 2022-12-15 DIAGNOSIS — E53.8 VITAMIN B12 DEFICIENCY: Primary | ICD-10-CM

## 2022-12-15 DIAGNOSIS — G89.29 CHRONIC MIDLINE LOW BACK PAIN WITH SCIATICA, SCIATICA LATERALITY UNSPECIFIED: ICD-10-CM

## 2022-12-15 DIAGNOSIS — M51.36 DDD (DEGENERATIVE DISC DISEASE), LUMBAR: ICD-10-CM

## 2022-12-15 DIAGNOSIS — M54.17 LUMBOSACRAL RADICULOPATHY: ICD-10-CM

## 2022-12-15 DIAGNOSIS — T85.848A DENTAL IMPLANT PAIN, INITIAL ENCOUNTER: ICD-10-CM

## 2022-12-15 PROCEDURE — 99213 OFFICE O/P EST LOW 20 MIN: CPT | Performed by: PHYSICIAN ASSISTANT

## 2022-12-15 PROCEDURE — 96372 THER/PROPH/DIAG INJ SC/IM: CPT | Performed by: PHYSICIAN ASSISTANT

## 2022-12-15 RX ORDER — CYANOCOBALAMIN 1000 UG/ML
1000 INJECTION, SOLUTION INTRAMUSCULAR; SUBCUTANEOUS ONCE
Status: COMPLETED | OUTPATIENT
Start: 2022-12-15 | End: 2022-12-15

## 2022-12-15 RX ORDER — OXYCODONE HYDROCHLORIDE AND ACETAMINOPHEN 5; 325 MG/1; MG/1
1 TABLET ORAL EVERY 6 HOURS PRN
Qty: 90 TABLET | Refills: 0 | Status: SHIPPED | OUTPATIENT
Start: 2022-12-15 | End: 2023-01-19 | Stop reason: SDUPTHER

## 2022-12-15 RX ADMIN — CYANOCOBALAMIN 1000 MCG: 1000 INJECTION, SOLUTION INTRAMUSCULAR; SUBCUTANEOUS at 13:57

## 2022-12-15 NOTE — PROGRESS NOTES
Subjective   Trinidad Quinteros is a 52 y.o. female  Back Pain ( Follow up on back pain and DDD, refill on oxycodone )      History of Present Illness  The patient presents today for a follow-up on chronic back pain in association with degenerative disc disease and for refills on pain medication. She also presents for a follow-up on vitamin B12 deficiency.     The patient was scheduled to see pain management next week, but had to cancel due to having to see her dentist. She is going to see her regular dentist next week and get new liners in. The patient is having 2 more implants put in on the bottom on 12/29/2022. She is taking 1 Percocet per day for her back pain. The patient reports her back is stable and she is sleeping better with the 2 gabapentin at night. She states the pain in her legs is worse after a certain length of walking.    The following portions of the patient's history were reviewed and updated as appropriate: allergies, current medications, past social history and problem list    Review of Systems   Constitutional: Negative.    HENT: Positive for dental problem.    Respiratory: Negative.    Gastrointestinal: Negative.    Genitourinary: Negative.    Musculoskeletal: Positive for arthralgias, back pain and myalgias. Negative for gait problem.   Neurological: Negative for dizziness, tremors, weakness and numbness.   Psychiatric/Behavioral:        Sleeping better       Objective     Vitals:    12/15/22 1346   BP: 118/68   Pulse: 69   Temp: 97 °F (36.1 °C)   SpO2: 98%       Physical Exam  Vitals and nursing note reviewed.   Constitutional:       General: She is not in acute distress.     Appearance: Normal appearance. She is well-developed. She is obese. She is not ill-appearing, toxic-appearing or diaphoretic.      Comments: BMI30   HENT:      Head:      Comments: Wears dentures  Cardiovascular:      Rate and Rhythm: Normal rate and regular rhythm.   Pulmonary:      Effort: Pulmonary effort is normal. No  respiratory distress.      Breath sounds: Normal breath sounds.   Abdominal:      General: Bowel sounds are normal.      Palpations: Abdomen is soft.   Musculoskeletal:         General: Tenderness ( low back) present. No deformity.      Lumbar back: Tenderness present. No swelling, deformity, spasms or bony tenderness. Decreased range of motion.   Skin:     General: Skin is warm and dry.      Findings: No bruising or rash.   Neurological:      Mental Status: She is alert and oriented to person, place, and time.      Sensory: No sensory deficit.      Coordination: Coordination normal.      Gait: Gait normal.      Deep Tendon Reflexes: Reflexes are normal and symmetric.   Psychiatric:         Mood and Affect: Mood normal.         Behavior: Behavior normal.         Assessment & Plan     Diagnoses and all orders for this visit:    1. Vitamin B12 deficiency (Primary)  -     cyanocobalamin injection 1,000 mcg    2. DDD (degenerative disc disease), lumbar    3. Lumbosacral radiculopathy    4. Dental implant pain, initial encounter    As patient is scheduled to have 2 additional dental implants placed in 2 weeks we are coordinating her pain management medication with her oral surgeon so that her pain medication will be prescribed through our office only.  We will increase her dosage of Percocet from current 5/325 1 daily for chronic back pain secondary to DDD temporarily up to a quantity of 90 for the next month where she will be able to administer her Percocet tablets 1 every 6-8 hours as needed for moderately severe dental pain for the 2 weeks following her dental implant surgery.  Follow-up in 1 month for recheck.  Patient is scheduled to be seen for further evaluation of her back pain for spinal injection assessment at [facility] in January after her dental implants heal.    The patient is doing well on her current medication regimen. She will continue on her current medication regimen. She is  scheduled to see her dentist next week and get new liners for her dentures. She is scheduled for an injection in 01/2023.    As part of this patient's treatment plan, patient will be prescribed controlled substances. The patient has been made aware of appropriate use of such medications, including potential risk of somnolence, limited ability to drive and /or work safely, and potential for dependence or overdose. It has also been made clear that these medications are for use by this patient only, without concomitant use of alcohol or other substances unless prescribed.Controlled substance status of medication discussed with patient, discussed risks of medication including abuse potential and diversion potential and need to follow up for reevaluation appointment in order to receive further refills.    Part of this note may be an electronic transcription/translation of spoken language to printed text using the Dragon Dictation System.      Transcribed from ambient dictation for Josephine Delacruz PA-C by Kezia Norman.  12/15/22   15:39 EST    Patient or patient representative verbalized consent to the visit recording.  I have personally performed the services described in this document as transcribed by the above individual, and it is both accurate and complete.  Josephine Delacruz PA-C  12/15/2022  15:48 EST

## 2022-12-16 RX ORDER — SERTRALINE HYDROCHLORIDE 100 MG/1
200 TABLET, FILM COATED ORAL DAILY
Qty: 60 TABLET | Refills: 5 | Status: SHIPPED | OUTPATIENT
Start: 2022-12-16

## 2022-12-20 ENCOUNTER — TELEPHONE (OUTPATIENT)
Dept: FAMILY MEDICINE CLINIC | Facility: CLINIC | Age: 52
End: 2022-12-20

## 2022-12-20 RX ORDER — LEVOFLOXACIN 500 MG/1
500 TABLET, FILM COATED ORAL DAILY
Qty: 10 TABLET | Refills: 0 | Status: SHIPPED | OUTPATIENT
Start: 2022-12-20 | End: 2023-01-19

## 2022-12-20 NOTE — TELEPHONE ENCOUNTER
Patient has a sore throat, drainage, and sinus pressure. She wants to know if an antibiotic can be sent in such as Levaquin or Augmentin. She can't take a Zpak. Patient uses,   [facility] - [place]   Phone: [phone]   Fax: [phone]   Thanks,   Angela.Garland..

## 2023-01-16 RX ORDER — ONDANSETRON 4 MG/1
4 TABLET, FILM COATED ORAL EVERY 8 HOURS PRN
Qty: 20 TABLET | Refills: 1 | Status: SHIPPED | OUTPATIENT
Start: 2023-01-16

## 2023-01-19 ENCOUNTER — OFFICE VISIT (OUTPATIENT)
Dept: FAMILY MEDICINE CLINIC | Facility: CLINIC | Age: 53
End: 2023-01-19
Payer: COMMERCIAL

## 2023-01-19 VITALS
WEIGHT: 161 LBS | HEIGHT: 61 IN | HEART RATE: 71 BPM | BODY MASS INDEX: 30.4 KG/M2 | TEMPERATURE: 97 F | DIASTOLIC BLOOD PRESSURE: 80 MMHG | SYSTOLIC BLOOD PRESSURE: 116 MMHG | OXYGEN SATURATION: 97 %

## 2023-01-19 DIAGNOSIS — M54.40 CHRONIC MIDLINE LOW BACK PAIN WITH SCIATICA, SCIATICA LATERALITY UNSPECIFIED: ICD-10-CM

## 2023-01-19 DIAGNOSIS — E53.8 VITAMIN B12 DEFICIENCY: ICD-10-CM

## 2023-01-19 DIAGNOSIS — G89.29 CHRONIC MIDLINE LOW BACK PAIN WITH SCIATICA, SCIATICA LATERALITY UNSPECIFIED: ICD-10-CM

## 2023-01-19 DIAGNOSIS — T85.79XA: ICD-10-CM

## 2023-01-19 DIAGNOSIS — M54.17 LUMBOSACRAL RADICULOPATHY: Primary | ICD-10-CM

## 2023-01-19 DIAGNOSIS — M51.36 DDD (DEGENERATIVE DISC DISEASE), LUMBAR: ICD-10-CM

## 2023-01-19 PROCEDURE — 96372 THER/PROPH/DIAG INJ SC/IM: CPT | Performed by: PHYSICIAN ASSISTANT

## 2023-01-19 PROCEDURE — 99213 OFFICE O/P EST LOW 20 MIN: CPT | Performed by: PHYSICIAN ASSISTANT

## 2023-01-19 RX ORDER — CYANOCOBALAMIN 1000 UG/ML
1000 INJECTION, SOLUTION INTRAMUSCULAR; SUBCUTANEOUS ONCE
Status: COMPLETED | OUTPATIENT
Start: 2023-01-19 | End: 2023-01-19

## 2023-01-19 RX ORDER — FLUCONAZOLE 200 MG/1
200 TABLET ORAL DAILY
Qty: 2 TABLET | Refills: 2 | Status: SHIPPED | OUTPATIENT
Start: 2023-01-19

## 2023-01-19 RX ORDER — OXYCODONE HYDROCHLORIDE AND ACETAMINOPHEN 5; 325 MG/1; MG/1
1 TABLET ORAL 3 TIMES DAILY PRN
Qty: 75 TABLET | Refills: 0 | Status: SHIPPED | OUTPATIENT
Start: 2023-01-19 | End: 2023-02-16 | Stop reason: SDUPTHER

## 2023-01-19 RX ADMIN — CYANOCOBALAMIN 1000 MCG: 1000 INJECTION, SOLUTION INTRAMUSCULAR; SUBCUTANEOUS at 15:41

## 2023-01-19 NOTE — PROGRESS NOTES
Subjective   Trinidad Quinteros is a 52 y.o. female  Back Pain (Follow up on back pain,rf on oxycodone)      History of Present Illness     The patient presents today for chronic pain management related to chronic back pain in association with lumbosacral radiculopathy and degenerative disc disease.    The patient reports they are not able to go back to taking one Percocet a day yet because they are still feeling pain from their dental implants. The patient confirms they just had two dental implants done on both sides of their lower jaw. The patient notes having to take amoxicillin because of an infection from her dental implants. The patient confirms she is still feeling constant pain from the implants. The patient rates her pain at a 10 out of 10. The patient notes taking half a Percocet in the morning, another half at lunch, another half sometimes at home, and another half at night at approximately 11 PM at night. The patient notes she uses Orajel to numb her mouth when the pain is intense.     The patient notes she has an appointment 1/27/2023 with pain management for her back. She confirms she is still having back pain that travels down to her right leg.       The following portions of the patient's history were reviewed and updated as appropriate: allergies, current medications, past social history and problem list    Review of Systems   Constitutional: Negative for fever.   HENT: Positive for dental problem.    Cardiovascular: Negative for chest pain.   Gastrointestinal: Negative for abdominal pain.   Genitourinary: Negative for dysuria and pelvic pain.   Musculoskeletal: Positive for arthralgias, back pain, gait problem and myalgias.   Neurological: Positive for weakness and numbness. Negative for headaches.   Psychiatric/Behavioral: Positive for sleep disturbance.       Objective     Vitals:    01/19/23 1528   BP: 116/80   Pulse: 71   Temp: 97 °F (36.1 °C)   SpO2: 97%       Physical Exam  Vitals and nursing  note reviewed.   Constitutional:       General: She is not in acute distress.     Appearance: Normal appearance. She is well-developed. She is not ill-appearing, toxic-appearing or diaphoretic.   HENT:      Mouth/Throat:      Dentition: Abnormal dentition.   Cardiovascular:      Rate and Rhythm: Normal rate and regular rhythm.   Pulmonary:      Effort: Pulmonary effort is normal. No respiratory distress.      Breath sounds: Normal breath sounds.   Abdominal:      General: Bowel sounds are normal.      Palpations: Abdomen is soft.   Musculoskeletal:         General: Tenderness present. No deformity.      Lumbar back: Tenderness present. No swelling, deformity, spasms or bony tenderness. Decreased range of motion.   Skin:     General: Skin is warm and dry.      Findings: No bruising or rash.   Neurological:      Mental Status: She is alert and oriented to person, place, and time.      Sensory: No sensory deficit.      Coordination: Coordination normal.      Gait: Gait abnormal.      Deep Tendon Reflexes: Reflexes are normal and symmetric.   Psychiatric:         Mood and Affect: Mood normal.         Behavior: Behavior normal.         Assessment & Plan     Diagnoses and all orders for this visit:    1. Lumbosacral radiculopathy (Primary)    2. Chronic midline low back pain with sciatica, sciatica laterality unspecified    3. Vitamin B12 deficiency  -     cyanocobalamin injection 1,000 mcg    4. DDD (degenerative disc disease), lumbar    5. Infection of dental prosthesis (HCC)    Other orders  -     fluconazole (Diflucan) 200 MG tablet; Take 1 tablet by mouth Daily.  Dispense: 2 tablet; Refill: 2    1. Chronic pain management related to chronic back pain in association with lumbosacral radiculopathy and degenerative disc disease:   - The patient will be referred for a consult regarding a spinal injection.    2. Anticipatory Guidance:  - The patient will be prescribed Percocet #75 mg to take twice a day and a half a  tablet when needed.  - The patient will be prescribed Diflucan.   - The patient is advised to follow up in 1 month.        Answers for HPI/ROS submitted by the patient on 1/19/2023  What is the primary reason for your visit?: Back Pain    Will electronically submit a prescription for Percocet 5/325 1 twice daily to 3 times daily as needed for chronic back pain and acute dental pain #75 per Dr. Roth.  Patient has an upcoming appointment for further evaluation of uncontrolled back pain with Dr. Mark at [facility] next week, she is going to continue to be seen by her oral surgeon and her dentist for further evaluation and treatment of her dental implants and infection associated with dental implant.  Follow-up in our office in 1 month.    As part of this patient's treatment plan, patient will be prescribed controlled substances. The patient has been made aware of appropriate use of such medications, including potential risk of somnolence, limited ability to drive and /or work safely, and potential for dependence or overdose. It has also been made clear that these medications are for use by this patient only, without concomitant use of alcohol or other substances unless prescribed.Controlled substance status of medication discussed with patient, discussed risks of medication including abuse potential and diversion potential and need to follow up for reevaluation appointment in order to receive further refills.    Part of this note may be an electronic transcription/translation of spoken language to printed text using the Dragon Dictation System.      Transcribed from ambient dictation for Josephine Delacruz PA-C by Rosalina Boswell.  01/19/23   17:45 EST    Patient or patient representative verbalized consent to the visit recording.  I have personally performed the services described in this document as transcribed by the above individual, and it is both accurate and complete.  Josephine Delacruz PA-C   1/20/2023  12:10 EST

## 2023-01-30 ENCOUNTER — TRANSCRIBE ORDERS (OUTPATIENT)
Dept: FAMILY MEDICINE CLINIC | Facility: CLINIC | Age: 53
End: 2023-01-30
Payer: COMMERCIAL

## 2023-01-30 ENCOUNTER — TELEPHONE (OUTPATIENT)
Dept: FAMILY MEDICINE CLINIC | Facility: CLINIC | Age: 53
End: 2023-01-30
Payer: COMMERCIAL

## 2023-01-30 DIAGNOSIS — Z12.11 ENCOUNTER FOR SCREENING COLONOSCOPY: Primary | ICD-10-CM

## 2023-02-02 ENCOUNTER — TELEPHONE (OUTPATIENT)
Dept: FAMILY MEDICINE CLINIC | Facility: CLINIC | Age: 53
End: 2023-02-02
Payer: COMMERCIAL

## 2023-02-02 RX ORDER — AMOXICILLIN AND CLAVULANATE POTASSIUM 875; 125 MG/1; MG/1
1 TABLET, FILM COATED ORAL 2 TIMES DAILY
Qty: 14 TABLET | Refills: 0 | Status: SHIPPED | OUTPATIENT
Start: 2023-02-02 | End: 2023-02-16

## 2023-02-02 NOTE — TELEPHONE ENCOUNTER
Patient has a sinus infection with lots of drainage, and coughing. She wants to know if an antibiotic can be sent to [facility].   Thanks,   Angela.....

## 2023-02-13 ENCOUNTER — TRANSCRIBE ORDERS (OUTPATIENT)
Dept: FAMILY MEDICINE CLINIC | Facility: CLINIC | Age: 53
End: 2023-02-13
Payer: COMMERCIAL

## 2023-02-13 DIAGNOSIS — Z12.31 ENCOUNTER FOR SCREENING MAMMOGRAM FOR BREAST CANCER: Primary | ICD-10-CM

## 2023-02-16 ENCOUNTER — OFFICE VISIT (OUTPATIENT)
Dept: FAMILY MEDICINE CLINIC | Facility: CLINIC | Age: 53
End: 2023-02-16
Payer: COMMERCIAL

## 2023-02-16 VITALS
HEIGHT: 61 IN | OXYGEN SATURATION: 99 % | BODY MASS INDEX: 31.15 KG/M2 | SYSTOLIC BLOOD PRESSURE: 124 MMHG | HEART RATE: 69 BPM | DIASTOLIC BLOOD PRESSURE: 72 MMHG | WEIGHT: 165 LBS | TEMPERATURE: 97.5 F

## 2023-02-16 DIAGNOSIS — G89.29 CHRONIC MIDLINE LOW BACK PAIN WITH SCIATICA, SCIATICA LATERALITY UNSPECIFIED: ICD-10-CM

## 2023-02-16 DIAGNOSIS — T85.848A DENTAL IMPLANT PAIN, INITIAL ENCOUNTER: ICD-10-CM

## 2023-02-16 DIAGNOSIS — M54.17 LUMBOSACRAL RADICULOPATHY: ICD-10-CM

## 2023-02-16 DIAGNOSIS — J02.9 SORE THROAT: ICD-10-CM

## 2023-02-16 DIAGNOSIS — M54.40 CHRONIC MIDLINE LOW BACK PAIN WITH SCIATICA, SCIATICA LATERALITY UNSPECIFIED: ICD-10-CM

## 2023-02-16 DIAGNOSIS — M51.36 DDD (DEGENERATIVE DISC DISEASE), LUMBAR: ICD-10-CM

## 2023-02-16 DIAGNOSIS — F41.9 ANXIETY: ICD-10-CM

## 2023-02-16 DIAGNOSIS — E53.8 B12 DEFICIENCY: Primary | ICD-10-CM

## 2023-02-16 DIAGNOSIS — F41.1 GAD (GENERALIZED ANXIETY DISORDER): ICD-10-CM

## 2023-02-16 PROCEDURE — 99214 OFFICE O/P EST MOD 30 MIN: CPT | Performed by: PHYSICIAN ASSISTANT

## 2023-02-16 PROCEDURE — 96372 THER/PROPH/DIAG INJ SC/IM: CPT | Performed by: PHYSICIAN ASSISTANT

## 2023-02-16 PROCEDURE — 87880 STREP A ASSAY W/OPTIC: CPT | Performed by: PHYSICIAN ASSISTANT

## 2023-02-16 RX ORDER — ATORVASTATIN CALCIUM 40 MG/1
40 TABLET, FILM COATED ORAL DAILY
Qty: 90 TABLET | Refills: 2 | Status: SHIPPED | OUTPATIENT
Start: 2023-02-16

## 2023-02-16 RX ORDER — VALACYCLOVIR HYDROCHLORIDE 500 MG/1
TABLET, FILM COATED ORAL
Status: CANCELLED | OUTPATIENT
Start: 2023-02-16

## 2023-02-16 RX ORDER — CYANOCOBALAMIN 1000 UG/ML
1000 INJECTION, SOLUTION INTRAMUSCULAR; SUBCUTANEOUS ONCE
Status: COMPLETED | OUTPATIENT
Start: 2023-02-16 | End: 2023-02-16

## 2023-02-16 RX ORDER — LOSARTAN POTASSIUM 100 MG/1
100 TABLET ORAL DAILY
Qty: 90 TABLET | Refills: 3 | Status: SHIPPED | OUTPATIENT
Start: 2023-02-16

## 2023-02-16 RX ORDER — OXYCODONE HYDROCHLORIDE AND ACETAMINOPHEN 5; 325 MG/1; MG/1
1-2 TABLET ORAL DAILY PRN
Qty: 40 TABLET | Refills: 0 | Status: SHIPPED | OUTPATIENT
Start: 2023-02-16 | End: 2023-03-14 | Stop reason: SDUPTHER

## 2023-02-16 RX ORDER — GLYCOPYRROLATE 2 MG/1
2 TABLET ORAL 2 TIMES DAILY
Qty: 60 TABLET | Refills: 2 | Status: SHIPPED | OUTPATIENT
Start: 2023-02-16

## 2023-02-16 RX ORDER — VALACYCLOVIR HYDROCHLORIDE 500 MG/1
TABLET, FILM COATED ORAL
Qty: 8 TABLET | Refills: 5 | Status: SHIPPED | OUTPATIENT
Start: 2023-02-16

## 2023-02-16 RX ORDER — ALPRAZOLAM 0.5 MG/1
0.5 TABLET ORAL 3 TIMES DAILY
Qty: 90 TABLET | Refills: 5 | Status: SHIPPED | OUTPATIENT
Start: 2023-02-16

## 2023-02-16 RX ADMIN — CYANOCOBALAMIN 1000 MCG: 1000 INJECTION, SOLUTION INTRAMUSCULAR; SUBCUTANEOUS at 16:10

## 2023-02-16 NOTE — PROGRESS NOTES
Subjective   Trinidad Quinteros is a 52 y.o. female  Back Pain (Follow up on back pain, refill on oxycodone ), Anxiety (Refill on anxiety, refill on alprazolam), and Sore Throat (Sore throat x1 day)      History of Present Illness       The patient presents today for follow-up on chronic back pain and pain management associated with this. The patient is currently on a combination of tizanidine, meloxicam, gabapentin, and oxycodone. She has degenerative disc disease, referral has been placed to pain management for injections. She is following up on B12 deficiency today due for an injection, has a new problem of sore throat today and also following up on generalized anxiety disorder which is currently stable on a combination of sertraline and Xanax. She is due for refills of Xanax.    The patient reports she has received her B12 injection. She states her throat started hurting today when she swallows. She states it feels like it is in her neck. She denies coughing. She states she has some problems with her dentures today. She states she had to take an extra half of the Percocet. She states she has been taking 1.5 Percocet every other day. She states she is still having a lot of dental pain. She states she has an appointment with pain management tomorrow, 02/17/2023. She states her colonoscopy is scheduled for 04/2023. She states she has to go back and see the oral surgeon in 04/2023. She states if it is okay, then they will start the process of making her bottom denture. She states she has implants under her gum. She states they just put the post in. She states when she goes back to see the oral surgeon, they will make the top parts made and then she has to go back to him and he has to cut the gums back up. She states it is an attached dentition. She reports she does a salt water rinse. She states they have been bothering her all day.     The following portions of the patient's history were reviewed and updated as  appropriate: allergies, current medications, past social history and problem list    Review of Systems   Constitutional: Negative.    HENT: Positive for dental problem and sore throat.    Respiratory: Negative.    Gastrointestinal: Negative.    Genitourinary: Negative.    Musculoskeletal: Positive for back pain. Negative for arthralgias, gait problem and myalgias.   Neurological: Negative for dizziness, tremors, weakness and numbness.   Psychiatric/Behavioral: Positive for sleep disturbance. The patient is nervous/anxious ( stable on medication).        Objective     Vitals:    02/16/23 1556   BP: 124/72   Pulse: 69   Temp: 97.5 °F (36.4 °C)   SpO2: 99%       Physical Exam  Vitals and nursing note reviewed.   Constitutional:       General: She is not in acute distress.     Appearance: Normal appearance. She is well-developed. She is not ill-appearing, toxic-appearing or diaphoretic.   HENT:      Head: Normocephalic and atraumatic.      Nose: Nose normal.      Mouth/Throat:      Mouth: Mucous membranes are moist.      Dentition: Has dentures. Dental tenderness present.      Pharynx: No posterior oropharyngeal erythema.   Cardiovascular:      Rate and Rhythm: Normal rate and regular rhythm.   Pulmonary:      Effort: Pulmonary effort is normal. No respiratory distress.      Breath sounds: Normal breath sounds.   Abdominal:      General: Bowel sounds are normal.      Palpations: Abdomen is soft.   Musculoskeletal:         General: Tenderness present. No deformity.      Lumbar back: Tenderness present. No swelling, deformity, spasms or bony tenderness. Decreased range of motion.   Skin:     General: Skin is warm and dry.      Findings: No bruising or rash.   Neurological:      Mental Status: She is alert and oriented to person, place, and time.      Sensory: No sensory deficit.      Coordination: Coordination normal.      Gait: Gait normal.      Deep Tendon Reflexes: Reflexes are normal and symmetric.   Psychiatric:          Mood and Affect: Mood normal.         Behavior: Behavior normal.         Thought Content: Thought content normal.         Judgment: Judgment normal.         Assessment & Plan     Diagnoses and all orders for this visit:    1. B12 deficiency (Primary)  -     cyanocobalamin injection 1,000 mcg    2. Sore throat  -     POCT rapid strep A    3. Chronic midline low back pain with sciatica, sciatica laterality unspecified    4. DDD (degenerative disc disease), lumbar    5. Lumbosacral radiculopathy    6. Dental implant pain, initial encounter    7. IVON (generalized anxiety disorder)      1. Chronic back pain  - We will decrease her Percocet to 40 mg daily.  - She will follow up with pain management for injections.    2. Generalized anxiety disorder  - She will continue taking sertraline and Xanax as prescribed.    3. Sore throat  - Strep test was negative today.      As part of this patient's treatment plan, patient will be prescribed controlled substances. The patient has been made aware of appropriate use of such medications, including potential risk of somnolence, limited ability to drive and /or work safely, and potential for dependence or overdose. It has also been made clear that these medications are for use by this patient only, without concomitant use of alcohol or other substances unless prescribed.Controlled substance status of medication discussed with patient, discussed risks of medication including abuse potential and diversion potential and need to follow up for reevaluation appointment in order to receive further refills.    Part of this note may be an electronic transcription/translation of spoken language to printed text using the Dragon Dictation System.      Will electronically prescribe a 1 month supply of Percocet 5/325 at new quantity and new dosage 1 daily to twice daily for chronic back pain and breakthrough dental pain #40 for a 1 month supply per Dr. Roth, refill Xanax 0.5 mg 3 times  daily #90 with 5 refills per Dr. Roth.  Keep upcoming appointment tomorrow with pain management [facility] [place] for further evaluation of chronic back pain.  Follow-up in 1 month for recheck.  Continue all other current medications at current dosages  Answers for HPI/ROS submitted by the patient on 2/16/2023  Please describe your symptoms.: medication review  Have you had these symptoms before?: Yes  How long have you been having these symptoms?: Greater than 2 weeks  What is the primary reason for your visit?: Other    Transcribed from ambient dictation for Josephine Delacruz PA-C by Charis Huddleston.  02/16/23   17:47 EST    Patient or patient representative verbalized consent to the visit recording.  I have personally performed the services described in this document as transcribed by the above individual, and it is both accurate and complete.  Josephine Delacruz PA-C  2/21/2023  12:31 EST

## 2023-02-21 LAB
EXPIRATION DATE: NORMAL
INTERNAL CONTROL: NORMAL
Lab: NORMAL
S PYO AG THROAT QL: NEGATIVE

## 2023-02-23 NOTE — TELEPHONE ENCOUNTER
Patient in room [facility]. I have received report from Carmencita MORSE and had the opportunity to 
ask questions and assume patient care. Sent to pharmacy    Initial (On Arrival)

## 2023-03-14 ENCOUNTER — OFFICE VISIT (OUTPATIENT)
Dept: FAMILY MEDICINE CLINIC | Facility: CLINIC | Age: 53
End: 2023-03-14
Payer: COMMERCIAL

## 2023-03-14 VITALS
SYSTOLIC BLOOD PRESSURE: 134 MMHG | BODY MASS INDEX: 31.15 KG/M2 | HEART RATE: 72 BPM | DIASTOLIC BLOOD PRESSURE: 80 MMHG | TEMPERATURE: 97.4 F | OXYGEN SATURATION: 99 % | HEIGHT: 61 IN | WEIGHT: 165 LBS

## 2023-03-14 DIAGNOSIS — M54.50 LOW BACK PAIN, UNSPECIFIED BACK PAIN LATERALITY, UNSPECIFIED CHRONICITY, UNSPECIFIED WHETHER SCIATICA PRESENT: Primary | ICD-10-CM

## 2023-03-14 DIAGNOSIS — M51.36 DDD (DEGENERATIVE DISC DISEASE), LUMBAR: ICD-10-CM

## 2023-03-14 DIAGNOSIS — M54.16 CHRONIC RIGHT-SIDED LUMBAR RADICULOPATHY: ICD-10-CM

## 2023-03-14 DIAGNOSIS — M54.40 CHRONIC MIDLINE LOW BACK PAIN WITH SCIATICA, SCIATICA LATERALITY UNSPECIFIED: ICD-10-CM

## 2023-03-14 DIAGNOSIS — G89.29 CHRONIC MIDLINE LOW BACK PAIN WITH SCIATICA, SCIATICA LATERALITY UNSPECIFIED: ICD-10-CM

## 2023-03-14 DIAGNOSIS — K58.0 IRRITABLE BOWEL SYNDROME WITH DIARRHEA: ICD-10-CM

## 2023-03-14 DIAGNOSIS — M51.26 HNP (HERNIATED NUCLEUS PULPOSUS), LUMBAR: ICD-10-CM

## 2023-03-14 DIAGNOSIS — J40 BRONCHITIS: ICD-10-CM

## 2023-03-14 LAB
BILIRUB BLD-MCNC: NEGATIVE MG/DL
CLARITY, POC: CLEAR
COLOR UR: YELLOW
EXPIRATION DATE: NORMAL
GLUCOSE UR STRIP-MCNC: NEGATIVE MG/DL
KETONES UR QL: NEGATIVE
LEUKOCYTE EST, POC: NEGATIVE
Lab: NORMAL
NITRITE UR-MCNC: NEGATIVE MG/ML
PH UR: 6 [PH] (ref 5–8)
PROT UR STRIP-MCNC: NEGATIVE MG/DL
RBC # UR STRIP: NEGATIVE /UL
SP GR UR: 1.02 (ref 1–1.03)
UROBILINOGEN UR QL: NORMAL

## 2023-03-14 PROCEDURE — 99214 OFFICE O/P EST MOD 30 MIN: CPT | Performed by: PHYSICIAN ASSISTANT

## 2023-03-14 PROCEDURE — 81003 URINALYSIS AUTO W/O SCOPE: CPT | Performed by: PHYSICIAN ASSISTANT

## 2023-03-14 RX ORDER — PREDNISONE 20 MG/1
TABLET ORAL
Qty: 15 TABLET | Refills: 0 | Status: SHIPPED | OUTPATIENT
Start: 2023-03-14

## 2023-03-14 RX ORDER — CEFDINIR 300 MG/1
300 CAPSULE ORAL 2 TIMES DAILY
Qty: 14 CAPSULE | Refills: 0 | Status: SHIPPED | OUTPATIENT
Start: 2023-03-14

## 2023-03-14 RX ORDER — DEXTROMETHORPHAN HYDROBROMIDE AND PROMETHAZINE HYDROCHLORIDE 15; 6.25 MG/5ML; MG/5ML
5 SYRUP ORAL 4 TIMES DAILY PRN
Qty: 120 ML | Refills: 0 | Status: SHIPPED | OUTPATIENT
Start: 2023-03-14

## 2023-03-14 RX ORDER — OXYCODONE HYDROCHLORIDE AND ACETAMINOPHEN 5; 325 MG/1; MG/1
1 TABLET ORAL 2 TIMES DAILY PRN
Qty: 30 TABLET | Refills: 0 | Status: SHIPPED | OUTPATIENT
Start: 2023-03-14

## 2023-03-14 NOTE — PROGRESS NOTES
Subjective   Trinidad Quinteros is a 52 y.o. female  Cough (Productive cough with brown mucus x3 days ), Back Pain (Increased right back pain, Refill on oxycodone), and Diarrhea (Intermittent diarrhea since Saturday )      History of Present Illness    The patient presents today for several issues, coming in for a new problem, productive cough for the last 3 days, increased right back pain superimposed on chronic back pain with degenerative disc disease, has recently seen pain management for an injection, and third issue is intermittent diarrhea since Saturday, which is a new problem.    The patient started coughing on Saturday, 03/11/2023, but she has been coughing for approximately 1 week. She states it feels like the congestion is more in her lungs. She has an albuterol inhaler. The patient reports her urine is okay.     She states her ongoing chronic back pain is typically in her right lower back. The patient reports on Friday, 03/10/2023, she walked over to the pharmacy and had to stop 3 to 4 times because she could not walk. She states the pain radiates down her lower right extremity and her leg does not want to move. The patient had a spinal injection approximately 1 week ago. She states the pain has gotten worse since she had the spinal injection. The patient has not tried a steroid pack. She denies any fever.     The patient has been using her CPAP. She has a sore on the side of her nose that she thought was from the CPAP machine.    The patient reports she started having diarrhea on Saturday, 03/11/2023. She states she used to have diarrhea. The patient took glycopyrrolate that she was previously prescribed and it did help. She states on Saturday night, 03/11/2023, she would have a bowel movement on herself before she could get to the bathroom. The patient reports it has calmed down. She states it is just a little bit of liquid, but it is not a formed stool at all. The patient has not vomited. She denies  there is any blood in the stool. The patient has a little stomach cramping, but not a lot. She states it is more like gurgling and making noises. The patient does not have diverticulosis.    The following portions of the patient's history were reviewed and updated as appropriate: allergies, current medications, past social history and problem list    Review of Systems   Constitutional: Negative for activity change, chills, fatigue and fever.   HENT: Negative.    Respiratory: Positive for cough, chest tightness and wheezing. Negative for shortness of breath.    Cardiovascular: Negative for chest pain.   Gastrointestinal: Positive for diarrhea. Negative for abdominal distention, abdominal pain and nausea.   Genitourinary: Negative for difficulty urinating and enuresis.   Musculoskeletal: Positive for back pain, gait problem and myalgias.   Neurological: Positive for weakness and numbness.       Objective     Vitals:    03/14/23 0900   BP: 134/80   Pulse: 72   Temp: 97.4 °F (36.3 °C)   SpO2: 99%       Physical Exam  Vitals and nursing note reviewed.   Constitutional:       General: She is not in acute distress.     Appearance: Normal appearance. She is well-developed and normal weight. She is not ill-appearing, toxic-appearing or diaphoretic.   HENT:      Head: Normocephalic and atraumatic.   Cardiovascular:      Rate and Rhythm: Normal rate and regular rhythm.   Pulmonary:      Effort: Pulmonary effort is normal. No respiratory distress.      Breath sounds: Wheezing present.   Abdominal:      General: Bowel sounds are normal.      Palpations: Abdomen is soft.   Musculoskeletal:         General: Tenderness ( low back right side) present. No deformity.      Lumbar back: Tenderness present. No swelling, deformity, spasms or bony tenderness. Decreased range of motion.   Skin:     General: Skin is warm and dry.      Findings: No bruising or rash.   Neurological:      Mental Status: She is alert and oriented to person,  place, and time.      Sensory: No sensory deficit.      Coordination: Coordination normal.      Gait: Gait abnormal ( antalgic, favoring right leg).      Deep Tendon Reflexes: Reflexes are normal and symmetric.   Psychiatric:         Mood and Affect: Mood normal.         Behavior: Behavior normal.         Thought Content: Thought content normal.         Judgment: Judgment normal.         Assessment & Plan     Chronic back pain with degenerative disc disease  I will refer the patient to pain management for further evaluation and treatment.  I will prescribe the patient steroids twice a day for 5 days and once a day for 5 days.  I will prescribe the patient promethazine DM and Tessalon Perles.    Diarrhea  Will continue on glycopyrrolate.    Diagnoses and all orders for this visit:    1. Low back pain, unspecified back pain laterality, unspecified chronicity, unspecified whether sciatica present (Primary)  -     POC Urinalysis Dipstick, Automated    2. DDD (degenerative disc disease), lumbar  -     Ambulatory Referral to Pain Management    3. HNP (herniated nucleus pulposus), lumbar  -     Ambulatory Referral to Pain Management    4. Chronic right-sided lumbar radiculopathy  -     Ambulatory Referral to Pain Management    5. Bronchitis    6. Irritable bowel syndrome with diarrhea    Other orders  -     predniSONE (DELTASONE) 20 MG tablet; Take one twice daily for 5 days then one daily for 5 days  Dispense: 15 tablet; Refill: 0  -     cefdinir (OMNICEF) 300 MG capsule; Take 1 capsule by mouth 2 (Two) Times a Day.  Dispense: 14 capsule; Refill: 0  -     promethazine-dextromethorphan (PROMETHAZINE-DM) 6.25-15 MG/5ML syrup; Take 5 mL by mouth 4 (Four) Times a Day As Needed for Cough.  Dispense: 120 mL; Refill: 0    Discussed with patient and we will need to refer her to pain management for further evaluation and treatment of her ongoing worsening back pain and that we will be lowering her dosage of oxycodone down to a  quantity of 30 for this 1 month supply as she is no longer needing extra oxycodone for dental needs.  Prescription will be sent today for Percocet 5/325 1 daily as needed for severe back pain #30 per Dr. Roth and discussed with patient that further opioid prescriptions would not be able to be prescribed for her chronic pain from our department that we will refer to pain management to take over her prescriptions for pain control.  Patient expresses understanding.    As part of this patient's treatment plan, patient will be prescribed controlled substances. The patient has been made aware of appropriate use of such medications, including potential risk of somnolence, limited ability to drive and /or work safely, and potential for dependence or overdose. It has also been made clear that these medications are for use by this patient only, without concomitant use of alcohol or other substances unless prescribed.Controlled substance status of medication discussed with patient, discussed risks of medication including abuse potential and diversion potential and need to follow up for reevaluation appointment in order to receive further refills.    Part of this note may be an electronic transcription/translation of spoken language to printed text using the Dragon Dictation System.    Transcribed from ambient dictation for Josephine Delacruz PA-C by Rody Pedersen.  03/14/23   11:15 EDT    Patient or patient representative verbalized consent to the visit recording.  I have personally performed the services described in this document as transcribed by the above individual, and it is both accurate and complete.  Josephine Delacruz PA-C  3/14/2023  12:49 EDT

## 2023-03-16 ENCOUNTER — OFFICE VISIT (OUTPATIENT)
Dept: PAIN MEDICINE | Facility: CLINIC | Age: 53
End: 2023-03-16
Payer: COMMERCIAL

## 2023-03-16 VITALS
HEIGHT: 61 IN | HEART RATE: 78 BPM | RESPIRATION RATE: 12 BRPM | SYSTOLIC BLOOD PRESSURE: 126 MMHG | WEIGHT: 171 LBS | DIASTOLIC BLOOD PRESSURE: 74 MMHG | TEMPERATURE: 97 F | OXYGEN SATURATION: 99 % | BODY MASS INDEX: 32.28 KG/M2

## 2023-03-16 DIAGNOSIS — M54.16 LUMBAR RADICULOPATHY: Primary | ICD-10-CM

## 2023-03-16 PROCEDURE — 99204 OFFICE O/P NEW MOD 45 MIN: CPT | Performed by: STUDENT IN AN ORGANIZED HEALTH CARE EDUCATION/TRAINING PROGRAM

## 2023-03-16 NOTE — PROGRESS NOTES
03/16/2023      Referring Physician: Josephine Delacruz PA-C  [address] [facility]  [address]  [place] [id number]    Primary Physician: Josephine Delacruz PA-C    CHIEF COMPLAINT or REASON FOR VISIT: Back Pain (New patient)      HISTORY OF PRESENT ILLNESS:  Ms. Trinidad Quinteros is 52 y.o. female who presents as a new patient referral for evaluation and treatment of chronic low back pain with radiation to right lower extremity.  Ms. Quinteros states that she for the past year has had slowly increasing pain.  She has prior PMH hip pain for which she has been taking Percocet however this has not been helpful for her pain.  She is additionally trialed gabapentin.  She describes a sharp burning tingling pain radiating down her right buttock and the posterior lateral aspect of her thigh and occasionally into the plantar surface of her foot.  Patient denies any bowel or bladder dysfunction, lower extremity weakness, new onset saddle anesthesia or unexplained weight loss.  She does work at [employer] with Dr. Roth.    Ms. Quinteros recently saw consultation with interventional pain, Dr. Rey, who diagnosed her with right lumbar radiculopathy and performed a right L4/5 and L5/S1 transforaminal epidural steroid injection.  Patient reported relief for approximately 1 week however unfortunately pain returned worse than previous.        Objective Pain Scoring:   BRIEF PAIN INVENTORY:  Total score:   Pain Score    03/16/23 1024   PainSc:   5   PainLoc: Back  Comment: lower back radiating into right leg      PHQ-2: PHQ-2 Total Score: 0  PHQ-9: PHQ-9: Brief Depression Severity Measure Score: 0  Opioid Risk Tool:         Review of Systems:   ROS negative except as otherwise noted     Past Medical History:   Past Medical History:   Diagnosis Date   • Allergic    • Anxiety    • Depression    • Extremity pain 2021   • Hyperlipidemia    • Hypertension    • Low back pain    • Neck pain 2021         Past Surgical History:   Past  Surgical History:   Procedure Laterality Date   • APPENDECTOMY     • CARDIAC CATHETERIZATION     • CARDIAC CATHETERIZATION     •  SECTION     • COLONOSCOPY     • HAND TENDON SURGERY Right    • SUBTOTAL HYSTERECTOMY     • TUBAL ABDOMINAL LIGATION           Family History   Family History   Problem Relation Age of Onset   • COPD Mother    • Arthritis Mother    • Diabetes Mother    • Hypertension Mother    • Miscarriages / Stillbirths Mother    • COPD Father    • Diabetes Father    • Hypertension Father    • Breast cancer Maternal Aunt 50   • Cancer Other    • Arthritis Other    • Diabetes Other    • Asthma Other    • Heart disease Other         heart trouble   • Hypertension Other         high blood pressure   • Alcohol abuse Other    • COPD Brother    • Diabetes Brother    • Early death Brother    • Hypertension Brother    • COPD Brother    • Diabetes Brother    • Early death Brother         covid/pneumonia    • COPD Brother    • Miscarriages / Stillbirths Daughter    • Ovarian cancer Neg Hx          Social History   Social History     Socioeconomic History   • Marital status:    Tobacco Use   • Smoking status: Every Day     Packs/day: 0.50     Years: 35.00     Pack years: 17.50     Types: Cigarettes   • Smokeless tobacco: Never   Substance and Sexual Activity   • Alcohol use: No   • Drug use: No   • Sexual activity: Yes     Partners: Male     Birth control/protection: None, Hysterectomy        Medications:     Current Outpatient Medications:   •  albuterol sulfate  (90 Base) MCG/ACT inhaler, Inhale 2 puffs as directed every 4 hours As Needed for Wheezing or Shortness of Air and/or cough., Disp: 18 g, Rfl: 5  •  ALPRAZolam (XANAX) 0.5 MG tablet, Take 1 tablet by mouth 3 (Three) Times a Day., Disp: 90 tablet, Rfl: 5  •  atorvastatin (LIPITOR) 40 MG tablet, Take 1 tablet by mouth Daily., Disp: 90 tablet, Rfl: 2  •  cefdinir (OMNICEF) 300 MG capsule, Take 1 capsule  by mouth 2 (Two) Times a Day., Disp: 14 capsule, Rfl: 0  •  estradiol (Estrace) 0.5 MG tablet, Take 1 tablet by mouth Daily. For menopausal hot flashes, Disp: 30 tablet, Rfl: 11  •  fluconazole (Diflucan) 200 MG tablet, Take 1 tablet by mouth Daily., Disp: 2 tablet, Rfl: 2  •  fluticasone (Flonase) 50 MCG/ACT nasal spray, Use 2 sprays into each nostril as directed by provider Daily., Disp: 16 g, Rfl: 11  •  fluticasone-salmeterol (Advair Diskus) 250-50 MCG/DOSE DISKUS, Inhale 1 puff 2 (Two) Times a Day., Disp: 60 each, Rfl: 1  •  folic acid (FOLVITE) 1 MG tablet, Take 1 tablet by mouth Daily., Disp: 30 tablet, Rfl: 11  •  gabapentin (NEURONTIN) 300 MG capsule, Take 1 capsule by mouth 4 (Four) Times a Day. For neuropathy, new dose, Disp: 120 capsule, Rfl: 5  •  glycopyrrolate (Robinul-Forte) 2 MG tablet, Take 1 tablet by mouth 2 (Two) Times a Day for diarrhea, Disp: 60 tablet, Rfl: 2  •  hydroCHLOROthiazide (HYDRODIURIL) 25 MG tablet, Take 1 tablet by mouth Daily., Disp: 90 tablet, Rfl: 2  •  levocetirizine (XYZAL) 5 MG tablet, Take 1 tablet by mouth Every Evening for allergies, Disp: 30 tablet, Rfl: 11  •  losartan (COZAAR) 100 MG tablet, Take 1 tablet by mouth Daily., Disp: 90 tablet, Rfl: 3  •  meloxicam (MOBIC) 7.5 MG tablet, Take 1 tablet by mouth Daily., Disp: 30 tablet, Rfl: 5  •  ondansetron (Zofran) 4 MG tablet, Take 1 tablet by mouth Every 8 (Eight) Hours As Needed for Nausea or Vomiting., Disp: 20 tablet, Rfl: 1  •  oxyCODONE-acetaminophen (PERCOCET) 5-325 MG per tablet, Take 1 tablet by mouth 1-2 time(s) daily As Needed for back pain., Disp: 30 tablet, Rfl: 0  •  predniSONE (DELTASONE) 20 MG tablet, Take one twice daily for 5 days then one daily for 5 days, Disp: 15 tablet, Rfl: 0  •  promethazine-dextromethorphan (PROMETHAZINE-DM) 6.25-15 MG/5ML syrup, Take 5 mL by mouth 4 (Four) Times a Day As Needed for Cough., Disp: 120 mL, Rfl: 0  •  sertraline (ZOLOFT) 100 MG tablet, Take 2 tablets by mouth Daily,  "Disp: 60 tablet, Rfl: 5  •  tiZANidine (ZANAFLEX) 4 MG tablet, Take 0.5-1 tablet by mouth 2 Times a Day As Needed for back pain, Disp: 60 tablet, Rfl: 5  •  valACYclovir (VALTREX) 500 MG tablet, TAKE 4 TABLETS BY MOUTH AT ONSET OF ATTACK AND 12 HOURS LATER, Disp: 8 tablet, Rfl: 5  •  hydroCHLOROthiazide (HYDRODIURIL) 25 MG tablet, Take 1 tablet by mouth Daily. (Patient not taking: Reported on 3/16/2023), Disp: 90 tablet, Rfl: 3  •  nicotine (Nicoderm CQ) 21 MG/24HR patch, Place 1 patch on the skin as directed by provider Daily. (Patient not taking: Reported on 3/16/2023), Disp: 21 patch, Rfl: 2  •  nicotine (Nicotrol) 10 MG inhaler, Inhale 1 puff As Needed for Smoking Cessation. (Patient not taking: Reported on 3/16/2023), Disp: 168 each, Rfl: 5    Current Facility-Administered Medications:   •  cyanocobalamin injection 1,000 mcg, 1,000 mcg, Intramuscular, Q28 Days, Josephine Delacruz PA-C, 1,000 mcg at 10/21/22 1324        Physical Exam:     Vitals:    03/16/23 1024   BP: 126/74   BP Location: Left arm   Patient Position: Sitting   Cuff Size: Adult   Pulse: 78   Resp: 12   Temp: 97 °F (36.1 °C)   TempSrc: Infrared   SpO2: 99%   Weight: 77.6 kg (171 lb)   Height: 154.9 cm (61\")   PainSc:   5   PainLoc: Back  Comment: lower back radiating into right leg        General: Alert and oriented, No acute distress.   HEENT: Normocephalic, atraumatic.   Cardiovascular: No gross edema  Respiratory: Respirations are non-labored    Lumbar Spine:   No masses or atrophy  Range of motion - Flexion normal. Extension normal. Right Lat Bending normal. Left Lat Bending normal  Facet Loading: Negative bilaterally  Facet Palpation - Nontender   PSIS tenderness - Negative bilaterally  Jeffrey's/PAOLO/Thigh thrust - Negative bilaterally  Straight leg raise: Positive right  Slump test: Positive right    Motor Exam:    Strength: Rate on 1-5 scale Right Left    L1/2- hip flexion 5 5    L3- knee extension 5 5    L4- ankle dorsiflexion 5 " 5    L5- great toe extension 5 5    S1- ankle plantarflexion 5 5    Sensory Exam: Altered sensation in right S1 dermatome.    Neurologic: Cranial Nerves II-XII are grossly intact.   Clonus -negative bilaterally    Psychiatric: Cooperative.   Gait: Antalgic  Assistive Devices: None    Imaging Studies:   Results for orders placed during the hospital encounter of 11/19/22    MRI Lumbar Spine Without Contrast    Narrative  DATE OF EXAM: 11/19/2022 8:13 AM    PROCEDURE: MRI LUMBAR SPINE WO CONTRAST-    INDICATIONS: Lumbar radiculopathy, symptoms persist with > 6 wks  treatment; M54.40-Lumbago with sciatica, unspecified side    COMPARISON: No comparisons available.    TECHNIQUE: Routine magnetic resonance imaging of the lumbar spine was  performed without the administration of contrast.    FINDINGS:  The alignment is anatomic. The vertebral body heights appear normal.  There are degenerative endplate changes at L5-S1. There is disc  desiccation at L4-5 and L5-S1, with advanced degenerative loss of disc  height at L5-S1. The conus terminates at the L1-2 level. The posterior  paravertebral soft tissues are unremarkable.    L1-2: No spinal canal or neural foraminal stenosis.    L2-3: Mild bilateral facet arthropathy. No spinal canal stenosis. No  neural foraminal stenosis.    L3-4: Mild disc bulge. Mild bilateral facet arthropathy. No spinal canal  stenosis. Mild right neural foraminal stenosis.    L4-5: Mild disc bulge with superimposed small central disc protrusion.  Mild bilateral facet arthropathy. Mild spinal canal stenosis. Mild  bilateral neural foraminal stenosis.    L5-S1: Moderate disc bulge. Mild right and moderate facet arthropathy.  Mild spinal canal stenosis. Mild to moderate right and mild left neural  foraminal stenosis.    Impression  Mild multilevel degenerative changes of lumbar spine as described above.    This report was finalized on 11/19/2022 9:02 AM by Jaxson Connolly MD.      Impression & Plan:    Ms. Trinidad Quinteros is a 52 y.o. female with past medical history significant for GERD, depression, HTN, ADRIAN, who presents to the pain clinic for evaluation and treatment of chronic low back pain with radiation to right lower extremity.  I personally reviewed the patient's lumbar MRI dated 11/19/2022 which demonstrates a central and right posterior L5/S1 disc herniation causing right L5/S1 neuroforaminal stenosis and lateral recess stenosis.  Clinical examination consistent with a right S1 radiculitis.  We discussed right S1 foramen epidural steroid injection to improve pain.  If greater than 50% relief for at least 2-3 months can consider repeat as needed every 3 to 4 months.  I had an in-depth discussion with the patient regarding the risks of the procedure including bleeding, infection, damage to surrounding structures, paralysis and even death.  We discussed the potential adverse effects of corticosteroid injection including flushing of the face, lipodystrophy, skin discoloration, elevated blood glucose, increased blood pressure.  Risks of frequent steroid administration include weight gain, hormonal changes, mood changes, osteoporosis.    If no benefit from epidural steroid will refer for neurosurgical evaluation.      1. Lumbar radiculopathy        PLAN:  1. Medication Recommendations: Continue per PCP    2. Physical Therapy: Continue HEP    3. Psychological: defer    4. Complementary and alternative (CAM) Therapies:     5. Labs: None indicated     6. Imaging: MRI reviewed with patient utilizing 3D model to explain pathology    7. Interventions: Schedule right S1 transforaminal epidural steroid injection (CPT 48134)    8. Referrals: None indicated     9. Records requested: n/a    10. Lifestyle goals:    Follow-up 2 months after injection      [facility]  Too Schroeder MD

## 2023-03-22 RX ORDER — TIZANIDINE 4 MG/1
2-4 TABLET ORAL 2 TIMES DAILY PRN
Qty: 60 TABLET | Refills: 5 | Status: SHIPPED | OUTPATIENT
Start: 2023-03-22

## 2023-04-04 ENCOUNTER — OUTSIDE FACILITY SERVICE (OUTPATIENT)
Dept: PAIN MEDICINE | Facility: CLINIC | Age: 53
End: 2023-04-04
Payer: COMMERCIAL

## 2023-04-04 ENCOUNTER — DOCUMENTATION (OUTPATIENT)
Dept: PAIN MEDICINE | Facility: CLINIC | Age: 53
End: 2023-04-04

## 2023-04-04 PROCEDURE — 64483 NJX AA&/STRD TFRM EPI L/S 1: CPT | Performed by: STUDENT IN AN ORGANIZED HEALTH CARE EDUCATION/TRAINING PROGRAM

## 2023-04-04 NOTE — PROGRESS NOTES
"[facility]  [address] [place] [id number]      PROCEDURE: Fluoroscopically-guided right S1 lumbar Transforaminal Epidural Steroid Injection     PRE-OP DIAGNOSIS: Lumbosacral radiculopathy  POST-OP DIAGNOSIS: Lumbosacral radiculopathy    BLOOD THINNERS (ANTIPLATELETS/ANTICOAGULANTS): Were discussed with the patient and AUDREY Guidelines were followed.      CONSENT: Risks, benefits and options were explained to the patient, all questions were answered and written informed consent was obtained.   ANESTHESIA: See Anesthesia note    PROCEDURE NOTE: A pre-procedural time out was performed to confirm the correct patient, procedure, side, and site. Standard ASA monitors were applied and oxygen via nasal cannula was provided. All proceduralists donned sterile gloves with masks and surgical hats. The patient was placed prone with pillow under the abdomen and all pressure points padded. The patient's lumbar spine was prepped in standard fashion using Chlorhexidine and draped with sterile towels. The sacral foraminae were identified using AP fluoroscopy and the superior endplate was squared with \"Hendrix\" view.  The target sacral foramen was identified.  The overlying skin and subcutaneous tissue was anesthetized with 1% lidocaine. A 22 gauge 3.5 inch spinal needle with bent tip was incrementally advanced using intermittent fluoroscopy to the lateral border of the target sacral foramen using AP fluoroscopy.  Then the needle was walked and medially until it entered the sacral foramen.  After negative aspiration of blood and cerebrospinal fluid, needle placement was confirmed with 1 ml of omnipaque 180 mgI/ml contrast using AP and lateral fluoroscopic imaging. Imaging revealed a clear outline of the target spinal nerve with proximal spread of agent through the neuroforamen to the epidural space, without evidence of intravascular or intrathecal spread. After negative aspiration, a mixture " containing dexamethasone 10 mg steroid and lidocaine 1% - 2 ml local anesthetic for a total volume of 3 ml was injected under direct visualization with fluoroscopy. The needle was flushed, removed and a bandage applied.     EBL: None     COMPLICATIONS: None     The patient was monitored for at least 30 minutes prior to discharge. Vital signs remained stable throughout the procedure and in the recovery area. There were no immediate complications and the patient tolerated the procedure well. Sensory and motor exam was unchanged from baseline. The patient received written discharge instructions prior to discharge.     FOLLOW UP: As scheduled     ADDITIONAL NOTES:         [facility]       Too Schroeder MD

## 2023-04-10 RX ORDER — ESTRADIOL 0.5 MG/1
0.5 TABLET ORAL DAILY
Qty: 30 TABLET | Refills: 2 | Status: SHIPPED | OUTPATIENT
Start: 2023-04-10

## 2023-04-11 ENCOUNTER — OFFICE VISIT (OUTPATIENT)
Dept: FAMILY MEDICINE CLINIC | Facility: CLINIC | Age: 53
End: 2023-04-11
Payer: COMMERCIAL

## 2023-04-11 VITALS
DIASTOLIC BLOOD PRESSURE: 90 MMHG | SYSTOLIC BLOOD PRESSURE: 132 MMHG | HEART RATE: 85 BPM | BODY MASS INDEX: 32.28 KG/M2 | TEMPERATURE: 97.3 F | OXYGEN SATURATION: 96 % | HEIGHT: 61 IN | WEIGHT: 171 LBS

## 2023-04-11 DIAGNOSIS — F17.200 TOBACCO USE DISORDER: ICD-10-CM

## 2023-04-11 DIAGNOSIS — J44.1 COPD WITH EXACERBATION: Primary | ICD-10-CM

## 2023-04-11 DIAGNOSIS — G47.33 OSA (OBSTRUCTIVE SLEEP APNEA): ICD-10-CM

## 2023-04-11 DIAGNOSIS — J01.11 ACUTE RECURRENT FRONTAL SINUSITIS: ICD-10-CM

## 2023-04-11 PROCEDURE — 99214 OFFICE O/P EST MOD 30 MIN: CPT | Performed by: PHYSICIAN ASSISTANT

## 2023-04-11 RX ORDER — ALBUTEROL SULFATE 1.25 MG/3ML
1 SOLUTION RESPIRATORY (INHALATION) EVERY 6 HOURS PRN
Qty: 75 ML | Refills: 12 | Status: SHIPPED | OUTPATIENT
Start: 2023-04-11

## 2023-04-11 RX ORDER — AMOXICILLIN AND CLAVULANATE POTASSIUM 875; 125 MG/1; MG/1
1 TABLET, FILM COATED ORAL 2 TIMES DAILY
Qty: 20 TABLET | Refills: 0 | Status: SHIPPED | OUTPATIENT
Start: 2023-04-11

## 2023-04-12 DIAGNOSIS — M54.16 LUMBAR RADICULOPATHY: Primary | ICD-10-CM

## 2023-04-17 ENCOUNTER — TELEPHONE (OUTPATIENT)
Dept: PAIN MEDICINE | Facility: CLINIC | Age: 53
End: 2023-04-17
Payer: COMMERCIAL

## 2023-04-17 ENCOUNTER — OFFICE VISIT (OUTPATIENT)
Dept: NEUROSURGERY | Facility: CLINIC | Age: 53
End: 2023-04-17
Payer: COMMERCIAL

## 2023-04-17 VITALS
BODY MASS INDEX: 32.31 KG/M2 | WEIGHT: 175.6 LBS | SYSTOLIC BLOOD PRESSURE: 102 MMHG | DIASTOLIC BLOOD PRESSURE: 58 MMHG | TEMPERATURE: 96.8 F | HEIGHT: 62 IN

## 2023-04-17 DIAGNOSIS — M54.16 LUMBAR RADICULOPATHY: Primary | ICD-10-CM

## 2023-04-17 PROCEDURE — 99204 OFFICE O/P NEW MOD 45 MIN: CPT | Performed by: STUDENT IN AN ORGANIZED HEALTH CARE EDUCATION/TRAINING PROGRAM

## 2023-04-17 RX ORDER — FAMOTIDINE 10 MG
20 TABLET ORAL
OUTPATIENT
Start: 2023-04-17

## 2023-04-17 NOTE — TELEPHONE ENCOUNTER
Called patient to let her know her 5/17 appointment with Dr. Schroeder has been cancelled since she is having surgery with Dr. Hurt. Advised her she can reach out to us if she has issues and needs to see us after her surgery. Patient voiced understanding.

## 2023-04-17 NOTE — PROGRESS NOTES
Patient: Trinidad Quinteros  : 1970    Primary Care Provider: Josephine Delacruz PA-C    Requesting Provider: As above      Chief Complaint: Back Pain, Leg Pain (RLE), Numbness (RLE), and Extremity Weakness (RLE)      History of Present Illness: This is a 52 y.o. female who presents with chronic back and right lower extremity pain which is progressively worsened over the past several months.  The patient has pain that starts in her back and radiates down the posterior lateral aspect of her right leg.  She has tried medications as well as physical therapy but has not noted any significant improvement.  She is also undergone 2 epidural injections which she states helped moderately, but the pain then returned within 1 week.  The pain is significantly exacerbated by ambulation and she is unable to walk any significant distances as a result.    PMHX  Allergies:  Allergies   Allergen Reactions   • Bactrim [Sulfamethoxazole-Trimethoprim]      Medications    Current Outpatient Medications:   •  albuterol (ACCUNEB) 1.25 MG/3ML nebulizer solution, Take 3 mL (one vial) by nebulization Every 6 (Six) Hours As Needed for Wheezing or Shortness of Air., Disp: 75 mL, Rfl: 12  •  albuterol sulfate  (90 Base) MCG/ACT inhaler, Inhale 2 puffs as directed every 4 hours As Needed for Wheezing or Shortness of Air and/or cough., Disp: 18 g, Rfl: 5  •  ALPRAZolam (XANAX) 0.5 MG tablet, Take 1 tablet by mouth 3 (Three) Times a Day., Disp: 90 tablet, Rfl: 5  •  amoxicillin-clavulanate (Augmentin) 875-125 MG per tablet, Take 1 tablet by mouth 2 (Two) Times a Day., Disp: 20 tablet, Rfl: 0  •  atorvastatin (LIPITOR) 40 MG tablet, Take 1 tablet by mouth Daily., Disp: 90 tablet, Rfl: 2  •  estradiol (Estrace) 0.5 MG tablet, Take 1 tablet by mouth Daily. For menopausal hot flashes, Disp: 30 tablet, Rfl: 2  •  fluticasone (Flonase) 50 MCG/ACT nasal spray, Use 2 sprays into each nostril as directed by provider Daily., Disp: 16 g, Rfl:  11  •  fluticasone-salmeterol (Advair Diskus) 250-50 MCG/DOSE DISKUS, Inhale 1 puff 2 (Two) Times a Day., Disp: 60 each, Rfl: 1  •  folic acid (FOLVITE) 1 MG tablet, Take 1 tablet by mouth Daily., Disp: 30 tablet, Rfl: 11  •  gabapentin (NEURONTIN) 300 MG capsule, Take 1 capsule by mouth 4 (Four) Times a Day for neuropathy, Disp: 120 capsule, Rfl: 5  •  glycopyrrolate (Robinul-Forte) 2 MG tablet, Take 1 tablet by mouth 2 (Two) Times a Day for diarrhea, Disp: 60 tablet, Rfl: 2  •  hydroCHLOROthiazide (HYDRODIURIL) 25 MG tablet, Take 1 tablet by mouth Daily., Disp: 90 tablet, Rfl: 3  •  hydroCHLOROthiazide (HYDRODIURIL) 25 MG tablet, Take 1 tablet by mouth Daily., Disp: 90 tablet, Rfl: 2  •  levocetirizine (XYZAL) 5 MG tablet, Take 1 tablet by mouth Every Evening for allergies, Disp: 30 tablet, Rfl: 11  •  losartan (COZAAR) 100 MG tablet, Take 1 tablet by mouth Daily., Disp: 90 tablet, Rfl: 3  •  meloxicam (MOBIC) 7.5 MG tablet, Take 1 tablet by mouth Daily., Disp: 30 tablet, Rfl: 5  •  ondansetron (Zofran) 4 MG tablet, Take 1 tablet by mouth Every 8 (Eight) Hours As Needed for Nausea or Vomiting., Disp: 20 tablet, Rfl: 1  •  sertraline (ZOLOFT) 100 MG tablet, Take 2 tablets by mouth Daily, Disp: 60 tablet, Rfl: 5  •  tiZANidine (ZANAFLEX) 4 MG tablet, Take 0.5-1 tablet by mouth 2 Times a Day As Needed for back pain, Disp: 60 tablet, Rfl: 5  •  valACYclovir (VALTREX) 500 MG tablet, TAKE 4 TABLETS BY MOUTH AT ONSET OF ATTACK AND 12 HOURS LATER, Disp: 8 tablet, Rfl: 5    Current Facility-Administered Medications:   •  cyanocobalamin injection 1,000 mcg, 1,000 mcg, Intramuscular, Q28 Days, Josephine Delacruz PA-C, 1,000 mcg at 10/21/22 1324  Past Medical History:  Past Medical History:   Diagnosis Date   • Allergic    • Anxiety    • CTS (carpal tunnel syndrome)    • Depression    • Extremity pain 2021   • Hyperlipidemia    • Hypertension    • Low back pain    • Neck pain 2021     Past Surgical History:  Past Surgical  History:   Procedure Laterality Date   • APPENDECTOMY     • CARDIAC CATHETERIZATION     • CARDIAC CATHETERIZATION     •  SECTION     • COLONOSCOPY     • HAND TENDON SURGERY Right    • SUBTOTAL HYSTERECTOMY     • TUBAL ABDOMINAL LIGATION       Social Hx:  Social History     Tobacco Use   • Smoking status: Every Day     Packs/day: 0.50     Years: 35.00     Pack years: 17.50     Types: Cigarettes   • Smokeless tobacco: Never   Substance Use Topics   • Alcohol use: No   • Drug use: No     Family Hx:  Family History   Problem Relation Age of Onset   • COPD Mother    • Arthritis Mother    • Diabetes Mother    • Hypertension Mother    • Miscarriages / Stillbirths Mother    • COPD Father    • Diabetes Father    • Hypertension Father    • Breast cancer Maternal Aunt 50   • Cancer Other    • Arthritis Other    • Diabetes Other    • Asthma Other    • Heart disease Other         heart trouble   • Hypertension Other         high blood pressure   • Alcohol abuse Other    • COPD Brother    • Diabetes Brother    • Early death Brother    • Hypertension Brother    • COPD Brother    • Diabetes Brother    • Early death Brother         covid/pneumonia    • COPD Brother    • Miscarriages / Stillbirths Daughter    • Asthma Son    • Ovarian cancer Neg Hx      Review of Systems:        Review of Systems   Constitutional: Positive for fatigue. Negative for activity change, appetite change, chills, diaphoresis, fever and unexpected weight change.   HENT: Negative for congestion, dental problem, drooling, ear discharge, ear pain, facial swelling, hearing loss, mouth sores, nosebleeds, postnasal drip, rhinorrhea, sinus pressure, sneezing, sore throat, tinnitus, trouble swallowing and voice change.    Eyes: Negative for photophobia, pain, discharge, redness, itching and visual disturbance.   Respiratory: Negative for apnea, cough, choking, chest tightness, shortness of breath, wheezing and stridor.   "  Cardiovascular: Negative for chest pain, palpitations and leg swelling.   Gastrointestinal: Negative for abdominal distention, abdominal pain, anal bleeding, blood in stool, constipation, diarrhea, nausea, rectal pain and vomiting.   Endocrine: Negative for cold intolerance, heat intolerance, polydipsia, polyphagia and polyuria.   Genitourinary: Negative for decreased urine volume, difficulty urinating, dysuria, enuresis, flank pain, frequency, genital sores, hematuria and urgency.   Musculoskeletal: Positive for back pain. Negative for arthralgias, gait problem, joint swelling, myalgias, neck pain and neck stiffness.   Skin: Negative for color change, pallor, rash and wound.   Allergic/Immunologic: Negative for environmental allergies, food allergies and immunocompromised state.   Neurological: Positive for weakness and numbness. Negative for dizziness, tremors, seizures, syncope, facial asymmetry, speech difficulty, light-headedness and headaches.   Hematological: Negative for adenopathy. Does not bruise/bleed easily.   Psychiatric/Behavioral: Negative for agitation, behavioral problems, confusion, decreased concentration, dysphoric mood, hallucinations, self-injury, sleep disturbance and suicidal ideas. The patient is not nervous/anxious and is not hyperactive.    All other systems reviewed and are negative.       Physical Exam:   /58 (BP Location: Right arm, Patient Position: Sitting, Cuff Size: Adult)   Temp 96.8 °F (36 °C) (Infrared)   Ht 157.5 cm (62\")   Wt 79.7 kg (175 lb 9.6 oz)   BMI 32.12 kg/m²   Awake, alert and oriented x 3  Speech f/c  Opens eyes spont  Pupils 3 mm rx bilaterally  Extraocular muscles intact bilaterally  Normal sensation to light touch in all 3 distributions of CN V bilaterally  Face symmetric bilaterally  Tongue midline  5/5 in the LE's bilaterally    Diagnostic Studies:  All neurological imaging studies were independently reviewed unless stated " otherwise    Assessment/Plan:  This is a 52 y.o. female presenting with long standing back and right lower extremity pain which has progressively worsened over several months and preventing ambulation.  In reviewing the patient's lumbar MRI, she has disc bulges and neuroforaminal stenosis on the right at L4-5 and L5-S1.  The patient's distribution of pain could be coming from either level or a combination of both.  She has exhausted conservative options including medications, physical therapy and injections.  Because her pain is debilitating, I do think she would benefit from a right-sided L4-5 foraminotomy as well as a right sided L5-S1 discectomy.  I explained the risks, benefits and alternatives to the patient.  She understood these and would like to proceed with surgery.  We will look to get her scheduled in the near future.    Diagnoses and all orders for this visit:    1. Lumbar radiculopathy (Primary)        Nakul Hurt MD  04/17/23  11:29 EDT

## 2023-04-21 ENCOUNTER — PATIENT ROUNDING (BHMG ONLY) (OUTPATIENT)
Dept: NEUROSURGERY | Facility: CLINIC | Age: 53
End: 2023-04-21
Payer: COMMERCIAL

## 2023-04-21 DIAGNOSIS — G60.9 IDIOPATHIC PERIPHERAL NEUROPATHY: ICD-10-CM

## 2023-04-21 RX ORDER — HYDROCHLOROTHIAZIDE 25 MG/1
25 TABLET ORAL DAILY
Qty: 90 TABLET | Refills: 3 | Status: SHIPPED | OUTPATIENT
Start: 2023-04-21

## 2023-04-21 RX ORDER — GABAPENTIN 300 MG/1
300 CAPSULE ORAL 4 TIMES DAILY
Qty: 120 CAPSULE | Refills: 5 | OUTPATIENT
Start: 2023-04-21

## 2023-04-21 RX ORDER — ALBUTEROL SULFATE 90 UG/1
2 AEROSOL, METERED RESPIRATORY (INHALATION) EVERY 4 HOURS PRN
Qty: 18 G | Refills: 5 | Status: SHIPPED | OUTPATIENT
Start: 2023-04-21

## 2023-04-21 NOTE — PROGRESS NOTES
A MY-CHART MESSAGE HAS BEEN SENT TO THE PATIENT FOR PATIENT ROUNDING WITH [facility] [place] [facility].

## 2023-04-24 DIAGNOSIS — G60.9 IDIOPATHIC PERIPHERAL NEUROPATHY: ICD-10-CM

## 2023-04-24 RX ORDER — GABAPENTIN 300 MG/1
300 CAPSULE ORAL 4 TIMES DAILY
Qty: 120 CAPSULE | Refills: 5 | OUTPATIENT
Start: 2023-04-24

## 2023-04-27 ENCOUNTER — TELEPHONE (OUTPATIENT)
Dept: FAMILY MEDICINE CLINIC | Facility: CLINIC | Age: 53
End: 2023-04-27
Payer: COMMERCIAL

## 2023-04-27 RX ORDER — CEFUROXIME AXETIL 250 MG/1
250 TABLET ORAL 2 TIMES DAILY
Qty: 14 TABLET | Refills: 0 | Status: SHIPPED | OUTPATIENT
Start: 2023-04-27

## 2023-04-27 RX ORDER — DEXTROMETHORPHAN HYDROBROMIDE AND PROMETHAZINE HYDROCHLORIDE 15; 6.25 MG/5ML; MG/5ML
5 SYRUP ORAL 4 TIMES DAILY PRN
Qty: 180 ML | Refills: 0 | Status: SHIPPED | OUTPATIENT
Start: 2023-04-27

## 2023-04-27 RX ORDER — PREDNISONE 20 MG/1
20 TABLET ORAL 2 TIMES DAILY
Qty: 10 TABLET | Refills: 0 | Status: SHIPPED | OUTPATIENT
Start: 2023-04-27

## 2023-04-27 NOTE — TELEPHONE ENCOUNTER
Patient is home sick today and wants to know if she can get an antibiotic, cough syrup, and prednisone. She is coughing and congested. You can contact patient at [phone]. Her pharmacy is, [id number] - [facility] [address] [place] [address] AT [facility] - [phone] [facility] [phone] ...   Thanks,   Angela.Garland..

## 2023-05-01 ENCOUNTER — OFFICE VISIT (OUTPATIENT)
Dept: FAMILY MEDICINE CLINIC | Facility: CLINIC | Age: 53
End: 2023-05-01
Payer: COMMERCIAL

## 2023-05-01 ENCOUNTER — HOSPITAL ENCOUNTER (OUTPATIENT)
Dept: GENERAL RADIOLOGY | Facility: HOSPITAL | Age: 53
Discharge: HOME | End: 2023-05-01
Admitting: PHYSICIAN ASSISTANT
Payer: COMMERCIAL

## 2023-05-01 VITALS
HEART RATE: 70 BPM | BODY MASS INDEX: 31.54 KG/M2 | DIASTOLIC BLOOD PRESSURE: 88 MMHG | TEMPERATURE: 97.1 F | RESPIRATION RATE: 21 BRPM | OXYGEN SATURATION: 95 % | HEIGHT: 62 IN | WEIGHT: 171.4 LBS | SYSTOLIC BLOOD PRESSURE: 130 MMHG

## 2023-05-01 DIAGNOSIS — R05.1 ACUTE COUGH: ICD-10-CM

## 2023-05-01 DIAGNOSIS — R50.9 FEVER, UNSPECIFIED FEVER CAUSE: ICD-10-CM

## 2023-05-01 LAB
EXPIRATION DATE: NORMAL
FLUAV AG UPPER RESP QL IA.RAPID: NOT DETECTED
FLUBV AG UPPER RESP QL IA.RAPID: NOT DETECTED
INTERNAL CONTROL: NORMAL
Lab: NORMAL
SARS-COV-2 AG UPPER RESP QL IA.RAPID: NOT DETECTED

## 2023-05-01 PROCEDURE — 71046 X-RAY EXAM CHEST 2 VIEWS: CPT

## 2023-05-01 RX ORDER — CEFTRIAXONE 1 G/1
1 INJECTION, POWDER, FOR SOLUTION INTRAMUSCULAR; INTRAVENOUS ONCE
Qty: 1 G | Refills: 0 | Status: SHIPPED | OUTPATIENT
Start: 2023-05-01 | End: 2023-05-01

## 2023-05-01 RX ORDER — CEFTRIAXONE 1 G/1
1 INJECTION, POWDER, FOR SOLUTION INTRAMUSCULAR; INTRAVENOUS ONCE
Status: COMPLETED | OUTPATIENT
Start: 2023-05-01 | End: 2023-05-01

## 2023-05-01 RX ADMIN — CEFTRIAXONE 1 G: 1 INJECTION, POWDER, FOR SOLUTION INTRAMUSCULAR; INTRAVENOUS at 10:59

## 2023-05-01 NOTE — PROGRESS NOTES
Subjective   Trinidad Quinteros is a 52 y.o. female  Cough and Nasal Congestion      History of Present Illness    The patient is seen today for follow-up.    The patient is still coughing. She started the antibiotic on Thursday afternoon, 04/27/2023, and she just finished it. The patient reports that it feels like it is in her chest. She started feeling bad on Wednesday, 04/26/2023. The patient denies any fever. She has some chills. The patient was so tired that when she got up on Thursday, 04/27/2023, she felt like her throat had glass in it. She states that her throat feels better today, but her chest is not any better. The patient reports that her chest is about the same.The patient did have nasal congestion, but it is a little better. She has been using the nasal spray and the cough syrup, but it is not helping. The patient is taking prednisone and she has been doing some breathing treatments. She states that it does not seem like it opens her up when she uses the nebulizer. The patient is allergic to SULFA.    The patient is scheduled for back surgery at the end of 05/2023.     The following portions of the patient's history were reviewed and updated as appropriate: allergies, current medications, past social history and problem list    Review of Systems   Constitutional: Positive for diaphoresis, fatigue and fever. Negative for chills.   HENT: Positive for congestion and sore throat.    Respiratory: Positive for cough, shortness of breath and wheezing. Negative for chest tightness.    Cardiovascular: Negative for chest pain.   Gastrointestinal: Negative for nausea.       Objective     Vitals:    05/01/23 0938   BP: 130/88   Pulse: 70   Resp: 21   Temp: 97.1 °F (36.2 °C)   SpO2: 95%       Physical Exam  Vitals and nursing note reviewed.   Constitutional:       General: She is not in acute distress.     Appearance: Normal appearance. She is well-developed. She is not ill-appearing, toxic-appearing or diaphoretic.    HENT:      Head: Normocephalic and atraumatic.      Nose: Nose normal.   Neck:      Vascular: No JVD.   Cardiovascular:      Rate and Rhythm: Normal rate and regular rhythm.      Heart sounds: Normal heart sounds. No murmur heard.  Pulmonary:      Effort: Pulmonary effort is normal. No respiratory distress.      Breath sounds: No stridor. Rales ( RLL) present. No wheezing.   Musculoskeletal:      Cervical back: Neck supple.   Lymphadenopathy:      Cervical: No cervical adenopathy.   Skin:     General: Skin is warm and dry.   Neurological:      Mental Status: She is alert.         Assessment & Plan   The patient will receive a Rocephin injection today. I will order a chest x-ray.    Diagnoses and all orders for this visit:    1. Acute cough  -     XR Chest PA & Lateral; Future  -     Discontinue: cefTRIAXone (ROCEPHIN) 1 g injection; Inject 1 g into the appropriate muscle as directed by prescriber 1 (One) Time for 1 dose. Given right ventrogluteal.  Patient tolerated well  Dispense: 1 g; Refill: 0  -     Discontinue: cefTRIAXone (ROCEPHIN) 1 g injection; Inject 1 g into the appropriate muscle as directed by prescriber 1 (One) Time for 1 dose.  Dispense: 1 g; Refill: 0  -     cefTRIAXone (ROCEPHIN) injection 1 g    2. Fever, unspecified fever cause  -     POCT SARS-CoV-2 Antigen GARDENIA + Flu     rocephin 1g IM  Transcribed from ambient dictation for Josephine Delacruz PA-C by Rody Pedersen.  05/01/23   11:22 EDT    Patient or patient representative verbalized consent to the visit recording.  I have personally performed the services described in this document as transcribed by the above individual, and it is both accurate and complete.  Josephine Delacruz PA-C  5/1/2023  13:56 EDT

## 2023-05-03 ENCOUNTER — TELEPHONE (OUTPATIENT)
Dept: FAMILY MEDICINE CLINIC | Facility: CLINIC | Age: 53
End: 2023-05-03
Payer: COMMERCIAL

## 2023-05-03 RX ORDER — FLUTICASONE PROPIONATE AND SALMETEROL 250; 50 UG/1; UG/1
1 POWDER RESPIRATORY (INHALATION)
Qty: 60 EACH | Refills: 1 | Status: SHIPPED | OUTPATIENT
Start: 2023-05-03

## 2023-05-03 NOTE — TELEPHONE ENCOUNTER
----- Message from Josephine Delacruz PA-C sent at 5/2/2023  5:09 PM EDT -----  Please notify patient that her chest x-ray is normal therefore the coughing she is doing is most consistent with being secondary to inflammation in her airway please see if she is getting any improvement on her Advair inhaler if she is using this twice daily, if her symptoms persist I would recommend referral to pulmonary

## 2023-05-03 NOTE — TELEPHONE ENCOUNTER
Patient said that she finished her prednisone, but needs a refill on her Advair inhaler. I couldn't find the Advair inhaler on her medication list. Patient doesn't want the Pulmonary referral right now.   Angela.....

## 2023-05-15 DIAGNOSIS — M75.41 IMPINGEMENT SYNDROME OF RIGHT SHOULDER: ICD-10-CM

## 2023-05-15 RX ORDER — LEVOCETIRIZINE DIHYDROCHLORIDE 5 MG/1
5 TABLET, FILM COATED ORAL EVERY EVENING
Qty: 30 TABLET | Refills: 11 | Status: SHIPPED | OUTPATIENT
Start: 2023-05-15

## 2023-05-15 RX ORDER — MELOXICAM 7.5 MG/1
7.5 TABLET ORAL DAILY
Qty: 30 TABLET | Refills: 5 | Status: SHIPPED | OUTPATIENT
Start: 2023-05-15

## 2023-05-17 ENCOUNTER — PRE-ADMISSION TESTING (OUTPATIENT)
Dept: PREADMISSION TESTING | Facility: HOSPITAL | Age: 53
End: 2023-05-17
Payer: COMMERCIAL

## 2023-05-17 VITALS — HEIGHT: 63 IN | BODY MASS INDEX: 30.8 KG/M2 | WEIGHT: 173.83 LBS

## 2023-05-17 DIAGNOSIS — M54.16 LUMBAR RADICULOPATHY: ICD-10-CM

## 2023-05-17 LAB
ANION GAP SERPL CALCULATED.3IONS-SCNC: 11 MMOL/L (ref 5–15)
BUN SERPL-MCNC: 7 MG/DL (ref 6–20)
BUN/CREAT SERPL: 8.6 (ref 7–25)
CALCIUM SPEC-SCNC: 9.4 MG/DL (ref 8.6–10.5)
CHLORIDE SERPL-SCNC: 104 MMOL/L (ref 98–107)
CO2 SERPL-SCNC: 26 MMOL/L (ref 22–29)
CREAT SERPL-MCNC: 0.81 MG/DL (ref 0.57–1)
DEPRECATED RDW RBC AUTO: 49.8 FL (ref 37–54)
EGFRCR SERPLBLD CKD-EPI 2021: 86.9 ML/MIN/1.73
ERYTHROCYTE [DISTWIDTH] IN BLOOD BY AUTOMATED COUNT: 14.8 % (ref 12.3–15.4)
GLUCOSE SERPL-MCNC: 98 MG/DL (ref 65–99)
HCT VFR BLD AUTO: 42.6 % (ref 34–46.6)
HGB BLD-MCNC: 14.2 G/DL (ref 12–15.9)
MCH RBC QN AUTO: 30.7 PG (ref 26.6–33)
MCHC RBC AUTO-ENTMCNC: 33.3 G/DL (ref 31.5–35.7)
MCV RBC AUTO: 92 FL (ref 79–97)
PLATELET # BLD AUTO: 292 10*3/MM3 (ref 140–450)
PMV BLD AUTO: 9.1 FL (ref 6–12)
POTASSIUM SERPL-SCNC: 3.9 MMOL/L (ref 3.5–5.2)
RBC # BLD AUTO: 4.63 10*6/MM3 (ref 3.77–5.28)
SODIUM SERPL-SCNC: 141 MMOL/L (ref 136–145)
WBC NRBC COR # BLD: 6.77 10*3/MM3 (ref 3.4–10.8)

## 2023-05-17 PROCEDURE — 87081 CULTURE SCREEN ONLY: CPT

## 2023-05-17 PROCEDURE — 93005 ELECTROCARDIOGRAM TRACING: CPT

## 2023-05-17 PROCEDURE — 36415 COLL VENOUS BLD VENIPUNCTURE: CPT

## 2023-05-17 PROCEDURE — 80048 BASIC METABOLIC PNL TOTAL CA: CPT

## 2023-05-17 PROCEDURE — 85027 COMPLETE CBC AUTOMATED: CPT

## 2023-05-17 RX ORDER — ESTRADIOL 0.5 MG/1
0.5 TABLET ORAL DAILY
Qty: 30 TABLET | Refills: 2 | Status: SHIPPED | OUTPATIENT
Start: 2023-05-17

## 2023-05-17 NOTE — PAT
Patient to apply Chlorhexadine wipes  to surgical area (as instructed) the night before procedure and the AM of procedure. Wipes provided.  Patient instructed to drink 20 ounces of Gatorade and it needs to be completed 1 hour (for Main OR patients) or 2 hours (scheduled  section & [facility] patients) before given arrival time for procedure (NO RED Gatorade)    Patient verbalized understanding.  Per Anesthesia Request, patient instructed not to take their ACE/ARB medications on the AM of surgery.  CXR on file from 23.  Abnormal EKG and fax cover sheet faxed to Dr. Anderson for review.  After review, pt was cleared for surgery.  Bactroban (if prescribed) and Chlorhexidine Prescription prescribed by physician before PAT visit.  Verified with patient that medication(s) were picked up from their pharmacy.  Written instructions given to patient during PAT visit.  Patient/family also instructed to complete skin prep checklist and return the checklist on the day of surgery to preoperative staff.  Patient/family verbalized understanding.

## 2023-05-18 LAB
MRSA SPEC QL CULT: NORMAL
QT INTERVAL: 442 MS
QTC INTERVAL: 442 MS

## 2023-05-23 DIAGNOSIS — G60.9 IDIOPATHIC PERIPHERAL NEUROPATHY: ICD-10-CM

## 2023-05-24 ENCOUNTER — ANESTHESIA (OUTPATIENT)
Dept: PERIOP | Facility: HOSPITAL | Age: 53
End: 2023-05-24
Payer: COMMERCIAL

## 2023-05-24 ENCOUNTER — ANESTHESIA EVENT (OUTPATIENT)
Dept: PERIOP | Facility: HOSPITAL | Age: 53
End: 2023-05-24
Payer: COMMERCIAL

## 2023-05-24 ENCOUNTER — APPOINTMENT (OUTPATIENT)
Dept: GENERAL RADIOLOGY | Facility: HOSPITAL | Age: 53
End: 2023-05-24
Payer: COMMERCIAL

## 2023-05-24 ENCOUNTER — HOSPITAL ENCOUNTER (OUTPATIENT)
Facility: HOSPITAL | Age: 53
Discharge: HOME | End: 2023-05-24
Attending: STUDENT IN AN ORGANIZED HEALTH CARE EDUCATION/TRAINING PROGRAM | Admitting: STUDENT IN AN ORGANIZED HEALTH CARE EDUCATION/TRAINING PROGRAM
Payer: COMMERCIAL

## 2023-05-24 VITALS
HEART RATE: 81 BPM | HEIGHT: 63 IN | WEIGHT: 173 LBS | RESPIRATION RATE: 16 BRPM | BODY MASS INDEX: 30.65 KG/M2 | SYSTOLIC BLOOD PRESSURE: 115 MMHG | TEMPERATURE: 97.2 F | OXYGEN SATURATION: 93 % | DIASTOLIC BLOOD PRESSURE: 62 MMHG

## 2023-05-24 DIAGNOSIS — M54.16 LUMBAR RADICULOPATHY: Primary | ICD-10-CM

## 2023-05-24 PROCEDURE — 63047 LAM FACETEC & FORAMOT LUMBAR: CPT

## 2023-05-24 PROCEDURE — 25010000002 FENTANYL CITRATE (PF) 100 MCG/2ML SOLUTION: Performed by: NURSE ANESTHETIST, CERTIFIED REGISTERED

## 2023-05-24 PROCEDURE — 25010000002 CEFAZOLIN IN DEXTROSE 2-4 GM/100ML-% SOLUTION: Performed by: STUDENT IN AN ORGANIZED HEALTH CARE EDUCATION/TRAINING PROGRAM

## 2023-05-24 PROCEDURE — 25010000002 NEOSTIGMINE 10 MG/10ML SOLUTION: Performed by: NURSE ANESTHETIST, CERTIFIED REGISTERED

## 2023-05-24 PROCEDURE — 25010000002 METHYLPREDNISOLONE PER 40 MG: Performed by: STUDENT IN AN ORGANIZED HEALTH CARE EDUCATION/TRAINING PROGRAM

## 2023-05-24 PROCEDURE — 63047 LAM FACETEC & FORAMOT LUMBAR: CPT | Performed by: STUDENT IN AN ORGANIZED HEALTH CARE EDUCATION/TRAINING PROGRAM

## 2023-05-24 PROCEDURE — 25010000002 HYDROMORPHONE 1 MG/ML SOLUTION

## 2023-05-24 PROCEDURE — 63030 LAMOT DCMPRN NRV RT 1 LMBR: CPT

## 2023-05-24 PROCEDURE — 63030 LAMOT DCMPRN NRV RT 1 LMBR: CPT | Performed by: STUDENT IN AN ORGANIZED HEALTH CARE EDUCATION/TRAINING PROGRAM

## 2023-05-24 PROCEDURE — 76000 FLUOROSCOPY <1 HR PHYS/QHP: CPT

## 2023-05-24 PROCEDURE — 25010000002 ONDANSETRON PER 1 MG: Performed by: NURSE ANESTHETIST, CERTIFIED REGISTERED

## 2023-05-24 PROCEDURE — 25010000002 DEXAMETHASONE SODIUM PHOSPHATE 100 MG/10ML SOLUTION: Performed by: STUDENT IN AN ORGANIZED HEALTH CARE EDUCATION/TRAINING PROGRAM

## 2023-05-24 DEVICE — HEMOST ABS SURGIFOAM SZ100 8X12 10MM: Type: IMPLANTABLE DEVICE | Site: SPINE LUMBAR | Status: FUNCTIONAL

## 2023-05-24 DEVICE — FLOSEAL HEMOSTATIC MATRIX, 10ML
Type: IMPLANTABLE DEVICE | Site: SPINE LUMBAR | Status: FUNCTIONAL
Brand: FLOSEAL HEMOSTATIC MATRIX

## 2023-05-24 RX ORDER — ONDANSETRON 2 MG/ML
INJECTION INTRAMUSCULAR; INTRAVENOUS AS NEEDED
Status: DISCONTINUED | OUTPATIENT
Start: 2023-05-24 | End: 2023-05-24 | Stop reason: SURG

## 2023-05-24 RX ORDER — SODIUM CHLORIDE 0.9 % (FLUSH) 0.9 %
10 SYRINGE (ML) INJECTION EVERY 12 HOURS SCHEDULED
Status: DISCONTINUED | OUTPATIENT
Start: 2023-05-24 | End: 2023-05-24 | Stop reason: HOSPADM

## 2023-05-24 RX ORDER — GLYCOPYRROLATE 0.2 MG/ML
INJECTION INTRAMUSCULAR; INTRAVENOUS AS NEEDED
Status: DISCONTINUED | OUTPATIENT
Start: 2023-05-24 | End: 2023-05-24 | Stop reason: SURG

## 2023-05-24 RX ORDER — SODIUM CHLORIDE 9 MG/ML
40 INJECTION, SOLUTION INTRAVENOUS AS NEEDED
Status: DISCONTINUED | OUTPATIENT
Start: 2023-05-24 | End: 2023-05-24 | Stop reason: HOSPADM

## 2023-05-24 RX ORDER — DIAZEPAM 5 MG/1
5 TABLET ORAL EVERY 8 HOURS PRN
Qty: 25 TABLET | Refills: 0 | Status: SHIPPED | OUTPATIENT
Start: 2023-05-24

## 2023-05-24 RX ORDER — SODIUM CHLORIDE, SODIUM LACTATE, POTASSIUM CHLORIDE, CALCIUM CHLORIDE 600; 310; 30; 20 MG/100ML; MG/100ML; MG/100ML; MG/100ML
9 INJECTION, SOLUTION INTRAVENOUS CONTINUOUS
Status: DISCONTINUED | OUTPATIENT
Start: 2023-05-24 | End: 2023-05-24 | Stop reason: HOSPADM

## 2023-05-24 RX ORDER — HYDROCODONE BITARTRATE AND ACETAMINOPHEN 5; 325 MG/1; MG/1
1 TABLET ORAL EVERY 4 HOURS PRN
Qty: 30 TABLET | Refills: 0 | Status: SHIPPED | OUTPATIENT
Start: 2023-05-24

## 2023-05-24 RX ORDER — LIDOCAINE HYDROCHLORIDE 10 MG/ML
INJECTION, SOLUTION EPIDURAL; INFILTRATION; INTRACAUDAL; PERINEURAL AS NEEDED
Status: DISCONTINUED | OUTPATIENT
Start: 2023-05-24 | End: 2023-05-24 | Stop reason: SURG

## 2023-05-24 RX ORDER — FENTANYL CITRATE 50 UG/ML
INJECTION, SOLUTION INTRAMUSCULAR; INTRAVENOUS AS NEEDED
Status: DISCONTINUED | OUTPATIENT
Start: 2023-05-24 | End: 2023-05-24 | Stop reason: SURG

## 2023-05-24 RX ORDER — METHYLPREDNISOLONE ACETATE 40 MG/ML
INJECTION, SUSPENSION INTRA-ARTICULAR; INTRALESIONAL; INTRAMUSCULAR; SOFT TISSUE AS NEEDED
Status: DISCONTINUED | OUTPATIENT
Start: 2023-05-24 | End: 2023-05-24 | Stop reason: HOSPADM

## 2023-05-24 RX ORDER — ROCURONIUM BROMIDE 10 MG/ML
INJECTION, SOLUTION INTRAVENOUS AS NEEDED
Status: DISCONTINUED | OUTPATIENT
Start: 2023-05-24 | End: 2023-05-24 | Stop reason: SURG

## 2023-05-24 RX ORDER — AMOXICILLIN 250 MG
1 CAPSULE ORAL DAILY
Qty: 30 TABLET | Refills: 0 | Status: SHIPPED | OUTPATIENT
Start: 2023-05-24

## 2023-05-24 RX ORDER — NEOSTIGMINE METHYLSULFATE 1 MG/ML
INJECTION, SOLUTION INTRAVENOUS AS NEEDED
Status: DISCONTINUED | OUTPATIENT
Start: 2023-05-24 | End: 2023-05-24 | Stop reason: SURG

## 2023-05-24 RX ORDER — FAMOTIDINE 20 MG/1
20 TABLET, FILM COATED ORAL
Status: DISCONTINUED | OUTPATIENT
Start: 2023-05-24 | End: 2023-05-24 | Stop reason: SDUPTHER

## 2023-05-24 RX ORDER — MIDAZOLAM HYDROCHLORIDE 1 MG/ML
1 INJECTION INTRAMUSCULAR; INTRAVENOUS
Status: DISCONTINUED | OUTPATIENT
Start: 2023-05-24 | End: 2023-05-24 | Stop reason: HOSPADM

## 2023-05-24 RX ORDER — SODIUM CHLORIDE 0.9 % (FLUSH) 0.9 %
10 SYRINGE (ML) INJECTION AS NEEDED
Status: DISCONTINUED | OUTPATIENT
Start: 2023-05-24 | End: 2023-05-24 | Stop reason: HOSPADM

## 2023-05-24 RX ORDER — MAGNESIUM HYDROXIDE 1200 MG/15ML
LIQUID ORAL AS NEEDED
Status: DISCONTINUED | OUTPATIENT
Start: 2023-05-24 | End: 2023-05-24 | Stop reason: HOSPADM

## 2023-05-24 RX ORDER — FAMOTIDINE 10 MG/ML
20 INJECTION, SOLUTION INTRAVENOUS ONCE
Status: DISCONTINUED | OUTPATIENT
Start: 2023-05-24 | End: 2023-05-24 | Stop reason: HOSPADM

## 2023-05-24 RX ORDER — FAMOTIDINE 20 MG/1
20 TABLET, FILM COATED ORAL ONCE
Status: COMPLETED | OUTPATIENT
Start: 2023-05-24 | End: 2023-05-24

## 2023-05-24 RX ORDER — CEFAZOLIN SODIUM 2 G/100ML
2 INJECTION, SOLUTION INTRAVENOUS ONCE
Status: COMPLETED | OUTPATIENT
Start: 2023-05-24 | End: 2023-05-24

## 2023-05-24 RX ORDER — LIDOCAINE HYDROCHLORIDE 10 MG/ML
0.5 INJECTION, SOLUTION EPIDURAL; INFILTRATION; INTRACAUDAL; PERINEURAL ONCE AS NEEDED
Status: COMPLETED | OUTPATIENT
Start: 2023-05-24 | End: 2023-05-24

## 2023-05-24 RX ORDER — SODIUM CHLORIDE, SODIUM LACTATE, POTASSIUM CHLORIDE, CALCIUM CHLORIDE 600; 310; 30; 20 MG/100ML; MG/100ML; MG/100ML; MG/100ML
9 INJECTION, SOLUTION INTRAVENOUS CONTINUOUS
Status: DISCONTINUED | OUTPATIENT
Start: 2023-05-24 | End: 2023-05-24 | Stop reason: SDUPTHER

## 2023-05-24 RX ORDER — LIDOCAINE HYDROCHLORIDE AND EPINEPHRINE 5; 5 MG/ML; UG/ML
INJECTION, SOLUTION INFILTRATION; PERINEURAL AS NEEDED
Status: DISCONTINUED | OUTPATIENT
Start: 2023-05-24 | End: 2023-05-24 | Stop reason: HOSPADM

## 2023-05-24 RX ORDER — LIDOCAINE HYDROCHLORIDE 10 MG/ML
0.5 INJECTION, SOLUTION EPIDURAL; INFILTRATION; INTRACAUDAL; PERINEURAL ONCE
Status: DISCONTINUED | OUTPATIENT
Start: 2023-05-24 | End: 2023-05-24 | Stop reason: HOSPADM

## 2023-05-24 RX ORDER — FENTANYL CITRATE 50 UG/ML
50 INJECTION, SOLUTION INTRAMUSCULAR; INTRAVENOUS
Status: DISCONTINUED | OUTPATIENT
Start: 2023-05-24 | End: 2023-05-24 | Stop reason: HOSPADM

## 2023-05-24 RX ORDER — EPHEDRINE SULFATE 50 MG/ML
INJECTION INTRAVENOUS AS NEEDED
Status: DISCONTINUED | OUTPATIENT
Start: 2023-05-24 | End: 2023-05-24 | Stop reason: SURG

## 2023-05-24 RX ORDER — ONDANSETRON 2 MG/ML
4 INJECTION INTRAMUSCULAR; INTRAVENOUS ONCE AS NEEDED
Status: DISCONTINUED | OUTPATIENT
Start: 2023-05-24 | End: 2023-05-24 | Stop reason: HOSPADM

## 2023-05-24 RX ADMIN — SODIUM CHLORIDE, POTASSIUM CHLORIDE, SODIUM LACTATE AND CALCIUM CHLORIDE 9 ML/HR: 600; 310; 30; 20 INJECTION, SOLUTION INTRAVENOUS at 06:15

## 2023-05-24 RX ADMIN — Medication 0.5 MG: at 09:53

## 2023-05-24 RX ADMIN — LIDOCAINE HYDROCHLORIDE 0.5 ML: 10 INJECTION, SOLUTION EPIDURAL; INFILTRATION; INTRACAUDAL; PERINEURAL at 06:15

## 2023-05-24 RX ADMIN — ROCURONIUM BROMIDE 10 MG: 10 SOLUTION INTRAVENOUS at 08:00

## 2023-05-24 RX ADMIN — FENTANYL CITRATE 100 MCG: 50 INJECTION, SOLUTION INTRAMUSCULAR; INTRAVENOUS at 07:36

## 2023-05-24 RX ADMIN — NEOSTIGMINE 2.5 MG: 1 INJECTION INTRAVENOUS at 09:13

## 2023-05-24 RX ADMIN — HYDROMORPHONE HYDROCHLORIDE 0.5 MG: 1 INJECTION, SOLUTION INTRAMUSCULAR; INTRAVENOUS; SUBCUTANEOUS at 09:53

## 2023-05-24 RX ADMIN — ROCURONIUM BROMIDE 50 MG: 10 SOLUTION INTRAVENOUS at 07:36

## 2023-05-24 RX ADMIN — EPHEDRINE SULFATE 20 MG: 50 INJECTION INTRAVENOUS at 08:27

## 2023-05-24 RX ADMIN — EPHEDRINE SULFATE 5 MG: 50 INJECTION INTRAVENOUS at 08:22

## 2023-05-24 RX ADMIN — FAMOTIDINE 20 MG: 20 TABLET ORAL at 06:17

## 2023-05-24 RX ADMIN — LIDOCAINE HYDROCHLORIDE 50 MG: 10 INJECTION, SOLUTION EPIDURAL; INFILTRATION; INTRACAUDAL; PERINEURAL at 07:36

## 2023-05-24 RX ADMIN — ONDANSETRON 4 MG: 2 INJECTION INTRAMUSCULAR; INTRAVENOUS at 08:53

## 2023-05-24 RX ADMIN — CEFAZOLIN SODIUM 2 G: 2 INJECTION, SOLUTION INTRAVENOUS at 07:39

## 2023-05-24 RX ADMIN — GLYCOPYRROLATE 0.4 MCG: 0.4 INJECTION INTRAMUSCULAR; INTRAVENOUS at 09:13

## 2023-05-24 RX ADMIN — EPHEDRINE SULFATE 5 MG: 50 INJECTION INTRAVENOUS at 08:15

## 2023-05-24 NOTE — H&P
Pre-Op H&P  Trinidad MARIO Neli  [id number]  1970      Chief complaint: Back and RLE Pain      Subjective:  Patient is a 52 y.o.female presents for scheduled surgery by Dr. Hurt. She anticipates a LUMBAR DISCECTOMY, L5-S1, L4-5 foraminotomy- RIGHT - Right today.  The patient endorses having back and right lower extremity pain the past 3 years.  She explains the pain to be throbbing.  She denies any other associated symptoms related to her present condition.  She states that her leg sometimes stops working after walking for an extended period of time.  She endorses having taking hydrocodone at night to help alleviate her pain.  She denies use of walker or assistive devices.       Review of Systems:  Constitutional-- No fever, chills or sweats. No fatigue.  CV-- No chest pain, palpitation or syncope. + HTN, HLD.  Resp-- No SOB, cough, hemoptysis. +ADRIAN w/ CPAP.  Skin--No rashes or lesions      Allergies:   Allergies   Allergen Reactions   • Bactrim [Sulfamethoxazole-Trimethoprim] Nausea Only         Home Meds:  Medications Prior to Admission   Medication Sig Dispense Refill Last Dose   • albuterol sulfate HFA (Ventolin HFA) 108 (90 Base) MCG/ACT inhaler Inhale 2 puffs as directed every 4 hours As Needed for Wheezing or Shortness of Air and/or cough. 18 g 5 5/23/2023 at 0700   • ALPRAZolam (XANAX) 0.5 MG tablet Take 1 tablet by mouth 3 (Three) Times a Day. 90 tablet 5 5/23/2023 at 2300   • atorvastatin (LIPITOR) 40 MG tablet Take 1 tablet by mouth Daily. 90 tablet 2 5/23/2023 at 2300   • estradiol (Estrace) 0.5 MG tablet Take 1 tablet by mouth Daily as directed for menopausal hot flashes 30 tablet 2 5/23/2023 at 0700   • folic acid (FOLVITE) 1 MG tablet Take 1 tablet by mouth Daily. 30 tablet 11 5/23/2023 at 0700   • gabapentin (NEURONTIN) 300 MG capsule Take 1 capsule by mouth 4 (Four) Times a Day for neuropathy 120 capsule 5 5/23/2023 at 2300   • hydroCHLOROthiazide (HYDRODIURIL) 25 MG tablet Take 1 tablet by  mouth Daily. 90 tablet 2 5/23/2023 at 0700   • losartan (COZAAR) 100 MG tablet Take 1 tablet by mouth Daily. 90 tablet 3 5/23/2023 at 0700   • sertraline (ZOLOFT) 100 MG tablet Take 2 tablets by mouth Daily 60 tablet 5 5/23/2023 at 0700   • tiZANidine (ZANAFLEX) 4 MG tablet Take 0.5-1 tablet by mouth 2 Times a Day As Needed for back pain (Patient taking differently: Take 2-4 mg by mouth 2 (Two) Times a Day As Needed for Muscle Spasms.) 60 tablet 5 5/23/2023 at 2300   • albuterol (ACCUNEB) 1.25 MG/3ML nebulizer solution Take 3 mL (one vial) by nebulization Every 6 (Six) Hours As Needed for Wheezing or Shortness of Air. 75 mL 12 5/17/2023   • fluticasone (Flonase) 50 MCG/ACT nasal spray Use 2 sprays into each nostril as directed by provider Daily. 16 g 11 5/10/2023   • fluticasone-salmeterol (Advair Diskus) 250-50 MCG/DOSE DISKUS Inhale 1 puff 2 (Two) Times a Day. 60 each 1 5/17/2023   • glycopyrrolate (Robinul-Forte) 2 MG tablet Take 1 tablet by mouth 2 (Two) Times a Day for diarrhea 60 tablet 2 5/17/2023   • levocetirizine (XYZAL) 5 MG tablet Take 1 tablet by mouth Every Evening for allergies 30 tablet 11 5/22/2023 at 2300   • meloxicam (MOBIC) 7.5 MG tablet Take 1 tablet by mouth Daily. 30 tablet 5 5/3/2023   • ondansetron (Zofran) 4 MG tablet Take 1 tablet by mouth Every 8 (Eight) Hours As Needed for Nausea or Vomiting. 20 tablet 1 5/20/2023   • promethazine-dextromethorphan (PROMETHAZINE-DM) 6.25-15 MG/5ML syrup Take 5 mL by mouth 4 (Four) Times a Day As Needed for Cough. 180 mL 0 5/10/2023   • valACYclovir (VALTREX) 500 MG tablet TAKE 4 TABLETS BY MOUTH AT ONSET OF ATTACK AND 12 HOURS LATER 8 tablet 5 More than a month         PMH:   Past Medical History:   Diagnosis Date   • Allergic    • Anxiety    • CTS (carpal tunnel syndrome)    • Depression    • Extremity pain 2021   • GERD (gastroesophageal reflux disease)    • Hyperlipidemia    • Hypertension    • Lumbar radiculopathy 04/17/2023   • Neck pain 2021   •  "ADRIAN on CPAP    • Smoker    • Wears glasses      PSH:    Past Surgical History:   Procedure Laterality Date   • APPENDECTOMY     • CARDIAC CATHETERIZATION     • CARDIAC CATHETERIZATION     •  SECTION     • COLONOSCOPY     • ENDOSCOPY     • HAND TENDON SURGERY Right    • SUBTOTAL HYSTERECTOMY     • TUBAL ABDOMINAL LIGATION         Immunization History:  Influenza:   Pneumococcal: No  Tetanus: Yes  Covid : x3    Social History:   Tobacco:   Social History     Tobacco Use   Smoking Status Every Day   • Packs/day: 0.25   • Years: 35.00   • Pack years: 8.75   • Types: Cigarettes   Smokeless Tobacco Never      Alcohol:     Social History     Substance and Sexual Activity   Alcohol Use No         Physical Exam:/74 (BP Location: Right arm, Patient Position: Lying)   Pulse 71   Temp 97.2 °F (36.2 °C) (Temporal)   Resp 16   Ht 158.8 cm (62.5\")   Wt 78.5 kg (173 lb)   SpO2 94%   BMI 31.14 kg/m²       General Appearance:    Alert, cooperative, no distress, appears stated age   Head:    Normocephalic, without obvious abnormality, atraumatic   Lungs:     Clear to auscultation bilaterally, respirations unlabored    Heart:   Regular rate and rhythm, S1 and S2 normal    Abdomen:    Soft without tenderness   Extremities:   Extremities normal, atraumatic, no cyanosis or edema   Skin:   Skin color, texture, turgor normal, no rashes or lesions   Neurologic:   Grossly intact     Results Review:     LABS:  Lab Results   Component Value Date    WBC 6.77 2023    HGB 14.2 2023    HCT 42.6 2023    MCV 92.0 2023     2023    NEUTROABS 5.85 2019    GLUCOSE 98 2023    BUN 7 2023    CREATININE 0.81 2023    EGFRIFNONA 81 2019     2023    K 3.9 2023     2023    CO2 26.0 2023    CALCIUM 9.4 2023    ALBUMIN 4.50 2019    AST 14 2019    ALT 14 2019    BILITOT 0.3 2019 "       RADIOLOGY:  Imaging Results (Last 72 Hours)     ** No results found for the last 72 hours. **          I reviewed the patient's new clinical results.        Impression: Lumbar radiculopathy      Plan: LUMBAR DISCECTOMY, L5-S1, L4-5 foraminotomy- RIGHT - Right      Anish Gomez, SOUMYA   5/24/2023   06:48 EDT

## 2023-05-24 NOTE — PERIOPERATIVE NURSING NOTE
When opening pt home medication Neurontin the cap was not on properly and the medication fell on the floor and patient stretcher. I picked up medications on the stretcher with the lid of the bottle and returned to bottle. I picked up the medication on the floor and put in in a denture cup and returned to patient so she could account for how many fell on the floor. I apologized to patient numerous times and spoke with manager about situation.

## 2023-05-24 NOTE — OP NOTE
Date of operation: May 24, 2023    Attending surgeon: Dr. Nakul Hurt MD  Surgical Assistance: Britney APONTE    Preoperative diagnosis: Neuroforaminal stenosis at L4-5 on the right.  Calcified disc and neuroforaminal stenosis at L5-S1 on the right causing compression of the nerve roots.    Postoperative diagnosis: The same    Operations performed: 1.  Lumbar foraminotomy at L4-5 on the right  2. Microlumbar discectomy through a MetRx tube system at L5-S1 on the right  3. Use of intraoperative microscope with microdissection techniques    Findings: Decompression of the nerve root at L4-5.  Decompression of the nerve root L5-S1.  The L5-S1 disc was very calcified.    Specimens: None    Indications: This is a 52-year-old female present with medically refractory pain due to neuroforaminal stenosis at L4-5 as well as a calcified disc at L5-S1.  She subsequently came for decompression    Procedure in detail: The patient was brought back to the operating room and placed under general anesthesia.  They were endotracheally intubated.  They were positioned prone on a Valdo frame with all pressure points well padded.  At this time, the patient's lumbar back was then prepped and draped in a sterile fashion.  A timeout was performed and antibiotics were given.  At this time, we used fluoroscopy to localize an incision marking 1.5 inches off the midline to the patient's right at the L4-5 disc base.  Local anesthetic was injected into the incision marking.  At this time, using fluoroscopy, the MetRx tube system was used to dilate the tubes and docked them at the L4-5 disc space.  At this time, we brought the microscope into the field.  Under direct microscopic vision and with microdissection techniques we removed the remaining muscle off of the lamina.  We then used a combination of drills and Kerrisons to remove the lamina and the medial one third portion of the facet joint.  In doing this, we could visualize the  ligamentum flavum.  At this time, using curettes and Kerrisons we removed the ligament until we could visualize the thecal sac as well as the shoulder of the exiting nerve root.  We continued our decompression out the foramen of the exiting nerve root.  Once this was done, we palpated circumferentially with a ball probe to confirm that there was circumferential decompression of the L5 nerve root.   At this time we copiously irrigated the field and achieved complete hemostasis with Gelfoam powder.   At this time, we slowly removed the MetRx tube using bipolar cautery of the muscle as we removed it.    At this time, we turned our attention to the L5-S1 disc level.  The patient's skin incision was extended with 1 inch inferiorly. At this time, using fluoroscopy, the MetRx tube system was used to dilate the tubes and docked them at the L5-S1 disc space.  At this time, we brought the microscope into the field.  Under direct microscopic vision and with microdissection techniques we removed the remaining muscle off of the lamina.  We then used a combination of drills and Kerrisons to remove the lamina and the medial one third portion of the facet joint.  In doing this, we could visualize the ligamentum flavum.  At this time, using curettes and Kerrisons we removed the ligament until we could visualize the thecal sac as well as the shoulder of the exiting nerve root.  We continued our decompression out the foramen of the exiting nerve root.  At this time, we retracted the nerve root and thecal sac medially to visualize the disc space.  We used an 11 blade knife to incise the disc space.  The disc space was very calcified and a small piece of calcified fragment was removed.  Once this was done, we palpated circumferentially around the nerve root and thecal sac to ensure that there was complete decompression, which there was.  At this time we copiously irrigated the field and achieved complete hemostasis with Gelfoam powder.   At this time, we slowly removed the MetRx tube using bipolar cautery of the muscle as we removed it.  We then turned our attention to the closure where the fascia was closed with an interrupted 2-0 Vicryl stitch.  The dermis was closed with interrupted inverted 3-0 Vicryl stitches and the skin with a Monocryl stitch and Dermabond.    Britney Diego was present for all portions of the surgery and assisted with patient positioning, opening, retraction, suturing, and closing.  At the end of the case all counts were correct x2 and there were no complications noted.

## 2023-05-24 NOTE — ANESTHESIA PREPROCEDURE EVALUATION
Anesthesia Evaluation     Patient summary reviewed and Nursing notes reviewed   no history of anesthetic complications:  NPO Solid Status: > 8 hours  NPO Liquid Status: > 8 hours           Airway   Mallampati: II  TM distance: >3 FB  Neck ROM: full  No difficulty expected  Dental      Pulmonary - normal exam   (+) sleep apnea,   Cardiovascular - normal exam    (+) hypertension, hyperlipidemia,       Neuro/Psych  (+) numbness, psychiatric history,    GI/Hepatic/Renal/Endo    (+)  GERD,      Musculoskeletal     (+) neck pain,   Abdominal    Substance History      OB/GYN          Other   arthritis,                      Anesthesia Plan    ASA 2     general     intravenous induction     Anesthetic plan, risks, benefits, and alternatives have been provided, discussed and informed consent has been obtained with: patient.    Plan discussed with CRNA.        CODE STATUS:

## 2023-05-24 NOTE — ANESTHESIA POSTPROCEDURE EVALUATION
Patient: Trinidad Quinteros    Procedure Summary     Date: 05/24/23 Room / [facility]:  [facility]    Anesthesia Start: 0728 Anesthesia Stop: 0920    Procedure: LUMBAR DISCECTOMY, L5-S1, L4-5 foraminotomy- RIGHT (Right: Spine Lumbar) Diagnosis:       Lumbar radiculopathy      (Lumbar radiculopathy [M54.16])    Surgeons: Nakul Hurt MD Provider: Deng Liz MD    Anesthesia Type: general ASA Status: 2          Anesthesia Type: general    Vitals  Vitals Value Taken Time   BP     Temp     Pulse 93 05/24/23 0918   Resp     SpO2 96 % 05/24/23 0918   Vitals shown include unvalidated device data.        Post Anesthesia Care and Evaluation    Patient location during evaluation: PACU  Patient participation: complete - patient participated  Level of consciousness: awake and alert  Pain management: adequate    Airway patency: patent  Anesthetic complications: No anesthetic complications  PONV Status: none  Cardiovascular status: hemodynamically stable and acceptable  Respiratory status: nonlabored ventilation, acceptable and nasal cannula  Hydration status: acceptable    Comments: 136/59 97.0 [id number]

## 2023-05-24 NOTE — ANESTHESIA PROCEDURE NOTES
Airway  Urgency: elective    Date/Time: 5/24/2023 7:41 AM  Airway not difficult    General Information and Staff    Patient location during procedure: OR  CRNA/CAA: Junior DORA Perez, CRNA    Indications and Patient Condition  Indications for airway management: airway protection    Preoxygenated: yes  MILS not maintained throughout  Mask difficulty assessment: 1 - vent by mask    Final Airway Details  Final airway type: endotracheal airway      Successful airway: ETT  Cuffed: yes   Successful intubation technique: direct laryngoscopy  Facilitating devices/methods: cricoid pressure and intubating stylet  Endotracheal tube insertion site: oral  Blade: Travis  Blade size: 3  ETT size (mm): 7.5  Cormack-Lehane Classification: grade I - full view of glottis  Placement verified by: chest auscultation and capnometry   Measured from: lips  ETT/EBT  to lips (cm): 20  Number of attempts at approach: 1  Assessment: lips, teeth, and gum same as pre-op and atraumatic intubation    Additional Comments  Negative epigastric sounds, Breath sound equal bilaterally with symmetric chest rise and fall

## 2023-05-24 NOTE — BRIEF OP NOTE
LUMBAR DISCECTOMY MICRO ENDOSCOPIC  Progress Note    Trinidda M Neli  5/24/2023    Pre-op Diagnosis:   Lumbar radiculopathy [M54.16]       Post-Op Diagnosis Codes:     * Lumbar radiculopathy [M54.16]    Procedure/CPT® Codes:        Procedure(s):  LUMBAR DISCECTOMY, L5-S1, L4-5 foraminotomy- RIGHT              Surgeon(s):  Nakul Hurt MD    Anesthesia: General    Staff:   Circulator: Canelo Baumann RN; Kaleigh Vasquez RN  Physician Assistant: Britney Diego, PAClementinaC  Scrub Person: Kevin Aldridge  Nursing Assistant: Denise Parada         Estimated Blood Loss: minimal    Urine Voided: * No values recorded between 5/24/2023  7:28 AM and 5/24/2023  8:48 AM *    Specimens:                None          Drains: * No LDAs found *    Findings: Decompression of the nerve roots at L4-5 and L5-S1 on the right L5-S1 disc was very calcified.        Complications: None apparent          Nakul Hurt MD     Date: 5/24/2023  Time: 08:48 EDT

## 2023-05-25 RX ORDER — ONDANSETRON 4 MG/1
4 TABLET, FILM COATED ORAL EVERY 8 HOURS PRN
Qty: 20 TABLET | Refills: 1 | Status: SHIPPED | OUTPATIENT
Start: 2023-05-25

## 2023-05-25 RX ORDER — GABAPENTIN 300 MG/1
300 CAPSULE ORAL 4 TIMES DAILY
Qty: 40 CAPSULE | Refills: 0 | Status: SHIPPED | OUTPATIENT
Start: 2023-05-25

## 2023-06-08 ENCOUNTER — OFFICE VISIT (OUTPATIENT)
Dept: NEUROSURGERY | Facility: CLINIC | Age: 53
End: 2023-06-08
Payer: COMMERCIAL

## 2023-06-08 VITALS
TEMPERATURE: 96.6 F | WEIGHT: 169 LBS | SYSTOLIC BLOOD PRESSURE: 116 MMHG | BODY MASS INDEX: 29.95 KG/M2 | DIASTOLIC BLOOD PRESSURE: 70 MMHG | HEIGHT: 63 IN

## 2023-06-08 DIAGNOSIS — M54.16 LUMBAR RADICULOPATHY: Primary | ICD-10-CM

## 2023-06-08 PROCEDURE — 99024 POSTOP FOLLOW-UP VISIT: CPT | Performed by: STUDENT IN AN ORGANIZED HEALTH CARE EDUCATION/TRAINING PROGRAM

## 2023-06-08 NOTE — PROGRESS NOTES
"NEUROSURGERY PROGRESS NOTE    Patient: Trinidad Quinteros  : 1970    Primary Care Provider: Josephine Delacruz PA-C    Chief Complaint: Lumbar radiculopathy    Subjective: This is a 52-year-old female who underwent an L4-5 foraminotomies and L5-S1 discectomy on the right 2 weeks ago.  She is here today for follow-up.  The patient endorses improvement in her neuropathic pain postoperatively.  She is not having any significant back pain.  She does have some calf tenderness which she describes more as a muscle ache, but does not seem to have any radicular type symptoms.  She states she was experiencing this prior to surgery.    Objective    Vital Signs: Blood pressure 116/70, temperature 96.6 °F (35.9 °C), height 158.8 cm (62.5\"), weight 76.7 kg (169 lb).    Physical Exam  Awake, alert and oriented x 3  Opens eyes spont  Pupils 3 mm rx bilat  Extraocular muscles intact bilaterally  Face symmetric bilaterally  Tongue midline  5/5 in the LE's bilaterally  Incision: c/d/I   Right calf mildly tender, negative Homans, not swollen     Current Medications:   Current Outpatient Medications:     albuterol (ACCUNEB) 1.25 MG/3ML nebulizer solution, Take 3 mL (one vial) by nebulization Every 6 (Six) Hours As Needed for Wheezing or Shortness of Air., Disp: 75 mL, Rfl: 12    albuterol sulfate HFA (Ventolin HFA) 108 (90 Base) MCG/ACT inhaler, Inhale 2 puffs as directed every 4 hours As Needed for Wheezing or Shortness of Air and/or cough., Disp: 18 g, Rfl: 5    ALPRAZolam (XANAX) 0.5 MG tablet, Take 1 tablet by mouth 3 (Three) Times a Day., Disp: 90 tablet, Rfl: 5    atorvastatin (LIPITOR) 40 MG tablet, Take 1 tablet by mouth Daily., Disp: 90 tablet, Rfl: 2    diazePAM (VALIUM) 5 MG tablet, Take 1 tablet by mouth Every 8 (Eight) Hours As Needed for Muscle Spasms., Disp: 25 tablet, Rfl: 0    estradiol (Estrace) 0.5 MG tablet, Take 1 tablet by mouth Daily as directed for menopausal hot flashes, Disp: 30 tablet, Rfl: 2    " fluticasone (Flonase) 50 MCG/ACT nasal spray, Use 2 sprays into each nostril as directed by provider Daily., Disp: 16 g, Rfl: 11    fluticasone-salmeterol (Advair Diskus) 250-50 MCG/DOSE DISKUS, Inhale 1 puff 2 (Two) Times a Day., Disp: 60 each, Rfl: 1    folic acid (FOLVITE) 1 MG tablet, Take 1 tablet by mouth Daily., Disp: 30 tablet, Rfl: 11    gabapentin (NEURONTIN) 300 MG capsule, Take 1 capsule by mouth 4 (Four) Times a Day for neuropathy, Disp: 40 capsule, Rfl: 0    glycopyrrolate (Robinul-Forte) 2 MG tablet, Take 1 tablet by mouth 2 (Two) Times a Day for diarrhea, Disp: 60 tablet, Rfl: 2    hydroCHLOROthiazide (HYDRODIURIL) 25 MG tablet, Take 1 tablet by mouth Daily., Disp: 90 tablet, Rfl: 2    HYDROcodone-acetaminophen (NORCO) 5-325 MG per tablet, Take 1 tablet by mouth Every 4 (Four) Hours As Needed for Moderate Pain., Disp: 30 tablet, Rfl: 0    levocetirizine (XYZAL) 5 MG tablet, Take 1 tablet by mouth Every Evening for allergies, Disp: 30 tablet, Rfl: 11    losartan (COZAAR) 100 MG tablet, Take 1 tablet by mouth Daily., Disp: 90 tablet, Rfl: 3    meloxicam (MOBIC) 7.5 MG tablet, Take 1 tablet by mouth Daily., Disp: 30 tablet, Rfl: 5    ondansetron (Zofran) 4 MG tablet, Take 1 tablet by mouth Every 8 (Eight) Hours As Needed for Nausea or Vomiting., Disp: 20 tablet, Rfl: 1    promethazine-dextromethorphan (PROMETHAZINE-DM) 6.25-15 MG/5ML syrup, Take 5 mL by mouth 4 (Four) Times a Day As Needed for Cough., Disp: 180 mL, Rfl: 0    sennosides-docusate (senna-docusate sodium) 8.6-50 MG per tablet, Take 1 tablet by mouth Daily. Take while using hydrocodone to prevent constipation, Disp: 30 tablet, Rfl: 0    sertraline (ZOLOFT) 100 MG tablet, Take 2 tablets by mouth Daily, Disp: 60 tablet, Rfl: 5    valACYclovir (VALTREX) 500 MG tablet, TAKE 4 TABLETS BY MOUTH AT ONSET OF ATTACK AND 12 HOURS LATER, Disp: 8 tablet, Rfl: 5     Laboratory Results:                              Brief Urine Lab Results  (Last result in  the past 365 days)        Color   Clarity   Blood   Leuk Est   Nitrite   Protein   CREAT   Urine HCG        03/14/23 0931 Yellow   Clear   Negative   Negative   Negative   Negative                 Microbiology Results (last 10 days)       ** No results found for the last 240 hours. **            Diagnostic Imaging: I reviewed and independently interpreted the new imaging.     Assessment/Plan:  This is a 52-year-old female who underwent an L4-5 foraminotomies followed by an L5-S1 discectomy on the right 2 weeks ago.  She is here today for follow-up.  Overall, I think the patient is doing well.  She has had resolution of her preoperative neuropathic and radicular symptoms in the leg.  She states she was having this calf achiness prior to surgery, and that she is continuing to deal with this.  Her calf is mildly tender to palpation.  I think this is likely unrelated to the neuroforaminal stenosis that we treated from her operation.  I am hopeful that if she continues to mobilize this will improve.  We will plan to have her follow-up with me in 3 weeks to see how she is doing.    Diagnoses and all orders for this visit:    1. Lumbar radiculopathy (Primary)        Nakul Hurt MD  06/08/23  08:50 EDT

## 2023-06-13 RX ORDER — GLYCOPYRROLATE 2 MG/1
2 TABLET ORAL 2 TIMES DAILY
Qty: 60 TABLET | Refills: 11 | Status: SHIPPED | OUTPATIENT
Start: 2023-06-13

## 2023-06-13 RX ORDER — SERTRALINE HYDROCHLORIDE 100 MG/1
200 TABLET, FILM COATED ORAL DAILY
Qty: 60 TABLET | Refills: 11 | Status: SHIPPED | OUTPATIENT
Start: 2023-06-13

## 2023-06-14 LAB
QT INTERVAL: 442 MS
QTC INTERVAL: 442 MS

## 2023-07-27 ENCOUNTER — TELEPHONE (OUTPATIENT)
Dept: FAMILY MEDICINE CLINIC | Facility: CLINIC | Age: 53
End: 2023-07-27
Payer: COMMERCIAL

## 2023-07-27 RX ORDER — DEXTROMETHORPHAN HYDROBROMIDE AND PROMETHAZINE HYDROCHLORIDE 15; 6.25 MG/5ML; MG/5ML
5 SYRUP ORAL 4 TIMES DAILY PRN
Qty: 120 ML | Refills: 0 | Status: SHIPPED | OUTPATIENT
Start: 2023-07-27

## 2023-07-27 NOTE — TELEPHONE ENCOUNTER
Patient is having sinus drainage with a cough, and she last seen Josephine on 07/14 for a sinus infection. The cough with drainage is keeping her up at night. She wants to know if cough syrup can be sent in to [id number] - [facility] [address] [place] [address] AT [facility] [place] - [phone] [facility] [phone]...   Thanks,   Angela.....

## 2023-08-01 RX ORDER — FLUTICASONE PROPIONATE AND SALMETEROL 250; 50 UG/1; UG/1
1 POWDER RESPIRATORY (INHALATION)
Qty: 60 EACH | Refills: 11 | Status: SHIPPED | OUTPATIENT
Start: 2023-08-01

## 2023-08-16 RX ORDER — DEXTROMETHORPHAN HYDROBROMIDE AND PROMETHAZINE HYDROCHLORIDE 15; 6.25 MG/5ML; MG/5ML
5 SYRUP ORAL 4 TIMES DAILY PRN
Qty: 120 ML | Refills: 0 | Status: SHIPPED | OUTPATIENT
Start: 2023-08-16 | End: 2023-08-21 | Stop reason: SDUPTHER

## 2023-08-17 NOTE — TELEPHONE ENCOUNTER
----- Message from Angela Rahman sent at 2/19/2020 10:58 AM EST -----  Patient wants to increase her dose of sertraline (ZOLOFT) 100 MG tablet to 150. Patient uses [facility]..  ThanksSandra.   [facility]

## 2023-08-21 ENCOUNTER — HOSPITAL ENCOUNTER (OUTPATIENT)
Dept: GENERAL RADIOLOGY | Facility: HOSPITAL | Age: 53
Discharge: HOME | End: 2023-08-21
Admitting: PHYSICIAN ASSISTANT
Payer: COMMERCIAL

## 2023-08-21 ENCOUNTER — OFFICE VISIT (OUTPATIENT)
Dept: FAMILY MEDICINE CLINIC | Facility: CLINIC | Age: 53
End: 2023-08-21
Payer: COMMERCIAL

## 2023-08-21 VITALS
HEIGHT: 63 IN | BODY MASS INDEX: 29.77 KG/M2 | TEMPERATURE: 97.8 F | OXYGEN SATURATION: 98 % | SYSTOLIC BLOOD PRESSURE: 146 MMHG | DIASTOLIC BLOOD PRESSURE: 92 MMHG | WEIGHT: 168 LBS | HEART RATE: 78 BPM

## 2023-08-21 DIAGNOSIS — R05.9 COUGH, UNSPECIFIED TYPE: ICD-10-CM

## 2023-08-21 DIAGNOSIS — F17.200 TOBACCO USE DISORDER: ICD-10-CM

## 2023-08-21 DIAGNOSIS — Z20.822 EXPOSURE TO COVID-19 VIRUS: Primary | ICD-10-CM

## 2023-08-21 DIAGNOSIS — J40 BRONCHITIS: ICD-10-CM

## 2023-08-21 PROCEDURE — 71046 X-RAY EXAM CHEST 2 VIEWS: CPT

## 2023-08-21 PROCEDURE — 99213 OFFICE O/P EST LOW 20 MIN: CPT | Performed by: PHYSICIAN ASSISTANT

## 2023-08-21 PROCEDURE — 87428 SARSCOV & INF VIR A&B AG IA: CPT | Performed by: PHYSICIAN ASSISTANT

## 2023-08-21 RX ORDER — LEVOFLOXACIN 500 MG/1
500 TABLET, FILM COATED ORAL DAILY
Qty: 7 TABLET | Refills: 0 | Status: SHIPPED | OUTPATIENT
Start: 2023-08-21

## 2023-08-21 RX ORDER — DEXTROMETHORPHAN HYDROBROMIDE AND PROMETHAZINE HYDROCHLORIDE 15; 6.25 MG/5ML; MG/5ML
5 SYRUP ORAL 4 TIMES DAILY PRN
Qty: 120 ML | Refills: 0 | Status: SHIPPED | OUTPATIENT
Start: 2023-08-21

## 2023-08-21 NOTE — PROGRESS NOTES
Subjective   Trinidad Quinteros is a 52 y.o. female  Cough (Ongoing cough since last visit , exposed to possible covid over the weekend)      History of Present Illness    The patient comes in with cough, congestion, and exposure to COVID-19 infection.    The patient's COVID-19 test was negative. The patient states that she has been around an ill person yesterday, 08/20/2023. She reports that she has been having ongoing continuous cough that does not resolve, which has also affected her voice. She has finished the antibiotic of cefdinir (Omnicef); however, it did not change her symptoms. Over the weekend, she reports that she started to have bilateral ear pain. Her blood pressure is elevated today, 08/21/2023, which she relates to being worried of having COVID-19. She notes that she takes her blood pressure medication every morning. She denies taking decongestants. She notes that she is coughing up greenish phlegm. She states that that congestion feels that it is in her chest and up in her head. She is still using fluticasone-salmeterol (Advair Diskus) once a day. She had a pulmonary function test approximately 20 years ago. She notes that she has been cutting back on her cigarettes. She states that she still has a small amount of cough syrup left.      The following portions of the patient's history were reviewed and updated as appropriate: allergies, current medications, past social history and problem list    Review of Systems   Constitutional:  Negative for fever.   HENT:  Positive for congestion, postnasal drip, rhinorrhea and voice change. Negative for sinus pressure, sneezing and sore throat.    Respiratory:  Positive for cough.      Objective     Vitals:    08/21/23 1520   BP: 146/92   Pulse: 78   Temp: 97.8 øF (36.6 øC)   SpO2: 98%       Physical Exam  Vitals and nursing note reviewed.   Constitutional:       General: She is not in acute distress.     Appearance: Normal appearance. She is well-developed. She  is not ill-appearing, toxic-appearing or diaphoretic.   HENT:      Head: Normocephalic and atraumatic.      Nose: Nose normal.   Neck:      Vascular: No JVD.   Cardiovascular:      Rate and Rhythm: Normal rate and regular rhythm.      Heart sounds: Normal heart sounds. No murmur heard.  Pulmonary:      Effort: Pulmonary effort is normal. No respiratory distress.      Breath sounds: No stridor. Wheezing present.   Musculoskeletal:      Cervical back: Neck supple.   Lymphadenopathy:      Cervical: No cervical adenopathy.   Neurological:      Mental Status: She is alert.       Assessment & Plan     Diagnoses and all orders for this visit:    1. Exposure to COVID-19 virus (Primary)  -     POCT SARS-CoV-2 Antigen [place] + Flu    2. Cough, unspecified type  -     POCT SARS-CoV-2 Antigen [place] + Flu  -     XR Chest PA & Lateral; Future    3. Tobacco use disorder    Other orders  -     levoFLOXacin (Levaquin) 500 MG tablet; Take 1 tablet by mouth Daily.  Dispense: 7 tablet; Refill: 0  -     promethazine-dextromethorphan (PROMETHAZINE-DM) 6.25-15 MG/5ML syrup; Take 5 mL by mouth 4 (Four) Times a Day As Needed for Cough.  Dispense: 120 mL; Refill: 0      Bronchitis  - The patient's lungs sound like she has bronchitis.  - The patient will be switched to a stronger antibiotic of levofloxacin (Levaquin).  - The patient will be sent for a chest x-ray.  - The patient is advised to increase her fluticasone-salmeterol (Advair Diskus) to twice a day.  - If she does not improve from this, then it is time to send her over to Dr. Drake Keating of Pulmonary to do some pulmonary function testing.  - The patient will be sent a prescription order refill of cough syrup, promethazine-dextromethorphan (Promethazine-DM).  Patient is strongly encouraged to stop smoking cigarettes    Transcribed from ambient dictation for Josephine Delacruz PA-C by Jc Kearns.  08/21/23   21:56 EDT    Patient or patient representative verbalized consent to the  visit recording.  I have personally performed the services described in this document as transcribed by the above individual, and it is both accurate and complete.

## 2023-09-13 DIAGNOSIS — Z12.31 BREAST CANCER SCREENING BY MAMMOGRAM: Primary | ICD-10-CM

## 2023-09-18 ENCOUNTER — OUTSIDE FACILITY SERVICE (OUTPATIENT)
Dept: GASTROENTEROLOGY | Facility: CLINIC | Age: 53
End: 2023-09-18
Payer: COMMERCIAL

## 2023-09-18 PROCEDURE — 45385 COLONOSCOPY W/LESION REMOVAL: CPT | Performed by: INTERNAL MEDICINE

## 2023-09-18 PROCEDURE — 45380 COLONOSCOPY AND BIOPSY: CPT | Performed by: INTERNAL MEDICINE

## 2023-09-18 PROCEDURE — 88305 TISSUE EXAM BY PATHOLOGIST: CPT

## 2023-09-19 ENCOUNTER — LAB REQUISITION (OUTPATIENT)
Dept: LAB | Facility: HOSPITAL | Age: 53
End: 2023-09-19
Payer: COMMERCIAL

## 2023-09-19 DIAGNOSIS — D12.8 BENIGN NEOPLASM OF RECTUM: ICD-10-CM

## 2023-09-19 DIAGNOSIS — Z80.0 FAMILY HISTORY OF MALIGNANT NEOPLASM OF DIGESTIVE ORGANS: ICD-10-CM

## 2023-09-19 DIAGNOSIS — Z86.010 PERSONAL HISTORY OF COLONIC POLYPS: ICD-10-CM

## 2023-09-19 DIAGNOSIS — Z12.11 ENCOUNTER FOR SCREENING FOR MALIGNANT NEOPLASM OF COLON: ICD-10-CM

## 2023-09-19 DIAGNOSIS — K64.8 OTHER HEMORRHOIDS: ICD-10-CM

## 2023-09-19 DIAGNOSIS — D12.5 BENIGN NEOPLASM OF SIGMOID COLON: ICD-10-CM

## 2023-09-19 DIAGNOSIS — Q43.8 OTHER SPECIFIED CONGENITAL MALFORMATIONS OF INTESTINE: ICD-10-CM

## 2023-09-20 LAB — REF LAB TEST METHOD: NORMAL

## 2023-09-25 ENCOUNTER — HOSPITAL ENCOUNTER (OUTPATIENT)
Dept: MRI IMAGING | Facility: HOSPITAL | Age: 53
Discharge: HOME | End: 2023-09-25
Admitting: STUDENT IN AN ORGANIZED HEALTH CARE EDUCATION/TRAINING PROGRAM
Payer: COMMERCIAL

## 2023-09-25 ENCOUNTER — OFFICE VISIT (OUTPATIENT)
Dept: NEUROSURGERY | Facility: CLINIC | Age: 53
End: 2023-09-25

## 2023-09-25 VITALS
SYSTOLIC BLOOD PRESSURE: 136 MMHG | DIASTOLIC BLOOD PRESSURE: 76 MMHG | HEIGHT: 63 IN | WEIGHT: 170 LBS | BODY MASS INDEX: 30.12 KG/M2 | TEMPERATURE: 98 F

## 2023-09-25 DIAGNOSIS — M54.16 LUMBAR RADICULOPATHY: ICD-10-CM

## 2023-09-25 DIAGNOSIS — M54.16 LUMBAR RADICULOPATHY: Primary | ICD-10-CM

## 2023-09-25 PROCEDURE — 0 GADOBENATE DIMEGLUMINE 529 MG/ML SOLUTION: Performed by: STUDENT IN AN ORGANIZED HEALTH CARE EDUCATION/TRAINING PROGRAM

## 2023-09-25 PROCEDURE — 99213 OFFICE O/P EST LOW 20 MIN: CPT | Performed by: STUDENT IN AN ORGANIZED HEALTH CARE EDUCATION/TRAINING PROGRAM

## 2023-09-25 PROCEDURE — 72158 MRI LUMBAR SPINE W/O & W/DYE: CPT

## 2023-09-25 PROCEDURE — A9577 INJ MULTIHANCE: HCPCS | Performed by: STUDENT IN AN ORGANIZED HEALTH CARE EDUCATION/TRAINING PROGRAM

## 2023-09-25 RX ORDER — METHYLPREDNISOLONE 4 MG/1
TABLET ORAL
Qty: 21 TABLET | Refills: 0 | Status: SHIPPED | OUTPATIENT
Start: 2023-09-25

## 2023-09-25 RX ORDER — METHYLPREDNISOLONE 4 MG/1
TABLET ORAL
Qty: 21 TABLET | Refills: 0 | Status: SHIPPED | OUTPATIENT
Start: 2023-09-25 | End: 2023-09-25

## 2023-09-25 RX ADMIN — GADOBENATE DIMEGLUMINE 15 ML: 529 INJECTION, SOLUTION INTRAVENOUS at 20:51

## 2023-09-25 NOTE — PROGRESS NOTES
"NEUROSURGERY PROGRESS NOTE    Patient: Trinidad Quinteros  : 1970    Primary Care Provider: Josephine Delacruz PA-C    Chief Complaint: Back and leg pain    Subjective: This is a 53-year-old female who underwent a right L4-5 foraminotomy and a right L5-S1 discectomy in May of this year.  The patient did well from her surgery and had complete resolution of her preoperative leg pain.  She was doing well until this weekend when she bent over and felt a pop in her back.  She immediately was experiencing significant back and bilateral lower extremity pain, right greater than left.  She endorses some subjective weakness in her legs, but denies any urinary incontinence or trouble voiding.    Objective    Vital Signs: Blood pressure 136/76, temperature 98 °F (36.7 °C), temperature source Infrared, height 158.8 cm (62.5\"), weight 77.1 kg (170 lb).    Physical Exam  Awake, alert and oriented x 3  Opens eyes spont  Pupils 3 mm rx bilat  Extraocular muscles intact bilaterally  Face symmetric bilaterally  Tongue midline  5/5 in the LE's bilaterally    Current Medications:   Current Outpatient Medications:     albuterol (ACCUNEB) 1.25 MG/3ML nebulizer solution, Take 3 mL (one vial) by nebulization Every 6 (Six) Hours As Needed for Wheezing or Shortness of Air., Disp: 75 mL, Rfl: 12    albuterol sulfate HFA (Ventolin HFA) 108 (90 Base) MCG/ACT inhaler, Inhale 2 puffs as directed every 4 hours As Needed for Wheezing or Shortness of Air and/or cough., Disp: 18 g, Rfl: 5    ALPRAZolam (XANAX) 0.5 MG tablet, Take 1 tablet by mouth 3 (Three) Times a Day., Disp: 90 tablet, Rfl: 5    atorvastatin (LIPITOR) 40 MG tablet, Take 1 tablet by mouth Daily., Disp: 90 tablet, Rfl: 2    buPROPion XL (Wellbutrin XL) 150 MG 24 hr tablet, Take 1 tablet by mouth Every Morning., Disp: 30 tablet, Rfl: 5    estradiol (Estrace) 0.5 MG tablet, Take 1 tablet by mouth Daily as directed for menopausal hot flashes, Disp: 30 tablet, Rfl: 2    fluticasone " (Flonase) 50 MCG/ACT nasal spray, Use 2 sprays into each nostril as directed by provider Daily., Disp: 16 g, Rfl: 11    Fluticasone-Salmeterol (Advair Diskus) 250-50 MCG/ACT DISKUS, Inhale 1 puff 2 (Two) Times a Day., Disp: 60 each, Rfl: 11    folic acid (FOLVITE) 1 MG tablet, Take 1 tablet by mouth Daily., Disp: 30 tablet, Rfl: 11    gabapentin (NEURONTIN) 300 MG capsule, Take 1 capsule by mouth 4 (Four) Times a Day., Disp: 120 capsule, Rfl: 5    glycopyrrolate (Robinul-Forte) 2 MG tablet, Take 1 tablet by mouth 2 (Two) Times a Day for diarrhea, Disp: 60 tablet, Rfl: 11    hydroCHLOROthiazide (HYDRODIURIL) 25 MG tablet, Take 1 tablet by mouth Daily., Disp: 90 tablet, Rfl: 2    levocetirizine (XYZAL) 5 MG tablet, Take 1 tablet by mouth Every Evening for allergies, Disp: 30 tablet, Rfl: 11    losartan (COZAAR) 100 MG tablet, Take 1 tablet by mouth Daily., Disp: 90 tablet, Rfl: 3    meloxicam (MOBIC) 7.5 MG tablet, Take 1 tablet by mouth Daily., Disp: 30 tablet, Rfl: 5    ondansetron (Zofran) 4 MG tablet, Take 1 tablet by mouth Every 8 (Eight) Hours As Needed for Nausea or Vomiting., Disp: 20 tablet, Rfl: 1    sertraline (ZOLOFT) 100 MG tablet, Take 2 tablets by mouth Daily, Disp: 60 tablet, Rfl: 11    valACYclovir (VALTREX) 500 MG tablet, TAKE 4 TABLETS BY MOUTH AT ONSET OF ATTACK AND 12 HOURS LATER, Disp: 8 tablet, Rfl: 5    methylPREDNISolone (MEDROL) 4 MG dose pack, Take as directed on package instructions., Disp: 21 tablet, Rfl: 0     Laboratory Results:                              Brief Urine Lab Results  (Last result in the past 365 days)        Color   Clarity   Blood   Leuk Est   Nitrite   Protein   CREAT   Urine HCG        03/14/23 0931 Yellow   Clear   Negative   Negative   Negative   Negative                 Microbiology Results (last 10 days)       ** No results found for the last 240 hours. **            Diagnostic Imaging: I reviewed and independently interpreted the new imaging.      Assessment/Plan:  This is a 53-year-old female status post a right L4-5 foraminotomy and a right L5-S1 discectomy in May of this year.  The patient was doing well from her surgery with complete resolution of her leg pain until this weekend when she bent over and felt a pop in her back.  Since that time, she has been having significant back and bilateral lower extremity pain, right greater than left.  On physical exam, the patient is full strength.  She denies any issues with urinary incontinence or trouble voiding.  Her presentation is highly concerning for disc re-herniation.  I am going to obtain a stat lumbar MRI.  I am also going to start her on a Medrol Dosepak.  She is taking Flexeril at home for her back pain.  I will call the patient once I review her new lumbar MRI.  I did review the signs and symptoms of cauda equina syndrome and told the patient if she develops any urinary issues, she should go to the emergency room immediately.    Diagnoses and all orders for this visit:    1. Lumbar radiculopathy (Primary)  -     MRI Lumbar Spine With & Without Contrast; Future    Other orders  -     Discontinue: methylPREDNISolone (MEDROL) 4 MG dose pack; Take as directed on package instructions.  Dispense: 21 tablet; Refill: 0  -     methylPREDNISolone (MEDROL) 4 MG dose pack; Take as directed on package instructions.  Dispense: 21 tablet; Refill: 0        Nakul Hurt MD  09/25/23  11:53 EDT

## 2023-09-26 ENCOUNTER — DOCUMENTATION (OUTPATIENT)
Dept: NEUROSURGERY | Facility: CLINIC | Age: 53
End: 2023-09-26
Payer: COMMERCIAL

## 2023-09-26 DIAGNOSIS — M54.16 LUMBAR RADICULOPATHY: Primary | ICD-10-CM

## 2023-09-26 NOTE — PROGRESS NOTES
I called the patient to discuss the results of her new MRI.  Fortunately, this does not show any new disc herniation or significant lumbar stenosis.  I am going to prescribe her Lyrica and she is going to take this in addition to the steroids provided to her yesterday.  We will have her follow-up in 2 weeks to see how she is doing.

## 2023-09-27 ENCOUNTER — TELEPHONE (OUTPATIENT)
Dept: NEUROSURGERY | Facility: CLINIC | Age: 53
End: 2023-09-27
Payer: COMMERCIAL

## 2023-09-27 NOTE — TELEPHONE ENCOUNTER
Provider:  Licha  Surgery/Procedure:  Lumbar discectomy L5-S1, L4-5 Foraminotomy  Surgery/Procedure Date:  5/24/23  Last visit:   9/25/23  Next visit: 10/10/23     Reason for call:  Patient called to follow up on new prescription for Lyrica, please send to [facility] in [place].

## 2023-09-28 RX ORDER — PREGABALIN 75 MG/1
75 CAPSULE ORAL 2 TIMES DAILY
Qty: 60 CAPSULE | Refills: 1 | Status: SHIPPED | OUTPATIENT
Start: 2023-09-28

## 2023-10-05 ENCOUNTER — CLINICAL SUPPORT (OUTPATIENT)
Dept: FAMILY MEDICINE CLINIC | Facility: CLINIC | Age: 53
End: 2023-10-05
Payer: COMMERCIAL

## 2023-10-05 DIAGNOSIS — M54.17 LUMBOSACRAL RADICULOPATHY: Primary | ICD-10-CM

## 2023-10-05 RX ORDER — KETOROLAC TROMETHAMINE 30 MG/ML
30 INJECTION, SOLUTION INTRAMUSCULAR; INTRAVENOUS EVERY 6 HOURS PRN
Status: SHIPPED | OUTPATIENT
Start: 2023-10-05 | End: 2023-10-10

## 2023-10-05 RX ORDER — KETOROLAC TROMETHAMINE 30 MG/ML
60 INJECTION, SOLUTION INTRAMUSCULAR; INTRAVENOUS ONCE AS NEEDED
Status: SHIPPED | OUTPATIENT
Start: 2023-10-05 | End: 2023-10-05

## 2023-10-05 RX ADMIN — KETOROLAC TROMETHAMINE 30 MG: 30 INJECTION, SOLUTION INTRAMUSCULAR; INTRAVENOUS at 09:03

## 2023-10-05 RX ADMIN — KETOROLAC TROMETHAMINE 30 MG: 30 INJECTION, SOLUTION INTRAMUSCULAR; INTRAVENOUS at 09:02

## 2023-10-10 ENCOUNTER — OFFICE VISIT (OUTPATIENT)
Dept: NEUROSURGERY | Facility: CLINIC | Age: 53
End: 2023-10-10
Payer: COMMERCIAL

## 2023-10-10 ENCOUNTER — TELEPHONE (OUTPATIENT)
Dept: PAIN MEDICINE | Facility: CLINIC | Age: 53
End: 2023-10-10
Payer: COMMERCIAL

## 2023-10-10 VITALS
DIASTOLIC BLOOD PRESSURE: 84 MMHG | TEMPERATURE: 97.3 F | HEIGHT: 61 IN | SYSTOLIC BLOOD PRESSURE: 130 MMHG | BODY MASS INDEX: 31.75 KG/M2 | WEIGHT: 168.2 LBS

## 2023-10-10 DIAGNOSIS — M54.16 LUMBAR RADICULOPATHY: Primary | ICD-10-CM

## 2023-10-10 RX ORDER — PREGABALIN 100 MG/1
100 CAPSULE ORAL 2 TIMES DAILY
Qty: 60 CAPSULE | Refills: 0 | Status: SHIPPED | OUTPATIENT
Start: 2023-10-10

## 2023-10-10 NOTE — PROGRESS NOTES
Name: Trinidad Quinteros    : 1970     MRN: [id number]     Primary Care Provider: Josephine Delacruz PA-C    Chief Complaint  Back Pain      History of Present Illness:  Trinidad Quinteros is a 53 y.o. female who underwent a right L4-5 foraminotomies and a right L5-S1 discectomy in May of this year.  Patient had resolution of her preoperative leg pain.  About 2 weeks ago she had bent over and felt a pop in her back with immediate back and lower extremity pain, right greater than left.  MRI lumbar spine was obtained which was reviewed by Dr. Hurt.  It showed no new disc herniation or significant lumbar stenosis.  She was prescribed Lyrica 75mg BID and a Medrol Dosepak.  Patient states the Lyrica and the Medrol Dosepak helped slightly.  She was also given a Toradol injection by her PCP last week.  Patient reports that her right leg pain is gone however she continues to have left-sided low back pain and left leg pain.  The low back pain is constant, however the left leg pain is exacerbated with movement and sitting for long periods of time.    PMHX  Allergies:  Allergies   Allergen Reactions    Bactrim [Sulfamethoxazole-Trimethoprim] Nausea Only     Medications    Current Outpatient Medications:     albuterol (ACCUNEB) 1.25 MG/3ML nebulizer solution, Take 3 mL (one vial) by nebulization Every 6 (Six) Hours As Needed for Wheezing or Shortness of Air., Disp: 75 mL, Rfl: 12    albuterol sulfate HFA (Ventolin HFA) 108 (90 Base) MCG/ACT inhaler, Inhale 2 puffs as directed every 4 hours As Needed for Wheezing or Shortness of Air and/or cough., Disp: 18 g, Rfl: 5    ALPRAZolam (XANAX) 0.5 MG tablet, Take 1 tablet by mouth 3 (Three) Times a Day., Disp: 90 tablet, Rfl: 5    atorvastatin (LIPITOR) 40 MG tablet, Take 1 tablet by mouth Daily., Disp: 90 tablet, Rfl: 2    buPROPion XL (Wellbutrin XL) 150 MG 24 hr tablet, Take 1 tablet by mouth Every Morning., Disp: 30 tablet, Rfl: 5    estradiol (Estrace) 0.5 MG tablet,  Take 1 tablet by mouth Daily as directed for menopausal hot flashes, Disp: 30 tablet, Rfl: 2    fluticasone (Flonase) 50 MCG/ACT nasal spray, Use 2 sprays into each nostril as directed by provider Daily., Disp: 16 g, Rfl: 11    Fluticasone-Salmeterol (Advair Diskus) 250-50 MCG/ACT DISKUS, Inhale 1 puff 2 (Two) Times a Day., Disp: 60 each, Rfl: 11    folic acid (FOLVITE) 1 MG tablet, Take 1 tablet by mouth Daily., Disp: 30 tablet, Rfl: 11    gabapentin (NEURONTIN) 300 MG capsule, Take 1 capsule by mouth 4 (Four) Times a Day., Disp: 120 capsule, Rfl: 5    glycopyrrolate (Robinul-Forte) 2 MG tablet, Take 1 tablet by mouth 2 (Two) Times a Day for diarrhea, Disp: 60 tablet, Rfl: 11    hydroCHLOROthiazide (HYDRODIURIL) 25 MG tablet, Take 1 tablet by mouth Daily., Disp: 90 tablet, Rfl: 2    levocetirizine (XYZAL) 5 MG tablet, Take 1 tablet by mouth Every Evening for allergies, Disp: 30 tablet, Rfl: 11    losartan (COZAAR) 100 MG tablet, Take 1 tablet by mouth Daily., Disp: 90 tablet, Rfl: 3    meloxicam (MOBIC) 7.5 MG tablet, Take 1 tablet by mouth Daily., Disp: 30 tablet, Rfl: 5    ondansetron (Zofran) 4 MG tablet, Take 1 tablet by mouth Every 8 (Eight) Hours As Needed for Nausea or Vomiting., Disp: 20 tablet, Rfl: 1    sertraline (ZOLOFT) 100 MG tablet, Take 2 tablets by mouth Daily, Disp: 60 tablet, Rfl: 11    valACYclovir (VALTREX) 500 MG tablet, TAKE 4 TABLETS BY MOUTH AT ONSET OF ATTACK AND 12 HOURS LATER, Disp: 8 tablet, Rfl: 5  No current facility-administered medications for this visit.  Past Medical History:  Past Medical History:   Diagnosis Date    Allergic     Anxiety     CTS (carpal tunnel syndrome)     Depression     Extremity pain 2021    GERD (gastroesophageal reflux disease)     Hyperlipidemia     Hypertension     Lumbar radiculopathy 04/17/2023    Neck pain 2021    ADRIAN on CPAP     Smoker     Wears glasses      Past Surgical History:  Past Surgical History:   Procedure Laterality Date    APPENDECTOMY       CARDIAC CATHETERIZATION      CARDIAC CATHETERIZATION       SECTION      COLONOSCOPY      ENDOSCOPY      HAND TENDON SURGERY Right     LUMBAR DISCECTOMY Right 2023    Procedure: LUMBAR DISCECTOMY, L5-S1, L4-5 FORAMINOTOMY- RIGHT;  Surgeon: Nakul Hurt MD;  Location: [facility];  Service: Neurosurgery;  Laterality: Right;    SUBTOTAL HYSTERECTOMY      TUBAL ABDOMINAL LIGATION       Social Hx:  Social History     Tobacco Use    Smoking status: Every Day     Packs/day: 1.00     Years: 35.00     Additional pack years: 0.00     Total pack years: 35.00     Types: Cigarettes    Smokeless tobacco: Never   Vaping Use    Vaping Use: Never used   Substance Use Topics    Alcohol use: No    Drug use: No     Family Hx:  Family History   Problem Relation Age of Onset    COPD Mother     Arthritis Mother     Diabetes Mother     Hypertension Mother     Miscarriages / Stillbirths Mother     COPD Father     Diabetes Father     Hypertension Father     Breast cancer Maternal Aunt 50    Cancer Other     Arthritis Other     Diabetes Other     Asthma Other     Heart disease Other         heart trouble    Hypertension Other         high blood pressure    Alcohol abuse Other     COPD Brother     Diabetes Brother     Early death Brother     Hypertension Brother     COPD Brother     Diabetes Brother     Early death Brother         COPD/Non-Hodgkin lymphoma    Early death Brother         covid/pneumonia     COPD Brother     Miscarriages / Stillbirths Daughter     Asthma Son     Ovarian cancer Neg Hx      Review of Systems:        Review of Systems   Constitutional:  Negative for activity change, appetite change, chills, diaphoresis, fatigue, fever and unexpected weight change.   HENT:  Negative for congestion, dental problem, drooling, ear discharge, ear pain, facial swelling, hearing loss, mouth sores, nosebleeds, postnasal drip, rhinorrhea, sinus pressure, sinus pain, sneezing, sore  "throat, tinnitus, trouble swallowing and voice change.    Eyes:  Negative for photophobia, pain, discharge, redness, itching and visual disturbance.   Respiratory:  Positive for apnea. Negative for cough, choking, chest tightness, shortness of breath, wheezing and stridor.    Cardiovascular:  Negative for chest pain, palpitations and leg swelling.   Gastrointestinal:  Negative for abdominal distention, abdominal pain, anal bleeding, blood in stool, constipation, diarrhea, nausea, rectal pain and vomiting.   Endocrine: Negative for cold intolerance, heat intolerance, polydipsia, polyphagia and polyuria.   Genitourinary:  Negative for decreased urine volume, difficulty urinating, dyspareunia, dysuria, enuresis, flank pain, frequency, genital sores, hematuria, menstrual problem, pelvic pain, urgency, vaginal bleeding, vaginal discharge and vaginal pain.   Musculoskeletal:  Positive for back pain and myalgias. Negative for arthralgias, gait problem, joint swelling, neck pain and neck stiffness.   Skin:  Negative for color change, pallor, rash and wound.   Allergic/Immunologic: Negative for environmental allergies, food allergies and immunocompromised state.   Neurological:  Negative for dizziness, tremors, seizures, syncope, facial asymmetry, speech difficulty, weakness, light-headedness, numbness and headaches.   Hematological:  Negative for adenopathy. Does not bruise/bleed easily.   Psychiatric/Behavioral:  Negative for agitation, behavioral problems, confusion, decreased concentration, dysphoric mood, hallucinations, self-injury, sleep disturbance and suicidal ideas. The patient is not nervous/anxious and is not hyperactive.           Vital Signs: /84 (BP Location: Right arm, Patient Position: Sitting, Cuff Size: Adult)   Temp 97.3 øF (36.3 øC) (Infrared)   Ht 154.9 cm (61\")   Wt 76.3 kg (168 lb 3.2 oz)   BMI 31.78 kg/mý      Physical Exam  Awake, alert and oriented x 3  Speech f/c  Opens eyes " spontaneously  Pupils 3 mm rx bilaterally  Extraocular muscles intact bilaterally  Normal sensation to light touch in all 3 distributions of CN V bilaterally  Face symmetric bilaterally  Tongue midline  5/5 in all 4 extremities  Normal sensation to light touch in upper and lower extremities  Clonus is negative bilaterally.       Social History    Tobacco Use      Smoking status: Every Day        Packs/day: 1.00        Years: 35.00        Additional pack years: 0.00        Total pack years: 35.00        Types: Cigarettes      Smokeless tobacco: Never       Tobacco Use: High Risk (10/10/2023)    Patient History     Smoking Tobacco Use: Every Day     Smokeless Tobacco Use: Never     Passive Exposure: Not on file         STEADI Fall Risk Assessment has not been completed.      Diagnostic Studies: (All imaging is independently reviewed unless stated otherwise.)  MRI lumbar spine from 9/25/2023 shows postsurgical changes at L4-5 with likely scar tissue in the right neuroforamen causing moderate right neuroforaminal stenosis.  At L5-S1 there is posterior disc bulge with left eccentric disc extrusion causing mild spinal canal stenosis and narrowing of the left lateral recess.    Assessment/Plan    Diagnoses and all orders for this visit:    1. Lumbar radiculopathy (Primary)         This is a 53-year-old female who previously underwent right L4-5 foraminotomies and a right L5-S1 discectomy in May of this year.  Patient had complete resolution of her right leg pain until about 2 weeks ago when she bent over and felt a pop in her back which caused her bilateral lower extremity pain.  Stat lumbar MRI shows no new disc herniation or significant lumbar stenosis.  Since the Lyrica has been helping her, we will increase her dose to 100 mg twice daily.  Her right leg pain has resolved and now most of her pain is in her left low back and in her left leg I will also refer her back to Dr. Schroeder for possible injections at L5-S1 on the  left.  We will have her return in about 6 weeks after she has seen pain management to see how she is doing.  Patient encouraged to contact us if she has any changes in her condition or any concerns.    Any copied data from previous notes included in the (1) HPI, (2) PE, (3) MDM and/or Assessment and Plan has been reviewed and accurate as of 10/10/23.    Britney Diego PA-C  10/10/23

## 2023-10-10 NOTE — TELEPHONE ENCOUNTER
Patient called because her back pain has returned, and she wants to know if she can be scheduled for a repeat injection, or if she needs to be seen in the office first.

## 2023-10-11 NOTE — TELEPHONE ENCOUNTER
Called patient, she had already called the [facility] and scheduled an appointment for Monday with Dr. Schroeder.

## 2023-10-20 ENCOUNTER — CLINICAL SUPPORT (OUTPATIENT)
Dept: FAMILY MEDICINE CLINIC | Facility: CLINIC | Age: 53
End: 2023-10-20
Payer: COMMERCIAL

## 2023-10-20 DIAGNOSIS — M54.17 LUMBOSACRAL RADICULOPATHY: Primary | ICD-10-CM

## 2023-10-20 RX ORDER — KETOROLAC TROMETHAMINE 30 MG/ML
30 INJECTION, SOLUTION INTRAMUSCULAR; INTRAVENOUS EVERY 6 HOURS PRN
Status: SHIPPED | OUTPATIENT
Start: 2023-10-20 | End: 2023-10-25

## 2023-10-20 RX ADMIN — KETOROLAC TROMETHAMINE 30 MG: 30 INJECTION, SOLUTION INTRAMUSCULAR; INTRAVENOUS at 13:59

## 2023-10-20 RX ADMIN — KETOROLAC TROMETHAMINE 30 MG: 30 INJECTION, SOLUTION INTRAMUSCULAR; INTRAVENOUS at 13:58

## 2023-10-23 RX ORDER — ESTRADIOL 0.5 MG/1
0.5 TABLET ORAL DAILY
Qty: 30 TABLET | Refills: 2 | Status: SHIPPED | OUTPATIENT
Start: 2023-10-23

## 2023-10-24 ENCOUNTER — OFFICE VISIT (OUTPATIENT)
Dept: FAMILY MEDICINE CLINIC | Facility: CLINIC | Age: 53
End: 2023-10-24
Payer: COMMERCIAL

## 2023-10-24 VITALS
WEIGHT: 168 LBS | SYSTOLIC BLOOD PRESSURE: 134 MMHG | HEART RATE: 74 BPM | DIASTOLIC BLOOD PRESSURE: 80 MMHG | TEMPERATURE: 97.7 F | OXYGEN SATURATION: 98 % | BODY MASS INDEX: 31.72 KG/M2 | HEIGHT: 61 IN

## 2023-10-24 DIAGNOSIS — Z72.0 TOBACCO USE: ICD-10-CM

## 2023-10-24 DIAGNOSIS — J01.90 ACUTE NON-RECURRENT SINUSITIS, UNSPECIFIED LOCATION: Primary | ICD-10-CM

## 2023-10-24 PROCEDURE — 99213 OFFICE O/P EST LOW 20 MIN: CPT | Performed by: PHYSICIAN ASSISTANT

## 2023-10-24 RX ORDER — CEFDINIR 300 MG/1
300 CAPSULE ORAL 2 TIMES DAILY
Qty: 14 CAPSULE | Refills: 0 | Status: SHIPPED | OUTPATIENT
Start: 2023-10-24

## 2023-10-24 NOTE — PROGRESS NOTES
Subjective   Trinidad Quinteros is a 53 y.o. female  Sinus Problem (Sinus pressure with some nasal cavity dryness, Flonase not helping,  and mild ear pain since Friday)      Sinus Problem  Associated symptoms include congestion and coughing. Pertinent negatives include no sore throat.     Trinidad Quinteros, date of birth 1970, presents today with sinus problems and ear pain.    The patient denies feeling like she has the flu or COVID-19. She denies fever, body aches, chills, or chest pain. She reports dryness in her nose that is green in color. Her symptoms began on 10/21/2023. The patient has been taking Xyzal, Flonase, and Advair inhaler. She experiences pain in her ears that are intermittent. She denies she has a sore throat or coughing. She has decreased usage of cigarettes. She is down to 8 cigarettes a day.    The following portions of the patient's history were reviewed and updated as appropriate: allergies, current medications, past social history and problem list    Review of Systems   Constitutional: Negative.    HENT:  Positive for congestion. Negative for sore throat.    Respiratory:  Positive for cough.    Cardiovascular: Negative.        Objective     Vitals:    10/24/23 0945   BP: 134/80   Pulse: 74   Temp: 97.7 °F (36.5 °C)   SpO2: 98%       Physical Exam  Vitals and nursing note reviewed.   Constitutional:       General: She is not in acute distress.     Appearance: Normal appearance. She is well-developed. She is not ill-appearing, toxic-appearing or diaphoretic.   HENT:      Head: Normocephalic and atraumatic.      Right Ear: Tympanic membrane, ear canal and external ear normal.      Left Ear: Tympanic membrane, ear canal and external ear normal.      Nose: Mucosal edema and congestion present. No rhinorrhea.      Right Sinus: Maxillary sinus tenderness and frontal sinus tenderness present.      Left Sinus: Maxillary sinus tenderness and frontal sinus tenderness present.      Mouth/Throat:       Pharynx: No oropharyngeal exudate.   Eyes:      Pupils: Pupils are equal, round, and reactive to light.   Cardiovascular:      Rate and Rhythm: Normal rate and regular rhythm.   Pulmonary:      Effort: Pulmonary effort is normal.      Breath sounds: Normal breath sounds.   Neurological:      Mental Status: She is alert.         Assessment & Plan     Diagnoses and all orders for this visit:    1. Acute non-recurrent sinusitis, unspecified location (Primary)    2. Tobacco use    Other orders  -     cefdinir (OMNICEF) 300 MG capsule; Take 1 capsule by mouth 2 (Two) Times a Day.  Dispense: 14 capsule; Refill: 0       1. Sinus infection  - I will prescribe cefdinir for 1 week. She will continue all her other allergy medicine. I advised the patient to use nasal saline.    Transcribed from ambient dictation for Josephine Delacruz PA-C by Mckenna Muhammad.  10/24/23   10:53 EDT    Patient or patient representative verbalized consent to the visit recording.  I have personally performed the services described in this document as transcribed by the above individual, and it is both accurate and complete.

## 2023-10-30 RX ORDER — TIZANIDINE 4 MG/1
TABLET ORAL
Qty: 30 TABLET | Refills: 5 | Status: SHIPPED | OUTPATIENT
Start: 2023-10-30

## 2023-10-30 NOTE — TELEPHONE ENCOUNTER
Patient needs a refill on her tiZANidine (ZANAFLEX) 4 MG tablet and she wants it to go to [facility]  Phone: [phone]  Fax: [phone]    ThanksLy...         This medication was discontinued on 05/24/2023 by Dr. Hurt, please advise      tiZANidine Take 2-4 mg by mouth 2 (Two) Times a Day As Needed for Muscle Spasms.

## 2023-10-30 NOTE — TELEPHONE ENCOUNTER
+ I may be missing it but I cannot find this medication on her med list at all is this something that she has taken since it was discontinued by Dr. Martínez in May?  I need to know why he discontinued it before we fill it

## 2023-10-30 NOTE — TELEPHONE ENCOUNTER
I have been on it for years, he did not tell me that he was going to discontinue it. I take it at night mostly and just realized that I had ran out, and there were no refills.     Trinidad

## 2023-11-16 ENCOUNTER — TELEPHONE (OUTPATIENT)
Dept: PAIN MEDICINE | Facility: CLINIC | Age: 53
End: 2023-11-16
Payer: COMMERCIAL

## 2023-11-16 NOTE — TELEPHONE ENCOUNTER
----- Message from Too Schroeder MD sent at 11/16/2023 10:48 AM EST -----  Regarding: FW: Appointment Request  Contact: [phone]  If she is doing well then I would recommend holding off on shots. She can call back to make an appt if pain worsens.  LKB  ----- Message -----  From: Mercy Lopez MA  Sent: 11/16/2023   8:14 AM EST  To: Too Schroeder MD  Subject: FW: Appointment Request                            ----- Message -----  From: Trinidad Quinteros  Sent: 11/15/2023   9:37 AM EST  To: [facility]  Subject: Appointment Request                              Appointment Request From: Trinidad Quinteros    With Provider: Too Schroeder [[facility]]    Preferred Date Range: 11/26/2023 – 12/29/2023    Preferred Times: Any Time    Reason for visit: Follow-up    Comments:  i have had to cancel my last couple of visits due to staffing issues in out office. my back is doing alot better and not much pain. please let me know if i need to come in to maybe getting a shot in my back or if i should wait. please call me at [phone]  thank you

## 2023-11-21 RX ORDER — FLUTICASONE PROPIONATE 50 MCG
2 SPRAY, SUSPENSION (ML) NASAL DAILY
Qty: 16 G | Refills: 11 | Status: SHIPPED | OUTPATIENT
Start: 2023-11-21

## 2023-11-21 RX ORDER — ONDANSETRON 4 MG/1
4 TABLET, FILM COATED ORAL EVERY 8 HOURS PRN
Qty: 20 TABLET | Refills: 1 | Status: SHIPPED | OUTPATIENT
Start: 2023-11-21

## 2023-11-21 RX ORDER — ATORVASTATIN CALCIUM 40 MG/1
40 TABLET, FILM COATED ORAL DAILY
Qty: 90 TABLET | Refills: 2 | Status: SHIPPED | OUTPATIENT
Start: 2023-11-21

## 2023-11-27 ENCOUNTER — TELEPHONE (OUTPATIENT)
Dept: PAIN MEDICINE | Facility: CLINIC | Age: 53
End: 2023-11-27
Payer: COMMERCIAL

## 2023-11-27 NOTE — TELEPHONE ENCOUNTER
Caller: Trinidad Quinteros    Relationship to patient: Self    Best call back number: [phone]    Patient is needing: PATIENTS PAIN HAS INCREASED AND WOULD LIKE TO BE WORKED IN - PLEASE REACH OUT AND ADVISE

## 2023-11-28 ENCOUNTER — OFFICE VISIT (OUTPATIENT)
Dept: PAIN MEDICINE | Facility: CLINIC | Age: 53
End: 2023-11-28
Payer: COMMERCIAL

## 2023-11-28 VITALS — BODY MASS INDEX: 32.1 KG/M2 | WEIGHT: 170 LBS | HEIGHT: 61 IN

## 2023-11-28 DIAGNOSIS — M54.16 LUMBAR RADICULOPATHY: Primary | ICD-10-CM

## 2023-11-28 PROCEDURE — 99214 OFFICE O/P EST MOD 30 MIN: CPT | Performed by: STUDENT IN AN ORGANIZED HEALTH CARE EDUCATION/TRAINING PROGRAM

## 2023-11-28 RX ORDER — PREGABALIN 75 MG/1
1 CAPSULE ORAL EVERY 12 HOURS SCHEDULED
COMMUNITY
Start: 2023-11-19

## 2023-11-28 NOTE — PROGRESS NOTES
Primary Physician: Josephine Delacruz PA-C    CHIEF COMPLAINT or REASON FOR VISIT: Back Pain    Initial HPI 3/16/2023:  Ms. Trinidad Quinteros is 53 y.o. female who presents as a new patient referral for evaluation and treatment of chronic low back pain with radiation to right lower extremity.  Ms. Quinteros states that she for the past year has had slowly increasing pain.  She has prior PMH hip pain for which she has been taking Percocet however this has not been helpful for her pain.  She is additionally trialed gabapentin.  She describes a sharp burning tingling pain radiating down her right buttock and the posterior lateral aspect of her thigh and occasionally into the plantar surface of her foot.  Patient denies any bowel or bladder dysfunction, lower extremity weakness, new onset saddle anesthesia or unexplained weight loss.  She does work at [employer] with Dr. Roth.    Ms. Quinteros recently saw consultation with interventional pain, Dr. Rey, who diagnosed her with right lumbar radiculopathy and performed a right L4/5 and L5/S1 transforaminal epidural steroid injection.  Patient reported relief for approximately 1 week however unfortunately pain returned worse than previous.    Interval history: Patient returns to clinic after undergoing a right-sided transforaminal epidural steroid injection.  Unfortunately this only had about 1 or 2 days benefit; nonetheless, she underwent L4-5 foraminotomies and L5-S1 discectomy on the right with Dr. Weber in May.  This resolved her right-sided symptoms however in early October she was bending over, felt a pop, and developed new onset low back pain with radiation into the left lower extremity.  She subsequently had a Medrol Dosepak and started pregabalin with some benefit.  She has a new lumbar MRI.  Recently evaluated by neurosurgery again and recommendation was made for a left-sided epidural steroid injection.    Interventions:  4/4/2023: Right S1 TFESI with 1  to 2 days benefit    Objective Pain Scoring:   BRIEF PAIN INVENTORY:  Total score:   Pain Score    23 1312   PainSc:   9   PainLoc: Back      PHQ-2:    PHQ-9:    Opioid Risk Tool:         Review of Systems:   ROS negative except as otherwise noted     Past Medical History:   Past Medical History:   Diagnosis Date    Allergic     Anxiety     CTS (carpal tunnel syndrome)     Degenerative disc disease, cervical 2019    Depression     Extremity pain     GERD (gastroesophageal reflux disease)     Hyperlipidemia     Hypertension     Lumbar radiculopathy 2023    Neck pain     ADRIAN on CPAP     Smoker     Wears glasses          Past Surgical History:   Past Surgical History:   Procedure Laterality Date    APPENDECTOMY      CARDIAC CATHETERIZATION      CARDIAC CATHETERIZATION       SECTION      COLONOSCOPY      ENDOSCOPY      HAND TENDON SURGERY Right     LUMBAR DISCECTOMY Right 2023    Procedure: LUMBAR DISCECTOMY, L5-S1, L4-5 FORAMINOTOMY- RIGHT;  Surgeon: Nakul Hurt MD;  Location: [facility];  Service: Neurosurgery;  Laterality: Right;    SUBTOTAL HYSTERECTOMY      TUBAL ABDOMINAL LIGATION           Family History   Family History   Problem Relation Age of Onset    COPD Mother     Arthritis Mother     Diabetes Mother     Hypertension Mother     Miscarriages / Stillbirths Mother     COPD Father     Diabetes Father     Hypertension Father     Breast cancer Maternal Aunt 50    Cancer Other     Arthritis Other     Diabetes Other     Asthma Other     Heart disease Other         heart trouble    Hypertension Other         high blood pressure    Alcohol abuse Other     COPD Brother     Diabetes Brother     Early death Brother     Hypertension Brother     COPD Brother     Diabetes Brother     Early death Brother         COPD/Non-Hodgkin lymphoma    Early death Brother         covid/pneumonia     COPD Brother     Miscarriages / Stillbirths Daughter      Asthma Son     Ovarian cancer Neg Hx          Social History   Social History     Socioeconomic History    Marital status:    Tobacco Use    Smoking status: Every Day     Packs/day: 1.00     Years: 35.00     Additional pack years: 0.00     Total pack years: 35.00     Types: Cigarettes    Smokeless tobacco: Never   Vaping Use    Vaping Use: Never used   Substance and Sexual Activity    Alcohol use: No    Drug use: No    Sexual activity: Yes     Partners: Male     Birth control/protection: None, Hysterectomy        Medications:     Current Outpatient Medications:     albuterol (ACCUNEB) 1.25 MG/3ML nebulizer solution, Take 3 mL (one vial) by nebulization Every 6 (Six) Hours As Needed for Wheezing or Shortness of Air., Disp: 75 mL, Rfl: 12    albuterol sulfate HFA (Ventolin HFA) 108 (90 Base) MCG/ACT inhaler, Inhale 2 puffs as directed every 4 hours As Needed for Wheezing or Shortness of Air and/or cough., Disp: 18 g, Rfl: 5    ALPRAZolam (XANAX) 0.5 MG tablet, Take 1 tablet by mouth 3 (Three) Times a Day., Disp: 90 tablet, Rfl: 5    atorvastatin (LIPITOR) 40 MG tablet, Take 1 tablet by mouth Daily., Disp: 90 tablet, Rfl: 2    buPROPion XL (Wellbutrin XL) 150 MG 24 hr tablet, Take 1 tablet by mouth Every Morning., Disp: 30 tablet, Rfl: 5    cefdinir (OMNICEF) 300 MG capsule, Take 1 capsule by mouth 2 (Two) Times a Day., Disp: 14 capsule, Rfl: 0    estradiol (Estrace) 0.5 MG tablet, Take 1 tablet by mouth Daily as directed for menopausal hot flashes, Disp: 30 tablet, Rfl: 2    fluticasone (FLONASE) 50 MCG/ACT nasal spray, Use 2 sprays into each nostril as directed by provider Daily., Disp: 16 g, Rfl: 11    Fluticasone-Salmeterol (Advair Diskus) 250-50 MCG/ACT DISKUS, Inhale 1 puff 2 (Two) Times a Day., Disp: 60 each, Rfl: 11    folic acid (FOLVITE) 1 MG tablet, Take 1 tablet by mouth Daily., Disp: 30 tablet, Rfl: 11    gabapentin (NEURONTIN) 300 MG capsule, Take 1 capsule by mouth 4 (Four) Times a Day., Disp: 120  "capsule, Rfl: 5    glycopyrrolate (Robinul-Forte) 2 MG tablet, Take 1 tablet by mouth 2 (Two) Times a Day for diarrhea, Disp: 60 tablet, Rfl: 11    hydroCHLOROthiazide (HYDRODIURIL) 25 MG tablet, Take 1 tablet by mouth Daily., Disp: 90 tablet, Rfl: 2    levocetirizine (XYZAL) 5 MG tablet, Take 1 tablet by mouth Every Evening for allergies, Disp: 30 tablet, Rfl: 11    losartan (COZAAR) 100 MG tablet, Take 1 tablet by mouth Daily., Disp: 90 tablet, Rfl: 3    meloxicam (MOBIC) 7.5 MG tablet, Take 1 tablet by mouth Daily., Disp: 30 tablet, Rfl: 5    ondansetron (Zofran) 4 MG tablet, Take 1 tablet by mouth Every 8 (Eight) Hours As Needed for Nausea or Vomiting., Disp: 20 tablet, Rfl: 1    pregabalin (LYRICA) 75 MG capsule, Take 1 capsule by mouth Every 12 (Twelve) Hours., Disp: , Rfl:     sertraline (ZOLOFT) 100 MG tablet, Take 2 tablets by mouth Daily, Disp: 60 tablet, Rfl: 11    tiZANidine (ZANAFLEX) 4 MG tablet, Take 0.5-1 tablet by mouth 2 Times a Day As Needed for back pain, Disp: 30 tablet, Rfl: 5    valACYclovir (VALTREX) 500 MG tablet, TAKE 4 TABLETS BY MOUTH AT ONSET OF ATTACK AND 12 HOURS LATER, Disp: 8 tablet, Rfl: 5        Physical Exam:     Vitals:    11/28/23 1312   Weight: 77.1 kg (170 lb)   Height: 154.9 cm (61\")   PainSc:   9   PainLoc: Back        General: Alert and oriented, No acute distress.   HEENT: Normocephalic, atraumatic.   Cardiovascular: No gross edema  Respiratory: Respirations are non-labored    Lumbar Spine:   No masses or atrophy  Range of motion - Flexion normal. Extension normal. Right Lat Bending normal. Left Lat Bending normal  Facet Loading: Negative bilaterally  Facet Palpation - Nontender   PSIS tenderness - Negative bilaterally  Jeffrey's/PAOLO/Thigh thrust - Negative bilaterally  Straight leg raise: Positive right  Slump test: Positive right    Motor Exam:    Strength: Rate on 1-5 scale Right Left    L1/2- hip flexion 5 5    L3- knee extension 5 5    L4- ankle dorsiflexion 5 " 5    L5- great toe extension 5 5    S1- ankle plantarflexion 5 5    Sensory Exam: Altered sensation in right S1 dermatome.    Neurologic: Cranial Nerves II-XII are grossly intact.   Clonus -negative bilaterally    Psychiatric: Cooperative.   Gait: Antalgic  Assistive Devices: None    Imaging Studies:   Results for orders placed during the hospital encounter of 11/19/22    MRI Lumbar Spine Without Contrast    Narrative  DATE OF EXAM: 11/19/2022 8:13 AM    PROCEDURE: MRI LUMBAR SPINE WO CONTRAST-    INDICATIONS: Lumbar radiculopathy, symptoms persist with > 6 wks  treatment; M54.40-Lumbago with sciatica, unspecified side    COMPARISON: No comparisons available.    TECHNIQUE: Routine magnetic resonance imaging of the lumbar spine was  performed without the administration of contrast.    FINDINGS:  The alignment is anatomic. The vertebral body heights appear normal.  There are degenerative endplate changes at L5-S1. There is disc  desiccation at L4-5 and L5-S1, with advanced degenerative loss of disc  height at L5-S1. The conus terminates at the L1-2 level. The posterior  paravertebral soft tissues are unremarkable.    L1-2: No spinal canal or neural foraminal stenosis.    L2-3: Mild bilateral facet arthropathy. No spinal canal stenosis. No  neural foraminal stenosis.    L3-4: Mild disc bulge. Mild bilateral facet arthropathy. No spinal canal  stenosis. Mild right neural foraminal stenosis.    L4-5: Mild disc bulge with superimposed small central disc protrusion.  Mild bilateral facet arthropathy. Mild spinal canal stenosis. Mild  bilateral neural foraminal stenosis.    L5-S1: Moderate disc bulge. Mild right and moderate facet arthropathy.  Mild spinal canal stenosis. Mild to moderate right and mild left neural  foraminal stenosis.    Impression  Mild multilevel degenerative changes of lumbar spine as described above.    This report was finalized on 11/19/2022 9:02 AM by Jaxson Connolly MD.      Impression & Plan:    3/16/2023: Ms. Trinidad Quinteros is a 53 y.o. female with past medical history significant for GERD, depression, HTN, ADRIAN, who presents to the pain clinic for evaluation and treatment of chronic low back pain with radiation to right lower extremity.  I personally reviewed the patient's lumbar MRI dated 11/19/2022 which demonstrates a central and right posterior L5/S1 disc herniation causing right L5/S1 neuroforaminal stenosis and lateral recess stenosis.  Clinical examination consistent with a right S1 radiculitis.  We discussed right S1 foramen epidural steroid injection to improve pain.  If greater than 50% relief for at least 2-3 months can consider repeat as needed every 3 to 4 months.  I had an in-depth discussion with the patient regarding the risks of the procedure including bleeding, infection, damage to surrounding structures, paralysis and even death.  We discussed the potential adverse effects of corticosteroid injection including flushing of the face, lipodystrophy, skin discoloration, elevated blood glucose, increased blood pressure.  Risks of frequent steroid administration include weight gain, hormonal changes, mood changes, osteoporosis.    If no benefit from epidural steroid will refer for neurosurgical evaluation.  11/20/2023: Now status post right-sided L4/5 foraminotomy and L5/S1 discectomy with resolution of right-sided symptoms.  Recent onset left-sided symptoms.  I personally reviewed and interpreted her lumbar MRI dated 9/25/2023: Interval improvement in right L4/5 NFS; persistent L5/S1 disc herniation.  Exam consistent with lumbar radiculopathy.  We will trial left L5/S1 and S1 TFESI.    1. Lumbar radiculopathy          PLAN:  1. Medication Recommendations: Continue per PCP    2. Physical Therapy: Continue HEP    3. Psychological: defer    4. Complementary and alternative (CAM) Therapies:     5. Labs: None indicated     6. Imaging: MRI independently interpreted and reviewed with patient    7.  Interventions: Schedule left L5/S1 and S1 transforaminal epidural steroid injection (CPT 02715, 29898).    8. Referrals: None indicated     9. Records requested: n/a    10. Lifestyle goals:    Follow-up 2 months after injection      [facility]  Too Schroeder MD

## 2023-11-29 ENCOUNTER — LAB (OUTPATIENT)
Dept: FAMILY MEDICINE CLINIC | Facility: CLINIC | Age: 53
End: 2023-11-29
Payer: COMMERCIAL

## 2023-11-29 ENCOUNTER — CLINICAL SUPPORT (OUTPATIENT)
Dept: FAMILY MEDICINE CLINIC | Facility: CLINIC | Age: 53
End: 2023-11-29
Payer: COMMERCIAL

## 2023-11-29 DIAGNOSIS — M54.17 LUMBOSACRAL RADICULOPATHY: Primary | ICD-10-CM

## 2023-11-29 RX ORDER — KETOROLAC TROMETHAMINE 30 MG/ML
60 INJECTION, SOLUTION INTRAMUSCULAR; INTRAVENOUS ONCE
Status: DISCONTINUED | OUTPATIENT
Start: 2023-11-29 | End: 2023-11-30

## 2023-11-30 PROCEDURE — 96372 THER/PROPH/DIAG INJ SC/IM: CPT | Performed by: FAMILY MEDICINE

## 2023-11-30 RX ORDER — KETOROLAC TROMETHAMINE 30 MG/ML
30 INJECTION, SOLUTION INTRAMUSCULAR; INTRAVENOUS EVERY 6 HOURS PRN
Status: SHIPPED | OUTPATIENT
Start: 2023-11-30 | End: 2023-12-05

## 2023-11-30 RX ORDER — KETOROLAC TROMETHAMINE 30 MG/ML
30 INJECTION, SOLUTION INTRAMUSCULAR; INTRAVENOUS EVERY 6 HOURS PRN
Status: SHIPPED | OUTPATIENT
Start: 2023-11-30 | End: 2023-12-02

## 2023-11-30 RX ADMIN — KETOROLAC TROMETHAMINE 30 MG: 30 INJECTION, SOLUTION INTRAMUSCULAR; INTRAVENOUS at 08:03

## 2023-11-30 RX ADMIN — KETOROLAC TROMETHAMINE 30 MG: 30 INJECTION, SOLUTION INTRAMUSCULAR; INTRAVENOUS at 08:02

## 2023-12-01 ENCOUNTER — TELEPHONE (OUTPATIENT)
Dept: FAMILY MEDICINE CLINIC | Facility: CLINIC | Age: 53
End: 2023-12-01

## 2023-12-07 ENCOUNTER — OUTSIDE FACILITY SERVICE (OUTPATIENT)
Dept: PAIN MEDICINE | Facility: CLINIC | Age: 53
End: 2023-12-07
Payer: COMMERCIAL

## 2023-12-07 ENCOUNTER — DOCUMENTATION (OUTPATIENT)
Dept: PAIN MEDICINE | Facility: CLINIC | Age: 53
End: 2023-12-07

## 2023-12-07 NOTE — PROGRESS NOTES
[facility]  [address] [place] [id number]      PROCEDURE: Fluoroscopically-guided  Lumbar Transforaminal Epidural Steroid Injection -left L5/S1 and S1 foramen    PRE-OP DIAGNOSIS: Lumbar radiculopathy  POST-OP DIAGNOSIS: Lumbar radiculopathy    BLOOD THINNERS (ANTIPLATELETS/ANTICOAGULANTS): Were discussed with the patient and AUDREY Guidelines were followed.     CONSENT: Risks, benefits and options were explained to the patient, all questions were answered and written informed consent was obtained.     ANESTHESIA: Moderate sedation was required to maintain comfort, safety, and cooperation during the procedure.  The duration of sedation service was over 10 minutes.  Patient received 2 mg IV Versed and 50 mcg IV fentanyl.  Independent observation and monitoring was performed by Atiya Lyle.  The patient's level of consciousness and physiologic status was continually monitored with pulse oximetry, EKG from 909 to 926.  There were no complications or adverse events during sedation.  After the sedation patient was taken to the recovery area.      PROCEDURE NOTE: A pre-procedural time out was performed to confirm the correct patient, procedure, side, and site. Standard ASA monitors were applied and oxygen via nasal cannula was provided. All proceduralists donned sterile gloves with masks and surgical hats. The patient was placed prone with pillow under the abdomen and all pressure points padded. The patient's lumbar spine was prepped in standard fashion using Chlorhexidine and draped with sterile towels. The target neuroforamen was identified using oblique fluoroscopy and the superior vertebral body endplate squared. The overlying skin and subcutaneous tissue was anesthetized with 1% lidocaine. A 22 gauge 3.5 inch spinal needle with bent tip was incrementally advanced using intermittent fluoroscopy to the 6 o'clock position of the target pedicle in the mid-neuroforamen using oblique,  AP and lateral intermittent fluoroscopy. After negative aspiration of blood and cerebrospinal fluid, needle placement was confirmed with 1 ml of omnipaque 180 mgI/ml contrast using AP fluoroscopic imaging. Imaging revealed a clear outline of the target spinal nerve with proximal spread of agent through the neuroforamen medially to the epidural space, without evidence of intravascular or intrathecal spread. After negative aspiration, a mixture containing dexamethasone 5 mg steroid and lidocaine 1% - 1 ml local anesthetic for a total volume of 1.5 ml was injected under direct visualization with fluoroscopy. The needle was flushed, removed and a bandage applied. The same procedure utilizing the same technique was performed at each target level listed above.    EBL: None     COMPLICATIONS: None     The patient was monitored in recovery area until all discharge criteria were met. Vital signs remained stable throughout the procedure and in the recovery area. There were no immediate complications and the patient tolerated the procedure well. Sensory and motor exam was unchanged from baseline. The patient received written discharge instructions prior to discharge.     FOLLOW UP: As scheduled     ADDITIONAL NOTES: []        [facility]       Too Schroeder MD     CODES:  [id number]  [id number]  [id number]

## 2023-12-18 ENCOUNTER — TELEPHONE (OUTPATIENT)
Dept: FAMILY MEDICINE CLINIC | Facility: CLINIC | Age: 53
End: 2023-12-18
Payer: COMMERCIAL

## 2023-12-18 RX ORDER — CEFDINIR 300 MG/1
300 CAPSULE ORAL 2 TIMES DAILY
Qty: 14 CAPSULE | Refills: 0 | Status: SHIPPED | OUTPATIENT
Start: 2023-12-18

## 2023-12-18 RX ORDER — DEXTROMETHORPHAN HYDROBROMIDE AND PROMETHAZINE HYDROCHLORIDE 15; 6.25 MG/5ML; MG/5ML
5 SYRUP ORAL 4 TIMES DAILY PRN
Qty: 180 ML | Refills: 0 | Status: SHIPPED | OUTPATIENT
Start: 2023-12-18

## 2023-12-28 DIAGNOSIS — M75.41 IMPINGEMENT SYNDROME OF RIGHT SHOULDER: ICD-10-CM

## 2023-12-28 RX ORDER — MELOXICAM 7.5 MG/1
7.5 TABLET ORAL DAILY
Qty: 30 TABLET | Refills: 5 | Status: SHIPPED | OUTPATIENT
Start: 2023-12-28

## 2024-01-18 RX ORDER — BUPROPION HYDROCHLORIDE 150 MG/1
150 TABLET ORAL EVERY MORNING
Qty: 30 TABLET | Refills: 5 | Status: SHIPPED | OUTPATIENT
Start: 2024-01-18

## 2024-01-18 RX ORDER — ESTRADIOL 0.5 MG/1
0.5 TABLET ORAL DAILY
Qty: 30 TABLET | Refills: 0 | Status: SHIPPED | OUTPATIENT
Start: 2024-01-18

## 2024-01-19 ENCOUNTER — OFFICE VISIT (OUTPATIENT)
Dept: FAMILY MEDICINE CLINIC | Facility: CLINIC | Age: 54
End: 2024-01-19
Payer: COMMERCIAL

## 2024-01-19 ENCOUNTER — TELEPHONE (OUTPATIENT)
Dept: FAMILY MEDICINE CLINIC | Facility: CLINIC | Age: 54
End: 2024-01-19

## 2024-01-19 VITALS
OXYGEN SATURATION: 95 % | DIASTOLIC BLOOD PRESSURE: 80 MMHG | TEMPERATURE: 97.8 F | HEART RATE: 79 BPM | HEIGHT: 61 IN | WEIGHT: 170.1 LBS | BODY MASS INDEX: 32.12 KG/M2 | SYSTOLIC BLOOD PRESSURE: 136 MMHG

## 2024-01-19 DIAGNOSIS — J01.90 ACUTE NON-RECURRENT SINUSITIS, UNSPECIFIED LOCATION: ICD-10-CM

## 2024-01-19 DIAGNOSIS — R05.8 PRODUCTIVE COUGH: Primary | ICD-10-CM

## 2024-01-19 DIAGNOSIS — J34.89 SINUS PRESSURE: ICD-10-CM

## 2024-01-19 LAB
EXPIRATION DATE: NORMAL
EXPIRATION DATE: NORMAL
FLUAV AG UPPER RESP QL IA.RAPID: NOT DETECTED
FLUBV AG UPPER RESP QL IA.RAPID: NOT DETECTED
INTERNAL CONTROL: NORMAL
INTERNAL CONTROL: NORMAL
Lab: 5710
Lab: NORMAL
RSV AG SPEC QL: NEGATIVE
SARS-COV-2 AG UPPER RESP QL IA.RAPID: NOT DETECTED

## 2024-01-19 PROCEDURE — 87428 SARSCOV & INF VIR A&B AG IA: CPT | Performed by: PHYSICIAN ASSISTANT

## 2024-01-19 PROCEDURE — 87807 RSV ASSAY W/OPTIC: CPT | Performed by: PHYSICIAN ASSISTANT

## 2024-01-19 PROCEDURE — 99213 OFFICE O/P EST LOW 20 MIN: CPT | Performed by: PHYSICIAN ASSISTANT

## 2024-01-19 RX ORDER — CEFDINIR 300 MG/1
300 CAPSULE ORAL 2 TIMES DAILY
Qty: 20 CAPSULE | Refills: 0 | Status: SHIPPED | OUTPATIENT
Start: 2024-01-19

## 2024-01-19 RX ORDER — DEXTROMETHORPHAN HYDROBROMIDE AND PROMETHAZINE HYDROCHLORIDE 15; 6.25 MG/5ML; MG/5ML
5 SYRUP ORAL 4 TIMES DAILY PRN
Qty: 180 ML | Refills: 0 | Status: SHIPPED | OUTPATIENT
Start: 2024-01-19

## 2024-01-19 NOTE — TELEPHONE ENCOUNTER
started yesterday nasal drip & stuffiness, slight headache, eyes are a little red. Slight cough, req medication to be sent to pharm

## 2024-01-19 NOTE — PROGRESS NOTES
Subjective   Trinidad Quinteros is a 53 y.o. female  Cough (Mild productive cough x1 day) and Sinus Problem (Sinus pressure/pain x1 day)      History of Present Illness    Trinidad Quinteros, date of birth 1970, presents today for evaluation of cough and congestion.     The patient is experiencing pressure in her sinuses, which feels like a sinus infection. She is blowing her nose but denies the nasal mucus to be dark in color. She adds that she is either congested or experiencing rhinorrhea. She denies experiencing a sore throat, fever, chills, sweats, myalgia, nausea, or vomiting. Her symptoms started yesterday, 01/18/2024, and it is getting worse. She is breathing through her nose without any difficulty. Her eyes are red. She denies having Stahist at home    The patient is allergic to BACTRIM.     The following portions of the patient's history were reviewed and updated as appropriate: allergies, current medications, past social history and problem list    Review of Systems   Constitutional:  Negative for chills, fatigue and fever.   HENT:  Positive for congestion, ear pain, postnasal drip, rhinorrhea and sinus pressure. Negative for sore throat.    Eyes:  Positive for pain and redness.   Respiratory:  Positive for cough. Negative for shortness of breath.    Neurological:  Positive for headaches. Negative for dizziness.   Hematological:  Negative for adenopathy.       Objective     Vitals:    01/19/24 1026   BP: 136/80   Pulse: 79   Temp: 97.8 °F (36.6 °C)   SpO2: 95%       Physical Exam  Vitals and nursing note reviewed.   Constitutional:       General: She is not in acute distress.     Appearance: Normal appearance. She is well-developed. She is not ill-appearing, toxic-appearing or diaphoretic.   HENT:      Head: Normocephalic and atraumatic.      Right Ear: Tympanic membrane and ear canal normal.      Left Ear: Tympanic membrane and ear canal normal.      Nose: Mucosal edema and congestion present. No  rhinorrhea.      Right Sinus: Maxillary sinus tenderness and frontal sinus tenderness present.      Left Sinus: Maxillary sinus tenderness and frontal sinus tenderness present.      Mouth/Throat:      Pharynx: No oropharyngeal exudate.   Eyes:      Pupils: Pupils are equal, round, and reactive to light.   Cardiovascular:      Rate and Rhythm: Normal rate and regular rhythm.   Pulmonary:      Effort: Pulmonary effort is normal.      Breath sounds: Wheezing present.   Neurological:      Mental Status: She is alert.         Assessment & Plan     Diagnoses and all orders for this visit:    1. Productive cough (Primary)  -     POCT SARS-CoV-2 Antigen G[place] + Flu  -     POCT RSV    2. Sinus pressure  -     POCT SARS-CoV-2 Antigen [place]IA + Flu  -     POCT RSV    3. Acute non-recurrent sinusitis, unspecified location    Other orders  -     cefdinir (OMNICEF) 300 MG capsule; Take 1 capsule by mouth 2 (Two) Times a Day.  Dispense: 20 capsule; Refill: 0  -     Chlorcyclizine-Pseudoephed 25-60 MG tablet; Take 1/2 to 1 every 12 hours as needed for congestion  Dispense: 14 tablet; Refill: 0  -     promethazine-dextromethorphan (PROMETHAZINE-DM) 6.25-15 MG/5ML syrup; Take 5 mL by mouth 4 (Four) Times a Day As Needed for Cough.  Dispense: 180 mL; Refill: 0    1. Productive cough  2. Sinus pressure  3. Acute non-recurrent maxillary sinusitis  It looks like bacterial sinus infection. I will prescribe cefdinir and Stahist. I advised the patient to hold levocetirizine and Xyzal when taking Stahist as to avoid dryness. She can use her Flonase nasal spray. If she starts feeling that her nose is dry or it starts bleeding, she needs stop Flonase and can use saline.    Transcribed from ambient dictation for Josephine Delacruz PA-C by Carla Valdez.  01/19/24   12:31 EST    Patient or patient representative verbalized consent to the visit recording.  I have personally performed the services described in this document as transcribed by the above  individual, and it is both accurate and complete.

## 2024-01-29 DIAGNOSIS — F41.9 ANXIETY: ICD-10-CM

## 2024-01-29 RX ORDER — ALPRAZOLAM 0.5 MG/1
0.5 TABLET ORAL 3 TIMES DAILY
Qty: 90 TABLET | Refills: 5 | OUTPATIENT
Start: 2024-01-29

## 2024-01-30 DIAGNOSIS — M54.17 LUMBOSACRAL RADICULOPATHY: ICD-10-CM

## 2024-01-30 DIAGNOSIS — F41.9 ANXIETY: ICD-10-CM

## 2024-01-30 RX ORDER — ALPRAZOLAM 0.5 MG/1
0.5 TABLET ORAL 3 TIMES DAILY
Qty: 90 TABLET | Refills: 5 | OUTPATIENT
Start: 2024-01-30

## 2024-01-30 RX ORDER — GABAPENTIN 300 MG/1
300 CAPSULE ORAL 4 TIMES DAILY
Qty: 120 CAPSULE | Refills: 5 | OUTPATIENT
Start: 2024-01-30

## 2024-01-31 RX ORDER — VALACYCLOVIR HYDROCHLORIDE 500 MG/1
2000 TABLET, FILM COATED ORAL EVERY 12 HOURS PRN
Qty: 8 TABLET | Refills: 5 | Status: SHIPPED | OUTPATIENT
Start: 2024-01-31

## 2024-02-07 ENCOUNTER — OFFICE VISIT (OUTPATIENT)
Dept: PAIN MEDICINE | Facility: CLINIC | Age: 54
End: 2024-02-07
Payer: COMMERCIAL

## 2024-02-07 VITALS — HEIGHT: 61 IN | WEIGHT: 171.5 LBS | BODY MASS INDEX: 32.38 KG/M2

## 2024-02-07 DIAGNOSIS — M54.16 LUMBAR RADICULOPATHY: Primary | ICD-10-CM

## 2024-02-07 NOTE — PROGRESS NOTES
Primary Physician: Josephine Delacruz PA-C    CHIEF COMPLAINT or REASON FOR VISIT: Follow-up (Fu from injection/)    Initial HPI 3/16/2023:  Ms. Trinidad Quinteros is 53 y.o. female who presents as a new patient referral for evaluation and treatment of chronic low back pain with radiation to right lower extremity.  Ms. Quinteors states that she for the past year has had slowly increasing pain.  She has prior PMH hip pain for which she has been taking Percocet however this has not been helpful for her pain.  She is additionally trialed gabapentin.  She describes a sharp burning tingling pain radiating down her right buttock and the posterior lateral aspect of her thigh and occasionally into the plantar surface of her foot.  Patient denies any bowel or bladder dysfunction, lower extremity weakness, new onset saddle anesthesia or unexplained weight loss.  She does work at [employer] with Dr. Roth.    Ms. Quinteros recently saw consultation with interventional pain, Dr. Rey, who diagnosed her with right lumbar radiculopathy and performed a right L4/5 and L5/S1 transforaminal epidural steroid injection.  Patient reported relief for approximately 1 week however unfortunately pain returned worse than previous.    Interval history: Patient returns to clinic after undergoing a left-sided transforaminal epidural steroid injection.  She reports excellent benefit from this injection and essentially has no pain on the left side.  However, she endorses right low back pain with radiation to the right lower extremity.  She states this pain is worst when standing and walking prolonged distances.  In May 2023 she underwent right L4-5 foraminotomies and L5/S1 discectomy which essentially resolved all of her right low back and leg pain.  However she feels as if those symptoms has returned in the last couple of months.     Interventions:  4/4/2023: Right S1 TFESI with 1 to 2 days benefit  12/7/2023: Left L5/S1 and S1 TFESI with  90% relief ongoing.     Objective Pain Scoring:   BRIEF PAIN INVENTORY:  Total score:   Pain Score    24 1318   PainSc:   1   PainLoc: Back      PHQ-2: PHQ-2 Total Score: 0  PHQ-9: PHQ-9: Brief Depression Severity Measure Score: 0  Opioid Risk Tool:         Review of Systems:   ROS negative except as otherwise noted     Past Medical History:   Past Medical History:   Diagnosis Date    Allergic     Anxiety     CTS (carpal tunnel syndrome)     Degenerative disc disease, cervical 2019    Depression     Extremity pain     GERD (gastroesophageal reflux disease)     Hyperlipidemia     Hypertension     Lumbar radiculopathy 2023    Neck pain     ADRIAN on CPAP     Smoker     Wears glasses          Past Surgical History:   Past Surgical History:   Procedure Laterality Date    APPENDECTOMY      CARDIAC CATHETERIZATION  1992    CARDIAC CATHETERIZATION       SECTION      COLONOSCOPY      ENDOSCOPY      HAND TENDON SURGERY Right 1997    LUMBAR DISCECTOMY Right 2023    Procedure: LUMBAR DISCECTOMY, L5-S1, L4-5 FORAMINOTOMY- RIGHT;  Surgeon: Nakul Hurt MD;  Location: Replaced by [facility];  Service: Neurosurgery;  Laterality: Right;    SUBTOTAL HYSTERECTOMY      TUBAL ABDOMINAL LIGATION           Family History   Family History   Problem Relation Age of Onset    COPD Mother     Arthritis Mother     Diabetes Mother     Hypertension Mother     Miscarriages / Stillbirths Mother     COPD Father     Diabetes Father     Hypertension Father     Breast cancer Maternal Aunt 50    Cancer Other     Arthritis Other     Diabetes Other     Asthma Other     Heart disease Other         heart trouble    Hypertension Other         high blood pressure    Alcohol abuse Other     COPD Brother     Diabetes Brother     Early death Brother     Hypertension Brother     COPD Brother     Diabetes Brother     Early death Brother         COPD/Non-Hodgkin lymphoma    Early death Brother          covid/pneumonia 2022    COPD Brother     Miscarriages / Stillbirths Daughter     Asthma Son     Ovarian cancer Neg Hx          Social History   Social History     Socioeconomic History    Marital status:    Tobacco Use    Smoking status: Every Day     Packs/day: 1.00     Years: 35.00     Additional pack years: 0.00     Total pack years: 35.00     Types: Cigarettes    Smokeless tobacco: Never   Vaping Use    Vaping Use: Never used   Substance and Sexual Activity    Alcohol use: No    Drug use: No    Sexual activity: Yes     Partners: Male     Birth control/protection: None, Hysterectomy        Medications:     Current Outpatient Medications:     albuterol (ACCUNEB) 1.25 MG/3ML nebulizer solution, Take 3 mL (one vial) by nebulization Every 6 (Six) Hours As Needed for Wheezing or Shortness of Air., Disp: 75 mL, Rfl: 12    albuterol sulfate HFA (Ventolin HFA) 108 (90 Base) MCG/ACT inhaler, Inhale 2 puffs as directed every 4 hours As Needed for Wheezing or Shortness of Air and/or cough., Disp: 18 g, Rfl: 5    ALPRAZolam (XANAX) 0.5 MG tablet, Take 1 tablet by mouth 3 (Three) Times a Day., Disp: 90 tablet, Rfl: 5    atorvastatin (LIPITOR) 40 MG tablet, Take 1 tablet by mouth Daily., Disp: 90 tablet, Rfl: 2    buPROPion XL (Wellbutrin XL) 150 MG 24 hr tablet, Take 1 tablet by mouth Every Morning., Disp: 30 tablet, Rfl: 5    estradiol (Estrace) 0.5 MG tablet, Take 1 tablet by mouth Daily as directed for menopausal hot flashes, Disp: 30 tablet, Rfl: 0    fluticasone (FLONASE) 50 MCG/ACT nasal spray, Use 2 sprays into each nostril as directed by provider Daily., Disp: 16 g, Rfl: 11    Fluticasone-Salmeterol (Advair Diskus) 250-50 MCG/ACT DISKUS, Inhale 1 puff 2 (Two) Times a Day., Disp: 60 each, Rfl: 11    folic acid (FOLVITE) 1 MG tablet, Take 1 tablet by mouth Daily., Disp: 30 tablet, Rfl: 11    gabapentin (NEURONTIN) 300 MG capsule, Take 1 capsule by mouth 4 (Four) Times a Day., Disp: 120 capsule, Rfl: 5     "glycopyrrolate (Robinul-Forte) 2 MG tablet, Take 1 tablet by mouth 2 (Two) Times a Day for diarrhea, Disp: 60 tablet, Rfl: 11    hydroCHLOROthiazide (HYDRODIURIL) 25 MG tablet, Take 1 tablet by mouth Daily., Disp: 90 tablet, Rfl: 2    levocetirizine (XYZAL) 5 MG tablet, Take 1 tablet by mouth Every Evening for allergies, Disp: 30 tablet, Rfl: 11    losartan (COZAAR) 100 MG tablet, Take 1 tablet by mouth Daily., Disp: 90 tablet, Rfl: 3    meloxicam (MOBIC) 7.5 MG tablet, Take 1 tablet by mouth Daily., Disp: 30 tablet, Rfl: 5    ondansetron (Zofran) 4 MG tablet, Take 1 tablet by mouth Every 8 (Eight) Hours As Needed for Nausea or Vomiting., Disp: 20 tablet, Rfl: 1    promethazine-dextromethorphan (PROMETHAZINE-DM) 6.25-15 MG/5ML syrup, Take 5 mL by mouth 4 (Four) Times a Day As Needed for Cough., Disp: 180 mL, Rfl: 0    sertraline (ZOLOFT) 100 MG tablet, Take 2 tablets by mouth Daily, Disp: 60 tablet, Rfl: 11    tiZANidine (ZANAFLEX) 4 MG tablet, Take 0.5-1 tablet by mouth 2 Times a Day As Needed for back pain, Disp: 30 tablet, Rfl: 5    valACYclovir (VALTREX) 500 MG tablet, Take 4 tablets by mouth as needed at onset of attack; THEN take 4 tablets 12 hours later., Disp: 8 tablet, Rfl: 5    pregabalin (LYRICA) 75 MG capsule, Take 1 capsule by mouth Every 12 (Twelve) Hours. (Patient not taking: Reported on 2/7/2024), Disp: , Rfl:         Physical Exam:     Vitals:    02/07/24 1318   Weight: 77.8 kg (171 lb 8 oz)   Height: 154.9 cm (61\")   PainSc:   1   PainLoc: Back        General: Alert and oriented, No acute distress.   HEENT: Normocephalic, atraumatic.   Cardiovascular: No gross edema  Respiratory: Respirations are non-labored    Lumbar Spine:   No masses or atrophy  Range of motion - Flexion normal. Extension normal. Right Lat Bending normal. Left Lat Bending normal  Facet Loading: Negative bilaterally  Facet Palpation - Nontender   PSIS tenderness - Negative bilaterally  Jeffrey's/PAOLO/Thigh thrust - Negative " bilaterally  Straight leg raise: Positive right  Slump test: Positive right    Motor Exam:    Strength: Rate on 1-5 scale Right Left    L1/2- hip flexion 5 5    L3- knee extension 5 5    L4- ankle dorsiflexion 5 5    L5- great toe extension 5 5    S1- ankle plantarflexion 5 5    Sensory Exam: Altered sensation in right S1 dermatome.    Neurologic: Cranial Nerves II-XII are grossly intact.   Clonus -negative bilaterally    Psychiatric: Cooperative.   Gait: Antalgic  Assistive Devices: None    Physical exam is consistent  2/7/2024.    Imaging Studies:   Results for orders placed during the hospital encounter of 11/19/22    MRI Lumbar Spine Without Contrast    Narrative  DATE OF EXAM: 11/19/2022 8:13 AM    PROCEDURE: MRI LUMBAR SPINE WO CONTRAST-    INDICATIONS: Lumbar radiculopathy, symptoms persist with > 6 wks  treatment; M54.40-Lumbago with sciatica, unspecified side    COMPARISON: No comparisons available.    TECHNIQUE: Routine magnetic resonance imaging of the lumbar spine was  performed without the administration of contrast.    FINDINGS:  The alignment is anatomic. The vertebral body heights appear normal.  There are degenerative endplate changes at L5-S1. There is disc  desiccation at L4-5 and L5-S1, with advanced degenerative loss of disc  height at L5-S1. The conus terminates at the L1-2 level. The posterior  paravertebral soft tissues are unremarkable.    L1-2: No spinal canal or neural foraminal stenosis.    L2-3: Mild bilateral facet arthropathy. No spinal canal stenosis. No  neural foraminal stenosis.    L3-4: Mild disc bulge. Mild bilateral facet arthropathy. No spinal canal  stenosis. Mild right neural foraminal stenosis.    L4-5: Mild disc bulge with superimposed small central disc protrusion.  Mild bilateral facet arthropathy. Mild spinal canal stenosis. Mild  bilateral neural foraminal stenosis.    L5-S1: Moderate disc bulge. Mild right and moderate facet arthropathy.  Mild spinal canal  stenosis. Mild to moderate right and mild left neural  foraminal stenosis.    Impression  Mild multilevel degenerative changes of lumbar spine as described above.    This report was finalized on 11/19/2022 9:02 AM by Jaxson Connolly MD.      Impression & Plan:   3/16/2023: Ms. Trinidad Quinteros is a 53 y.o. female with past medical history significant for GERD, depression, HTN, ADRIAN, who presents to the pain clinic for evaluation and treatment of chronic low back pain with radiation to right lower extremity.  I personally reviewed the patient's lumbar MRI dated 11/19/2022 which demonstrates a central and right posterior L5/S1 disc herniation causing right L5/S1 neuroforaminal stenosis and lateral recess stenosis.  Clinical examination consistent with a right S1 radiculitis.  We discussed right S1 foramen epidural steroid injection to improve pain.  If greater than 50% relief for at least 2-3 months can consider repeat as needed every 3 to 4 months.  I had an in-depth discussion with the patient regarding the risks of the procedure including bleeding, infection, damage to surrounding structures, paralysis and even death.  We discussed the potential adverse effects of corticosteroid injection including flushing of the face, lipodystrophy, skin discoloration, elevated blood glucose, increased blood pressure.  Risks of frequent steroid administration include weight gain, hormonal changes, mood changes, osteoporosis.    If no benefit from epidural steroid will refer for neurosurgical evaluation.  11/20/2023: Now status post right-sided L4/5 foraminotomy and L5/S1 discectomy with resolution of right-sided symptoms.  Recent onset left-sided symptoms.  I personally reviewed and interpreted her lumbar MRI dated 9/25/2023: Interval improvement in right L4/5 NFS; persistent L5/S1 disc herniation.  Exam consistent with lumbar radiculopathy.  We will trial left L5/S1 and S1 TFESI.  2/7/2024: Excellent benefit from left sided TFESI.   Complains of right-sided symptoms.  Did not have benefit from S1 epidural injection we will try different approach with L4/5 and L5/S1.  If no benefit consider referral back to neurosurgery.    1. Lumbar radiculopathy            PLAN:  1. Medication Recommendations: Continue per PCP    2. Physical Therapy: Continue HEP    3. Psychological: defer    4. Complementary and alternative (CAM) Therapies:     5. Labs: None indicated     6. Imaging: MRI independently interpreted and reviewed with patient    7. Interventions: Schedule right L4/5 and L5/S1 transforaminal epidural steroid injection (CPT 67226, 48218).    8. Referrals: Consider referral back to neurosurgery if minimal or transient benefit from epidural    9. Records requested: n/a    10. Lifestyle goals:    Follow-up 6 weeks after injection    Patient does not take any blood thinners or anticoagulants.  Denies any allergies to iodine or contrast      [facility]  Megha Frazier PA-C

## 2024-02-08 ENCOUNTER — OFFICE VISIT (OUTPATIENT)
Dept: FAMILY MEDICINE CLINIC | Facility: CLINIC | Age: 54
End: 2024-02-08
Payer: COMMERCIAL

## 2024-02-08 VITALS
WEIGHT: 171 LBS | DIASTOLIC BLOOD PRESSURE: 80 MMHG | OXYGEN SATURATION: 98 % | SYSTOLIC BLOOD PRESSURE: 128 MMHG | BODY MASS INDEX: 32.28 KG/M2 | HEIGHT: 61 IN

## 2024-02-08 DIAGNOSIS — R73.03 PREDIABETES: ICD-10-CM

## 2024-02-08 DIAGNOSIS — E78.49 OTHER HYPERLIPIDEMIA: ICD-10-CM

## 2024-02-08 DIAGNOSIS — E66.9 CLASS 1 OBESITY WITH SERIOUS COMORBIDITY AND BODY MASS INDEX (BMI) OF 32.0 TO 32.9 IN ADULT, UNSPECIFIED OBESITY TYPE: ICD-10-CM

## 2024-02-08 DIAGNOSIS — Z00.00 GENERAL MEDICAL EXAM: Primary | ICD-10-CM

## 2024-02-08 DIAGNOSIS — G47.33 OSA (OBSTRUCTIVE SLEEP APNEA): ICD-10-CM

## 2024-02-08 PROCEDURE — 99396 PREV VISIT EST AGE 40-64: CPT | Performed by: PHYSICIAN ASSISTANT

## 2024-02-08 NOTE — PROGRESS NOTES
Subjective   Isidra Rivera is a 53 y.o. female  Annual Exam (Annual physical) and Obesity (Discuss options for weight loss )    ISIDRA RIVERA, date of birth 1970, presents today for an annual physical.    She has not eaten anything since 5:30 AM this morning. She has been drinking coffee with creamer.    She is going for another injection in her back next week.    She has never done a lung cancer screening. She declines having this done at this time. She denies any bladder issues, skin issues, or edema.     Obesity      Patient presents today for a preventive medical visit.  Patient is here to determine screening labs and tests that are due and to determine immunization status as well.  Patient will be counseled regarding preventative medicine issues such as regular exercise and healthy diet as well.  The following portions of the patient's history were reviewed and updated as appropriate: allergies, current medications, past social history and problem list    Review of Systems   Constitutional:  Positive for unexpected weight change (weight gain).   HENT: Negative.     Eyes: Negative.    Respiratory: Negative.     Cardiovascular: Negative.    Gastrointestinal: Negative.    Endocrine: Negative.    Genitourinary: Negative.    Musculoskeletal:  Positive for back pain (chronic back pain).   Skin: Negative.    Allergic/Immunologic: Negative.    Neurological: Negative.    Hematological: Negative.    Psychiatric/Behavioral: Negative.     All other systems reviewed and are negative.      Objective     Vitals:    02/08/24 1428   BP: 128/80   SpO2: 98%       Physical Exam  Vitals and nursing note reviewed.   Constitutional:       General: She is not in acute distress.     Appearance: Normal appearance. She is well-developed. She is obese. She is not ill-appearing, toxic-appearing or diaphoretic.      Comments: BMI32   HENT:      Head: Normocephalic and atraumatic.      Right Ear: External ear normal.      Left Ear:  External ear normal.   Eyes:      Conjunctiva/sclera: Conjunctivae normal.      Pupils: Pupils are equal, round, and reactive to light.   Neck:      Thyroid: No thyromegaly.      Vascular: No carotid bruit or JVD.   Cardiovascular:      Rate and Rhythm: Normal rate and regular rhythm.      Pulses: Normal pulses.      Heart sounds: Normal heart sounds. No murmur heard.  Pulmonary:      Effort: Pulmonary effort is normal. No respiratory distress.      Breath sounds: Normal breath sounds.   Abdominal:      General: Bowel sounds are normal.      Palpations: Abdomen is soft. There is no mass.      Tenderness: There is no abdominal tenderness.   Musculoskeletal:         General: No swelling. Normal range of motion.      Cervical back: Normal range of motion and neck supple.   Lymphadenopathy:      Cervical: No cervical adenopathy.   Skin:     General: Skin is warm and dry.      Findings: No lesion or rash.   Neurological:      Mental Status: She is alert and oriented to person, place, and time.      Cranial Nerves: No cranial nerve deficit.      Sensory: No sensory deficit.      Motor: No weakness.      Coordination: Coordination normal.      Gait: Gait normal.      Deep Tendon Reflexes: Reflexes are normal and symmetric.   Psychiatric:         Mood and Affect: Mood normal.         Behavior: Behavior normal.         Thought Content: Thought content normal.         Judgment: Judgment normal.       Discussed preventative medicine issues with patient including regular exercise, healthy diet, stress reduction, adequate sleep and recommended age-appropriate screening studies.  Assessment & Plan     Diagnoses and all orders for this visit:    1. General medical exam (Primary)  -     Lipid Panel  -     Comprehensive metabolic panel  -     CBC (No Diff)  -     Hemoglobin A1c  -     TSH    2. Prediabetes  -     Lipid Panel  -     Comprehensive metabolic panel  -     Hemoglobin A1c    3. Class 1 obesity with serious comorbidity and  body mass index (BMI) of 32.0 to 32.9 in adult, unspecified obesity type    4. Other hyperlipidemia    5. ADRIAN (obstructive sleep apnea)        1. Annual physical exam (Primary)  - She is doing well overall. She is up to date on her preventative care. She would not like a lung cancer screening. Updated routine blood work has been ordered.    2. Obesity  - Her BMI is 32.3 kg/m2. If her BMI is over 30 with a comorbidity like hypertension, she already meets the criteria to get Wegovy, but is on backorder. She will have updated lab work done tomorrow to check her hemoglobin A1c, and as soon as the results are received, a prescription will be sent for either Wegovy or Ozempic.      Transcribed from ambient dictation for Josephine Delacruz PA-C by Lorena Michele.  02/08/24   18:15 EST    Patient or patient representative verbalized consent to the visit recording.  I have personally performed the services described in this document as transcribed by the above individual, and it is both accurate and complete.

## 2024-02-09 ENCOUNTER — LAB (OUTPATIENT)
Dept: FAMILY MEDICINE CLINIC | Facility: CLINIC | Age: 54
End: 2024-02-09
Payer: COMMERCIAL

## 2024-02-09 DIAGNOSIS — Z00.00 GENERAL MEDICAL EXAM: Primary | ICD-10-CM

## 2024-02-09 LAB
ALBUMIN SERPL-MCNC: 4.6 G/DL (ref 3.5–5.2)
ALBUMIN/GLOB SERPL: 2.4 G/DL
ALP SERPL-CCNC: 75 U/L (ref 39–117)
ALT SERPL W P-5'-P-CCNC: 14 U/L (ref 1–33)
ANION GAP SERPL CALCULATED.3IONS-SCNC: 14 MMOL/L (ref 5–15)
AST SERPL-CCNC: 17 U/L (ref 1–32)
BILIRUB SERPL-MCNC: 0.3 MG/DL (ref 0–1.2)
BUN SERPL-MCNC: 11 MG/DL (ref 6–20)
BUN/CREAT SERPL: 9.3 (ref 7–25)
CALCIUM SPEC-SCNC: 9.6 MG/DL (ref 8.6–10.5)
CHLORIDE SERPL-SCNC: 103 MMOL/L (ref 98–107)
CHOLEST SERPL-MCNC: 315 MG/DL (ref 0–200)
CO2 SERPL-SCNC: 23 MMOL/L (ref 22–29)
CREAT SERPL-MCNC: 1.18 MG/DL (ref 0.57–1)
DEPRECATED RDW RBC AUTO: 46.4 FL (ref 37–54)
EGFRCR SERPLBLD CKD-EPI 2021: 55 ML/MIN/1.73
ERYTHROCYTE [DISTWIDTH] IN BLOOD BY AUTOMATED COUNT: 14.4 % (ref 12.3–15.4)
GLOBULIN UR ELPH-MCNC: 1.9 GM/DL
GLUCOSE SERPL-MCNC: 89 MG/DL (ref 65–99)
HBA1C MFR BLD: 5.8 % (ref 4.8–5.6)
HCT VFR BLD AUTO: 45.1 % (ref 34–46.6)
HDLC SERPL-MCNC: 38 MG/DL (ref 40–60)
HGB BLD-MCNC: 15.2 G/DL (ref 12–15.9)
LDLC SERPL CALC-MCNC: 135 MG/DL (ref 0–100)
LDLC/HDLC SERPL: 3.36 {RATIO}
MCH RBC QN AUTO: 30.1 PG (ref 26.6–33)
MCHC RBC AUTO-ENTMCNC: 33.7 G/DL (ref 31.5–35.7)
MCV RBC AUTO: 89.3 FL (ref 79–97)
PLATELET # BLD AUTO: 256 10*3/MM3 (ref 140–450)
PMV BLD AUTO: 10.3 FL (ref 6–12)
POTASSIUM SERPL-SCNC: 3.6 MMOL/L (ref 3.5–5.2)
PROT SERPL-MCNC: 6.5 G/DL (ref 6–8.5)
RBC # BLD AUTO: 5.05 10*6/MM3 (ref 3.77–5.28)
SODIUM SERPL-SCNC: 140 MMOL/L (ref 136–145)
TRIGL SERPL-MCNC: 746 MG/DL (ref 0–150)
TSH SERPL DL<=0.05 MIU/L-ACNC: 2.71 UIU/ML (ref 0.27–4.2)
VLDLC SERPL-MCNC: 142 MG/DL (ref 5–40)
WBC NRBC COR # BLD AUTO: 10.39 10*3/MM3 (ref 3.4–10.8)

## 2024-02-09 PROCEDURE — 83036 HEMOGLOBIN GLYCOSYLATED A1C: CPT | Performed by: PHYSICIAN ASSISTANT

## 2024-02-09 PROCEDURE — 80050 GENERAL HEALTH PANEL: CPT | Performed by: PHYSICIAN ASSISTANT

## 2024-02-09 PROCEDURE — 80061 LIPID PANEL: CPT | Performed by: PHYSICIAN ASSISTANT

## 2024-02-09 PROCEDURE — 36415 COLL VENOUS BLD VENIPUNCTURE: CPT | Performed by: PHYSICIAN ASSISTANT

## 2024-02-12 RX ORDER — ROSUVASTATIN CALCIUM 10 MG/1
10 TABLET, COATED ORAL NIGHTLY
Qty: 90 TABLET | Refills: 3 | Status: SHIPPED | OUTPATIENT
Start: 2024-02-12

## 2024-02-12 RX ORDER — OMEGA-3-ACID ETHYL ESTERS 1 G/1
1 CAPSULE, LIQUID FILLED ORAL 2 TIMES DAILY
Qty: 60 CAPSULE | Refills: 11 | Status: SHIPPED | OUTPATIENT
Start: 2024-02-12

## 2024-02-15 ENCOUNTER — DOCUMENTATION (OUTPATIENT)
Dept: PAIN MEDICINE | Facility: CLINIC | Age: 54
End: 2024-02-15

## 2024-02-15 ENCOUNTER — OUTSIDE FACILITY SERVICE (OUTPATIENT)
Dept: PAIN MEDICINE | Facility: CLINIC | Age: 54
End: 2024-02-15
Payer: COMMERCIAL

## 2024-02-16 DIAGNOSIS — F41.9 ANXIETY: ICD-10-CM

## 2024-02-16 DIAGNOSIS — M54.17 LUMBOSACRAL RADICULOPATHY: ICD-10-CM

## 2024-02-16 RX ORDER — ALPRAZOLAM 0.5 MG/1
0.5 TABLET ORAL 3 TIMES DAILY
Qty: 90 TABLET | Refills: 5 | OUTPATIENT
Start: 2024-02-16

## 2024-02-16 RX ORDER — TIZANIDINE 4 MG/1
2-4 TABLET ORAL 2 TIMES DAILY PRN
Qty: 30 TABLET | Refills: 5 | Status: SHIPPED | OUTPATIENT
Start: 2024-02-16

## 2024-02-16 RX ORDER — GABAPENTIN 300 MG/1
300 CAPSULE ORAL 4 TIMES DAILY
Qty: 120 CAPSULE | Refills: 5 | OUTPATIENT
Start: 2024-02-16

## 2024-02-23 NOTE — TELEPHONE ENCOUNTER
----- Message from Angela Rahman sent at 2/9/2018 11:31 AM EST -----  PATIENT NEEDS REFILL ON HER OXYCODONE 5-325MG 1 TAB AT NIGHT FOR BACK AND NECK PAIN..  THANKS,  ANGELA..  
I have given this prescription directly to patient today, she will have it scanned.  
This is a 20 y/o biological female who identifies as a male and is scheduled for a robotically assisted laparoscopic hysterectomy and BSO    Patient instructed on     1. To follow instructions as per ASU for NPO status   2. On the use of EZ sponges

## 2024-03-04 DIAGNOSIS — M54.17 LUMBOSACRAL RADICULOPATHY: ICD-10-CM

## 2024-03-04 DIAGNOSIS — F41.9 ANXIETY: ICD-10-CM

## 2024-03-05 RX ORDER — ESTRADIOL 0.5 MG/1
0.5 TABLET ORAL DAILY
Qty: 30 TABLET | Refills: 0 | OUTPATIENT
Start: 2024-03-05

## 2024-03-05 RX ORDER — ALPRAZOLAM 0.5 MG/1
0.5 TABLET ORAL 3 TIMES DAILY
Qty: 90 TABLET | Refills: 5 | OUTPATIENT
Start: 2024-03-05

## 2024-03-05 RX ORDER — GABAPENTIN 300 MG/1
300 CAPSULE ORAL 4 TIMES DAILY
Qty: 120 CAPSULE | Refills: 5 | OUTPATIENT
Start: 2024-03-05

## 2024-03-05 NOTE — TELEPHONE ENCOUNTER
Need updated mammogram to refill estrace and need appt to refill controlled substance, gabapentin   Your baby received decadron by mouth which is an oral steroid that lasts for about 3 days to help with croupy cough.    - Continue supportive care therapies as tolerated such as Zarbees cough and mucous for babies/Mommy's Bliss/Maxwell's; Nose Aurea and/or bulb suction +/- normal saline spray, humidifier, baby vicks rub    - Can give Pedialyte which can help decrease and/or thin the mucous drainage in the back of his throat    - Return to clinic if not getting better and/or worsening of symptoms while on this treatment management    Infant  Can take 3 mLs of Tylenol/Acetaminophen every 4-6 hours as needed for fever control     Infant  Infant: 1.5 mLs of Motrin/Ibuprofen/Advil every 6-8 hours as needed for fever control     If needed, can alternate between Tylenol and Motrin every 4-6 hours    Send [employer] Message on Friday June 16th of her overall condition to see if she may needed extended steroids going into the weekend.     - RTC if not getting better

## 2024-03-14 DIAGNOSIS — M54.17 LUMBOSACRAL RADICULOPATHY: ICD-10-CM

## 2024-03-14 DIAGNOSIS — F41.9 ANXIETY: ICD-10-CM

## 2024-03-14 RX ORDER — ALPRAZOLAM 0.5 MG/1
0.5 TABLET ORAL 3 TIMES DAILY
Qty: 90 TABLET | Refills: 5 | OUTPATIENT
Start: 2024-03-14

## 2024-03-14 RX ORDER — ESTRADIOL 0.5 MG/1
0.5 TABLET ORAL DAILY
Qty: 30 TABLET | Refills: 0 | OUTPATIENT
Start: 2024-03-14

## 2024-03-14 RX ORDER — GABAPENTIN 300 MG/1
300 CAPSULE ORAL 4 TIMES DAILY
Qty: 120 CAPSULE | Refills: 5 | OUTPATIENT
Start: 2024-03-14

## 2024-03-14 RX ORDER — ONDANSETRON 4 MG/1
4 TABLET, FILM COATED ORAL EVERY 8 HOURS PRN
Qty: 20 TABLET | Refills: 1 | Status: SHIPPED | OUTPATIENT
Start: 2024-03-14

## 2024-03-20 DIAGNOSIS — M54.17 LUMBOSACRAL RADICULOPATHY: ICD-10-CM

## 2024-03-20 DIAGNOSIS — F41.9 ANXIETY: ICD-10-CM

## 2024-03-20 RX ORDER — ALPRAZOLAM 0.5 MG/1
0.5 TABLET ORAL 3 TIMES DAILY
Qty: 90 TABLET | Refills: 5 | Status: CANCELLED | OUTPATIENT
Start: 2024-03-20

## 2024-03-20 RX ORDER — GABAPENTIN 300 MG/1
300 CAPSULE ORAL 4 TIMES DAILY
Qty: 120 CAPSULE | Refills: 5 | OUTPATIENT
Start: 2024-03-20

## 2024-03-20 RX ORDER — ESTRADIOL 0.5 MG/1
0.5 TABLET ORAL DAILY
Qty: 30 TABLET | Refills: 1 | Status: SHIPPED | OUTPATIENT
Start: 2024-03-20

## 2024-03-20 RX ORDER — ESTRADIOL 0.5 MG/1
0.5 TABLET ORAL DAILY
Qty: 30 TABLET | Refills: 0 | OUTPATIENT
Start: 2024-03-20

## 2024-03-21 DIAGNOSIS — M54.17 LUMBOSACRAL RADICULOPATHY: ICD-10-CM

## 2024-03-21 RX ORDER — GABAPENTIN 300 MG/1
300 CAPSULE ORAL 4 TIMES DAILY
Qty: 120 CAPSULE | Refills: 5 | OUTPATIENT
Start: 2024-03-21

## 2024-03-26 ENCOUNTER — OFFICE VISIT (OUTPATIENT)
Dept: FAMILY MEDICINE CLINIC | Facility: CLINIC | Age: 54
End: 2024-03-26
Payer: COMMERCIAL

## 2024-03-26 VITALS
DIASTOLIC BLOOD PRESSURE: 72 MMHG | BODY MASS INDEX: 32.66 KG/M2 | HEART RATE: 77 BPM | HEIGHT: 61 IN | TEMPERATURE: 97.9 F | OXYGEN SATURATION: 98 % | WEIGHT: 173 LBS | SYSTOLIC BLOOD PRESSURE: 128 MMHG

## 2024-03-26 DIAGNOSIS — Z51.81 ENCOUNTER FOR THERAPEUTIC DRUG MONITORING: Primary | ICD-10-CM

## 2024-03-26 DIAGNOSIS — F41.9 ANXIETY: ICD-10-CM

## 2024-03-26 DIAGNOSIS — E66.9 CLASS 1 OBESITY WITH SERIOUS COMORBIDITY AND BODY MASS INDEX (BMI) OF 32.0 TO 32.9 IN ADULT, UNSPECIFIED OBESITY TYPE: ICD-10-CM

## 2024-03-26 DIAGNOSIS — M54.17 LUMBOSACRAL RADICULOPATHY: ICD-10-CM

## 2024-03-26 DIAGNOSIS — M54.17 LUMBOSACRAL RADICULOPATHY: Primary | ICD-10-CM

## 2024-03-26 DIAGNOSIS — F41.1 GAD (GENERALIZED ANXIETY DISORDER): ICD-10-CM

## 2024-03-26 PROBLEM — B35.1 ONYCHOMYCOSIS: Status: RESOLVED | Noted: 2018-05-10 | Resolved: 2024-03-26

## 2024-03-26 PROBLEM — R20.2 NUMBNESS AND TINGLING OF RIGHT HAND: Status: RESOLVED | Noted: 2021-07-13 | Resolved: 2024-03-26

## 2024-03-26 PROBLEM — R20.0 NUMBNESS AND TINGLING OF RIGHT HAND: Status: RESOLVED | Noted: 2021-07-13 | Resolved: 2024-03-26

## 2024-03-26 PROBLEM — M54.50 LOW BACK PAIN: Status: RESOLVED | Noted: 2017-10-13 | Resolved: 2024-03-26

## 2024-03-26 PROCEDURE — 99214 OFFICE O/P EST MOD 30 MIN: CPT | Performed by: PHYSICIAN ASSISTANT

## 2024-03-26 RX ORDER — OMEGA-3-ACID ETHYL ESTERS 1 G/1
1 CAPSULE, LIQUID FILLED ORAL 2 TIMES DAILY
Qty: 60 CAPSULE | Refills: 11 | Status: SHIPPED | OUTPATIENT
Start: 2024-03-26

## 2024-03-26 RX ORDER — GABAPENTIN 300 MG/1
300 CAPSULE ORAL 4 TIMES DAILY
Qty: 120 CAPSULE | Refills: 5 | Status: SHIPPED | OUTPATIENT
Start: 2024-03-26

## 2024-03-26 RX ORDER — ALPRAZOLAM 0.5 MG/1
0.5 TABLET ORAL 3 TIMES DAILY PRN
Qty: 80 TABLET | Refills: 5 | Status: SHIPPED | OUTPATIENT
Start: 2024-03-26

## 2024-03-26 NOTE — PROGRESS NOTES
Subjective   Trinidad Quinteros is a 53 y.o. female  Anxiety (Refill on alprazolam) and Back Pain (Refill on gabapentin )      History of Present Illness  Trinidad Quinteros, 1970, presents today for evaluation of back pain and generalized anxiety disorder for follow-up on medications.    She is scheduled to see pain management, Dr. Schroeder, tomorrow 03/27/2024. She does not prescribe any medications for her. She has been receiving injections. She is taking gabapentin 4 times a day. After this appointment, he may send her back over the surgery. Her back feels better, but her right leg continues to bother her. The other day, she walked over to the pharmacy and felt like her right leg was dragging. Her right leg goes weak and tightens up. This started before her surgery. If she starts walking any distance, she starts having pain that radiates down her leg. She is still taking meloxicam.    She has started Wegovy 0.25 mg. She will receive her 4th injection tomorrow. Initially, it was upsetting her stomach, but she is fine now. She denies any constipation or nausea. She is on the lowest dose of 0.25 mg.    For her anxiety, she is taking sertraline 200 mg, bupropion 150 mg, and alprazolam. Most days, she takes 2 alprazolam a day.    The following portions of the patient's history were reviewed and updated as appropriate: allergies, current medications, past social history and problem list    Review of Systems   Respiratory: Negative.     Gastrointestinal: Negative.    Genitourinary: Negative.    Musculoskeletal:  Positive for back pain, gait problem and myalgias. Negative for arthralgias.   Neurological:  Positive for weakness and numbness. Negative for dizziness and tremors.   Psychiatric/Behavioral:          Anxiety and depression stable on medication       Objective     Vitals:    03/26/24 1637   BP: 128/72   Pulse: 77   Temp: 97.9 °F (36.6 °C)   SpO2: 98%       Physical Exam  Vitals and nursing note reviewed.    Constitutional:       General: She is not in acute distress.     Appearance: Normal appearance. She is well-developed. She is obese. She is not ill-appearing, toxic-appearing or diaphoretic.      Comments: [id number] noted     Neck:      Thyroid: No thyromegaly.   Cardiovascular:      Rate and Rhythm: Normal rate and regular rhythm.      Heart sounds: Normal heart sounds. No murmur heard.  Pulmonary:      Effort: Pulmonary effort is normal. No respiratory distress.      Breath sounds: Normal breath sounds.   Abdominal:      Palpations: Abdomen is soft. There is no mass.      Tenderness: There is no abdominal tenderness.   Musculoskeletal:         General: Tenderness (right lateral thigh) present.   Neurological:      Mental Status: She is alert.      Gait: Gait abnormal (holding Right hip).   Psychiatric:         Mood and Affect: Mood normal.         Behavior: Behavior normal.         Thought Content: Thought content normal.         Judgment: Judgment normal.         Assessment & Plan     Diagnoses and all orders for this visit:    1. Lumbosacral radiculopathy (Primary)  -     gabapentin (NEURONTIN) 300 MG capsule; Take 1 capsule by mouth 4 (Four) Times a Day.  Dispense: 120 capsule; Refill: 5    2. IVON (generalized anxiety disorder)    3. Class 1 obesity with serious comorbidity and body mass index (BMI) of 32.0 to 32.9 in adult, unspecified obesity type    Other orders  -     Semaglutide-Weight Management 0.5 MG/0.5ML solution auto-injector; Inject 0.5 mL under the skin into the appropriate area as directed 1 (One) Time Per Week.  Dispense: 2 mL; Refill: 3    Controlled substance agreement updated urine drug screen obtained.  Patient will continue following up with Dr. Schroeder and pain management.  She has an upcoming appointment with him tomorrow.  Patient will continue following a healthy high-protein low-carb diet for weight loss.  Will electronically submit a refill of Xanax 0.5 mg 1 3 times daily as  needed anxiety lower quantity down to 80 as patient states she is not needing 3 a day every day and is trying to lower her dose when she is feeling less anxious.  Prescription will be sent per Dr. Roth.  Patient will follow-up in 6 months and as needed.    1. Back pain.  I will refill her gabapentin.    2. Weight loss.  She is tolerating Wegovy well. We will increase her Wegovy from 0.25 mg to 0.5 mg. After 4 weeks, if she is tolerating it well, she is not having any significant constipation, nausea, or digestive issues, she will let me know and we will send up the next dose.    3. Generalized anxiety disorder.  I will lower her alprazolam to a quantity monthly of 80 with 5 refills.  Electronic prescription will be sent to pharmacy per Dr. Roth.  Follow-up in 6 months and as needed.    Follow-up  The patient will follow  up in 4 weeks for weight management    As part of this patient's treatment plan, patient will be prescribed controlled substances. The patient has been made aware of appropriate use of such medications, including potential risk of somnolence, limited ability to drive and /or work safely, and potential for dependence or overdose. It has also been made clear that these medications are for use by this patient only, without concomitant use of alcohol or other substances unless prescribed.Controlled substance status of medication discussed with patient, discussed risks of medication including abuse potential and diversion potential and need to follow up for reevaluation appointment in order to receive further refills.    Part of this note may be an electronic transcription/translation of spoken language to printed text using the Dragon Dictation System.      Answers submitted by the patient for this visit:  Other (Submitted on 3/26/2024)  Please describe your symptoms.: back and right side leg pain  Have you had these symptoms before?: Yes  How long have you been having these symptoms?:  Greater than 2 weeks  Primary Reason for Visit (Submitted on 3/26/2024)  What is the primary reason for your visit?: Other    .[email or web address]  Clarice Busby.

## 2024-03-27 ENCOUNTER — OFFICE VISIT (OUTPATIENT)
Dept: PAIN MEDICINE | Facility: CLINIC | Age: 54
End: 2024-03-27
Payer: COMMERCIAL

## 2024-03-27 VITALS — BODY MASS INDEX: 32.72 KG/M2 | HEIGHT: 61 IN | WEIGHT: 173.3 LBS

## 2024-03-27 DIAGNOSIS — M54.16 LUMBAR RADICULOPATHY: Primary | ICD-10-CM

## 2024-03-27 NOTE — PROGRESS NOTES
Primary Physician: Josephine Delacruz PA-C    CHIEF COMPLAINT or REASON FOR VISIT: follow up TESI / right leg pain    Initial HPI 3/16/2023:  Ms. Trinidad Quinteros is 53 y.o. female who presents as a new patient referral for evaluation and treatment of chronic low back pain with radiation to right lower extremity.  Ms. Quinteros states that she for the past year has had slowly increasing pain.  She has prior PMH hip pain for which she has been taking Percocet however this has not been helpful for her pain.  She is additionally trialed gabapentin.  She describes a sharp burning tingling pain radiating down her right buttock and the posterior lateral aspect of her thigh and occasionally into the plantar surface of her foot.  Patient denies any bowel or bladder dysfunction, lower extremity weakness, new onset saddle anesthesia or unexplained weight loss.  She does work at [employer] with Dr. Roth.    Ms. Quinteros recently saw consultation with interventional pain, Dr. Rey, who diagnosed her with right lumbar radiculopathy and performed a right L4/5 and L5/S1 transforaminal epidural steroid injection.  Patient reported relief for approximately 1 week however unfortunately pain returned worse than previous.    Interval history: Patient returns to clinic after undergoing right-sided transforaminal epidural steroid injection.  She reports very minimal benefit from this procedure.  Today she continues to complain of chronic low back pain with radiation to the right lower extremity.  Her pain radiates into the right lower extremity ending approximately on the posterior side of her right calf.  Pain is significantly exacerbated by ambulation.     Of note, in May 2023 she underwent right L4-5 foraminotomies and L5/1 discectomy which essentially resolved all of her right lower back pain and radicular pain at that time.  However, this pain has since returned without any inciting injury or event.    In October 2023 she  bent over, felt a pop, and experienced left lower extremity radicular symptoms.  She is continuing to get relief from a left-sided transforaminal ROLAND that occurred in 2023.    Interventions:  2023: Right S1 TFESI with 1 to 2 days benefit  2023: Left L5/S1 and S1 TFESI with 90% relief ongoing.   2/15/2024: Right L4/5 and L5/S1 TFESI with 0% benefit.     Objective Pain Scoring:   BRIEF PAIN INVENTORY:  Total score:   Pain Score    24 1457   PainSc:   8   PainLoc: Leg      PHQ-2: PHQ-2 Total Score: 1  PHQ-9: PHQ-9: Brief Depression Severity Measure Score: 1  Opioid Risk Tool:         Review of Systems:   ROS negative except as otherwise noted     Past Medical History:   Past Medical History:   Diagnosis Date    Allergic     Anxiety     CTS (carpal tunnel syndrome)     Degenerative disc disease, cervical 2019    Depression     Extremity pain     GERD (gastroesophageal reflux disease)     Hyperlipidemia     Hypertension     Lumbar radiculopathy 2023    Neck pain     ADRIAN on CPAP     Smoker     Wears glasses          Past Surgical History:   Past Surgical History:   Procedure Laterality Date    APPENDECTOMY      CARDIAC CATHETERIZATION      CARDIAC CATHETERIZATION       SECTION      COLONOSCOPY      ENDOSCOPY      HAND TENDON SURGERY Right     LUMBAR DISCECTOMY Right 2023    Procedure: LUMBAR DISCECTOMY, L5-S1, L4-5 FORAMINOTOMY- RIGHT;  Surgeon: Nakul Hurt MD;  Location: [facility];  Service: Neurosurgery;  Laterality: Right;    SUBTOTAL HYSTERECTOMY      TUBAL ABDOMINAL LIGATION           Family History   Family History   Problem Relation Age of Onset    COPD Mother     Arthritis Mother     Diabetes Mother     Hypertension Mother     Miscarriages / Stillbirths Mother     COPD Father     Diabetes Father     Hypertension Father     Breast cancer Maternal Aunt 50    Cancer Other     Arthritis Other     Diabetes Other     Asthma  Other     Heart disease Other         heart trouble    Hypertension Other         high blood pressure    Alcohol abuse Other     COPD Brother     Diabetes Brother     Early death Brother     Hypertension Brother     COPD Brother     Diabetes Brother     Early death Brother         COPD/Non-Hodgkin lymphoma    Early death Brother         covid/pneumonia 2022    COPD Brother     Miscarriages / Stillbirths Daughter     Asthma Son     Ovarian cancer Neg Hx          Social History   Social History     Socioeconomic History    Marital status:    Tobacco Use    Smoking status: Every Day     Current packs/day: 1.00     Average packs/day: 1 pack/day for 35.0 years (35.0 ttl pk-yrs)     Types: Cigarettes    Smokeless tobacco: Never   Vaping Use    Vaping status: Never Used   Substance and Sexual Activity    Alcohol use: No    Drug use: No    Sexual activity: Yes     Partners: Male     Birth control/protection: None, Hysterectomy        Medications:     Current Outpatient Medications:     albuterol (ACCUNEB) 1.25 MG/3ML nebulizer solution, Take 3 mL (one vial) by nebulization Every 6 (Six) Hours As Needed for Wheezing or Shortness of Air., Disp: 75 mL, Rfl: 12    albuterol sulfate HFA (Ventolin HFA) 108 (90 Base) MCG/ACT inhaler, Inhale 2 puffs as directed every 4 hours As Needed for Wheezing or Shortness of Air and/or cough., Disp: 18 g, Rfl: 5    ALPRAZolam (XANAX) 0.5 MG tablet, Take 1 tablet by mouth 3 (Three) Times a Day As Needed for Anxiety., Disp: 80 tablet, Rfl: 5    buPROPion XL (Wellbutrin XL) 150 MG 24 hr tablet, Take 1 tablet by mouth Every Morning., Disp: 30 tablet, Rfl: 5    estradiol (Estrace) 0.5 MG tablet, Take 1 tablet by mouth Daily as directed for menopausal hot flashes, Disp: 30 tablet, Rfl: 1    fluticasone (FLONASE) 50 MCG/ACT nasal spray, Use 2 sprays into each nostril as directed by provider Daily., Disp: 16 g, Rfl: 11    Fluticasone-Salmeterol (Advair Diskus) 250-50 MCG/ACT DISKUS, Inhale 1  puff 2 (Two) Times a Day., Disp: 60 each, Rfl: 11    folic acid (FOLVITE) 1 MG tablet, Take 1 tablet by mouth Daily., Disp: 30 tablet, Rfl: 11    gabapentin (NEURONTIN) 300 MG capsule, Take 1 capsule by mouth 4 (Four) Times a Day., Disp: 120 capsule, Rfl: 5    glycopyrrolate (Robinul-Forte) 2 MG tablet, Take 1 tablet by mouth 2 (Two) Times a Day for diarrhea, Disp: 60 tablet, Rfl: 11    hydroCHLOROthiazide (HYDRODIURIL) 25 MG tablet, Take 1 tablet by mouth Daily., Disp: 90 tablet, Rfl: 2    levocetirizine (XYZAL) 5 MG tablet, Take 1 tablet by mouth Every Evening for allergies, Disp: 30 tablet, Rfl: 11    losartan (COZAAR) 100 MG tablet, Take 1 tablet by mouth Daily., Disp: 90 tablet, Rfl: 3    meloxicam (MOBIC) 7.5 MG tablet, Take 1 tablet by mouth Daily., Disp: 30 tablet, Rfl: 5    omega-3 acid ethyl esters (LOVAZA) 1 g capsule, Take 1 capsule by mouth 2 (Two) Times a Day., Disp: 60 capsule, Rfl: 11    omega-3 acid ethyl esters (Lovaza) 1 g capsule, Take 1 capsule by mouth 2 (Two) Times a Day. For high triglycerides, Disp: 60 capsule, Rfl: 11    ondansetron (Zofran) 4 MG tablet, Take 1 tablet by mouth Every 8 (Eight) Hours As Needed for Nausea or Vomiting., Disp: 20 tablet, Rfl: 1    rosuvastatin (Crestor) 10 MG tablet, Take 1 tablet by mouth Every Night. New med for cholesterol, Disp: 90 tablet, Rfl: 3    rosuvastatin (CRESTOR) 10 MG tablet, Take 1 tablet by mouth Every Night for cholesterol., Disp: 90 tablet, Rfl: 3    Semaglutide-Weight Management 0.5 MG/0.5ML solution auto-injector, Inject 0.5 mL under the skin into the appropriate area as directed 1 (One) Time Per Week., Disp: 2 mL, Rfl: 3    sertraline (ZOLOFT) 100 MG tablet, Take 2 tablets by mouth Daily, Disp: 60 tablet, Rfl: 11    tiZANidine (ZANAFLEX) 4 MG tablet, Take 0.5-1 tablet by mouth up to 2 Times a Day As Needed for back pain, Disp: 30 tablet, Rfl: 5    valACYclovir (VALTREX) 500 MG tablet, Take 4 tablets by mouth as needed at onset of attack;  "THEN take 4 tablets 12 hours later., Disp: 8 tablet, Rfl: 5        Physical Exam:     Vitals:    03/27/24 1457   Weight: 78.6 kg (173 lb 4.8 oz)   Height: 154.9 cm (61\")   PainSc:   8   PainLoc: Leg        General: Alert and oriented, No acute distress.   HEENT: Normocephalic, atraumatic.   Cardiovascular: No gross edema  Respiratory: Respirations are non-labored    Lumbar Spine:   No masses or atrophy  Range of motion - Flexion normal. Extension normal. Right Lat Bending normal. Left Lat Bending normal  Facet Loading: Negative bilaterally  Facet Palpation - Nontender   PSIS tenderness - Negative bilaterally  Jeffrey's/PAOLO/Thigh thrust - Negative bilaterally  Straight leg raise: Positive right  Slump test: Positive right    Motor Exam:    Strength: Rate on 1-5 scale Right Left    L1/2- hip flexion 5 5    L3- knee extension 5 5    L4- ankle dorsiflexion 5 5    L5- great toe extension 5 5    S1- ankle plantarflexion 5 5    Sensory Exam: Altered sensation in right S1 dermatome.    Neurologic: Cranial Nerves II-XII are grossly intact.   Clonus -negative bilaterally    Psychiatric: Cooperative.   Gait: Antalgic  Assistive Devices: None    Physical exam is consistent and accurate as of 3/27/2024    Imaging Studies:   Results for orders placed during the hospital encounter of 11/19/22    MRI Lumbar Spine Without Contrast    Narrative  DATE OF EXAM: 11/19/2022 8:13 AM    PROCEDURE: MRI LUMBAR SPINE WO CONTRAST-    INDICATIONS: Lumbar radiculopathy, symptoms persist with > 6 wks  treatment; M54.40-Lumbago with sciatica, unspecified side    COMPARISON: No comparisons available.    TECHNIQUE: Routine magnetic resonance imaging of the lumbar spine was  performed without the administration of contrast.    FINDINGS:  The alignment is anatomic. The vertebral body heights appear normal.  There are degenerative endplate changes at L5-S1. There is disc  desiccation at L4-5 and L5-S1, with advanced degenerative loss of disc  height " at L5-S1. The conus terminates at the L1-2 level. The posterior  paravertebral soft tissues are unremarkable.    L1-2: No spinal canal or neural foraminal stenosis.    L2-3: Mild bilateral facet arthropathy. No spinal canal stenosis. No  neural foraminal stenosis.    L3-4: Mild disc bulge. Mild bilateral facet arthropathy. No spinal canal  stenosis. Mild right neural foraminal stenosis.    L4-5: Mild disc bulge with superimposed small central disc protrusion.  Mild bilateral facet arthropathy. Mild spinal canal stenosis. Mild  bilateral neural foraminal stenosis.    L5-S1: Moderate disc bulge. Mild right and moderate facet arthropathy.  Mild spinal canal stenosis. Mild to moderate right and mild left neural  foraminal stenosis.    Impression  Mild multilevel degenerative changes of lumbar spine as described above.    This report was finalized on 11/19/2022 9:02 AM by Jaxson Connolly MD.      Impression & Plan:   3/16/2023: Ms. Trinidad Quinteros is a 53 y.o. female with past medical history significant for GERD, depression, HTN, ADRIAN, who presents to the pain clinic for evaluation and treatment of chronic low back pain with radiation to right lower extremity.  I personally reviewed the patient's lumbar MRI dated 11/19/2022 which demonstrates a central and right posterior L5/S1 disc herniation causing right L5/S1 neuroforaminal stenosis and lateral recess stenosis.  Clinical examination consistent with a right S1 radiculitis.  We discussed right S1 foramen epidural steroid injection to improve pain.  If greater than 50% relief for at least 2-3 months can consider repeat as needed every 3 to 4 months.  I had an in-depth discussion with the patient regarding the risks of the procedure including bleeding, infection, damage to surrounding structures, paralysis and even death.  We discussed the potential adverse effects of corticosteroid injection including flushing of the face, lipodystrophy, skin discoloration, elevated  blood glucose, increased blood pressure.  Risks of frequent steroid administration include weight gain, hormonal changes, mood changes, osteoporosis.    If no benefit from epidural steroid will refer for neurosurgical evaluation.  2023: Now status post right-sided L4/5 foraminotomy and L5/S1 discectomy with resolution of right-sided symptoms.  Recent onset left-sided symptoms.  I personally reviewed and interpreted her lumbar MRI dated 2023: Interval improvement in right L4/5 NFS; persistent L5/S1 disc herniation.  Exam consistent with lumbar radiculopathy.  We will trial left L5/S1 and S1 TFESI.  2024: Excellent benefit from left sided TFESI.  Complains of right-sided symptoms. Will plan for right L4/5 and L5/S1 TFESI.  If no benefit consider referral back to neurosurgery.  3/27/2024: No benefit from right TFESI.  Will refer back to NSA.  Consider SCS trial if no neurosurgical invention    1. Lumbar radiculopathy              PLAN:  1. Medication Recommendations: Continue per PCP    2. Physical Therapy: Continue HEP    3. Psychological: Consider psych clearance for SCS trial if no neurosurgical intervention    4. Complementary and alternative (CAM) Therapies:     5. Labs: None indicated     6. Imagin. Interventions: Consider SCS trial if no neurosurgical intervention    8. Referrals: Referral back to neurosurgery/Dr. Hurt/Britney Diego PA-C    9. Records requested: n/a    10. Lifestyle goals:    Follow-up after NSA appointment        [facility]  Megha Frazier PA-C    Quality metrics:  Trinidad Quinteros reports a pain score of 8.  Given her pain assessment as noted, treatment options were discussed and the following options were decided upon as a follow-up plan to address the patient's pain: continuation of current treatment plan for pain.

## 2024-03-28 ENCOUNTER — TELEPHONE (OUTPATIENT)
Dept: PAIN MEDICINE | Facility: CLINIC | Age: 54
End: 2024-03-28
Payer: COMMERCIAL

## 2024-03-28 NOTE — TELEPHONE ENCOUNTER
S/w patient and she is awaiting a call from neurosurgery to see when she can be scheduled within their office.

## 2024-04-04 LAB — DRUGS UR: NORMAL

## 2024-04-08 RX ORDER — HYDROCHLOROTHIAZIDE 25 MG/1
25 TABLET ORAL DAILY
Qty: 90 TABLET | Refills: 2 | Status: SHIPPED | OUTPATIENT
Start: 2024-04-08

## 2024-04-11 ENCOUNTER — OFFICE VISIT (OUTPATIENT)
Dept: NEUROSURGERY | Facility: CLINIC | Age: 54
End: 2024-04-11
Payer: COMMERCIAL

## 2024-04-11 VITALS
SYSTOLIC BLOOD PRESSURE: 118 MMHG | WEIGHT: 172 LBS | DIASTOLIC BLOOD PRESSURE: 78 MMHG | TEMPERATURE: 97 F | HEIGHT: 61 IN | BODY MASS INDEX: 32.47 KG/M2

## 2024-04-11 DIAGNOSIS — M54.16 LUMBAR RADICULOPATHY: Primary | ICD-10-CM

## 2024-04-11 NOTE — PROGRESS NOTES
"  Name: Trinidad Quinteros    : 1970     MRN: [id number]     Primary Care Provider: Josephine Delacruz PA-C    Chief Complaint  Leg Pain      History of Present Illness:  Trinidad Quinteros is a 53 y.o. female who previously underwent right L4-5 foraminotomies and right L5-S1 discectomy in May 2023.  Postoperatively, she had complete resolution of her leg pain however developed new onset right leg pain after bending forward and healing of \"pop\" in her back.  She was seen by Dr. Hurt who ordered MRI lumbar spine which showed no new disc herniation or significant lumbar stenosis.  She was prescribed Lyrica as well as Medrol Dosepak which helped slightly she was then referred to pain management and has had multiple injections.  She is states she has not had any substantial relief from injections.  her pain significantly worsens with ambulation and she starts to feel that she \"drags her foot\" after an extended period of time walking.  Denies any weakness, numbness, tingling, bowel or bladder issues.    PMHX  Allergies:  Allergies   Allergen Reactions    Bactrim [Sulfamethoxazole-Trimethoprim] Nausea Only     Medications    Current Outpatient Medications:     albuterol (ACCUNEB) 1.25 MG/3ML nebulizer solution, Take 3 mL (one vial) by nebulization Every 6 (Six) Hours As Needed for Wheezing or Shortness of Air., Disp: 75 mL, Rfl: 12    albuterol sulfate HFA (Ventolin HFA) 108 (90 Base) MCG/ACT inhaler, Inhale 2 puffs as directed every 4 hours As Needed for Wheezing or Shortness of Air and/or cough., Disp: 18 g, Rfl: 5    ALPRAZolam (XANAX) 0.5 MG tablet, Take 1 tablet by mouth 3 (Three) Times a Day As Needed for Anxiety., Disp: 80 tablet, Rfl: 5    buPROPion XL (Wellbutrin XL) 150 MG 24 hr tablet, Take 1 tablet by mouth Every Morning., Disp: 30 tablet, Rfl: 5    estradiol (Estrace) 0.5 MG tablet, Take 1 tablet by mouth Daily as directed for menopausal hot flashes, Disp: 30 tablet, Rfl: 1    fluticasone (FLONASE) 50 " MCG/ACT nasal spray, Use 2 sprays into each nostril as directed by provider Daily., Disp: 16 g, Rfl: 11    Fluticasone-Salmeterol (Advair Diskus) 250-50 MCG/ACT DISKUS, Inhale 1 puff 2 (Two) Times a Day., Disp: 60 each, Rfl: 11    folic acid (FOLVITE) 1 MG tablet, Take 1 tablet by mouth Daily., Disp: 30 tablet, Rfl: 11    gabapentin (NEURONTIN) 300 MG capsule, Take 1 capsule by mouth 4 (Four) Times a Day., Disp: 120 capsule, Rfl: 5    glycopyrrolate (Robinul-Forte) 2 MG tablet, Take 1 tablet by mouth 2 (Two) Times a Day for diarrhea, Disp: 60 tablet, Rfl: 11    hydroCHLOROthiazide 25 MG tablet, Take 1 tablet by mouth Daily., Disp: 90 tablet, Rfl: 2    levocetirizine (XYZAL) 5 MG tablet, Take 1 tablet by mouth Every Evening for allergies, Disp: 30 tablet, Rfl: 11    losartan (COZAAR) 100 MG tablet, Take 1 tablet by mouth Daily., Disp: 90 tablet, Rfl: 3    meloxicam (MOBIC) 7.5 MG tablet, Take 1 tablet by mouth Daily., Disp: 30 tablet, Rfl: 5    omega-3 acid ethyl esters (LOVAZA) 1 g capsule, Take 1 capsule by mouth 2 (Two) Times a Day., Disp: 60 capsule, Rfl: 11    omega-3 acid ethyl esters (Lovaza) 1 g capsule, Take 1 capsule by mouth 2 (Two) Times a Day. For high triglycerides, Disp: 60 capsule, Rfl: 11    ondansetron (Zofran) 4 MG tablet, Take 1 tablet by mouth Every 8 (Eight) Hours As Needed for Nausea or Vomiting., Disp: 20 tablet, Rfl: 1    rosuvastatin (Crestor) 10 MG tablet, Take 1 tablet by mouth Every Night. New med for cholesterol, Disp: 90 tablet, Rfl: 3    rosuvastatin (CRESTOR) 10 MG tablet, Take 1 tablet by mouth Every Night for cholesterol., Disp: 90 tablet, Rfl: 3    Semaglutide-Weight Management 0.5 MG/0.5ML solution auto-injector, Inject 0.5 mL under the skin into the appropriate area as directed 1 (One) Time Per Week., Disp: 2 mL, Rfl: 3    sertraline (ZOLOFT) 100 MG tablet, Take 2 tablets by mouth Daily, Disp: 60 tablet, Rfl: 11    tiZANidine (ZANAFLEX) 4 MG tablet, Take 0.5-1 tablet by mouth up  to 2 Times a Day As Needed for back pain, Disp: 30 tablet, Rfl: 5    valACYclovir (VALTREX) 500 MG tablet, Take 4 tablets by mouth as needed at onset of attack; THEN take 4 tablets 12 hours later., Disp: 8 tablet, Rfl: 5  Past Medical History:  Past Medical History:   Diagnosis Date    Allergic     Anxiety     CTS (carpal tunnel syndrome)     Degenerative disc disease, cervical 2019    Depression     Extremity pain     GERD (gastroesophageal reflux disease)     Hyperlipidemia     Hypertension     Lumbar radiculopathy 2023    Neck pain     ADRIAN on CPAP     Smoker     Wears glasses      Past Surgical History:  Past Surgical History:   Procedure Laterality Date    APPENDECTOMY      CARDIAC CATHETERIZATION  1992    CARDIAC CATHETERIZATION       SECTION      COLONOSCOPY      ENDOSCOPY      HAND TENDON SURGERY Right 1997    LUMBAR DISCECTOMY Right 2023    Procedure: LUMBAR DISCECTOMY, L5-S1, L4-5 FORAMINOTOMY- RIGHT;  Surgeon: Nakul Hurt MD;  Location: [facility];  Service: Neurosurgery;  Laterality: Right;    SUBTOTAL HYSTERECTOMY      TUBAL ABDOMINAL LIGATION       Social Hx:  Social History     Tobacco Use    Smoking status: Every Day     Current packs/day: 1.00     Average packs/day: 1 pack/day for 35.0 years (35.0 ttl pk-yrs)     Types: Cigarettes    Smokeless tobacco: Never   Vaping Use    Vaping status: Never Used   Substance Use Topics    Alcohol use: No    Drug use: No     Family Hx:  Family History   Problem Relation Age of Onset    COPD Mother     Arthritis Mother     Diabetes Mother     Hypertension Mother     Miscarriages / Stillbirths Mother     COPD Father     Diabetes Father     Hypertension Father     Breast cancer Maternal Aunt 50    Cancer Other     Arthritis Other     Diabetes Other     Asthma Other     Heart disease Other         heart trouble    Hypertension Other         high blood pressure    Alcohol abuse Other     COPD Brother      Diabetes Brother     Early death Brother     Hypertension Brother     COPD Brother     Diabetes Brother     Early death Brother         COPD/Non-Hodgkin lymphoma    Early death Brother         covid/pneumonia 2022    COPD Brother     Miscarriages / Stillbirths Daughter     Asthma Son     Ovarian cancer Neg Hx      Review of Systems:        Review of Systems   Constitutional:  Negative for activity change, appetite change, chills, diaphoresis, fatigue, fever and unexpected weight change.   HENT:  Negative for congestion, dental problem, drooling, ear discharge, ear pain, facial swelling, hearing loss, mouth sores, nosebleeds, postnasal drip, rhinorrhea, sinus pressure, sinus pain, sneezing, sore throat, tinnitus, trouble swallowing and voice change.    Eyes:  Negative for photophobia, pain, discharge, redness, itching and visual disturbance.   Respiratory:  Positive for apnea. Negative for cough, choking, chest tightness, shortness of breath, wheezing and stridor.    Cardiovascular:  Negative for chest pain, palpitations and leg swelling.   Gastrointestinal:  Negative for abdominal distention, abdominal pain, anal bleeding, blood in stool, constipation, diarrhea, nausea, rectal pain and vomiting.   Endocrine: Negative for cold intolerance, heat intolerance, polydipsia, polyphagia and polyuria.   Genitourinary:  Negative for decreased urine volume, difficulty urinating, dyspareunia, dysuria, enuresis, flank pain, frequency, genital sores, hematuria, menstrual problem, pelvic pain, urgency, vaginal bleeding, vaginal discharge and vaginal pain.   Musculoskeletal:  Positive for arthralgias, gait problem and myalgias. Negative for back pain, joint swelling, neck pain and neck stiffness.   Skin:  Negative for color change, pallor, rash and wound.   Allergic/Immunologic: Negative for environmental allergies, food allergies and immunocompromised state.   Neurological:  Negative for dizziness, tremors, seizures, syncope,  "facial asymmetry, speech difficulty, weakness, light-headedness, numbness and headaches.   Hematological:  Negative for adenopathy. Does not bruise/bleed easily.   Psychiatric/Behavioral:  Negative for agitation, behavioral problems, confusion, decreased concentration, dysphoric mood, hallucinations, self-injury, sleep disturbance and suicidal ideas. The patient is not nervous/anxious and is not hyperactive.           Vital Signs: /78 (BP Location: Right arm, Patient Position: Sitting, Cuff Size: Adult)   Temp 97 °F (36.1 °C) (Infrared)   Ht 154.9 cm (61\")   Wt 78 kg (172 lb)   BMI 32.50 kg/m²      Physical Exam  Awake, alert and oriented x 3  Speech f/c  Opens eyes spontaneously  Pupils 3 mm rx bilaterally  Extraocular muscles intact bilaterally  Face symmetric bilaterally  Tongue midline  5/5 in all 4 extremities  Clonus is negative bilaterally  Gait normal    Social History    Tobacco Use      Smoking status: Every Day        Packs/day: 1.00        Years: 1 pack/day for 35.0 years (35.0 ttl pk-yrs)        Types: Cigarettes      Smokeless tobacco: Never       Tobacco Use: High Risk (4/11/2024)    Patient History     Smoking Tobacco Use: Every Day     Smokeless Tobacco Use: Never     Passive Exposure: Not on file         STEADI Fall Risk Assessment has not been completed.      Diagnostic Studies: (All imaging is independently reviewed unless stated otherwise.)      Assessment/Plan    Diagnoses and all orders for this visit:    1. Lumbar radiculopathy (Primary)  -     MRI Lumbar Spine With & Without Contrast; Future  -     XR Spine Lumbar Flex & Ext; Future         This is a 53-year-old female who previously underwent right L4-5 foraminotomies and right L5-S1 discectomy in May 2023 with significant improvement postoperatively.  Unfortunately about 4 months later she was bending down to pick something up and felt a pop in her back which subsequently developed return of her leg symptoms.  Repeat MRI lumbar " spine at that time showed no new disc herniation or stenosis therefore she was sent to pain management for conservative measures.  Patient has not had any significant relief with injections and has actually felt worse over time, therefore I would like to obtain new MRI lumbar spine w/wo as well as flexion-extension x-rays for further evaluation of her pain.  Patient is neurologically intact with full strength in bilateral lower extremities.  I will call the patient after imaging is completed. If MRI does not show any indication for surgical intervention, can consider SCS trial at that point.  Patient encouraged to contact us if she has any changes in her condition or any concerns.    Any copied data from previous notes included in the (1) HPI, (2) PE, (3) MDM and/or Assessment and Plan has been reviewed and accurate as of 04/11/24.    Britney Diego PA-C  04/11/24

## 2024-04-15 RX ORDER — ESTRADIOL 0.5 MG/1
0.5 TABLET ORAL DAILY
Qty: 30 TABLET | Refills: 1 | Status: SHIPPED | OUTPATIENT
Start: 2024-04-15

## 2024-04-15 RX ORDER — TIZANIDINE 4 MG/1
2-4 TABLET ORAL 2 TIMES DAILY PRN
Qty: 30 TABLET | Refills: 5 | Status: SHIPPED | OUTPATIENT
Start: 2024-04-15

## 2024-04-16 ENCOUNTER — OFFICE VISIT (OUTPATIENT)
Dept: FAMILY MEDICINE CLINIC | Facility: CLINIC | Age: 54
End: 2024-04-16
Payer: COMMERCIAL

## 2024-04-16 VITALS
SYSTOLIC BLOOD PRESSURE: 138 MMHG | OXYGEN SATURATION: 98 % | BODY MASS INDEX: 32.1 KG/M2 | TEMPERATURE: 98 F | WEIGHT: 170 LBS | HEART RATE: 73 BPM | HEIGHT: 61 IN | DIASTOLIC BLOOD PRESSURE: 80 MMHG

## 2024-04-16 DIAGNOSIS — J01.91 ACUTE RECURRENT SINUSITIS, UNSPECIFIED LOCATION: Primary | ICD-10-CM

## 2024-04-16 PROCEDURE — 99213 OFFICE O/P EST LOW 20 MIN: CPT | Performed by: PHYSICIAN ASSISTANT

## 2024-04-16 RX ORDER — DEXTROMETHORPHAN HYDROBROMIDE AND PROMETHAZINE HYDROCHLORIDE 15; 6.25 MG/5ML; MG/5ML
5 SYRUP ORAL 4 TIMES DAILY PRN
Qty: 180 ML | Refills: 0 | Status: SHIPPED | OUTPATIENT
Start: 2024-04-16

## 2024-04-16 RX ORDER — LEVOFLOXACIN 500 MG/1
500 TABLET, FILM COATED ORAL DAILY
Qty: 7 TABLET | Refills: 0 | Status: SHIPPED | OUTPATIENT
Start: 2024-04-16

## 2024-04-16 NOTE — PROGRESS NOTES
Subjective   Trinidad Quinteros is a 53 y.o. female  Facial Pain (Sinus pain since Sunday ) and Cough (Productive cough with PND )      History of Present Illness  The patient is a 53-year-old female who presents for evaluation of sinus infection.    The patient reports a severe sinus infection, characterized by pressure in her teeth and between her eyes. She denies any fever or chest congestion. She has been expectorating a small amount of mucus. The onset of her symptoms was on Sunday. She has been self-medicating with an over-the-counter allergy medication, Stahist, and has an inhaler at her residence. Nasal congestion is reported, with greenish nasal discharge upon blowing her nose. She has previously tolerated Levaquin well.    Supplemental Information  She is scheduled for an MRI with and without contrast of her back on 05/01/2024. She has had injections in the past, which did not help, so she went back to surgery.    The following portions of the patient's history were reviewed and updated as appropriate: allergies, current medications, past social history and problem list    Review of Systems   Constitutional:  Negative for chills, fatigue and fever.   HENT:  Positive for congestion, ear pain, postnasal drip, rhinorrhea, sinus pressure and sinus pain. Negative for sore throat.    Eyes:  Positive for pain.   Respiratory:  Positive for cough. Negative for shortness of breath.    Neurological:  Positive for headaches. Negative for dizziness.   Hematological:  Negative for adenopathy.       Objective     Vitals:    04/16/24 1518   BP: 138/80   Pulse: 73   Temp: 98 °F (36.7 °C)   SpO2: 98%       Physical Exam  Vitals and nursing note reviewed.   Constitutional:       General: She is not in acute distress.     Appearance: Normal appearance. She is well-developed. She is not ill-appearing, toxic-appearing or diaphoretic.   HENT:      Head: Normocephalic and atraumatic.      Right Ear: Tympanic membrane and ear canal  normal.      Left Ear: Tympanic membrane and ear canal normal.      Nose: Mucosal edema and congestion present. No rhinorrhea.      Right Sinus: Maxillary sinus tenderness and frontal sinus tenderness present.      Left Sinus: Maxillary sinus tenderness and frontal sinus tenderness present.      Mouth/Throat:      Pharynx: No oropharyngeal exudate.   Eyes:      Pupils: Pupils are equal, round, and reactive to light.   Cardiovascular:      Rate and Rhythm: Normal rate and regular rhythm.   Pulmonary:      Effort: Pulmonary effort is normal.      Breath sounds: Wheezing present.   Neurological:      Mental Status: She is alert.         Assessment & Plan     Diagnoses and all orders for this visit:    1. Acute recurrent sinusitis, unspecified location (Primary)    Other orders  -     Chlorcyclizine-Pseudoephed 25-60 MG tablet; Take 1/2 to 1 every 12 hours as needed for congestion  Dispense: 30 tablet; Refill: 0  -     levoFLOXacin (Levaquin) 500 MG tablet; Take 1 tablet by mouth Daily.  Dispense: 7 tablet; Refill: 0  -     promethazine-dextromethorphan (PROMETHAZINE-DM) 6.25-15 MG/5ML syrup; Take 5 mL by mouth 4 (Four) Times a Day As Needed for Cough.  Dispense: 180 mL; Refill: 0       1. Sinusitis.  A prescription for Levaquin, to be taken for a duration of 10 days, has been issued. Additionally, a refill of Stahist has been provided. The patient is advised to discontinue Xyzal while she is on Stahist. Once her sinus pressure improves, she is advised to resume Xyzal and maintain its use throughout the spring season. Should there be no improvement in her condition, she is advised to seek immediate medical attention.    Transcribed from ambient dictation for Josephine Delacruz PA-C by Meenakshi Live.   04/16/24   16:57 EDT    Patient or patient representative verbalized consent to the visit recording.  I have personally performed the services described in this document as transcribed by the above individual, and it is  both accurate and complete.

## 2024-04-18 RX ORDER — LOSARTAN POTASSIUM 100 MG/1
100 TABLET ORAL DAILY
Qty: 90 TABLET | Refills: 3 | Status: SHIPPED | OUTPATIENT
Start: 2024-04-18

## 2024-05-01 ENCOUNTER — HOSPITAL ENCOUNTER (OUTPATIENT)
Dept: MRI IMAGING | Facility: HOSPITAL | Age: 54
Discharge: HOME | End: 2024-05-01
Payer: COMMERCIAL

## 2024-05-01 ENCOUNTER — HOSPITAL ENCOUNTER (OUTPATIENT)
Dept: GENERAL RADIOLOGY | Facility: HOSPITAL | Age: 54
Discharge: HOME | End: 2024-05-01
Payer: COMMERCIAL

## 2024-05-01 DIAGNOSIS — M54.16 LUMBAR RADICULOPATHY: ICD-10-CM

## 2024-05-01 PROCEDURE — 72158 MRI LUMBAR SPINE W/O & W/DYE: CPT

## 2024-05-01 PROCEDURE — 72120 X-RAY BEND ONLY L-S SPINE: CPT

## 2024-05-01 PROCEDURE — A9577 INJ MULTIHANCE: HCPCS

## 2024-05-01 PROCEDURE — 0 GADOBENATE DIMEGLUMINE 529 MG/ML SOLUTION

## 2024-05-01 RX ADMIN — GADOBENATE DIMEGLUMINE 20 ML: 529 INJECTION, SOLUTION INTRAVENOUS at 18:47

## 2024-05-03 NOTE — PROGRESS NOTES
"Subjective   Trinidad Quinteros is a 51 y.o. female  Back Pain (Refill on oxycodone for back pain ) and Sinusitis (Sinus pressure with intermittent nasal drainage and congestion x2 days )      History of Present Illness     Patient is a 38-year-old female seen today for follow-up on chronic back pain managed with daily opioid therapy and to have evaluation for sinus pressure, which is a new problem.    The patient reports she is currently taking Percocet for her mouth pain. She states she can go back down to 1 Percocet a day and her pain has improved. She made an appointment early because she is not going to be here Monday, Tuesday, or Wednesday.    The patient reports she feels like she is starting to get a sinus infection. Last night her nose was \"clogged and this morning it was stopped up\". The patient reports using nasal spray and it starts running. She is taking Xyzal and does not have Stahist.    The following portions of the patient's history were reviewed and updated as appropriate: allergies, current medications, past social history and problem list    Review of Systems   Constitutional: Negative.  Negative for chills, fatigue and fever.   HENT: Positive for congestion, sinus pressure and sinus pain. Negative for dental problem, ear pain, postnasal drip, rhinorrhea and sore throat.    Eyes: Negative for pain.   Respiratory: Negative.  Negative for cough and shortness of breath.    Cardiovascular: Negative.    Gastrointestinal: Negative.    Genitourinary: Negative.    Musculoskeletal: Positive for back pain. Negative for arthralgias, gait problem and myalgias.   Neurological: Negative for dizziness, tremors, weakness, numbness and headaches.   Hematological: Negative for adenopathy.       Objective     Vitals:    07/14/22 1512   BP: 130/72   Pulse: 77   Temp: 97 °F (36.1 °C)   SpO2: 98%       Physical Exam  Vitals and nursing note reviewed.   Constitutional:       General: She is not in acute distress.     " Appearance: Normal appearance. She is well-developed. She is not ill-appearing, toxic-appearing or diaphoretic.   HENT:      Head: Normocephalic and atraumatic.      Right Ear: Tympanic membrane and ear canal normal.      Left Ear: Tympanic membrane and ear canal normal.      Nose: Mucosal edema present. No rhinorrhea.      Right Sinus: Maxillary sinus tenderness and frontal sinus tenderness present.      Left Sinus: Maxillary sinus tenderness and frontal sinus tenderness present.      Mouth/Throat:      Pharynx: No oropharyngeal exudate.   Eyes:      Pupils: Pupils are equal, round, and reactive to light.   Cardiovascular:      Rate and Rhythm: Normal rate and regular rhythm.   Pulmonary:      Effort: Pulmonary effort is normal. No respiratory distress.      Breath sounds: Normal breath sounds.   Abdominal:      General: Bowel sounds are normal.      Palpations: Abdomen is soft.   Musculoskeletal:         General: Tenderness present. No deformity.      Lumbar back: Tenderness present. No swelling, deformity, spasms or bony tenderness. Decreased range of motion.   Skin:     General: Skin is warm and dry.      Findings: No bruising or rash.   Neurological:      Mental Status: She is alert and oriented to person, place, and time.      Sensory: No sensory deficit.      Coordination: Coordination normal.      Gait: Gait normal.      Deep Tendon Reflexes: Reflexes are normal and symmetric.   Psychiatric:         Mood and Affect: Mood normal.         Behavior: Behavior normal.         Assessment & Plan     Diagnoses and all orders for this visit:    1. Chronic midline low back pain with sciatica, sciatica laterality unspecified (Primary)    2. Acute non-recurrent sinusitis, unspecified location    Other orders  -     cefdinir (OMNICEF) 300 MG capsule; Take 1 capsule by mouth 2 (Two) Times a Day.  Dispense: 14 capsule; Refill: 0  -     Chlorcyclizine-Pseudoephed 25-60 MG tablet; Take 0.5-1 tablet by mouth Every 12 (Twelve)  Hours As Needed for congestion.  Dispense: 30 tablet; Refill: 0      1. Chronic midline low back pain with sciatica, sciatica laterality unspecified (Primary)  - The patient will decrease her Percocet back down to 1 pill daily.     2. Acute non-recurrent sinusitis, unspecified location  - The patient will start cefdinir. She will continue her allergy medication and nasal spray.    Will electronically send in a 1 month prescription of reduced dosage Percocet 5/325.to 1 daily for chronic back pain #30 this is patient's previous dose prior to all of her dental surgery.  Follow-up in 1 month for recheck    As part of this patient's treatment plan, patient will be prescribed controlled substances. The patient has been made aware of appropriate use of such medications, including potential risk of somnolence, limited ability to drive and /or work safely, and potential for dependence or overdose. It has also been made clear that these medications are for use by this patient only, without concomitant use of alcohol or other substances unless prescribed.Controlled substance status of medication discussed with patient, discussed risks of medication including abuse potential and diversion potential and need to follow up for reevaluation appointment in order to receive further refills.    Part of this note may be an electronic transcription/translation of spoken language to printed text using the Dragon Dictation System.        Transcribed from ambient dictation for Josephine Delacruz PA-C by Aaron Rivas.  07/14/22   16:08 EDT    Patient verbalized consent to the visit recording.     MEDICINE

## 2024-05-06 RX ORDER — PROMETHAZINE HYDROCHLORIDE 25 MG/1
25 TABLET ORAL EVERY 6 HOURS PRN
Qty: 20 TABLET | Refills: 0 | Status: SHIPPED | OUTPATIENT
Start: 2024-05-06

## 2024-05-14 ENCOUNTER — DOCUMENTATION (OUTPATIENT)
Dept: NEUROSURGERY | Facility: CLINIC | Age: 54
End: 2024-05-14
Payer: COMMERCIAL

## 2024-05-14 NOTE — PROGRESS NOTES
Reviewed MRI lumbar spine as well as flexion-extension x-rays with Dr. Hurt.  There is no new focal disc herniation or high-grade central canal or neuroforaminal stenosis that can be surgically corrected.  In regards to her continued right leg pain, would defer to Dr. Schroeder to possibly discuss spinal cord stimulator to help her pain.  There is no role for any further surgical intervention therefore patient can follow-up on an as-needed basis.  I discussed this with the patient and she is in agreement and was thankful for the call back.

## 2024-05-20 RX ORDER — ALBUTEROL SULFATE 90 UG/1
2 AEROSOL, METERED RESPIRATORY (INHALATION) EVERY 4 HOURS PRN
Qty: 18 G | Refills: 5 | Status: SHIPPED | OUTPATIENT
Start: 2024-05-20

## 2024-05-21 ENCOUNTER — TELEPHONE (OUTPATIENT)
Dept: PAIN MEDICINE | Facility: CLINIC | Age: 54
End: 2024-05-21
Payer: COMMERCIAL

## 2024-05-23 ENCOUNTER — OFFICE VISIT (OUTPATIENT)
Dept: PAIN MEDICINE | Facility: CLINIC | Age: 54
End: 2024-05-23
Payer: COMMERCIAL

## 2024-05-23 VITALS — HEIGHT: 61 IN | WEIGHT: 166.2 LBS | BODY MASS INDEX: 31.38 KG/M2

## 2024-05-23 DIAGNOSIS — Z00.8 PRE-SURGICAL PSYCHOLOGICAL ASSESSMENT, ENCOUNTER FOR: Primary | ICD-10-CM

## 2024-05-23 DIAGNOSIS — M54.16 LUMBAR RADICULOPATHY: Primary | ICD-10-CM

## 2024-05-23 NOTE — PROGRESS NOTES
Primary Physician: Josephine Delacruz PA-C    CHIEF COMPLAINT or REASON FOR VISIT: Follow-up (Follow up after appointment with neurosurgery. ) and Back Pain    Initial HPI 3/16/2023:  Ms. Trinidad Quinteros is 53 y.o. female who presents as a new patient referral for evaluation and treatment of chronic low back pain with radiation to right lower extremity.  Ms. Quinteros states that she for the past year has had slowly increasing pain.  She has prior PMH hip pain for which she has been taking Percocet however this has not been helpful for her pain.  She is additionally trialed gabapentin.  She describes a sharp burning tingling pain radiating down her right buttock and the posterior lateral aspect of her thigh and occasionally into the plantar surface of her foot.  Patient denies any bowel or bladder dysfunction, lower extremity weakness, new onset saddle anesthesia or unexplained weight loss.  She does work at [employer] with Dr. Roth.    Ms. Quinteros recently saw consultation with interventional pain, Dr. Rey, who diagnosed her with right lumbar radiculopathy and performed a right L4/5 and L5/S1 transforaminal epidural steroid injection.  Patient reported relief for approximately 1 week however unfortunately pain returned worse than previous.    Interval history: Patient returns to clinic today after neurosurgical evaluation.  At this office visit they obtained new lumbar films which did not reveal any new disc herniation or high-grade central canal or neuroforaminal stenosis that could be surgically corrected.  They referred her back to pain management for consideration of spinal cord stimulator.  Today, patient continues to complain of right-sided lower back pain with radiation to the right lower extremity.  Pain will radiate into the posterior aspect of her right calf and occasionally going into her right foot.  She continues to take gabapentin as prescribed by her PCP with some benefit.  Her pain is  significantly exacerbated with ambulation.  She feels as if she is unable to use her right leg at times due to the radicular pain.  We discussed SCS trial in great detail at today's appointment including risk and benefits.  Risk include bleeding, infection, damage to surrounding structures, paralysis, and even death.  She is interested in long-term treatment options to control her pain.       Interventions:  2023: Right S1 TFESI with 1 to 2 days benefit  2023: Left L5/S1 and S1 TFESI with 90% relief ongoing.   2/15/2024: Right L4/5 and L5/S1 TFESI with 0% benefit.     Objective Pain Scoring:   BRIEF PAIN INVENTORY:  Total score:   Pain Score    24 0959   PainSc:   7   PainLoc: Back        PHQ-2: PHQ-2 Total Score: 0  PHQ-9: PHQ-9: Brief Depression Severity Measure Score: 0  Opioid Risk Tool:         Review of Systems:   ROS negative except as otherwise noted     Past Medical History:   Past Medical History:   Diagnosis Date    Allergic     Anxiety     Chronic pain disorder     CTS (carpal tunnel syndrome)     Degenerative disc disease, cervical 2019    Depression     Extremity pain     GERD (gastroesophageal reflux disease)     Hyperlipidemia     Hypertension     Joint pain     Low back pain     Lumbar radiculopathy 2023    Neck pain     ADRIAN on CPAP     Smoker     Wears glasses          Past Surgical History:   Past Surgical History:   Procedure Laterality Date    APPENDECTOMY      CARDIAC CATHETERIZATION      CARDIAC CATHETERIZATION       SECTION      COLONOSCOPY      ENDOSCOPY      HAND TENDON SURGERY Right 1997    LUMBAR DISCECTOMY Right 2023    Procedure: LUMBAR DISCECTOMY, L5-S1, L4-5 FORAMINOTOMY- RIGHT;  Surgeon: Nakul Hurt MD;  Location: [facility];  Service: Neurosurgery;  Laterality: Right;    SUBTOTAL HYSTERECTOMY      TUBAL ABDOMINAL LIGATION  2003         Family History   Family History   Problem Relation Age of Onset    COPD  Mother     Arthritis Mother     Diabetes Mother     Hypertension Mother     Miscarriages / Stillbirths Mother     COPD Father     Diabetes Father     Hypertension Father     Breast cancer Maternal Aunt 50    Cancer Other     Arthritis Other     Diabetes Other     Asthma Other     Heart disease Other         heart trouble    Hypertension Other         high blood pressure    Alcohol abuse Other     COPD Brother     Diabetes Brother     Early death Brother     Hypertension Brother     COPD Brother     Diabetes Brother     Early death Brother         COPD/Non-Hodgkin lymphoma    Early death Brother         covid/pneumonia 2022    COPD Brother     Miscarriages / Stillbirths Daughter     Asthma Son     Ovarian cancer Neg Hx          Social History   Social History     Socioeconomic History    Marital status:    Tobacco Use    Smoking status: Every Day     Current packs/day: 0.50     Average packs/day: 0.7 packs/day for 74.1 years (54.5 ttl pk-yrs)     Types: Cigarettes     Start date: 5/4/1985    Smokeless tobacco: Never   Vaping Use    Vaping status: Never Used   Substance and Sexual Activity    Alcohol use: No    Drug use: No    Sexual activity: Yes     Partners: Male     Birth control/protection: None, Hysterectomy        Medications:     Current Outpatient Medications:     albuterol (ACCUNEB) 1.25 MG/3ML nebulizer solution, Take 3 mL (one vial) by nebulization Every 6 (Six) Hours As Needed for Wheezing or Shortness of Air., Disp: 75 mL, Rfl: 12    albuterol sulfate HFA (Ventolin HFA) 108 (90 Base) MCG/ACT inhaler, Inhale 2 puffs as directed every 4 hours As Needed for Wheezing or Shortness of Air and/or cough., Disp: 18 g, Rfl: 5    ALPRAZolam (XANAX) 0.5 MG tablet, Take 1 tablet by mouth 3 (Three) Times a Day As Needed for Anxiety., Disp: 80 tablet, Rfl: 5    buPROPion XL (Wellbutrin XL) 150 MG 24 hr tablet, Take 1 tablet by mouth Every Morning., Disp: 30 tablet, Rfl: 5    Chlorcyclizine-Pseudoephed 25-60 MG  tablet, Take 1/2 to 1 every 12 hours as needed for congestion, Disp: 30 tablet, Rfl: 0    estradiol (Estrace) 0.5 MG tablet, Take 1 tablet by mouth Daily as directed for menopausal hot flashes, Disp: 30 tablet, Rfl: 1    fluticasone (FLONASE) 50 MCG/ACT nasal spray, Use 2 sprays into each nostril as directed by provider Daily., Disp: 16 g, Rfl: 11    Fluticasone-Salmeterol (Advair Diskus) 250-50 MCG/ACT DISKUS, Inhale 1 puff 2 (Two) Times a Day., Disp: 60 each, Rfl: 11    folic acid (FOLVITE) 1 MG tablet, Take 1 tablet by mouth Daily., Disp: 30 tablet, Rfl: 11    gabapentin (NEURONTIN) 300 MG capsule, Take 1 capsule by mouth 4 (Four) Times a Day., Disp: 120 capsule, Rfl: 5    glycopyrrolate (Robinul-Forte) 2 MG tablet, Take 1 tablet by mouth 2 (Two) Times a Day for diarrhea, Disp: 60 tablet, Rfl: 11    hydroCHLOROthiazide 25 MG tablet, Take 1 tablet by mouth Daily., Disp: 90 tablet, Rfl: 2    levocetirizine (XYZAL) 5 MG tablet, Take 1 tablet by mouth Every Evening for allergies, Disp: 30 tablet, Rfl: 11    losartan (COZAAR) 100 MG tablet, Take 1 tablet by mouth Daily., Disp: 90 tablet, Rfl: 3    meloxicam (MOBIC) 7.5 MG tablet, Take 1 tablet by mouth Daily., Disp: 30 tablet, Rfl: 5    omega-3 acid ethyl esters (LOVAZA) 1 g capsule, Take 1 capsule by mouth 2 (Two) Times a Day., Disp: 60 capsule, Rfl: 11    omega-3 acid ethyl esters (Lovaza) 1 g capsule, Take 1 capsule by mouth 2 (Two) Times a Day. For high triglycerides, Disp: 60 capsule, Rfl: 11    ondansetron (Zofran) 4 MG tablet, Take 1 tablet by mouth Every 8 (Eight) Hours As Needed for Nausea or Vomiting., Disp: 20 tablet, Rfl: 1    promethazine (PHENERGAN) 25 MG tablet, Take 1 tablet by mouth Every 6 (Six) Hours As Needed for Nausea or Vomiting., Disp: 20 tablet, Rfl: 0    promethazine-dextromethorphan (PROMETHAZINE-DM) 6.25-15 MG/5ML syrup, Take 5 mL by mouth 4 (Four) Times a Day As Needed for Cough., Disp: 180 mL, Rfl: 0    rosuvastatin (Crestor) 10 MG  "tablet, Take 1 tablet by mouth Every Night. New med for cholesterol, Disp: 90 tablet, Rfl: 3    rosuvastatin (CRESTOR) 10 MG tablet, Take 1 tablet by mouth Every Night for cholesterol., Disp: 90 tablet, Rfl: 3    Semaglutide-Weight Management 0.5 MG/0.5ML solution auto-injector, Inject 0.5 mL under the skin into the appropriate area as directed 1 (One) Time Per Week., Disp: 2 mL, Rfl: 3    sertraline (ZOLOFT) 100 MG tablet, Take 2 tablets by mouth Daily, Disp: 60 tablet, Rfl: 11    tiZANidine (ZANAFLEX) 4 MG tablet, Take 0.5-1 tablet by mouth up to 2 Times a Day As Needed for back pain, Disp: 30 tablet, Rfl: 5    valACYclovir (VALTREX) 500 MG tablet, Take 4 tablets by mouth as needed at onset of attack; THEN take 4 tablets 12 hours later., Disp: 8 tablet, Rfl: 5        Physical Exam:     Vitals:    05/23/24 0959   Weight: 75.4 kg (166 lb 3.2 oz)   Height: 154.9 cm (60.98\")   PainSc:   7   PainLoc: Back          General: Alert and oriented, No acute distress.   HEENT: Normocephalic, atraumatic.   Cardiovascular: No gross edema  Respiratory: Respirations are non-labored    Lumbar Spine:   No masses or atrophy  Range of motion - Flexion normal. Extension normal. Right Lat Bending normal. Left Lat Bending normal  Facet Loading: Negative bilaterally  Facet Palpation - Nontender   PSIS tenderness - Negative bilaterally  Jeffrey's/PAOLO/Thigh thrust - Negative bilaterally  Straight leg raise: Positive right  Slump test: Positive right    Motor Exam:    Strength: Rate on 1-5 scale Right Left    L1/2- hip flexion 5 5    L3- knee extension 5 5    L4- ankle dorsiflexion 5 5    L5- great toe extension 5 5    S1- ankle plantarflexion 5 5    Sensory Exam: Altered sensation in right S1 dermatome.    Neurologic: Cranial Nerves II-XII are grossly intact.   Clonus -negative bilaterally    Psychiatric: Cooperative.   Gait: Antalgic  Assistive Devices: None    Physical exam is consistent and accurate as of 5/23/2024    Imaging Studies: "   Results for orders placed during the hospital encounter of 11/19/22    MRI Lumbar Spine Without Contrast    Narrative  DATE OF EXAM: 11/19/2022 8:13 AM    PROCEDURE: MRI LUMBAR SPINE WO CONTRAST-    INDICATIONS: Lumbar radiculopathy, symptoms persist with > 6 wks  treatment; M54.40-Lumbago with sciatica, unspecified side    COMPARISON: No comparisons available.    TECHNIQUE: Routine magnetic resonance imaging of the lumbar spine was  performed without the administration of contrast.    FINDINGS:  The alignment is anatomic. The vertebral body heights appear normal.  There are degenerative endplate changes at L5-S1. There is disc  desiccation at L4-5 and L5-S1, with advanced degenerative loss of disc  height at L5-S1. The conus terminates at the L1-2 level. The posterior  paravertebral soft tissues are unremarkable.    L1-2: No spinal canal or neural foraminal stenosis.    L2-3: Mild bilateral facet arthropathy. No spinal canal stenosis. No  neural foraminal stenosis.    L3-4: Mild disc bulge. Mild bilateral facet arthropathy. No spinal canal  stenosis. Mild right neural foraminal stenosis.    L4-5: Mild disc bulge with superimposed small central disc protrusion.  Mild bilateral facet arthropathy. Mild spinal canal stenosis. Mild  bilateral neural foraminal stenosis.    L5-S1: Moderate disc bulge. Mild right and moderate facet arthropathy.  Mild spinal canal stenosis. Mild to moderate right and mild left neural  foraminal stenosis.    Impression  Mild multilevel degenerative changes of lumbar spine as described above.    This report was finalized on 11/19/2022 9:02 AM by Jaxson Connolly MD.      MRI LUMBAR SPINE W WO CONTRAST     Date of Exam: 5/1/2024 6:39 PM EDT     Indication: right leg pain, hx of discectomy.     Comparison: 9/25/2023.     Technique:  Routine multiplanar/multisequence sequence images of the lumbar spine were obtained before and after the uneventful administration of 17 mL Multihance.           Findings:   Postoperative changes are noted from prior right hemilaminotomy at L4-5 and L5-S1. T1 marrow signal is otherwise preserved, without evidence of fracture or suspicious marrow replacing lesion. Alignment is anatomic, without evidence of significant   listhesis or subluxation. The conus medullaris and cauda equina nerve roots are satisfactory in appearance. The paraspinal soft tissues demonstrate no acute or suspicious findings. Multilevel spondylosis is present, with areas of involvement including     L1-2, no significant spinal canal or neuroforaminal impingement.     L2-3, no significant spinal canal or neuroforaminal impingement.     L3-4, small disc bulge and bilateral facet arthropathy. There is minimal spinal canal and bilateral neuroforaminal narrowing present.     L4-5, postoperative changes are present with some enhancing scar tissue seen along the operative tract. A broad-based circumferential disc bulge persists, without new focal disc herniation. The spinal canal and neural foramina are patent.     L5-S1, postoperative changes are present with some enhancing scar tissue seen along the operative tract. A broad-based circumferential disc bulge persists in addition to some facet arthropathy, without new focal disc herniation. The spinal canal is   patent and there is unchanged mild bilateral neuroforaminal narrowing, greater on the right.     IMPRESSION:  Impression:   Stable contrast-enhanced MRI of the lumbar spine demonstrating postoperative changes at the L4-5 and L5-S1 levels similar to comparison. These levels demonstrate some mild persistent spondylosis, otherwise without new focal disc herniation. There is no   new high-grade spinal canal or neuroforaminal impingement.        Electronically Signed: Fox Donohue MD    5/2/2024 9:43 AM EDT    Workstation ID: [id number]    Independent interpretation of radiographic imaging:  Lumbar MRI dated 5/2/2024 demonstrates: DDD worst at L5/S1; no  significant central canal stenosis; broad-based disc bulge at L4/5; mild-moderate bilateral NFS L5/S1, R>L; postoperative changes at L5-S1; no new central disc herniation, spinal canal stenosis, or neuroforaminal stenosis; facet arthropathy.       Impression & Plan:   3/16/2023: Ms. Trinidad Quinteros is a 53 y.o. female with past medical history significant for GERD, depression, HTN, ADRIAN, who presents to the pain clinic for evaluation and treatment of chronic low back pain with radiation to right lower extremity.  I personally reviewed the patient's lumbar MRI dated 11/19/2022 which demonstrates a central and right posterior L5/S1 disc herniation causing right L5/S1 neuroforaminal stenosis and lateral recess stenosis.  Clinical examination consistent with a right S1 radiculitis.  We discussed right S1 foramen epidural steroid injection to improve pain.  If greater than 50% relief for at least 2-3 months can consider repeat as needed every 3 to 4 months.  I had an in-depth discussion with the patient regarding the risks of the procedure including bleeding, infection, damage to surrounding structures, paralysis and even death.  We discussed the potential adverse effects of corticosteroid injection including flushing of the face, lipodystrophy, skin discoloration, elevated blood glucose, increased blood pressure.  Risks of frequent steroid administration include weight gain, hormonal changes, mood changes, osteoporosis.    If no benefit from epidural steroid will refer for neurosurgical evaluation.  11/20/2023: Now status post right-sided L4/5 foraminotomy and L5/S1 discectomy with resolution of right-sided symptoms.  Recent onset left-sided symptoms.  I personally reviewed and interpreted her lumbar MRI dated 9/25/2023: Interval improvement in right L4/5 NFS; persistent L5/S1 disc herniation.  Exam consistent with lumbar radiculopathy.  We will trial left L5/S1 and S1 TFESI.  2/7/2024: Excellent benefit from left sided  TFESI.  Complains of right-sided symptoms. Will plan for right L4/5 and L5/S1 TFESI.  If no benefit consider referral back to neurosurgery.  3/27/2024: No benefit from right TFESI.  Will refer back to NSA.  Consider SCS trial if no neurosurgical invention  2024: Lumbar MRI reviewed.  Will obtain psych clearance for SCS trial.  [facility] to educate.    1. Lumbar radiculopathy        PLAN:  1. Medication Recommendations: Continue per PCP  -Agree with gabapentin    2. Physical Therapy: Continue HEP    3. Psychological: Obtain psychiatric clearance from [facility] for spinal cord stimulator trial    4. Complementary and alternative (CAM) Therapies:     5. Labs: None indicated     6. Imagin. Interventions: Can schedule SCS trial (63650 x2) with [facility] once appropriate psychiatric clearance has been obtained.  -Of note, if patient received permanent implant she will need to hold Wegovy injections for 2 weeks prior to procedure    8. Referrals:     9. Records requested: Neurosurgery notes reviewed    10. Lifestyle goals:    Follow-up 1 month or 5-6 days after SCS trial        [facility]  Megha Frazier PA-C    Quality metrics:  Trinidad SHELBI Quinteros reports a pain score of 7.  Given her pain assessment as noted, treatment options were discussed and the following options were decided upon as a follow-up plan to address the patient's pain: continuation of current treatment plan for pain.

## 2024-06-03 ENCOUNTER — OFFICE VISIT (OUTPATIENT)
Dept: FAMILY MEDICINE CLINIC | Facility: CLINIC | Age: 54
End: 2024-06-03
Payer: COMMERCIAL

## 2024-06-03 VITALS
OXYGEN SATURATION: 98 % | TEMPERATURE: 97.8 F | SYSTOLIC BLOOD PRESSURE: 130 MMHG | HEIGHT: 61 IN | BODY MASS INDEX: 30.93 KG/M2 | WEIGHT: 163.8 LBS | HEART RATE: 72 BPM | DIASTOLIC BLOOD PRESSURE: 82 MMHG

## 2024-06-03 DIAGNOSIS — E66.09 CLASS 1 OBESITY DUE TO EXCESS CALORIES WITH SERIOUS COMORBIDITY AND BODY MASS INDEX (BMI) OF 30.0 TO 30.9 IN ADULT: Primary | ICD-10-CM

## 2024-06-03 DIAGNOSIS — G47.33 OSA (OBSTRUCTIVE SLEEP APNEA): ICD-10-CM

## 2024-06-03 DIAGNOSIS — M54.16 LUMBAR RADICULOPATHY: ICD-10-CM

## 2024-06-03 DIAGNOSIS — F41.1 GAD (GENERALIZED ANXIETY DISORDER): ICD-10-CM

## 2024-06-03 PROCEDURE — 99213 OFFICE O/P EST LOW 20 MIN: CPT | Performed by: PHYSICIAN ASSISTANT

## 2024-06-03 RX ORDER — SEMAGLUTIDE 1 MG/.5ML
1 INJECTION, SOLUTION SUBCUTANEOUS WEEKLY
Qty: 2 ML | Refills: 5 | Status: SHIPPED | OUTPATIENT
Start: 2024-06-03

## 2024-06-03 NOTE — PROGRESS NOTES
Subjective   Trinidad Quinteros is a 53 y.o. female  Back Pain (Discuss pain stimulator that Dr. Schroeder recommended ) and Obesity (Follow up on Wegovy, discuss next dosing )      History of Present Illness  History of Present Illness    + Patient presents today for follow-up of weight loss management in association with obesity on Wegovy.  She is doing very well her weight is down over 10 pounds and she is doing well on this medication without any adverse effects.  Bowels are moving normally denies any GI upset, she states that she has been able to cut back significantly on snacking and is continuing to eat a healthy well-balanced diet.  Weight is still elevated BMI in obese range 30.95 today.  Patient has chronic back pain associated with lumbar radiculopathy and is considering having a pain stimulator inserted by her pain management specialist due to ongoing persistent chronic back and leg pain.  I discussed with patient the benefits of weight loss in relationship to her chronic back pain and lumbar radiculopathy and would encourage her to continue following a low calorie high-protein diet to try to achieve a BMI closer to 25 to assist with her chronic back pain.  This would also help with her sleep apnea.  Patient has generalized anxiety disorder which is well-controlled on sertraline and alprazolam, no current symptoms and no adverse effects from medications.  Patient is hopeful that she will be able to wean off of gabapentin and Flexeril if the pain stimulator is successful in addressing her lumbar radiculopathy pain.  The following portions of the patient's history were reviewed and updated as appropriate: allergies, current medications, past social history and problem list    Review of Systems   Constitutional:  Positive for activity change and appetite change.   Respiratory: Negative.     Cardiovascular: Negative.    Gastrointestinal: Negative.    Musculoskeletal:  Positive for arthralgias, back pain, gait  problem and myalgias. Negative for joint swelling.   Skin: Negative.    Neurological:  Negative for weakness and numbness.   Psychiatric/Behavioral:  The patient is not nervous/anxious (Chronically stable on medication well-controlled).        Objective     Vitals:    06/03/24 1452   BP: 130/82   Pulse: 72   Temp: 97.8 °F (36.6 °C)   SpO2: 98%       Physical Exam  Vitals and nursing note reviewed.   Constitutional:       General: She is not in acute distress.     Appearance: Normal appearance. She is well-developed. She is obese. She is not ill-appearing, toxic-appearing or diaphoretic.      Comments: BMI 30.95 obesity noted     Neck:      Thyroid: No thyromegaly.   Cardiovascular:      Rate and Rhythm: Normal rate and regular rhythm.      Heart sounds: Normal heart sounds. No murmur heard.  Pulmonary:      Effort: Pulmonary effort is normal. No respiratory distress.      Breath sounds: Normal breath sounds.   Abdominal:      Palpations: Abdomen is soft. There is no mass.      Tenderness: There is no abdominal tenderness.   Neurological:      Mental Status: She is alert.   Psychiatric:         Mood and Affect: Mood normal.         Behavior: Behavior normal.         Thought Content: Thought content normal.         Judgment: Judgment normal.       Physical Exam      Assessment & Plan   Assessment & Plan      Diagnoses and all orders for this visit:    1. Class 1 obesity due to excess calories with serious comorbidity and body mass index (BMI) of 30.0 to 30.9 in adult (Primary)    2. Lumbar radiculopathy    3. ADRIAN (obstructive sleep apnea)    4. IVON (generalized anxiety disorder)    Other orders  -     Semaglutide-Weight Management (Wegovy) 1 MG/0.5ML solution auto-injector; Inject 0.5 mL under the skin into the appropriate area as directed 1 (One) Time Per Week. New dose  Dispense: 2 mL; Refill: 5       Will increase dosage of Wegovy to 1 mg to assist with weight loss further, goal of getting BMI closer to 25.   Discussed importance of following a healthy high-protein low sugar diet adequate fiber intake adequate water intake.  Patient will follow-up with her pain management doctor as scheduled.  She will follow-up with me in 6 months and as needed.    I spent 15 minutes in patient care: Reviewing records prior to the visit, examining the patient, entering orders and documentation    Part of this note may be an electronic transcription/translation of spoken language to printed text using the Dragon Dictation System.        Answers submitted by the patient for this visit:  Other (Submitted on 6/3/2024)  Please describe your symptoms.: discuss Spinal cord stimulator  Have you had these symptoms before?: No  How long have you been having these symptoms?: 1-4 days  Please list any medications you are currently taking for this condition.: n/a  Please describe any probable cause for these symptoms. : n/a  Primary Reason for Visit (Submitted on 6/3/2024)  What is the primary reason for your visit?: Other

## 2024-06-05 RX ORDER — ONDANSETRON 4 MG/1
4 TABLET, FILM COATED ORAL EVERY 8 HOURS PRN
Qty: 20 TABLET | Refills: 1 | Status: SHIPPED | OUTPATIENT
Start: 2024-06-05

## 2024-06-05 RX ORDER — LEVOCETIRIZINE DIHYDROCHLORIDE 5 MG/1
5 TABLET, FILM COATED ORAL EVERY EVENING
Qty: 30 TABLET | Refills: 11 | Status: SHIPPED | OUTPATIENT
Start: 2024-06-05

## 2024-06-06 ENCOUNTER — TELEPHONE (OUTPATIENT)
Dept: PAIN MEDICINE | Facility: CLINIC | Age: 54
End: 2024-06-06
Payer: COMMERCIAL

## 2024-06-06 DIAGNOSIS — M54.16 LUMBAR RADICULOPATHY: Primary | ICD-10-CM

## 2024-06-06 NOTE — TELEPHONE ENCOUNTER
----- Message from Too Schroeder sent at 6/6/2024  7:44 AM EDT -----  OK to schedule SCS trial with [facility]  ----- Message -----  From: Elvie Oconnor MA  Sent: 6/6/2024   7:29 AM EDT  To: Too Schroeder MD; Megha Frazier PA-C    I SEE NO SIGNIFICANT PSYCHOLOGICAL FACTORS THAT WOULD HINDER THE SUCCESS OF A SPINAL CORD STIMULATOR. I AFFIRM THAT ISIDRA RIVERA IS A GOOD CANDIDATE FOR THE PROCEDURE.

## 2024-06-13 ENCOUNTER — OFFICE VISIT (OUTPATIENT)
Dept: FAMILY MEDICINE CLINIC | Facility: CLINIC | Age: 54
End: 2024-06-13
Payer: COMMERCIAL

## 2024-06-13 VITALS
HEART RATE: 59 BPM | RESPIRATION RATE: 15 BRPM | TEMPERATURE: 97.5 F | WEIGHT: 161 LBS | OXYGEN SATURATION: 98 % | HEIGHT: 61 IN | BODY MASS INDEX: 30.4 KG/M2 | DIASTOLIC BLOOD PRESSURE: 96 MMHG | SYSTOLIC BLOOD PRESSURE: 140 MMHG

## 2024-06-13 DIAGNOSIS — M54.16 LUMBAR RADICULOPATHY: Primary | ICD-10-CM

## 2024-06-13 PROCEDURE — 99213 OFFICE O/P EST LOW 20 MIN: CPT | Performed by: FAMILY MEDICINE

## 2024-06-13 NOTE — PROGRESS NOTES
Subjective   Trinidad Quinteros is a 53 y.o. female    Chief Complaint    Chronic back pain  Spinal cord stimulator    HPI  Patient suffers from chronic back pain.  She has had previous lumbar surgery and despite this continues to have pain in her back with symptoms into her right lower extremity.  Opioid analgesics of limited to no relief.  Gabapentin seems to have helped some.  She has been seeing pain management and they have recommended spinal cord stimulator.  She is scheduled for a trial in 1 week.  She is anxious regarding this and wants to discuss.  She has read on the Internet that there is a possibility that she could have infection from the procedure.  Her last brother  within the past year due to septicemia and complications.  All of this has her concerned.      The following portions of the patient's history were reviewed and updated as appropriate: allergies, current medications, past social history and problem list    Review of Systems   Respiratory: Negative.     Gastrointestinal: Negative.    Musculoskeletal:  Positive for back pain. Negative for arthralgias, gait problem and myalgias.   Neurological:  Positive for weakness. Negative for dizziness, tremors and numbness.   Psychiatric/Behavioral:  Negative for behavioral problems and dysphoric mood. The patient is not nervous/anxious.        Objective     Vitals:    24 0803   BP: 140/96   Pulse: 59   Resp: 15   Temp: 97.5 °F (36.4 °C)   SpO2: 98%       Physical Exam  Vitals and nursing note reviewed.   Constitutional:       Appearance: Normal appearance. She is well-developed.   HENT:      Head: Normocephalic and atraumatic.   Eyes:      General: No scleral icterus.     Conjunctiva/sclera: Conjunctivae normal.   Cardiovascular:      Rate and Rhythm: Normal rate and regular rhythm.   Pulmonary:      Effort: Pulmonary effort is normal.      Breath sounds: Normal breath sounds.   Abdominal:      General: Bowel sounds are normal.      Palpations:  Abdomen is soft.   Musculoskeletal:      Lumbar back: Tenderness and bony tenderness present. No swelling, deformity or spasms. Decreased range of motion.   Neurological:      Mental Status: She is alert and oriented to person, place, and time.      Deep Tendon Reflexes: Reflexes are normal and symmetric.   Psychiatric:         Mood and Affect: Mood normal.         Behavior: Behavior normal.         Assessment & Plan   Problems Addressed this Visit          Neuro    Lumbar radiculopathy - Primary     Diagnoses         Codes Comments    Lumbar radiculopathy    -  Primary ICD-10-CM: M54.16  ICD-9-CM: 724.4           Discussion: I have advised the patient that from my perspective infection would be very very unlikely especially during the trial procedure.  If she does have the device implanted permanently the stimulator itself is not open to the outside world as the charging device is under the skin.  It is therefore protected.  I told the patient that I felt the trial was very important because it will tell us whether this is going to work for her or not before she goes through a more invasive procedure.    I spent 15 minutes in patient care: Reviewing records prior to the visit, examining the patient, entering orders and documentation    Part of this note may be an electronic transcription/translation of spoken language to printed text using the Dragon Dictation System.

## 2024-06-18 RX ORDER — ESTRADIOL 0.5 MG/1
0.5 TABLET ORAL DAILY
Qty: 30 TABLET | Refills: 2 | Status: SHIPPED | OUTPATIENT
Start: 2024-06-18

## 2024-06-18 RX ORDER — GLYCOPYRROLATE 2 MG/1
2 TABLET ORAL 2 TIMES DAILY
Qty: 60 TABLET | Refills: 11 | Status: SHIPPED | OUTPATIENT
Start: 2024-06-18

## 2024-06-18 RX ORDER — TIZANIDINE 4 MG/1
2-4 TABLET ORAL 2 TIMES DAILY PRN
Qty: 30 TABLET | Refills: 5 | Status: SHIPPED | OUTPATIENT
Start: 2024-06-18

## 2024-06-18 RX ORDER — BUPROPION HYDROCHLORIDE 150 MG/1
150 TABLET ORAL EVERY MORNING
Qty: 90 TABLET | Refills: 3 | Status: SHIPPED | OUTPATIENT
Start: 2024-06-18

## 2024-06-18 RX ORDER — PROMETHAZINE HYDROCHLORIDE 25 MG/1
25 TABLET ORAL EVERY 6 HOURS PRN
Qty: 20 TABLET | Refills: 0 | Status: SHIPPED | OUTPATIENT
Start: 2024-06-18

## 2024-06-18 RX ORDER — ROSUVASTATIN CALCIUM 10 MG/1
10 TABLET, COATED ORAL NIGHTLY
Qty: 90 TABLET | Refills: 3 | Status: SHIPPED | OUTPATIENT
Start: 2024-06-18

## 2024-06-20 ENCOUNTER — DOCUMENTATION (OUTPATIENT)
Dept: PAIN MEDICINE | Facility: CLINIC | Age: 54
End: 2024-06-20

## 2024-06-20 ENCOUNTER — OUTSIDE FACILITY SERVICE (OUTPATIENT)
Dept: PAIN MEDICINE | Facility: CLINIC | Age: 54
End: 2024-06-20
Payer: COMMERCIAL

## 2024-06-20 NOTE — PROGRESS NOTES
[facility]  [address] [place] [address]    PROCEDURE: Spinal Cord Stimulator Trial, Two Lead(s)  Device: Abbott    PRE-OP DIAGNOSIS: Lumbar radiculopathy  POST-OP DIAGNOSIS:Same    CONSENT: Risks, benefits and options were explained to the patient, all questions were answered and written informed consent was obtained.     BLOOD THINNERS (ANTIPLATELETS/ANTICOAGULANTS): Were discussed with the patient and AUDREY Guidelines were followed.    ANTIBIOTICS: Ancef 2 grams IV    ANESTHESIA: Moderate sedation was required to maintain comfort, safety, and cooperation during the procedure.  The duration of sedation service was over 10 minutes.  Patient received 2mg IV Versed and 50mcg IV fentanyl.  Independent observation and monitoring was performed by MIGUEL Paniagua, RN.  The patient's level of consciousness and physiologic status was continually monitored with pulse oximetry, EKG from 1034 to 1104.  There were no complications or adverse events during sedation.  After the sedation patient was taken to the recovery area.      PROCEDURE NOTE: The patient was placed prone with the abdomen supported by a pillow and all pressure points were padded. Standard ASA monitors were applied. A timeout protocol was performed. Intravenous Antibiotics were administered prior to needle placement. Proper protective gear was worn by the physician including a mask, scrub cap, sterile gown, and sterile gloves. The patient was prepped and draped in the usual sterile fashion using Chlorhexidine, followed by sterile towels and laparotomy full body drape. Fluoroscopy was then used to identify entry into the epidural space and needle entry site at the skin. Next, a 50-50 mixture containing 0.5% bupivacaine and 2% lidocaine plain was used to raise a skin wheal and anesthetize deeper tissues. Next, a 22 gauge 3.5 inch spinal needle was used to anesthetize the tract down to the target lamina with  the same mixture. Using intermittent fluoroscopic guidance, a 14 gauge Tuohy needle was then advanced to contact the inferior lamina of the target entry interlaminar space. It was then walked off in a superior medial direction and a loss of resistance technique with stimulator lead was used to facilitate entrance into the epidural space of the T12-L1 interlaminar space. A stimulator lead was advanced through the Tuohy needle into the epidural space and directed to rest the tip at the top of the T7 vertebral body.  A second stimulator lead was inserted on the contralateral side using the identical technique and advanced to the top of the T8 vertebral body.  AP and lateral views were used to confirm appropriate posterior and midline position of the leads. [Once leads were determined to be in the correct position, the needles were withdrawn from the epidural space leaving the leads carefully in place. Leads were then secured with Steri-Strips and Tegaderm. The patient as then transferred to a stretcher and taken to recovery in stable condition.    EBL: Minimal    COMPLICATIONS: None    The patient tolerated the procedure well. Vital signs were stable. Sensory and motor exam was unchanged from baseline. The patient was observed in recovery for appropriate time and discharged in stable condition.    FOLLOW UP: as scheduled for post operative visit in clinic    ADDITIONAL NOTES: None    Final programming of the device was done in the recovery room by the device  representative. The patient (or responsible party) was given post-procedural and  discharge instructions to follow at home with an emphasis on instructions to avoid  infection and lead migration.    CODES:  63650 x2  13940  [id number]

## 2024-06-24 ENCOUNTER — OFFICE VISIT (OUTPATIENT)
Dept: PAIN MEDICINE | Facility: CLINIC | Age: 54
End: 2024-06-24
Payer: COMMERCIAL

## 2024-06-24 VITALS — HEIGHT: 61 IN | BODY MASS INDEX: 30.4 KG/M2 | WEIGHT: 161 LBS

## 2024-06-24 DIAGNOSIS — M54.16 LUMBAR RADICULOPATHY: Primary | ICD-10-CM

## 2024-06-24 PROCEDURE — 99213 OFFICE O/P EST LOW 20 MIN: CPT

## 2024-06-24 NOTE — PROGRESS NOTES
Primary Physician: Josephine Delacruz PA-C    CHIEF COMPLAINT or REASON FOR VISIT: lead pull     Initial HPI 3/16/2023:  Ms. Trinidad Quinteros is 53 y.o. female who presents as a new patient referral for evaluation and treatment of chronic low back pain with radiation to right lower extremity.  Ms. Quinteros states that she for the past year has had slowly increasing pain.  She has prior PMH hip pain for which she has been taking Percocet however this has not been helpful for her pain.  She is additionally trialed gabapentin.  She describes a sharp burning tingling pain radiating down her right buttock and the posterior lateral aspect of her thigh and occasionally into the plantar surface of her foot.  Patient denies any bowel or bladder dysfunction, lower extremity weakness, new onset saddle anesthesia or unexplained weight loss.  [facility] [employer] with [facility]. [employer].    Ms. Quinteros recently saw consultation with interventional pain, Dr. Rey, who diagnosed her with right lumbar radiculopathy and performed a right L4/5 and L5/S1 transforaminal epidural steroid injection.  Patient reported relief for approximately 1 week however unfortunately pain returned worse than previous.    Interval history: Patient returns to clinic today after undergoing SCS trial.  She reports excellent pain relief from this procedure.  Continues with significant back pain and radicular leg pain.  She is interested in permanent implant.  We discussed risk and benefits of procedure.  Risk include bleeding, infection, damage surrounding structures could exacerbate symptoms, paralysis, and even death.  Patient does not take any blood thinners.  She will need to hold Wegovy injections for 2 weeks prior to procedure.  We discussed postoperative physical restrictions for 6 to 8 weeks after procedure.  She voiced understanding    Interventions:  4/4/2023: Right S1 TFESI with 1 to 2 days benefit  12/7/2023: Left L5/S1 and S1 TFESI with  90% relief ongoing.   2/15/2024: Right L4/5 and L5/S1 TFESI with 0% benefit.   2024: SCS trial Abbott with 90% relief    Objective Pain Scoring:   BRIEF PAIN INVENTORY:  Total score:   Pain Score    24 1026   PainSc:   3   PainLoc: Back        PHQ-2: PHQ-2 Total Score: 0  PHQ-9: PHQ-9: Brief Depression Severity Measure Score: 0  Opioid Risk Tool:         Review of Systems:   ROS negative except as otherwise noted     Past Medical History:   Past Medical History:   Diagnosis Date    Allergic     Anxiety     Chronic pain disorder     CTS (carpal tunnel syndrome)     Degenerative disc disease, cervical 2019    Depression     Extremity pain     GERD (gastroesophageal reflux disease)     Hyperlipidemia     Hypertension     Joint pain     Low back pain     Lumbar radiculopathy 2023    Neck pain     ADRIAN on CPAP     Smoker     Wears glasses          Past Surgical History:   Past Surgical History:   Procedure Laterality Date    APPENDECTOMY  1998    BACK SURGERY  22    CARDIAC CATHETERIZATION  1992    CARDIAC CATHETERIZATION       SECTION      COLONOSCOPY      ENDOSCOPY      HAND TENDON SURGERY Right     LUMBAR DISCECTOMY Right 2023    Procedure: LUMBAR DISCECTOMY, L5-S1, L4-5 FORAMINOTOMY- RIGHT;  Surgeon: Nakul Hurt MD;  Location: [facility];  Service: Neurosurgery;  Laterality: Right;    SUBTOTAL HYSTERECTOMY      TUBAL ABDOMINAL LIGATION  2003         Family History   Family History   Problem Relation Age of Onset    COPD Mother     Arthritis Mother     Diabetes Mother     Hypertension Mother     Miscarriages / Stillbirths Mother     COPD Father     Diabetes Father     Hypertension Father     Breast cancer Maternal Aunt 50    Cancer Other     Arthritis Other     Diabetes Other     Asthma Other     Heart disease Other         heart trouble    Hypertension Other         high blood pressure    Alcohol abuse Other     COPD Brother     Diabetes Brother      Early death Brother     Hypertension Brother     COPD Brother     Diabetes Brother     Early death Brother         COPD/Non-Hodgkin lymphoma    Early death Brother         covid/pneumonia 2022    COPD Brother     Miscarriages / Stillbirths Daughter     Asthma Son     Ovarian cancer Neg Hx          Social History   Social History     Socioeconomic History    Marital status:    Tobacco Use    Smoking status: Every Day     Current packs/day: 0.50     Average packs/day: 0.7 packs/day for 74.1 years (54.6 ttl pk-yrs)     Types: Cigarettes     Start date: 5/4/1985    Smokeless tobacco: Never   Vaping Use    Vaping status: Never Used   Substance and Sexual Activity    Alcohol use: No    Drug use: No    Sexual activity: Yes     Partners: Male     Birth control/protection: None, Hysterectomy        Medications:     Current Outpatient Medications:     albuterol (ACCUNEB) 1.25 MG/3ML nebulizer solution, Take 3 mL (one vial) by nebulization Every 6 (Six) Hours As Needed for Wheezing or Shortness of Air., Disp: 75 mL, Rfl: 12    albuterol sulfate HFA (Ventolin HFA) 108 (90 Base) MCG/ACT inhaler, Inhale 2 puffs as directed every 4 hours As Needed for Wheezing or Shortness of Air and/or cough., Disp: 18 g, Rfl: 5    ALPRAZolam (XANAX) 0.5 MG tablet, Take 1 tablet by mouth 3 (Three) Times a Day As Needed for Anxiety., Disp: 80 tablet, Rfl: 5    buPROPion XL (Wellbutrin XL) 150 MG 24 hr tablet, Take 1 tablet by mouth Every Morning., Disp: 90 tablet, Rfl: 3    Chlorcyclizine-Pseudoephed 25-60 MG tablet, Take 1/2 to 1 every 12 hours as needed for congestion, Disp: 30 tablet, Rfl: 0    estradiol (Estrace) 0.5 MG tablet, Take 1 tablet by mouth Daily as directed for menopausal hot flashes. *Need appt for further refills*, Disp: 30 tablet, Rfl: 2    fluticasone (FLONASE) 50 MCG/ACT nasal spray, Use 2 sprays into each nostril as directed by provider Daily., Disp: 16 g, Rfl: 11    Fluticasone-Salmeterol (Advair Diskus) 250-50  MCG/ACT DISKUS, Inhale 1 puff 2 (Two) Times a Day., Disp: 60 each, Rfl: 11    folic acid (FOLVITE) 1 MG tablet, Take 1 tablet by mouth Daily., Disp: 30 tablet, Rfl: 11    gabapentin (NEURONTIN) 300 MG capsule, Take 1 capsule by mouth 4 (Four) Times a Day., Disp: 120 capsule, Rfl: 5    glycopyrrolate (Robinul-Forte) 2 MG tablet, Take 1 tablet by mouth 2 (Two) Times a Day for diarrhea, Disp: 60 tablet, Rfl: 11    hydroCHLOROthiazide 25 MG tablet, Take 1 tablet by mouth Daily., Disp: 90 tablet, Rfl: 2    levocetirizine (XYZAL) 5 MG tablet, Take 1 tablet by mouth Every Evening for allergies, Disp: 30 tablet, Rfl: 11    losartan (COZAAR) 100 MG tablet, Take 1 tablet by mouth Daily., Disp: 90 tablet, Rfl: 3    meloxicam (MOBIC) 7.5 MG tablet, Take 1 tablet by mouth Daily., Disp: 30 tablet, Rfl: 5    omega-3 acid ethyl esters (LOVAZA) 1 g capsule, Take 1 capsule by mouth 2 (Two) Times a Day., Disp: 60 capsule, Rfl: 11    omega-3 acid ethyl esters (Lovaza) 1 g capsule, Take 1 capsule by mouth 2 (Two) Times a Day. For high triglycerides, Disp: 60 capsule, Rfl: 11    ondansetron (Zofran) 4 MG tablet, Take 1 tablet by mouth Every 8 (Eight) Hours As Needed for Nausea or Vomiting., Disp: 20 tablet, Rfl: 1    promethazine (PHENERGAN) 25 MG tablet, Take 1 tablet by mouth Every 6 (Six) Hours As Needed for Nausea or Vomiting., Disp: 20 tablet, Rfl: 0    rosuvastatin (CRESTOR) 10 MG tablet, Take 1 tablet by mouth Every Night for cholesterol., Disp: 90 tablet, Rfl: 3    rosuvastatin (Crestor) 10 MG tablet, Take 1 tablet by mouth Every Night., Disp: 90 tablet, Rfl: 3    Semaglutide-Weight Management (Wegovy) 1 MG/0.5ML solution auto-injector, Inject 0.5 mL under the skin into the appropriate area as directed 1 (One) Time Per Week. New dose, Disp: 2 mL, Rfl: 5    sertraline (ZOLOFT) 100 MG tablet, Take 2 tablets by mouth Daily, Disp: 60 tablet, Rfl: 11    tiZANidine (ZANAFLEX) 4 MG tablet, Take 0.5-1 tablet by mouth up to 2 Times a Day  "As Needed for back pain, Disp: 30 tablet, Rfl: 5    valACYclovir (VALTREX) 500 MG tablet, Take 4 tablets by mouth as needed at onset of attack; THEN take 4 tablets 12 hours later., Disp: 8 tablet, Rfl: 5        Physical Exam:     Vitals:    06/24/24 1026   Weight: 73 kg (161 lb)   Height: 154.9 cm (60.98\")   PainSc:   3   PainLoc: Back          General: Alert and oriented, No acute distress.   HEENT: Normocephalic, atraumatic.   Cardiovascular: No gross edema  Respiratory: Respirations are non-labored    Lumbar Spine:   No masses or atrophy  Range of motion - Flexion normal. Extension normal. Right Lat Bending normal. Left Lat Bending normal  Facet Loading: Negative bilaterally  Facet Palpation - Nontender   PSIS tenderness - Negative bilaterally  Jeffrey's/PAOLO/Thigh thrust - Negative bilaterally  Straight leg raise: Positive right  Slump test: Positive right    Motor Exam:    Strength: Rate on 1-5 scale Right Left    L1/2- hip flexion 5 5    L3- knee extension 5 5    L4- ankle dorsiflexion 5 5    L5- great toe extension 5 5    S1- ankle plantarflexion 5 5    Sensory Exam: Altered sensation in right S1 dermatome.    Neurologic: Cranial Nerves II-XII are grossly intact.   Clonus -negative bilaterally    Psychiatric: Cooperative.   Gait: Antalgic  Assistive Devices: None    Physical exam is consistent and accurate as of 6/24/2024    2 spinal cord stimulator leads were removed without incident with tip intact.  No signs of surrounding inflammation, purulent drainage, or erythema.    Imaging Studies:   Results for orders placed during the hospital encounter of 11/19/22    MRI Lumbar Spine Without Contrast    Narrative  DATE OF EXAM: 11/19/2022 8:13 AM    PROCEDURE: MRI LUMBAR SPINE WO CONTRAST-    INDICATIONS: Lumbar radiculopathy, symptoms persist with > 6 wks  treatment; M54.40-Lumbago with sciatica, unspecified side    COMPARISON: No comparisons available.    TECHNIQUE: Routine magnetic resonance imaging of the " lumbar spine was  performed without the administration of contrast.    FINDINGS:  The alignment is anatomic. The vertebral body heights appear normal.  There are degenerative endplate changes at L5-S1. There is disc  desiccation at L4-5 and L5-S1, with advanced degenerative loss of disc  height at L5-S1. The conus terminates at the L1-2 level. The posterior  paravertebral soft tissues are unremarkable.    L1-2: No spinal canal or neural foraminal stenosis.    L2-3: Mild bilateral facet arthropathy. No spinal canal stenosis. No  neural foraminal stenosis.    L3-4: Mild disc bulge. Mild bilateral facet arthropathy. No spinal canal  stenosis. Mild right neural foraminal stenosis.    L4-5: Mild disc bulge with superimposed small central disc protrusion.  Mild bilateral facet arthropathy. Mild spinal canal stenosis. Mild  bilateral neural foraminal stenosis.    L5-S1: Moderate disc bulge. Mild right and moderate facet arthropathy.  Mild spinal canal stenosis. Mild to moderate right and mild left neural  foraminal stenosis.    Impression  Mild multilevel degenerative changes of lumbar spine as described above.    This report was finalized on 11/19/2022 9:02 AM by Jaxson Connolly MD.      MRI LUMBAR SPINE W WO CONTRAST     Date of Exam: 5/1/2024 6:39 PM EDT     Indication: right leg pain, hx of discectomy.     Comparison: 9/25/2023.     Technique:  Routine multiplanar/multisequence sequence images of the lumbar spine were obtained before and after the uneventful administration of 17 mL Multihance.          Findings:   Postoperative changes are noted from prior right hemilaminotomy at L4-5 and L5-S1. T1 marrow signal is otherwise preserved, without evidence of fracture or suspicious marrow replacing lesion. Alignment is anatomic, without evidence of significant   listhesis or subluxation. The conus medullaris and cauda equina nerve roots are satisfactory in appearance. The paraspinal soft tissues demonstrate no acute or  suspicious findings. Multilevel spondylosis is present, with areas of involvement including     L1-2, no significant spinal canal or neuroforaminal impingement.     L2-3, no significant spinal canal or neuroforaminal impingement.     L3-4, small disc bulge and bilateral facet arthropathy. There is minimal spinal canal and bilateral neuroforaminal narrowing present.     L4-5, postoperative changes are present with some enhancing scar tissue seen along the operative tract. A broad-based circumferential disc bulge persists, without new focal disc herniation. The spinal canal and neural foramina are patent.     L5-S1, postoperative changes are present with some enhancing scar tissue seen along the operative tract. A broad-based circumferential disc bulge persists in addition to some facet arthropathy, without new focal disc herniation. The spinal canal is   patent and there is unchanged mild bilateral neuroforaminal narrowing, greater on the right.     IMPRESSION:  Impression:   Stable contrast-enhanced MRI of the lumbar spine demonstrating postoperative changes at the L4-5 and L5-S1 levels similar to comparison. These levels demonstrate some mild persistent spondylosis, otherwise without new focal disc herniation. There is no   new high-grade spinal canal or neuroforaminal impingement.        Electronically Signed: Fox Donohue MD    5/2/2024 9:43 AM EDT    Workstation ID: [id number]    Independent interpretation of radiographic imaging:  Lumbar MRI dated 5/2/2024 demonstrates: DDD worst at L5/S1; no significant central canal stenosis; broad-based disc bulge at L4/5; mild-moderate bilateral NFS L5/S1, R>L; postoperative changes at L5-S1; no new central disc herniation, spinal canal stenosis, or neuroforaminal stenosis; facet arthropathy.       Impression & Plan:   3/16/2023: Ms. Trinidad Quinteros is a 53 y.o. female with past medical history significant for GERD, depression, HTN, ADRIAN, who presents to the pain clinic for  evaluation and treatment of chronic low back pain with radiation to right lower extremity.  I personally reviewed the patient's lumbar MRI dated 11/19/2022 which demonstrates a central and right posterior L5/S1 disc herniation causing right L5/S1 neuroforaminal stenosis and lateral recess stenosis.  Clinical examination consistent with a right S1 radiculitis.  We discussed right S1 foramen epidural steroid injection to improve pain.  If greater than 50% relief for at least 2-3 months can consider repeat as needed every 3 to 4 months.  I had an in-depth discussion with the patient regarding the risks of the procedure including bleeding, infection, damage to surrounding structures, paralysis and even death.  We discussed the potential adverse effects of corticosteroid injection including flushing of the face, lipodystrophy, skin discoloration, elevated blood glucose, increased blood pressure.  Risks of frequent steroid administration include weight gain, hormonal changes, mood changes, osteoporosis.    If no benefit from epidural steroid will refer for neurosurgical evaluation.  11/20/2023: Now status post right-sided L4/5 foraminotomy and L5/S1 discectomy with resolution of right-sided symptoms.  Recent onset left-sided symptoms.  I personally reviewed and interpreted her lumbar MRI dated 9/25/2023: Interval improvement in right L4/5 NFS; persistent L5/S1 disc herniation.  Exam consistent with lumbar radiculopathy.  We will trial left L5/S1 and S1 TFESI.  2/7/2024: Excellent benefit from left sided TFESI.  Complains of right-sided symptoms. Will plan for right L4/5 and L5/S1 TFESI.  If no benefit consider referral back to neurosurgery.  3/27/2024: No benefit from right TFESI.  Will refer back to NSA.  Consider SCS trial if no neurosurgical invention  5/23/2024: Lumbar MRI reviewed.  Will obtain psych clearance for SCS trial.  [place] to educate.  6/24/2024: Excellent relief from SCS trial.  Will proceed with permanent  implant.  Risks and benefits of procedure discussed at length.  Will need to hold Wegovy 2 weeks prior to procedure    1. Lumbar radiculopathy          PLAN:  1. Medication Recommendations: Continue per PCP  -Agree with gabapentin  -Chlorhexidine wash given at today's appointment.  To be used 5 days before procedure  -Will send mupirocin nasal ointment to be used 3 times a day for 5 days prior to procedure    2. Physical Therapy: Continue HEP    3. Psychological: Psych clearance obtained    4. Complementary and alternative (CAM) Therapies:     5. Labs: None indicated     6. Imagin. Interventions: Schedule spinal cord stimulator permanent implant (63650 x2, [id number], 72748)  -Patient will need to hold Wegovy injections for 2 weeks prior to procedure    8. Referrals:     9. Records requested:     10. Lifestyle goals:    Follow-up 10 days after procedure        [facility]  Megha Frazier PA-C    Quality metrics:  Trinidad MARIO Neli reports a pain score of 3.  Given her pain assessment as noted, treatment options were discussed and the following options were decided upon as a follow-up plan to address the patient's pain: continuation of current treatment plan for pain.

## 2024-07-01 ENCOUNTER — OFFICE VISIT (OUTPATIENT)
Dept: FAMILY MEDICINE CLINIC | Facility: CLINIC | Age: 54
End: 2024-07-01
Payer: COMMERCIAL

## 2024-07-01 VITALS
TEMPERATURE: 98 F | SYSTOLIC BLOOD PRESSURE: 124 MMHG | WEIGHT: 160.4 LBS | OXYGEN SATURATION: 98 % | DIASTOLIC BLOOD PRESSURE: 70 MMHG | BODY MASS INDEX: 30.29 KG/M2 | HEIGHT: 61 IN | HEART RATE: 62 BPM

## 2024-07-01 DIAGNOSIS — H92.01 EAR PAIN, RIGHT: ICD-10-CM

## 2024-07-01 DIAGNOSIS — S46.811A STRAIN OF RIGHT TRAPEZIUS MUSCLE, INITIAL ENCOUNTER: ICD-10-CM

## 2024-07-01 PROCEDURE — 99213 OFFICE O/P EST LOW 20 MIN: CPT | Performed by: PHYSICIAN ASSISTANT

## 2024-07-01 RX ORDER — METHYLPREDNISOLONE 4 MG/1
TABLET ORAL
Qty: 1 EACH | Refills: 0 | Status: SHIPPED | OUTPATIENT
Start: 2024-07-01

## 2024-07-01 NOTE — PROGRESS NOTES
Subjective   Trinidad Quinteros is a 53 y.o. female  Earache (Right ear pain x1 day )      History of Present Illness  History of Present Illness  The patient comes in today for evaluation of her ear hurting since yesterday.    The patient began experiencing pain in her right ear yesterday, accompanied by mild sinus drainage. She denies any facial pressure or soreness. Her ocular health is unaffected. She reports an unusual sensation in her jaw, despite having all her implants. She denies any dental issues. She also denies experiencing any popping or cracking sounds.    The following portions of the patient's history were reviewed and updated as appropriate: allergies, current medications, past social history and problem list    Review of Systems   Constitutional: Negative.    HENT:  Positive for ear pain. Negative for congestion and dental problem.    Musculoskeletal:  Positive for neck stiffness.       Objective     Vitals:    07/01/24 1125   BP: 124/70   Pulse: 62   Temp: 98 °F (36.7 °C)   SpO2: 98%       Physical Exam  Vitals and nursing note reviewed.   Constitutional:       General: She is not in acute distress.     Appearance: Normal appearance. She is well-developed. She is not ill-appearing, toxic-appearing or diaphoretic.   HENT:      Head: Normocephalic and atraumatic.      Right Ear: External ear normal.      Left Ear: External ear normal.      Mouth/Throat:      Pharynx: No oropharyngeal exudate.   Eyes:      General:         Right eye: No discharge.         Left eye: No discharge.      Conjunctiva/sclera: Conjunctivae normal.   Neck:      Thyroid: No thyromegaly.      Vascular: No JVD.      Trachea: No tracheal deviation.   Cardiovascular:      Rate and Rhythm: Normal rate and regular rhythm.      Heart sounds: Normal heart sounds.   Pulmonary:      Effort: Pulmonary effort is normal. No respiratory distress.      Breath sounds: Normal breath sounds.   Musculoskeletal:      Cervical back: Normal range of  motion and neck supple. Spinous process tenderness and muscular tenderness present. Decreased range of motion.   Lymphadenopathy:      Cervical: No cervical adenopathy.   Skin:     General: Skin is warm and dry.   Neurological:      Mental Status: She is alert and oriented to person, place, and time.      Sensory: No sensory deficit.      Motor: No weakness.      Coordination: Coordination normal.       Physical Exam      Assessment & Plan   Assessment & Plan  1. Right ear pain.  Upon examination, her ears appear normal, and her lymph nodes appear normal. There is no evidence of infection or fluid accumulation in her ear. However, there is evidence of inflammation around the joint of her jaw, neck, and ear. A 6-day course of prednisone has been prescribed. Additionally, she has been advised to take 2 tizanidine tablets at bedtime and continue with gabapentin 600 mg at night.    Diagnoses and all orders for this visit:    1. Ear pain, right    2. Strain of right trapezius muscle, initial encounter    Other orders  -     methylPREDNISolone (MEDROL) 4 MG dose pack; Take as directed on package instructions.  Dispense: 1 each; Refill: 0         Patient or patient representative verbalized consent for the use of Ambient Listening during the visit with  Josephine Delacruz PA-C for chart documentation. 7/1/2024  12:24 EDT

## 2024-07-02 RX ORDER — SERTRALINE HYDROCHLORIDE 100 MG/1
200 TABLET, FILM COATED ORAL DAILY
Qty: 60 TABLET | Refills: 11 | Status: SHIPPED | OUTPATIENT
Start: 2024-07-02

## 2024-07-12 RX ORDER — FOLIC ACID 1 MG/1
1 TABLET ORAL DAILY
Qty: 30 TABLET | Refills: 11 | Status: SHIPPED | OUTPATIENT
Start: 2024-07-12

## 2024-07-18 ENCOUNTER — OFFICE VISIT (OUTPATIENT)
Dept: FAMILY MEDICINE CLINIC | Facility: CLINIC | Age: 54
End: 2024-07-18
Payer: COMMERCIAL

## 2024-07-18 VITALS
SYSTOLIC BLOOD PRESSURE: 138 MMHG | OXYGEN SATURATION: 99 % | WEIGHT: 164.2 LBS | TEMPERATURE: 97.8 F | HEIGHT: 61 IN | BODY MASS INDEX: 31 KG/M2 | DIASTOLIC BLOOD PRESSURE: 80 MMHG | HEART RATE: 76 BPM

## 2024-07-18 DIAGNOSIS — M54.17 LUMBOSACRAL RADICULOPATHY: Primary | ICD-10-CM

## 2024-07-18 PROCEDURE — 99213 OFFICE O/P EST LOW 20 MIN: CPT | Performed by: PHYSICIAN ASSISTANT

## 2024-07-18 RX ORDER — PREGABALIN 100 MG/1
100 CAPSULE ORAL 2 TIMES DAILY
Qty: 60 CAPSULE | Refills: 2 | Status: SHIPPED | OUTPATIENT
Start: 2024-07-18

## 2024-07-18 NOTE — PROGRESS NOTES
Subjective   Trinidad Quinteros is a 53 y.o. female  Back Pain (Follow up on back pain, has appt scheduled for surgery Aug 6th, req change to lyrica)      History of Present Illness  History of Present Illness  The patient is coming in today for follow-up on chronic back pain with radiculopathy. She is currently on gabapentin, but having breakthrough pain. She is scheduled for a spinal surgery on 08/06/2024.    She is scheduled for a nerve stimulator placement on 08/06/2024. Prior to this, she had a temporary nerve stimulator, which effectively alleviated her leg pain, enabling her to walk. She believes that once the nerve stimulator is implanted, she may not require Lyrica or gabapentin. She previously took Lyrica for a back injury, which was effective and did not cause any side effects. Currently, she is taking gabapentin 300 mg 4 times a day. Her pain is manageable. She underwent an MRI or CT scan, which did not reveal any surgical solutions. She returned to Dr. Schroeder in November 2023 when she felt a pop in her back, which became aggravated when she bent over. This week, her chair flipped over, causing her to fall on the floor. She experiences constant pain in her right leg, which becomes stiff and weak when she walks. She uses a cane for support. Driving does not exacerbate her pain.    The following portions of the patient's history were reviewed and updated as appropriate: allergies, current medications, past social history and problem list    Review of Systems   Constitutional: Negative.    Respiratory: Negative.     Gastrointestinal: Negative.    Genitourinary: Negative.    Musculoskeletal:  Positive for back pain and gait problem. Negative for arthralgias and myalgias.   Neurological:  Positive for numbness. Negative for dizziness, tremors and weakness.       Objective     Vitals:    07/18/24 1120   BP: 138/80   Pulse: 76   Temp: 97.8 °F (36.6 °C)   SpO2: 99%       Physical Exam  Vitals and nursing note  reviewed.   Constitutional:       General: She is not in acute distress.     Appearance: Normal appearance. She is well-developed. She is not ill-appearing, toxic-appearing or diaphoretic.   Cardiovascular:      Rate and Rhythm: Normal rate and regular rhythm.   Pulmonary:      Effort: Pulmonary effort is normal. No respiratory distress.      Breath sounds: Normal breath sounds.   Abdominal:      General: Bowel sounds are normal.      Palpations: Abdomen is soft.   Musculoskeletal:         General: Tenderness present. No deformity.      Lumbar back: Tenderness present. No swelling, deformity, spasms or bony tenderness. Decreased range of motion.   Skin:     General: Skin is warm and dry.      Findings: No bruising or rash.   Neurological:      Mental Status: She is alert and oriented to person, place, and time.      Sensory: No sensory deficit.      Coordination: Coordination normal.      Gait: Gait abnormal (slow and deliberant).      Deep Tendon Reflexes: Reflexes are normal and symmetric.   Psychiatric:         Mood and Affect: Mood normal.         Behavior: Behavior normal.       Physical Exam      Assessment & Plan   Assessment & Plan  1. Chronic back pain with radiculopathy.  She has previously taken Lyrica as prescribed by pain management, which she tolerated well without any adverse effects. Gabapentin will be discontinued. Lyrica 100 mg twice daily will be prescribed for a duration of 3 months. Two refills will be provided until her nerve stimulator is implanted. Should Lyrica cause dizziness, she is advised to avoid driving , at which point a reduction to 75 mg will be considered.    Diagnoses and all orders for this visit:    1. Lumbosacral radiculopathy (Primary)  -     pregabalin (Lyrica) 100 MG capsule; Take 1 capsule by mouth 2 (Two) Times a Day.  Dispense: 60 capsule; Refill: 2    Follow-up with pain management and scheduled follow-up with me in 3 months for recheck.    As part of this patient's  treatment plan, patient will be prescribed controlled substances. The patient has been made aware of appropriate use of such medications, including potential risk of somnolence, limited ability to drive and /or work safely, and potential for dependence or overdose. It has also been made clear that these medications are for use by this patient only, without concomitant use of alcohol or other substances unless prescribed.Controlled substance status of medication discussed with patient, discussed risks of medication including abuse potential and diversion potential and need to follow up for reevaluation appointment in order to receive further refills.    Part of this note may be an electronic transcription/translation of spoken language to printed text using the Dragon Dictation System.        Patient or patient representative verbalized consent for the use of Ambient Listening during the visit with  Josephine Delacruz PA-C for chart documentation. 7/18/2024  11:51 EDT

## 2024-07-29 ENCOUNTER — OFFICE VISIT (OUTPATIENT)
Dept: FAMILY MEDICINE CLINIC | Facility: CLINIC | Age: 54
End: 2024-07-29
Payer: COMMERCIAL

## 2024-07-29 VITALS
SYSTOLIC BLOOD PRESSURE: 130 MMHG | BODY MASS INDEX: 31.23 KG/M2 | WEIGHT: 165.4 LBS | OXYGEN SATURATION: 99 % | DIASTOLIC BLOOD PRESSURE: 78 MMHG | HEART RATE: 81 BPM | HEIGHT: 61 IN | TEMPERATURE: 98.4 F

## 2024-07-29 DIAGNOSIS — J30.89 NON-SEASONAL ALLERGIC RHINITIS, UNSPECIFIED TRIGGER: ICD-10-CM

## 2024-07-29 DIAGNOSIS — J01.90 ACUTE NON-RECURRENT SINUSITIS, UNSPECIFIED LOCATION: ICD-10-CM

## 2024-07-29 DIAGNOSIS — M75.41 IMPINGEMENT SYNDROME OF RIGHT SHOULDER: ICD-10-CM

## 2024-07-29 PROCEDURE — 99213 OFFICE O/P EST LOW 20 MIN: CPT | Performed by: PHYSICIAN ASSISTANT

## 2024-07-29 RX ORDER — AZITHROMYCIN 250 MG/1
TABLET, FILM COATED ORAL
Qty: 6 TABLET | Refills: 0 | Status: SHIPPED | OUTPATIENT
Start: 2024-07-29

## 2024-07-29 NOTE — PROGRESS NOTES
Subjective   Trinidad Quinteros is a 53 y.o. female  Sinus Problem (Sinus pressure/ sinus drainage and sneezing. Stahist not helping )      History of Present Illness  History of Present Illness  The patient is a 53-year-old female who is coming in today for evaluation of persistent ongoing sinus congestion, pressure, and sneezing that is not responding to Stahist.    She experienced a swollen nose yesterday, accompanied by watery discharge. She has not been tested for COVID-19 since the onset of her symptoms. These symptoms have been present for approximately a week. She recalls working in [employer] on Saturday, which she believes may have triggered her symptoms. She experienced sneezing and eye pressure yesterday, but denies having a fever, body aches, or chills. She also denies having a sore throat. She has been using nasal sprays, which have provided some relief.    Supplemental Information  She is getting her back surgery next Tuesday. She has already stopped taking her Wegovy because she was trying to get it done earlier.    SOCIAL HISTORY  She is a smoker.    The following portions of the patient's history were reviewed and updated as appropriate: allergies, current medications, past social history and problem list    Review of Systems   Constitutional:  Negative for chills, fatigue and fever.   HENT:  Positive for congestion, postnasal drip, rhinorrhea, sinus pressure, sinus pain and sneezing. Negative for ear pain, hearing loss, sore throat and trouble swallowing.    Eyes:  Negative for itching.   Respiratory:  Negative for cough.    Cardiovascular:  Negative for chest pain.   Allergic/Immunologic: Positive for environmental allergies.   Neurological:  Negative for headaches.       Objective     Vitals:    07/29/24 1134   BP: 130/78   Pulse: 81   Temp: 98.4 °F (36.9 °C)   SpO2: 99%       Physical Exam  Vitals and nursing note reviewed.   Constitutional:       General: She is not in acute distress.      Appearance: Normal appearance. She is well-developed. She is not ill-appearing, toxic-appearing or diaphoretic.   HENT:      Head: Normocephalic and atraumatic.      Right Ear: Tympanic membrane, ear canal and external ear normal.      Left Ear: Tympanic membrane, ear canal and external ear normal.      Nose: Nose normal. Congestion present.      Right Sinus: Maxillary sinus tenderness present.      Left Sinus: Maxillary sinus tenderness present.   Eyes:      Conjunctiva/sclera: Conjunctivae normal.   Cardiovascular:      Rate and Rhythm: Normal rate and regular rhythm.      Heart sounds: Normal heart sounds.   Pulmonary:      Effort: Pulmonary effort is normal.      Breath sounds: Normal breath sounds.   Neurological:      Mental Status: She is alert.       Physical Exam  Ears appear normal. Throat appears normal.    Assessment & Plan   Assessment & Plan  1. Sinus congestion, pressure, and sneezing.  Given her smoking habit, a short course of antibiotics is deemed necessary due to her upcoming surgical procedure on Tuesday. Flonase will be used once daily. A 5-day course of Z-Raffy will be prescribed. Her Xyzal dosage will be increased to 2 tablets at bedtime for the next 3 to 4 days. The steroid nasal spray will be continued.    Diagnoses and all orders for this visit:    1. Acute non-recurrent sinusitis, unspecified location    2. Non-seasonal allergic rhinitis, unspecified trigger    Other orders  -     azithromycin (Zithromax Z-Raffy) 250 MG tablet; Take 2 tablets by mouth on day 1, then 1 tablet daily on days 2-5  Dispense: 6 tablet; Refill: 0         Patient or patient representative verbalized consent for the use of Ambient Listening during the visit with  Josephine Delacruz PA-C for chart documentation. 7/29/2024  12:00 EDT

## 2024-07-30 RX ORDER — MELOXICAM 7.5 MG/1
7.5 TABLET ORAL DAILY
Qty: 30 TABLET | Refills: 5 | Status: SHIPPED | OUTPATIENT
Start: 2024-07-30

## 2024-08-06 ENCOUNTER — OUTSIDE FACILITY SERVICE (OUTPATIENT)
Dept: PAIN MEDICINE | Facility: CLINIC | Age: 54
End: 2024-08-06
Payer: COMMERCIAL

## 2024-08-06 ENCOUNTER — DOCUMENTATION (OUTPATIENT)
Dept: PAIN MEDICINE | Facility: CLINIC | Age: 54
End: 2024-08-06

## 2024-08-06 DIAGNOSIS — G89.18 POSTOPERATIVE PAIN: Primary | ICD-10-CM

## 2024-08-06 RX ORDER — HYDROCODONE BITARTRATE AND ACETAMINOPHEN 5; 325 MG/1; MG/1
1 TABLET ORAL EVERY 6 HOURS PRN
Qty: 12 TABLET | Refills: 0 | Status: SHIPPED | OUTPATIENT
Start: 2024-08-06

## 2024-08-06 NOTE — PROGRESS NOTES
[facility]  [address] [place] [address]    PROCEDURE: Spinal Cord Stimulator Implant, Two Leads    Device: ABBOTT    PRE-OP DIAGNOSIS: Lumbar Radiculopathy, Failed back surgery syndrome  POST-OP DIAGNOSIS: Same    BLOOD THINNERS (ANTIPLATELETS/ANTICOAGULANTS): Were discussed with the patient and AUDREY Guidelines were followed.    CONSENT: Risks, benefits and options were explained to the patient, all questions were answered and written informed consent was obtained.     ANESTHESIA: MAC. See anesthetic record    ANTIBIOTICS: Ancef 2 gm IV    PROCEDURE NOTE: The patient was placed prone with the abdomen supported by a pillow and all pressure points were padded. Standard ASA monitors were applied. A timeout protocol was performed. Intravenous Antibiotics were administered prior to making incision. Proper protective gear was worn by the physician including a mask, scrub cap, sterile gown, and sterile gloves. The patient was prepped and draped in the usual sterile fashion using Chlorhexidine followed by sterile towels, laparotomy full body drape and Ioban. Fluoroscopy was then used to  identify target entry epidural space and needle entry site at the skin. Next, a 50/50 mixture containing 0.5% bupivacaine and 1% lidocaine with epinephrine was used to raise a skin wheal and anesthetize deeper tissues at the midline. Then a 15 blade scalpel was used to create a longitudinal midline incision down to the superficial fascial plane. Hemostasis was achieved throughout with a combination of pressure and electrocautery. Next, a 22 gauge 3.5 inch spinal needle was used to anesthetize the tract down to the target lamina. Using intermittent fluoroscopic guidance, a 14 gauge Tuohy needle was then advanced to contact the inferior lamina of the target entry interlaminar space. Onder contralateral oblique, AP and Lateral intermittent fluoroscopy the needle was then walked off in a  superior medial direction and a guidewire was used to facilitate entrance into the epidural space with a loss of resistance technique into the target interlaminar space at T12/L1. A stimulator lead was advanced through the Tuohy needle into the epidural space and directed to rest the tip at the top of the T7 vertebral body. The procedure was then repeated with a second 14 gauge Tuohy needle with again loss of resistance to wire at the same interlaminar space. The second stimulator lead was advanced through the Tuohy needle into the epidural space and directed to rest at the top of the T8 vertebral body. AP and lateral views were used to confirm appropriate posterior and midline position of the leads.  The needles were withdrawn from the epidural space leaving the leads carefully in place.     Leads were then anchored to the superficial fascia using device company anchoring device and secured with 0-0 Ethibond suture.     Attention was then directed to the LEFT flank site above the belt line for the Internal Pulse Generator (IPG). Skin was anesthetized in same fashion and a horizontal incision was made and dissected down approximately 1 cm deep. A combination of blunt and sharp dissection was used to create a small pocket for the IPG. Hemostasis was achieved throughout with a combination of pressure and electrocautery. Next, a tunneling device was used to bring the stimulator leads to the IPG pocket. The lead tips were cleaned and connected to the IPG. Impedance testing was performed by device company representative to confirm appropriate functioning of leads. The leads were then locked into the IPG. Both pocket and lead anchor incisions were thoroughly irrigated with 0.9% normal saline and the IPG battery was placed in the pocket. Care was taken to curl the leads under the battery during placement in the pocket. Finally, both incisions were closed. Deep layers were closed with 2-0 PDS suture in simple interrupted  fashion and then subcuticular tissue was closed with 3-0 Vicryl suture. Exofin was then used to approximate the incision edges. Occlusive sterile dressings were then applied to both of the surgical sites. An abdominal binder was placed on the patient. The patient was transported to the recovery room with standard ASA monitors in stable condition.    EBL: Less than 50mL    COMPLICATIONS: None. The patient tolerated the procedure well. Vital signs were stable. Sensory and motor exam was unchanged from baseline. The patient was observed in recovery for appropriate time and discharged in stable condition.    FOLLOW UP: as scheduled for post operative visit in clinic    ADDITIONAL NOTES: [n/a]    Final programming of the device was done in the recovery room by the device  representative. The patient (or responsible party) was given post-procedural and  discharge instructions to follow at home with an emphasis on instructions to avoid  infection and lead migration.    CODES:   63650 x 2  [id number]  [id number]

## 2024-08-15 ENCOUNTER — TELEPHONE (OUTPATIENT)
Dept: PAIN MEDICINE | Facility: CLINIC | Age: 54
End: 2024-08-15

## 2024-08-15 ENCOUNTER — OFFICE VISIT (OUTPATIENT)
Dept: PAIN MEDICINE | Facility: CLINIC | Age: 54
End: 2024-08-15
Payer: COMMERCIAL

## 2024-08-15 VITALS — BODY MASS INDEX: 30.79 KG/M2 | WEIGHT: 163.1 LBS | HEIGHT: 61 IN

## 2024-08-15 DIAGNOSIS — Z96.89 S/P INSERTION OF SPINAL CORD STIMULATOR: Primary | ICD-10-CM

## 2024-08-15 DIAGNOSIS — M54.16 LUMBAR RADICULOPATHY: ICD-10-CM

## 2024-08-15 PROCEDURE — 99024 POSTOP FOLLOW-UP VISIT: CPT

## 2024-08-15 NOTE — TELEPHONE ENCOUNTER
Provider: GREG ROSAS    Caller: ISIDRA RIVERA    Relationship to Patient: SELF    Phone Number: [phone]    Reason for Call: PT WANTING CALL BACK REGARDING IF IT'S OKAY TO GO TO THE [facility] TOMORROW TO HAVE WISDOM TOOTH TAKEN OUT, STATING SHE DID HAVE SX LAST WEEK.

## 2024-08-15 NOTE — PROGRESS NOTES
Primary Physician: Josephine Delacruz PA-C    CHIEF COMPLAINT or REASON FOR VISIT: Follow-up (Post SCS implantation. ) and Back Pain    Initial HPI 3/16/2023:  Ms. Trinidad Quinteros is 53 y.o. female who presents as a new patient referral for evaluation and treatment of chronic low back pain with radiation to right lower extremity.  Ms. Quinteros states that she for the past year has had slowly increasing pain.  She has prior PMH hip pain for which she has been taking Percocet however this has not been helpful for her pain.  She is additionally trialed gabapentin.  She describes a sharp burning tingling pain radiating down her right buttock and the posterior lateral aspect of her thigh and occasionally into the plantar surface of her foot.  Patient denies any bowel or bladder dysfunction, lower extremity weakness, new onset saddle anesthesia or unexplained weight loss.  She does work at [employer] with Dr. Roth.    Ms. Quinteros recently saw consultation with interventional pain, Dr. Rey, who diagnosed her with right lumbar radiculopathy and performed a right L4/5 and L5/S1 transforaminal epidural steroid injection.  Patient reported relief for approximately 1 week however unfortunately pain returned worse than previous.    Interval history:   Patient returns to clinic today after undergoing a permanent SCS implant with [employer].  She is reporting excellent pain relief thus far.  She is currently off work at the moment.  Her incisions continue to heal appropriately without surrounding erythema, inflammation, or purulent drainage.  Appropriate wound care instructions were discussed.  She is to let warm soapy water run over top of her wound but to avoid vigorous scrubbing.  Signs and symptoms of infection such as fever, chills, night sweats she voiced understanding.  Lastly, we discussed postoperative restrictions for approximately were discussed with patient at length.  Lastly, postoperative restrictions were  discussed with patient and should be continued for at least 4-5 more weeks.      Interventions:  2023: Right S1 TFESI with 1 to 2 days benefit  2023: Left L5/S1 and S1 TFESI with 90% relief ongoing.   2/15/2024: Right L4/5 and L5/S1 TFESI with 0% benefit.   2024: SCS trial [employer] with 90% relief  2024: Permanent SCS Implant (leads top of T8) with 80-90% relief ongoing    Objective Pain Scoring:   BRIEF PAIN INVENTORY:  Total score:   Pain Score    08/15/24 0839   PainSc:   4   PainLoc: Back          PHQ-2: PHQ-2 Total Score: 0  PHQ-9: PHQ-9: Brief Depression Severity Measure Score: 0  Opioid Risk Tool:         Review of Systems:   ROS negative except as otherwise noted     Past Medical History:   Past Medical History:   Diagnosis Date    Allergic     Anxiety     Chronic pain disorder     CTS (carpal tunnel syndrome)     Degenerative disc disease, cervical 2019    Depression     Extremity pain     GERD (gastroesophageal reflux disease)     Hyperlipidemia     Hypertension     Joint pain     Low back pain     Lumbar radiculopathy 2023    Neck pain     ADRIAN on CPAP     Smoker     Wears glasses          Past Surgical History:   Past Surgical History:   Procedure Laterality Date    APPENDECTOMY  1998    BACK SURGERY  23    CARDIAC CATHETERIZATION  1992    CARDIAC CATHETERIZATION       SECTION      COLONOSCOPY      ENDOSCOPY      HAND TENDON SURGERY Right     LUMBAR DISCECTOMY Right 2023    Procedure: LUMBAR DISCECTOMY, L5-S1, L4-5 FORAMINOTOMY- RIGHT;  Surgeon: Nakul Hurt MD;  [facility]: [facility];  Service: Neurosurgery;  Laterality: Right;    SPINAL CORD STIMULATOR IMPLANT N/A 2024    [facility] [employer]    SUBTOTAL HYSTERECTOMY      TUBAL ABDOMINAL LIGATION           Family History   Family History   Problem Relation Age of Onset    COPD Mother     Arthritis Mother     Diabetes Mother     Hypertension Mother      Miscarriages / Stillbirths Mother     COPD Father     Diabetes Father     Hypertension Father     Breast cancer Maternal Aunt 50    Cancer Other     Arthritis Other     Diabetes Other     Asthma Other     Heart disease Other         heart trouble    Hypertension Other         high blood pressure    Alcohol abuse Other     COPD Brother     Diabetes Brother     Early death Brother     Hypertension Brother     COPD Brother     Diabetes Brother     Early death Brother         COPD/Non-Hodgkin lymphoma    Early death Brother         covid/pneumonia 2022    COPD Brother     Miscarriages / Stillbirths Daughter     Asthma Son     Ovarian cancer Neg Hx          Social History   Social History     Socioeconomic History    Marital status:    Tobacco Use    Smoking status: Every Day     Current packs/day: 0.50     Average packs/day: 0.7 packs/day for 74.3 years (54.6 ttl pk-yrs)     Types: Cigarettes     Start date: 5/4/1985    Smokeless tobacco: Never   Vaping Use    Vaping status: Never Used   Substance and Sexual Activity    Alcohol use: No    Drug use: No    Sexual activity: Yes     Partners: Male     Birth control/protection: None, Hysterectomy        Medications:     Current Outpatient Medications:     albuterol (ACCUNEB) 1.25 MG/3ML nebulizer solution, Take 3 mL (one vial) by nebulization Every 6 (Six) Hours As Needed for Wheezing or Shortness of Air., Disp: 75 mL, Rfl: 12    albuterol sulfate HFA (Ventolin HFA) 108 (90 Base) MCG/ACT inhaler, Inhale 2 puffs as directed every 4 hours As Needed for Wheezing or Shortness of Air and/or cough., Disp: 18 g, Rfl: 5    ALPRAZolam (XANAX) 0.5 MG tablet, Take 1 tablet by mouth 3 (Three) Times a Day As Needed for Anxiety., Disp: 80 tablet, Rfl: 5    azithromycin (Zithromax Z-Raffy) 250 MG tablet, Take 2 tablets by mouth on day 1, then 1 tablet daily on days 2-5, Disp: 6 tablet, Rfl: 0    buPROPion XL (Wellbutrin XL) 150 MG 24 hr tablet, Take 1 tablet by mouth Every Morning.,  Disp: 90 tablet, Rfl: 3    Chlorcyclizine-Pseudoephed 25-60 MG tablet, Take 0.5-1 tablets by mouth Every 12 (Twelve) Hours As Needed for congestion., Disp: 30 tablet, Rfl: 0    estradiol (Estrace) 0.5 MG tablet, Take 1 tablet by mouth Daily as directed for menopausal hot flashes. *Need appt for further refills*, Disp: 30 tablet, Rfl: 2    fluticasone (FLONASE) 50 MCG/ACT nasal spray, Use 2 sprays into each nostril as directed by provider Daily., Disp: 16 g, Rfl: 11    Fluticasone-Salmeterol (Advair Diskus) 250-50 MCG/ACT DISKUS, Inhale 1 puff 2 (Two) Times a Day., Disp: 60 each, Rfl: 11    folic acid (FOLVITE) 1 MG tablet, Take 1 tablet by mouth Daily., Disp: 30 tablet, Rfl: 11    glycopyrrolate (Robinul-Forte) 2 MG tablet, Take 1 tablet by mouth 2 (Two) Times a Day for diarrhea, Disp: 60 tablet, Rfl: 11    hydroCHLOROthiazide 25 MG tablet, Take 1 tablet by mouth Daily., Disp: 90 tablet, Rfl: 2    levocetirizine (XYZAL) 5 MG tablet, Take 1 tablet by mouth Every Evening for allergies, Disp: 30 tablet, Rfl: 11    losartan (COZAAR) 100 MG tablet, Take 1 tablet by mouth Daily., Disp: 90 tablet, Rfl: 3    meloxicam (MOBIC) 7.5 MG tablet, Take 1 tablet by mouth Daily., Disp: 30 tablet, Rfl: 5    mupirocin (BACTROBAN) 2 % nasal ointment, 1 Application into the nostril(s) as directed by provider 3 (Three) Times a Day. Apply three times daily for 5 days prior to procedure and after procedure as directed by surgeon., Disp: 30 g, Rfl: 0    omega-3 acid ethyl esters (LOVAZA) 1 g capsule, Take 1 capsule by mouth 2 (Two) Times a Day., Disp: 60 capsule, Rfl: 11    omega-3 acid ethyl esters (Lovaza) 1 g capsule, Take 1 capsule by mouth 2 (Two) Times a Day. For high triglycerides, Disp: 60 capsule, Rfl: 11    ondansetron (Zofran) 4 MG tablet, Take 1 tablet by mouth Every 8 (Eight) Hours As Needed for Nausea or Vomiting., Disp: 20 tablet, Rfl: 1    pregabalin (Lyrica) 100 MG capsule, Take 1 capsule by mouth 2 (Two) Times a Day.,  "Disp: 60 capsule, Rfl: 2    promethazine (PHENERGAN) 25 MG tablet, Take 1 tablet by mouth Every 6 (Six) Hours As Needed for Nausea or Vomiting., Disp: 20 tablet, Rfl: 0    rosuvastatin (CRESTOR) 10 MG tablet, Take 1 tablet by mouth Every Night for cholesterol., Disp: 90 tablet, Rfl: 3    rosuvastatin (Crestor) 10 MG tablet, Take 1 tablet by mouth Every Night., Disp: 90 tablet, Rfl: 3    Semaglutide-Weight Management (Wegovy) 1 MG/0.5ML solution auto-injector, Inject 0.5 mL under the skin into the appropriate area as directed 1 (One) Time Per Week. New dose, Disp: 2 mL, Rfl: 5    sertraline (ZOLOFT) 100 MG tablet, Take 2 tablets by mouth Daily, Disp: 60 tablet, Rfl: 11    tiZANidine (ZANAFLEX) 4 MG tablet, Take 0.5-1 tablet by mouth up to 2 Times a Day As Needed for back pain, Disp: 30 tablet, Rfl: 5    valACYclovir (VALTREX) 500 MG tablet, Take 4 tablets by mouth as needed at onset of attack; THEN take 4 tablets 12 hours later., Disp: 8 tablet, Rfl: 5    HYDROcodone-acetaminophen (NORCO) 5-325 MG per tablet, Take 1 tablet by mouth Every 6 (Six) Hours As Needed for Moderate Pain or Severe Pain. (Patient not taking: Reported on 8/15/2024), Disp: 12 tablet, Rfl: 0        Physical Exam:     Vitals:    08/15/24 0839   Weight: 74 kg (163 lb 1.6 oz)   Height: 154.9 cm (60.98\")   PainSc:   4   PainLoc: Back            General: Alert and oriented, No acute distress.   HEENT: Normocephalic, atraumatic.   Cardiovascular: No gross edema  Respiratory: Respirations are non-labored    Lumbar Spine:   No masses or atrophy  Range of motion - Flexion normal. Extension normal. Right Lat Bending normal. Left Lat Bending normal  Facet Loading: Negative bilaterally  Facet Palpation - Nontender   PSIS tenderness - Negative bilaterally  Jeffrey's/PAOLO/Thigh thrust - Negative bilaterally  Straight leg raise: Positive right  Slump test: Positive right    Motor Exam:    Strength: Rate on 1-5 scale Right Left    L1/2- hip flexion 5 5    L3- " knee extension 5 5    L4- ankle dorsiflexion 5 5    L5- great toe extension 5 5    S1- ankle plantarflexion 5 5    Sensory Exam: Altered sensation in right S1 dermatome.    Neurologic: Cranial Nerves II-XII are grossly intact.   Clonus -negative bilaterally    Psychiatric: Cooperative.   Gait: Antalgic  Assistive Devices: None    IPG and percutaneous lead incision site continue to heal appropriately without surrounding signs of infection, erythema, or inflammation.    Imaging Studies:   Results for orders placed during the hospital encounter of 11/19/22    MRI Lumbar Spine Without Contrast    Narrative  DATE OF EXAM: 11/19/2022 8:13 AM    PROCEDURE: MRI LUMBAR SPINE WO CONTRAST-    INDICATIONS: Lumbar radiculopathy, symptoms persist with > 6 wks  treatment; M54.40-Lumbago with sciatica, unspecified side    COMPARISON: No comparisons available.    TECHNIQUE: Routine magnetic resonance imaging of the lumbar spine was  performed without the administration of contrast.    FINDINGS:  The alignment is anatomic. The vertebral body heights appear normal.  There are degenerative endplate changes at L5-S1. There is disc  desiccation at L4-5 and L5-S1, with advanced degenerative loss of disc  height at L5-S1. The conus terminates at the L1-2 level. The posterior  paravertebral soft tissues are unremarkable.    L1-2: No spinal canal or neural foraminal stenosis.    L2-3: Mild bilateral facet arthropathy. No spinal canal stenosis. No  neural foraminal stenosis.    L3-4: Mild disc bulge. Mild bilateral facet arthropathy. No spinal canal  stenosis. Mild right neural foraminal stenosis.    L4-5: Mild disc bulge with superimposed small central disc protrusion.  Mild bilateral facet arthropathy. Mild spinal canal stenosis. Mild  bilateral neural foraminal stenosis.    L5-S1: Moderate disc bulge. Mild right and moderate facet arthropathy.  Mild spinal canal stenosis. Mild to moderate right and mild left neural  foraminal  stenosis.    Impression  Mild multilevel degenerative changes of lumbar spine as described above.    This report was finalized on 11/19/2022 9:02 AM by Jaxson Connolly MD.      MRI LUMBAR SPINE W WO CONTRAST     Date of Exam: 5/1/2024 6:39 PM EDT     Indication: right leg pain, hx of discectomy.     Comparison: 9/25/2023.     Technique:  Routine multiplanar/multisequence sequence images of the lumbar spine were obtained before and after the uneventful administration of 17 mL Multihance.          Findings:   Postoperative changes are noted from prior right hemilaminotomy at L4-5 and L5-S1. T1 marrow signal is otherwise preserved, without evidence of fracture or suspicious marrow replacing lesion. Alignment is anatomic, without evidence of significant   listhesis or subluxation. The conus medullaris and cauda equina nerve roots are satisfactory in appearance. The paraspinal soft tissues demonstrate no acute or suspicious findings. Multilevel spondylosis is present, with areas of involvement including     L1-2, no significant spinal canal or neuroforaminal impingement.     L2-3, no significant spinal canal or neuroforaminal impingement.     L3-4, small disc bulge and bilateral facet arthropathy. There is minimal spinal canal and bilateral neuroforaminal narrowing present.     L4-5, postoperative changes are present with some enhancing scar tissue seen along the operative tract. A broad-based circumferential disc bulge persists, without new focal disc herniation. The spinal canal and neural foramina are patent.     L5-S1, postoperative changes are present with some enhancing scar tissue seen along the operative tract. A broad-based circumferential disc bulge persists in addition to some facet arthropathy, without new focal disc herniation. The spinal canal is   patent and there is unchanged mild bilateral neuroforaminal narrowing, greater on the right.     IMPRESSION:  Impression:   Stable contrast-enhanced MRI of the  lumbar spine demonstrating postoperative changes at the L4-5 and L5-S1 levels similar to comparison. These levels demonstrate some mild persistent spondylosis, otherwise without new focal disc herniation. There is no   new high-grade spinal canal or neuroforaminal impingement.        Electronically Signed: Fox Donohue MD    5/2/2024 9:43 AM EDT    Workstation ID: [id number]    Independent interpretation of radiographic imaging:  Lumbar MRI dated 5/2/2024 demonstrates: DDD worst at L5/S1; no significant central canal stenosis; broad-based disc bulge at L4/5; mild-moderate bilateral NFS L5/S1, R>L; postoperative changes at L5-S1; no new central disc herniation, spinal canal stenosis, or neuroforaminal stenosis; facet arthropathy.       Impression & Plan:   3/16/2023: Ms. Trinidad Quinteros is a 53 y.o. female with past medical history significant for GERD, depression, HTN, ADRIAN, who presents to the pain clinic for evaluation and treatment of chronic low back pain with radiation to right lower extremity.  I personally reviewed the patient's lumbar MRI dated 11/19/2022 which demonstrates a central and right posterior L5/S1 disc herniation causing right L5/S1 neuroforaminal stenosis and lateral recess stenosis.  Clinical examination consistent with a right S1 radiculitis.  We discussed right S1 foramen epidural steroid injection to improve pain.  If greater than 50% relief for at least 2-3 months can consider repeat as needed every 3 to 4 months.  I had an in-depth discussion with the patient regarding the risks of the procedure including bleeding, infection, damage to surrounding structures, paralysis and even death.  We discussed the potential adverse effects of corticosteroid injection including flushing of the face, lipodystrophy, skin discoloration, elevated blood glucose, increased blood pressure.  Risks of frequent steroid administration include weight gain, hormonal changes, mood changes, osteoporosis.    If no  benefit from epidural steroid will refer for neurosurgical evaluation.  2023: Now status post right-sided L4/5 foraminotomy and L5/S1 discectomy with resolution of right-sided symptoms.  Recent onset left-sided symptoms.  I personally reviewed and interpreted her lumbar MRI dated 2023: Interval improvement in right L4/5 NFS; persistent L5/S1 disc herniation.  Exam consistent with lumbar radiculopathy.  We will trial left L5/S1 and S1 TFESI.  2024: Excellent benefit from left sided TFESI.  Complains of right-sided symptoms. Will plan for right L4/5 and L5/S1 TFESI.  If no benefit consider referral back to neurosurgery.  3/27/2024: No benefit from right TFESI.  Will refer back to NSA.  Consider SCS trial if no neurosurgical invention  2024: Lumbar MRI reviewed.  Will obtain psych clearance for SCS trial.  [place] to educate.  2024: Excellent relief from SCS trial.  Will proceed with permanent implant.  Risks and benefits of procedure discussed at length.  Will need to hold Wegovy 2 weeks prior to procedure  8/15/2024: Approximately 2 weeks s/p permanent SCS implant.  Continues to recover appropriately.  Incisions healing appropriately.  Postop restrictions discussed.    1. S/P insertion of spinal cord stimulator    2. Lumbar radiculopathy            PLAN:  1. Medication Recommendations: Continue per PCP  -Agree with gabapentin    2. Physical Therapy:     3. Psychological: Psych clearance obtained    4. Complementary and alternative (CAM) Therapies:     5. Labs: None indicated     6. Imagin. Interventions: 2 weeks s/p permanent SCS implant.  [employer] device rep here for reprogramming    8. Referrals:     9. Records requested:     10. Lifestyle goals:    Follow-up 1 month      [facility]  Megha Frazier PA-C    Quality metrics:  Trin[place] MARIO Neli reports a pain score of 4.  Given her pain assessment as noted, treatment options were discussed and the  following options were decided upon as a follow-up plan to address the patient's pain: continuation of current treatment plan for pain.

## 2024-08-16 ENCOUNTER — PATIENT MESSAGE (OUTPATIENT)
Dept: PAIN MEDICINE | Facility: CLINIC | Age: 54
End: 2024-08-16
Payer: COMMERCIAL

## 2024-09-18 ENCOUNTER — TELEPHONE (OUTPATIENT)
Dept: PAIN MEDICINE | Facility: CLINIC | Age: 54
End: 2024-09-18

## 2024-09-18 ENCOUNTER — OFFICE VISIT (OUTPATIENT)
Dept: PAIN MEDICINE | Facility: CLINIC | Age: 54
End: 2024-09-18
Payer: COMMERCIAL

## 2024-09-18 VITALS — BODY MASS INDEX: 31.34 KG/M2 | HEIGHT: 61 IN | WEIGHT: 166 LBS

## 2024-09-18 DIAGNOSIS — M54.16 LUMBAR RADICULOPATHY: ICD-10-CM

## 2024-09-18 DIAGNOSIS — Z96.89 S/P INSERTION OF SPINAL CORD STIMULATOR: Primary | ICD-10-CM

## 2024-09-18 PROCEDURE — 99212 OFFICE O/P EST SF 10 MIN: CPT

## 2024-09-23 DIAGNOSIS — F41.9 ANXIETY: ICD-10-CM

## 2024-09-23 RX ORDER — FLUTICASONE PROPIONATE AND SALMETEROL 250; 50 UG/1; UG/1
1 POWDER RESPIRATORY (INHALATION)
Qty: 60 EACH | Refills: 11 | Status: SHIPPED | OUTPATIENT
Start: 2024-09-23

## 2024-09-23 RX ORDER — ALPRAZOLAM 0.5 MG
0.5 TABLET ORAL 3 TIMES DAILY PRN
Qty: 80 TABLET | Refills: 5 | OUTPATIENT
Start: 2024-09-23

## 2024-09-26 DIAGNOSIS — F41.9 ANXIETY: ICD-10-CM

## 2024-09-26 RX ORDER — ALPRAZOLAM 0.5 MG
0.5 TABLET ORAL 3 TIMES DAILY PRN
Qty: 80 TABLET | Refills: 5 | OUTPATIENT
Start: 2024-09-26

## 2024-09-27 DIAGNOSIS — F41.9 ANXIETY: ICD-10-CM

## 2024-09-27 RX ORDER — ALPRAZOLAM 0.5 MG
0.5 TABLET ORAL 3 TIMES DAILY PRN
Qty: 80 TABLET | Refills: 5 | OUTPATIENT
Start: 2024-09-27

## 2024-10-01 DIAGNOSIS — F41.9 ANXIETY: ICD-10-CM

## 2024-10-01 RX ORDER — ALPRAZOLAM 0.5 MG
0.5 TABLET ORAL 3 TIMES DAILY PRN
Qty: 80 TABLET | Refills: 5 | Status: CANCELLED | OUTPATIENT
Start: 2024-10-01

## 2024-10-02 ENCOUNTER — OFFICE VISIT (OUTPATIENT)
Dept: FAMILY MEDICINE CLINIC | Facility: CLINIC | Age: 54
End: 2024-10-02
Payer: COMMERCIAL

## 2024-10-02 VITALS
WEIGHT: 164 LBS | HEART RATE: 82 BPM | BODY MASS INDEX: 30.96 KG/M2 | SYSTOLIC BLOOD PRESSURE: 144 MMHG | TEMPERATURE: 98.7 F | DIASTOLIC BLOOD PRESSURE: 90 MMHG | OXYGEN SATURATION: 98 % | HEIGHT: 61 IN

## 2024-10-02 DIAGNOSIS — Z12.31 BREAST CANCER SCREENING BY MAMMOGRAM: ICD-10-CM

## 2024-10-02 DIAGNOSIS — N95.1 MENOPAUSAL AND FEMALE CLIMACTERIC STATES: ICD-10-CM

## 2024-10-02 DIAGNOSIS — R09.81 SINUS CONGESTION: ICD-10-CM

## 2024-10-02 DIAGNOSIS — E66.09 CLASS 1 OBESITY DUE TO EXCESS CALORIES WITH SERIOUS COMORBIDITY AND BODY MASS INDEX (BMI) OF 30.0 TO 30.9 IN ADULT: ICD-10-CM

## 2024-10-02 DIAGNOSIS — E66.811 CLASS 1 OBESITY DUE TO EXCESS CALORIES WITH SERIOUS COMORBIDITY AND BODY MASS INDEX (BMI) OF 30.0 TO 30.9 IN ADULT: ICD-10-CM

## 2024-10-02 DIAGNOSIS — F41.9 ANXIETY: ICD-10-CM

## 2024-10-02 DIAGNOSIS — F41.1 GAD (GENERALIZED ANXIETY DISORDER): ICD-10-CM

## 2024-10-02 DIAGNOSIS — R11.0 NAUSEA: ICD-10-CM

## 2024-10-02 DIAGNOSIS — R35.0 URINARY FREQUENCY: Primary | ICD-10-CM

## 2024-10-02 LAB
BILIRUB BLD-MCNC: NEGATIVE MG/DL
CLARITY, POC: CLEAR
COLOR UR: YELLOW
EXPIRATION DATE: NORMAL
GLUCOSE UR STRIP-MCNC: NEGATIVE MG/DL
KETONES UR QL: NEGATIVE
LEUKOCYTE EST, POC: NEGATIVE
Lab: NORMAL
NITRITE UR-MCNC: NEGATIVE MG/ML
PH UR: 6 [PH] (ref 5–8)
PROT UR STRIP-MCNC: NEGATIVE MG/DL
RBC # UR STRIP: NEGATIVE /UL
SP GR UR: 1.01 (ref 1–1.03)
UROBILINOGEN UR QL: NORMAL

## 2024-10-02 PROCEDURE — 81003 URINALYSIS AUTO W/O SCOPE: CPT | Performed by: PHYSICIAN ASSISTANT

## 2024-10-02 PROCEDURE — 99214 OFFICE O/P EST MOD 30 MIN: CPT | Performed by: PHYSICIAN ASSISTANT

## 2024-10-02 RX ORDER — ALPRAZOLAM 0.5 MG
0.5 TABLET ORAL 3 TIMES DAILY PRN
Qty: 80 TABLET | Refills: 5 | Status: SHIPPED | OUTPATIENT
Start: 2024-10-02

## 2024-10-02 NOTE — PROGRESS NOTES
Subjective   Trinidad Quinteros is a 54 y.o. female  Urinary Frequency (Urinary frequency with some pressure since the weekend ), Headache (Sinus headache, started back on wegovy and had some vomiting and nausea), and Anxiety (Refill on alprazolam )      History of Present Illness  History of Present Illness  The patient is a 54-year-old female presenting today with three distinct concerns.    She resumed her wegovy treatment on Sunday, starting with the lowest dose. This led to severe nausea, which persisted until Monday afternoon. Despite taking Zofran and Phenergan, she experienced vomiting. She also reports a cough but denies any sinus infection or colored discharge. She experienced a severe headache, which has since subsided. She continues to smoke, albeit less frequently.    She requires a refill of her Xanax prescription. She continues to take bupropion and sertraline.  Patient states that her anxiety is well-controlled on this medication regimen and she denies any adverse effects from any of her medications currently taken for anxiety.    The following portions of the patient's history were reviewed and updated as appropriate: allergies, current medications, past social history and problem list    Review of Systems   Constitutional:  Negative for appetite change and fatigue.   HENT:  Positive for congestion. Negative for trouble swallowing.    Respiratory:  Positive for cough. Negative for chest tightness and shortness of breath.    Gastrointestinal:  Positive for nausea and vomiting. Negative for abdominal pain and diarrhea.   Genitourinary:  Positive for frequency.   Neurological:  Negative for dizziness, tremors, weakness, light-headedness and headaches.   Psychiatric/Behavioral:  Negative for agitation, behavioral problems, confusion, decreased concentration, dysphoric mood, sleep disturbance and suicidal ideas. The patient is nervous/anxious (stable on medication).        Objective     Vitals:    10/02/24  1144   BP: 144/90   Pulse: 82   Temp: 98.7 °F (37.1 °C)   SpO2: 98%       Physical Exam  Vitals and nursing note reviewed.   Constitutional:       General: She is not in acute distress.     Appearance: Normal appearance. She is well-developed. She is obese. She is not ill-appearing, toxic-appearing or diaphoretic.      Comments: BMI30   HENT:      Head: Normocephalic and atraumatic.      Right Ear: External ear normal.      Left Ear: External ear normal.      Mouth/Throat:      Pharynx: No oropharyngeal exudate.   Eyes:      General:         Right eye: No discharge.         Left eye: No discharge.      Conjunctiva/sclera: Conjunctivae normal.   Neck:      Vascular: No carotid bruit or JVD.   Cardiovascular:      Rate and Rhythm: Normal rate and regular rhythm.      Pulses: Normal pulses.      Heart sounds: Normal heart sounds. No murmur heard.  Pulmonary:      Effort: Pulmonary effort is normal. No respiratory distress.      Breath sounds: Normal breath sounds.   Abdominal:      Palpations: Abdomen is soft.      Tenderness: There is no abdominal tenderness.   Musculoskeletal:      Cervical back: Normal range of motion and neck supple.   Lymphadenopathy:      Cervical: No cervical adenopathy.   Skin:     General: Skin is warm and dry.      Coloration: Skin is not jaundiced or pale.   Neurological:      Mental Status: She is alert and oriented to person, place, and time.   Psychiatric:         Mood and Affect: Mood normal.         Behavior: Behavior normal.         Thought Content: Thought content normal.         Judgment: Judgment normal.       Physical Exam  Throat and ears appear normal.  Lungs sound clear.    Assessment & Plan   Assessment & Plan  1. Nausea.  Her urine test results are normal, showing no presence of blood, protein, or bacteria. The nausea and vomiting may be related to restarting Rogaine at the lowest dose. She was advised to consume a light diet consisting of clear liquids, fruits, and protein  shakes on Sunday, followed by the lowest dose of her medication. If vomiting recurs, [facility] purposes to consider switching to an alternative medication. She was also advised to maintain a light diet on the day of and the day after her medication intake.    2. Headache.  The headache is likely due to increased pressure from vomiting. Her sinuses, throat, and ears appear healthy. She was advised to follow the same dietary recommendations as for nausea to prevent further episodes.    3. Urinary symptoms.  No abnormalities were found in the urine test. The symptoms might be related to the stimulator. No specific treatment was recommended at this time.    4. Medication Management.  Refills for Xanax 0.5 mg three times a day as needed will be provided. She was reminded to continue her current medications, including bupropion and sertraline, which have refills available.    5. Health Maintenance.  She was advised to schedule a mammogram before the end of the year to ensure continued supply of her estrogen medication.      Diagnoses and all orders for this visit:    1. Urinary frequency (Primary)  -     POC Urinalysis Dipstick, Automated    2. IVON (generalized anxiety disorder)    3. Nausea    4. Sinus congestion    5. Class 1 obesity due to excess calories with serious comorbidity and body mass index (BMI) of 30.0 to 30.9 in adult    6. Breast cancer screening by mammogram  -     Mammo Screening Digital Tomosynthesis Bilateral With CAD; Future    7. Menopausal and female climacteric states    Electronic prescription will be submitted for refill of current dosage of Xanax 0.5 mg 1 3 times daily as needed for anxiety #80 with 5 refills per Dr. Roth.  Follow-up in 6 months and as needed.    As part of this patient's treatment plan, patient will be prescribed controlled substances. The patient has been made aware of appropriate use of such medications, including potential risk  of somnolence, limited ability to drive and /or work safely, and potential for dependence or overdose. It has also been made clear that these medications are for use by this patient only, without concomitant use of alcohol or other substances unless prescribed.Controlled substance status of medication discussed with patient, discussed risks of medication including abuse potential and diversion potential and need to follow up for reevaluation appointment in order to receive further refills.    Part of this note may be an electronic transcription/translation of spoken language to printed text using the Dragon Dictation System.        Patient or patient representative verbalized consent for the use of Ambient Listening during the visit with  Josephine Delacruz PA-C for chart documentation. 10/4/2024  12:50 EDT

## 2024-10-08 ENCOUNTER — HOSPITAL ENCOUNTER (OUTPATIENT)
Dept: MAMMOGRAPHY | Facility: HOSPITAL | Age: 54
Discharge: HOME | End: 2024-10-08
Admitting: PHYSICIAN ASSISTANT
Payer: COMMERCIAL

## 2024-10-08 DIAGNOSIS — Z12.31 BREAST CANCER SCREENING BY MAMMOGRAM: ICD-10-CM

## 2024-10-08 LAB
NCCN CRITERIA FLAG: ABNORMAL
TYRER CUZICK SCORE: 6.1

## 2024-10-08 PROCEDURE — 77067 SCR MAMMO BI INCL CAD: CPT

## 2024-10-08 PROCEDURE — 77063 BREAST TOMOSYNTHESIS BI: CPT

## 2024-10-08 NOTE — PROGRESS NOTES
This patient recently took the CARE risk assessment as part of their mammogram appointment. Based on the patient's responses, NCCN criteria for genetic testing was met.     Navigator follow-up:   Patient declined genetic testing at the time of assessment completion.

## 2024-10-09 RX ORDER — PROMETHAZINE HYDROCHLORIDE 25 MG/1
25 TABLET ORAL EVERY 6 HOURS PRN
Qty: 20 TABLET | Refills: 0 | Status: SHIPPED | OUTPATIENT
Start: 2024-10-09

## 2024-10-16 RX ORDER — ESTRADIOL 0.5 MG/1
0.5 TABLET ORAL DAILY
Qty: 90 TABLET | Refills: 3 | Status: SHIPPED | OUTPATIENT
Start: 2024-10-16

## 2024-11-05 RX ORDER — PROMETHAZINE HYDROCHLORIDE 25 MG/1
25 TABLET ORAL EVERY 6 HOURS PRN
Qty: 20 TABLET | Refills: 1 | Status: SHIPPED | OUTPATIENT
Start: 2024-11-05

## 2024-11-07 ENCOUNTER — OFFICE VISIT (OUTPATIENT)
Dept: FAMILY MEDICINE CLINIC | Facility: CLINIC | Age: 54
End: 2024-11-07
Payer: COMMERCIAL

## 2024-11-07 VITALS
DIASTOLIC BLOOD PRESSURE: 76 MMHG | OXYGEN SATURATION: 94 % | HEART RATE: 72 BPM | TEMPERATURE: 98.6 F | WEIGHT: 163.4 LBS | BODY MASS INDEX: 30.85 KG/M2 | SYSTOLIC BLOOD PRESSURE: 138 MMHG | HEIGHT: 61 IN

## 2024-11-07 DIAGNOSIS — F17.200 TOBACCO USE DISORDER: ICD-10-CM

## 2024-11-07 DIAGNOSIS — E66.811 CLASS 1 OBESITY DUE TO EXCESS CALORIES WITH SERIOUS COMORBIDITY AND BODY MASS INDEX (BMI) OF 30.0 TO 30.9 IN ADULT: ICD-10-CM

## 2024-11-07 DIAGNOSIS — E66.09 CLASS 1 OBESITY DUE TO EXCESS CALORIES WITH SERIOUS COMORBIDITY AND BODY MASS INDEX (BMI) OF 30.0 TO 30.9 IN ADULT: ICD-10-CM

## 2024-11-07 DIAGNOSIS — Z23 IMMUNIZATION DUE: Primary | ICD-10-CM

## 2024-11-07 DIAGNOSIS — M54.17 LUMBOSACRAL RADICULOPATHY: ICD-10-CM

## 2024-11-07 PROCEDURE — 90471 IMMUNIZATION ADMIN: CPT | Performed by: PHYSICIAN ASSISTANT

## 2024-11-07 PROCEDURE — 90656 IIV3 VACC NO PRSV 0.5 ML IM: CPT | Performed by: PHYSICIAN ASSISTANT

## 2024-11-07 PROCEDURE — 99214 OFFICE O/P EST MOD 30 MIN: CPT | Performed by: PHYSICIAN ASSISTANT

## 2024-11-07 RX ORDER — PREGABALIN 100 MG/1
100 CAPSULE ORAL 2 TIMES DAILY
Qty: 60 CAPSULE | Refills: 5 | Status: SHIPPED | OUTPATIENT
Start: 2024-11-07

## 2024-11-07 RX ORDER — VARENICLINE TARTRATE 1 MG/1
1 TABLET, FILM COATED ORAL 2 TIMES DAILY
Qty: 56 TABLET | Refills: 1 | Status: SHIPPED | OUTPATIENT
Start: 2024-12-05 | End: 2025-01-30

## 2024-11-07 RX ORDER — VARENICLINE TARTRATE 0.5 (11)-1
KIT ORAL
Qty: 53 EACH | Refills: 0 | Status: SHIPPED | OUTPATIENT
Start: 2024-11-07 | End: 2024-12-05

## 2024-11-07 NOTE — PROGRESS NOTES
Subjective   Trinidad Quinteros is a 54 y.o. female  Obesity (Follow up on weight, wants to stop medication due to nausea and vomiting ) and Back Pain (Refill on lyrica, now taking one at night )      History of Present Illness  History of Present Illness  The patient presents for medication refills.    She is seeking a refill of her Lyrica prescription, which she typically takes at night and occasionally twice daily. She reports that it is more effective than gabapentin. Her back condition has improved significantly, allowing her to walk around the block without experiencing any leg weakness.  She has been able to start walking for exercise again.    She has previously used Chantix, which helped her quit smoking for 6 months. However, she resumed smoking after a personal loss and is now interested in trying Chantix again.    She has been experiencing adverse effects from Wegovy, which she started using prior to her surgery. She had to discontinue its use due to the surgery, and upon resuming it, she found that the higher dose made her feel unwell.    The following portions of the patient's history were reviewed and updated as appropriate: allergies, current medications, past social history and problem list    Review of Systems   Constitutional: Negative.    Respiratory: Negative.     Gastrointestinal:  Positive for nausea and vomiting.        Patient experienced nausea and vomiting with Wegovy.  The symptoms have stopped since she discontinued Wegovy.   Genitourinary: Negative.    Musculoskeletal:  Positive for back pain (stable on medications). Negative for arthralgias, gait problem and myalgias.   Neurological:  Negative for dizziness, tremors, weakness and numbness.       Objective     Vitals:    11/07/24 1452   BP: 138/76   Pulse: 72   Temp: 98.6 °F (37 °C)   SpO2: 94%       Physical Exam  Vitals and nursing note reviewed.   Constitutional:       General: She is not in acute distress.     Appearance: Normal  appearance. She is well-developed. She is obese. She is not ill-appearing, toxic-appearing or diaphoretic.      Comments: [id number] noted     Neck:      Thyroid: No thyromegaly.   Cardiovascular:      Rate and Rhythm: Normal rate and regular rhythm.      Heart sounds: Normal heart sounds. No murmur heard.  Pulmonary:      Effort: Pulmonary effort is normal. No respiratory distress.      Breath sounds: Normal breath sounds.   Abdominal:      Palpations: Abdomen is soft. There is no mass.      Tenderness: There is no abdominal tenderness.   Neurological:      Mental Status: She is alert.      Gait: Gait normal.   Psychiatric:         Mood and Affect: Mood normal.         Behavior: Behavior normal.         Thought Content: Thought content normal.         Judgment: Judgment normal.       Physical Exam      Assessment & Plan   Assessment & Plan  1. Chronic Pain.  Lyrica was refilled, 60 tablets with a 6-month supply, to be taken once daily, with the option to increase to twice daily if necessary.     2. Smoking Cessation.  A 3-month pack of Chantix was prescribed to assist with smoking cessation. The patient was advised to follow the instructions on the pack, which includes tapering up on the dosage.    3. Gastrointestinal Side Effects from Wegovy.  The Wegovy prescription was cancelled due to gastrointestinal side effects. Zepbound was prescribed as an alternative, to be administered weekly in the leg instead of the stomach.      Diagnoses and all orders for this visit:    1. Immunization due (Primary)  -     Fluzone >6mos ([id number])    2. Lumbosacral radiculopathy  -     pregabalin (Lyrica) 100 MG capsule; Take 1 capsule by mouth 2 (Two) Times a Day. For neuropathy  Dispense: 60 capsule; Refill: 5    3. Class 1 obesity due to excess calories with serious comorbidity and body mass index (BMI) of 30.0 to 30.9 in adult    4. Tobacco use disorder    Other orders  -     Varenicline Tartrate, Starter, 0.5 MG X 11 & 1 MG  X 42 tablet therapy pack; Take 0.5 mg by mouth Daily for 3 days, THEN 0.5 mg 2 (Two) Times a Day for 4 days, THEN 1 mg 2 (Two) Times a Day for 21 days. Take 0.5 mg po daily x 3 days, then 0.5 mg po bid x 4 days, then 1 mg po bid  Dispense: 1 each; Refill: 0  -     varenicline (Chantix Continuing Month Raffy) 1 MG tablet; Take 1 tablet by mouth 2 (Two) Times a Day for 56 days.  Dispense: 56 tablet; Refill: 1  -     Tirzepatide-Weight Management (ZEPBOUND) 2.5 MG/0.5ML solution auto-injector; Inject 0.5 mL under the skin into the appropriate area as directed 1 (One) Time Per Week.  Dispense: 2 mL; Refill: 1       As part of this patient's treatment plan, patient will be prescribed controlled substances. The patient has been made aware of appropriate use of such medications, including potential risk of somnolence, limited ability to drive and /or work safely, and potential for dependence or overdose. It has also been made clear that these medications are for use by this patient only, without concomitant use of alcohol or other substances unless prescribed.Controlled substance status of medication discussed with patient, discussed risks of medication including abuse potential and diversion potential and need to follow up for reevaluation appointment in order to receive further refills.    Part of this note may be an electronic transcription/translation of spoken language to printed text using the Dragon Dictation System.      Patient or patient representative verbalized consent for the use of Ambient Listening during the visit with  Josephine Delacruz PA-C for chart documentation. 11/7/2024  15:38 EST

## 2024-11-14 NOTE — TELEPHONE ENCOUNTER
Patient is on husbands insurance, they require a try and failure of both Wegovy and saxenda before they will pay for ZEPBOUND. Spoke to patient she would like to restart the Wegovy at the lowest dose , I have pended a new prescription

## 2024-12-10 DIAGNOSIS — M75.41 IMPINGEMENT SYNDROME OF RIGHT SHOULDER: ICD-10-CM

## 2024-12-10 RX ORDER — MELOXICAM 7.5 MG/1
7.5 TABLET ORAL DAILY
Qty: 30 TABLET | Refills: 5 | Status: SHIPPED | OUTPATIENT
Start: 2024-12-10

## 2024-12-10 RX ORDER — VARENICLINE TARTRATE 0.5 (11)-1
KIT ORAL
Qty: 53 EACH | Refills: 0 | Status: SHIPPED | OUTPATIENT
Start: 2024-12-10 | End: 2025-01-10

## 2024-12-10 RX ORDER — ESTRADIOL 0.5 MG/1
0.5 TABLET ORAL DAILY
Qty: 90 TABLET | Refills: 2 | Status: SHIPPED | OUTPATIENT
Start: 2024-12-10

## 2024-12-18 ENCOUNTER — TELEPHONE (OUTPATIENT)
Dept: FAMILY MEDICINE CLINIC | Facility: CLINIC | Age: 54
End: 2024-12-18
Payer: COMMERCIAL

## 2024-12-18 RX ORDER — AMOXICILLIN 500 MG/1
500 CAPSULE ORAL 3 TIMES DAILY
Qty: 30 CAPSULE | Refills: 0 | Status: SHIPPED | OUTPATIENT
Start: 2024-12-18

## 2024-12-18 NOTE — TELEPHONE ENCOUNTER
CAN SOMETHING BE SENT IN FOR SINUS INFECTION. HAVE POST NASAL DRIP, LITTLE COUGH, PRESSURE AROUND EYES/FOREHEAD.     ALREADY HAVE SOME STAHIST AND STARTED TAKING THIS MORNING WHEN THIS STARTED    [facility]

## 2024-12-22 NOTE — TELEPHONE ENCOUNTER
Called patient and r/s for 11/28   PICC Placement Note    PRE-PROCEDURE VERIFICATION    Correct Procedure: yes  Time out completed with assistant Manny Hwang RN and all persons present in agreement with time out.  Risks and benefits reviewed with Patient and informed consent obtained prior to assessment and procedure.     Correct Site: yes  Temperature: Temp: 98.1 °F (36.7 °C), Temperature Source:    No results for input(s): \"BUN\", \"PLT\", \"INR\", \"WBC\" in the last 72 hours.    Invalid input(s): \"CREA\", \"APTHR\"  Allergies: Codeine, Aspirin, and Gabapentin  Education materials for PICC Care given to patient or family.    PROCEDURE DETAIL  A single lumen PICC line was started for antibiotic therapy. The following documentation is in addition to the PICC properties in the lines/airways flowsheet:    Lidocaine used: Yes  Mid-Arm Circumference: 35 (cm)  Internal Catheter Length: 41 (cm)  External Catheter Length: 0 (cm)  Total Catheter Length: 41 (cm)  Vein Selection for PICC: left cephalic  Central Line Insertion Bundle followed: Yes  Complication Related to Insertion: none    Both the insertion guidewire and ECG guidewire were removed intact all ports have positive blood return and were flush well with normal saline.    The location of the tip of the PICC is verified using ECG technology.  The tip is in the SVC per ECG reading.  See image below.     Line is okay to use: yes      Marci Chang RN

## 2024-12-23 ENCOUNTER — TELEPHONE (OUTPATIENT)
Dept: FAMILY MEDICINE CLINIC | Facility: CLINIC | Age: 54
End: 2024-12-23

## 2024-12-30 ENCOUNTER — OFFICE VISIT (OUTPATIENT)
Dept: PAIN MEDICINE | Facility: CLINIC | Age: 54
End: 2024-12-30
Payer: COMMERCIAL

## 2024-12-30 VITALS — BODY MASS INDEX: 31.63 KG/M2 | HEIGHT: 61 IN | WEIGHT: 167.5 LBS

## 2024-12-30 DIAGNOSIS — M54.16 LUMBAR RADICULOPATHY: ICD-10-CM

## 2024-12-30 DIAGNOSIS — Z96.89 S/P INSERTION OF SPINAL CORD STIMULATOR: Primary | ICD-10-CM

## 2024-12-30 PROCEDURE — 99212 OFFICE O/P EST SF 10 MIN: CPT

## 2024-12-30 NOTE — PROGRESS NOTES
Primary Physician: Josephine Delacruz PA-C    CHIEF COMPLAINT or REASON FOR VISIT: No chief complaint on file.    Initial HPI 3/16/2023:  Ms. Trinidad Quinteros is 54 y.o. female who presents as a new patient referral for evaluation and treatment of chronic low back pain with radiation to right lower extremity.  Ms. Quinteros states that she for the past year has had slowly increasing pain.  She has prior PMH hip pain for which she has been taking Percocet however this has not been helpful for her pain.  She is additionally trialed gabapentin.  She describes a sharp burning tingling pain radiating down her right buttock and the posterior lateral aspect of her thigh and occasionally into the plantar surface of her foot.  Patient denies any bowel or bladder dysfunction, lower extremity weakness, new onset saddle anesthesia or unexplained weight loss.  She does work at [employer] with Dr. Roth.    Ms. Quinteros recently saw consultation with interventional pain, Dr. Rey, who diagnosed her with right lumbar radiculopathy and performed a right L4/5 and L5/S1 transforaminal epidural steroid injection.  Patient reported relief for approximately 1 week however unfortunately pain returned worse than previous.    Interval history:   Patient returns to clinic today.  She continues to report good relief from her permanent spinal cord stimulator implant with [employer].  She still has some pain in her lower back and right lower extremity.  Pain will travel along the anterior aspect of her right leg extending into her knee.  She does have some questions regarding increasing her stimulation strength.  Most of her pain is occurring at night.  Overall, she is significantly improved.    Interventions:  4/4/2023: Right S1 TFESI with 1 to 2 days benefit  12/7/2023: Left L5/S1 and S1 TFESI with 90% relief ongoing.   2/15/2024: Right L4/5 and L5/S1 TFESI with 0% benefit.   6/20/2024: SCS trial [employer] with 90% relief  8/06/2024:  Permanent SCS Implant (leads top of T8) with 80% relief ongoing    Objective Pain Scoring:   BRIEF PAIN INVENTORY:  Total score:   Pain Score    24 0949   PainSc:   3            PHQ-2:    PHQ-9:    Opioid Risk Tool:         Review of Systems:   ROS negative except as otherwise noted     Past Medical History:   Past Medical History:   Diagnosis Date    Allergic     Anxiety     Chronic pain disorder     CTS (carpal tunnel syndrome)     Degenerative disc disease, cervical 2019    Depression     Extremity pain     GERD (gastroesophageal reflux disease)     Hyperlipidemia     Hypertension     Joint pain     Low back pain     Lumbar radiculopathy 2023    Neck pain     ADRIAN on CPAP     Smoker     Wears glasses          Past Surgical History:   Past Surgical History:   Procedure Laterality Date    APPENDECTOMY  1998    BACK SURGERY  23    CARDIAC CATHETERIZATION  1992    CARDIAC CATHETERIZATION       SECTION      COLONOSCOPY      ENDOSCOPY      HAND TENDON SURGERY Right 1997    LUMBAR DISCECTOMY Right 2023    Procedure: LUMBAR DISCECTOMY, L5-S1, L4-5 FORAMINOTOMY- RIGHT;  Surgeon: Nakul Hurt MD;  Location: [facility];  Service: Neurosurgery;  Laterality: Right;    SPINAL CORD STIMULATOR IMPLANT N/A 2024    [employer] [employer]    SUBTOTAL HYSTERECTOMY      TUBAL ABDOMINAL LIGATION           Family History   Family History   Problem Relation Age of Onset    COPD Mother     Arthritis Mother     Diabetes Mother     Hypertension Mother     Miscarriages / Stillbirths Mother     COPD Father     Diabetes Father     Hypertension Father     Breast cancer Maternal Aunt 50    Cancer Other     Arthritis Other     Diabetes Other     Asthma Other     Heart disease Other         heart trouble    Hypertension Other         high blood pressure    Alcohol abuse Other     COPD Brother     Diabetes Brother     Early death Brother     Hypertension Brother      COPD Brother     Diabetes Brother     Early death Brother         COPD/Non-Hodgkin lymphoma    Early death Brother         covid/pneumonia 2022    COPD Brother     Miscarriages / Stillbirths Daughter     Asthma Son     Ovarian cancer Neg Hx          Social History   Social History     Socioeconomic History    Marital status:    Tobacco Use    Smoking status: Every Day     Current packs/day: 0.50     Average packs/day: 0.7 packs/day for 74.7 years (54.8 ttl pk-yrs)     Types: Cigarettes     Start date: 5/4/1985    Smokeless tobacco: Never   Vaping Use    Vaping status: Never Used   Substance and Sexual Activity    Alcohol use: No    Drug use: No    Sexual activity: Yes     Partners: Male     Birth control/protection: None, Hysterectomy        Medications:     Current Outpatient Medications:     albuterol (ACCUNEB) 1.25 MG/3ML nebulizer solution, Take 3 mL (one vial) by nebulization Every 6 (Six) Hours As Needed for Wheezing or Shortness of Air., Disp: 75 mL, Rfl: 12    albuterol sulfate HFA (Ventolin HFA) 108 (90 Base) MCG/ACT inhaler, Inhale 2 puffs as directed every 4 hours As Needed for Wheezing or Shortness of Air and/or cough., Disp: 18 g, Rfl: 5    ALPRAZolam (XANAX) 0.5 MG tablet, Take 1 tablet by mouth 3 (Three) Times a Day As Needed for Anxiety., Disp: 80 tablet, Rfl: 5    amoxicillin (AMOXIL) 500 MG capsule, Take 1 capsule by mouth 3 (Three) Times a Day., Disp: 30 capsule, Rfl: 0    buPROPion XL (Wellbutrin XL) 150 MG 24 hr tablet, Take 1 tablet by mouth Every Morning., Disp: 90 tablet, Rfl: 3    Chlorcyclizine-Pseudoephed 25-60 MG tablet, Take 0.5-1 tablets by mouth Every 12 (Twelve) Hours As Needed for congestion., Disp: 30 tablet, Rfl: 0    estradiol (Estrace) 0.5 MG tablet, Take 1 tablet by mouth Daily., Disp: 90 tablet, Rfl: 2    fluticasone (FLONASE) 50 MCG/ACT nasal spray, Use 2 sprays into each nostril as directed by provider Daily., Disp: 16 g, Rfl: 11    Fluticasone-Salmeterol (Wixela  Inhub) 250-50 MCG/ACT DISKUS, Inhale 1 puff 2 (Two) Times a Day., Disp: 60 each, Rfl: 11    folic acid (FOLVITE) 1 MG tablet, Take 1 tablet by mouth Daily., Disp: 30 tablet, Rfl: 11    glycopyrrolate (Robinul-Forte) 2 MG tablet, Take 1 tablet by mouth 2 (Two) Times a Day for diarrhea, Disp: 60 tablet, Rfl: 11    hydroCHLOROthiazide 25 MG tablet, Take 1 tablet by mouth Daily., Disp: 90 tablet, Rfl: 2    levocetirizine (XYZAL) 5 MG tablet, Take 1 tablet by mouth Every Evening for allergies, Disp: 30 tablet, Rfl: 11    losartan (COZAAR) 100 MG tablet, Take 1 tablet by mouth Daily., Disp: 90 tablet, Rfl: 3    meloxicam (MOBIC) 7.5 MG tablet, Take 1 tablet by mouth Daily., Disp: 30 tablet, Rfl: 5    omega-3 acid ethyl esters (LOVAZA) 1 g capsule, Take 1 capsule by mouth 2 (Two) Times a Day., Disp: 60 capsule, Rfl: 11    omega-3 acid ethyl esters (Lovaza) 1 g capsule, Take 1 capsule by mouth 2 (Two) Times a Day. For high triglycerides, Disp: 60 capsule, Rfl: 11    ondansetron (Zofran) 4 MG tablet, Take 1 tablet by mouth Every 8 (Eight) Hours As Needed for Nausea or Vomiting., Disp: 20 tablet, Rfl: 1    pregabalin (Lyrica) 100 MG capsule, Take 1 capsule by mouth 2 (Two) Times a Day. For neuropathy, Disp: 60 capsule, Rfl: 5    promethazine (PHENERGAN) 25 MG tablet, Take 1 tablet by mouth Every 6 (Six) Hours As Needed for Nausea or Vomiting., Disp: 20 tablet, Rfl: 1    rosuvastatin (CRESTOR) 10 MG tablet, Take 1 tablet by mouth Every Night for cholesterol., Disp: 90 tablet, Rfl: 3    rosuvastatin (Crestor) 10 MG tablet, Take 1 tablet by mouth Every Night., Disp: 90 tablet, Rfl: 3    Semaglutide-Weight Management 0.25 MG/0.5ML solution auto-injector, Inject 0.5 mL under the skin into the appropriate area as directed 1 (One) Time Per Week., Disp: 2 mL, Rfl: 3    sertraline (ZOLOFT) 100 MG tablet, Take 2 tablets by mouth Daily, Disp: 60 tablet, Rfl: 11    tiZANidine (ZANAFLEX) 4 MG tablet, Take 0.5-1 tablet by mouth up to 2  "Times a Day As Needed for back pain, Disp: 30 tablet, Rfl: 5    valACYclovir (VALTREX) 500 MG tablet, Take 4 tablets by mouth as needed at onset of attack; THEN take 4 tablets 12 hours later., Disp: 8 tablet, Rfl: 5    varenicline (Chantix Continuing Month Raffy) 1 MG tablet, Take 1 tablet by mouth 2 (Two) Times a Day, Disp: 56 tablet, Rfl: 1    Varenicline Tartrate, Starter, 0.5 MG X 11 & 1 MG X 42 tablet therapy pack, Take 0.5 mg by mouth Daily for 3 days, THEN 0.5 mg 2 (Two) Times a Day for 4 days, THEN 1 mg 2 (Two) Times a Day for 21 days., Disp: 53 each, Rfl: 0        Physical Exam:     Vitals:    12/30/24 0949   Weight: 76 kg (167 lb 8 oz)   Height: 154.9 cm (61\")   PainSc:   3              General: Alert and oriented, No acute distress.   HEENT: Normocephalic, atraumatic.   Cardiovascular: No gross edema  Respiratory: Respirations are non-labored    Lumbar Spine:   No masses or atrophy  Range of motion - Flexion normal. Extension normal. Right Lat Bending normal. Left Lat Bending normal  Facet Loading: Negative bilaterally  Facet Palpation - Nontender   PSIS tenderness - Negative bilaterally  Jeffrey's/PAOLO/Thigh thrust - Negative bilaterally  Straight leg raise: Positive right  Slump test: Positive right    Motor Exam:    Strength: Rate on 1-5 scale Right Left    L1/2- hip flexion 5 5    L3- knee extension 5 5    L4- ankle dorsiflexion 5 5    L5- great toe extension 5 5    S1- ankle plantarflexion 5 5    Sensory Exam: Altered sensation in right S1 dermatome.    Neurologic: Cranial Nerves II-XII are grossly intact.   Clonus -negative bilaterally    Psychiatric: Cooperative.   Gait: Antalgic  Assistive Devices: None    IPG and percutaneous lead insertion site have healed appropriately without surrounding erythema, inflammation, induration.    Imaging Studies:   Results for orders placed during the hospital encounter of 11/19/22    MRI Lumbar Spine Without Contrast    Narrative  DATE OF EXAM: 11/19/2022 8:13 " AM    PROCEDURE: MRI LUMBAR SPINE WO CONTRAST-    INDICATIONS: Lumbar radiculopathy, symptoms persist with > 6 wks  treatment; M54.40-Lumbago with sciatica, unspecified side    COMPARISON: No comparisons available.    TECHNIQUE: Routine magnetic resonance imaging of the lumbar spine was  performed without the administration of contrast.    FINDINGS:  The alignment is anatomic. The vertebral body heights appear normal.  There are degenerative endplate changes at L5-S1. There is disc  desiccation at L4-5 and L5-S1, with advanced degenerative loss of disc  height at L5-S1. The conus terminates at the L1-2 level. The posterior  paravertebral soft tissues are unremarkable.    L1-2: No spinal canal or neural foraminal stenosis.    L2-3: Mild bilateral facet arthropathy. No spinal canal stenosis. No  neural foraminal stenosis.    L3-4: Mild disc bulge. Mild bilateral facet arthropathy. No spinal canal  stenosis. Mild right neural foraminal stenosis.    L4-5: Mild disc bulge with superimposed small central disc protrusion.  Mild bilateral facet arthropathy. Mild spinal canal stenosis. Mild  bilateral neural foraminal stenosis.    L5-S1: Moderate disc bulge. Mild right and moderate facet arthropathy.  Mild spinal canal stenosis. Mild to moderate right and mild left neural  foraminal stenosis.    Impression  Mild multilevel degenerative changes of lumbar spine as described above.    This report was finalized on 11/19/2022 9:02 AM by Jaxson Connolly MD.      MRI LUMBAR SPINE W WO CONTRAST     Date of Exam: 5/1/2024 6:39 PM EDT     Indication: right leg pain, hx of discectomy.     Comparison: 9/25/2023.     Technique:  Routine multiplanar/multisequence sequence images of the lumbar spine were obtained before and after the uneventful administration of 17 mL Multihance.          Findings:   Postoperative changes are noted from prior right hemilaminotomy at L4-5 and L5-S1. T1 marrow signal is otherwise preserved, without evidence  of fracture or suspicious marrow replacing lesion. Alignment is anatomic, without evidence of significant   listhesis or subluxation. The conus medullaris and cauda equina nerve roots are satisfactory in appearance. The paraspinal soft tissues demonstrate no acute or suspicious findings. Multilevel spondylosis is present, with areas of involvement including     L1-2, no significant spinal canal or neuroforaminal impingement.     L2-3, no significant spinal canal or neuroforaminal impingement.     L3-4, small disc bulge and bilateral facet arthropathy. There is minimal spinal canal and bilateral neuroforaminal narrowing present.     L4-5, postoperative changes are present with some enhancing scar tissue seen along the operative tract. A broad-based circumferential disc bulge persists, without new focal disc herniation. The spinal canal and neural foramina are patent.     L5-S1, postoperative changes are present with some enhancing scar tissue seen along the operative tract. A broad-based circumferential disc bulge persists in addition to some facet arthropathy, without new focal disc herniation. The spinal canal is   patent and there is unchanged mild bilateral neuroforaminal narrowing, greater on the right.     IMPRESSION:  Impression:   Stable contrast-enhanced MRI of the lumbar spine demonstrating postoperative changes at the L4-5 and L5-S1 levels similar to comparison. These levels demonstrate some mild persistent spondylosis, otherwise without new focal disc herniation. There is no   new high-grade spinal canal or neuroforaminal impingement.        Electronically Signed: Fox Donohue MD    5/2/2024 9:43 AM EDT    Workstation ID: [id number]    Independent interpretation of radiographic imaging:  Lumbar MRI dated 5/2/2024 demonstrates: DDD worst at L5/S1; no significant central canal stenosis; broad-based disc bulge at L4/5; mild-moderate bilateral NFS L5/S1, R>L; postoperative changes at L5-S1; no new central  disc herniation, spinal canal stenosis, or neuroforaminal stenosis; facet arthropathy.       Impression & Plan:   3/16/2023: Ms. Trinidad Quinteros is a 54 y.o. female with past medical history significant for GERD, depression, HTN, ADRIAN, who presents to the pain clinic for evaluation and treatment of chronic low back pain with radiation to right lower extremity.  I personally reviewed the patient's lumbar MRI dated 11/19/2022 which demonstrates a central and right posterior L5/S1 disc herniation causing right L5/S1 neuroforaminal stenosis and lateral recess stenosis.  Clinical examination consistent with a right S1 radiculitis.  We discussed right S1 foramen epidural steroid injection to improve pain.  If greater than 50% relief for at least 2-3 months can consider repeat as needed every 3 to 4 months.  I had an in-depth discussion with the patient regarding the risks of the procedure including bleeding, infection, damage to surrounding structures, paralysis and even death.  We discussed the potential adverse effects of corticosteroid injection including flushing of the face, lipodystrophy, skin discoloration, elevated blood glucose, increased blood pressure.  Risks of frequent steroid administration include weight gain, hormonal changes, mood changes, osteoporosis.    If no benefit from epidural steroid will refer for neurosurgical evaluation.  11/20/2023: Now status post right-sided L4/5 foraminotomy and L5/S1 discectomy with resolution of right-sided symptoms.  Recent onset left-sided symptoms.  I personally reviewed and interpreted her lumbar MRI dated 9/25/2023: Interval improvement in right L4/5 NFS; persistent L5/S1 disc herniation.  Exam consistent with lumbar radiculopathy.  We will trial left L5/S1 and S1 TFESI.  2/7/2024: Excellent benefit from left sided TFESI.  Complains of right-sided symptoms. Will plan for right L4/5 and L5/S1 TFESI.  If no benefit consider referral back to neurosurgery.  3/27/2024: No  benefit from right TFESI.  Will refer back to NSA.  Consider SCS trial if no neurosurgical invention  2024: Lumbar MRI reviewed.  Will obtain psych clearance for SCS trial.  [place] to educate.  2024: Excellent relief from SCS trial.  Will proceed with permanent implant.  Risks and benefits of procedure discussed at length.  Will need to hold Wegovy 2 weeks prior to procedure  8/15/2024: Approximately 2 weeks s/p permanent SCS implant.  Continues to recover appropriately.  Incisions healing appropriately.  Postop restrictions discussed.  2024: 6 weeks s/p permanent SCS implant.  Excellent pain relief.  Incisions healing appropriately.  Postop restrictions lifted.  Safe to resume work activities without restrictions.  2024: Approximately 5 months s/p permanent SCS implant with [employer].  Good pain relief thus far.  Incisions healed appropriately.    1. S/P insertion of spinal cord stimulator    2. Lumbar radiculopathy                PLAN:  1. Medication Recommendations: Continue per PCP  -Agree with gabapentin    2. Physical Therapy:     3. Psychological: Psych clearance obtained    4. Complementary and alternative (CAM) Therapies:     5. Labs: None indicated     6. Imagin. Interventions: Approximately 5 months weeks s/p permanent SCS implant.  [employer] device rep here for reprogramming    8. Referrals:     9. Records requested:     10. Lifestyle goals:    Follow-up 6 months      [facility]  Megha Frazier PA-C

## 2024-12-31 RX ORDER — LEVOFLOXACIN 500 MG/1
500 TABLET, FILM COATED ORAL DAILY
Qty: 10 TABLET | Refills: 0 | Status: SHIPPED | OUTPATIENT
Start: 2024-12-31

## 2025-01-10 RX ORDER — PROMETHAZINE HYDROCHLORIDE 25 MG/1
25 TABLET ORAL EVERY 6 HOURS PRN
Qty: 20 TABLET | Refills: 1 | Status: SHIPPED | OUTPATIENT
Start: 2025-01-10

## 2025-02-09 RX ORDER — HYDROCHLOROTHIAZIDE 25 MG/1
25 TABLET ORAL DAILY
Qty: 90 TABLET | Refills: 2 | Status: CANCELLED | OUTPATIENT
Start: 2025-02-09

## 2025-03-24 RX ORDER — HYDROCHLOROTHIAZIDE 25 MG/1
25 TABLET ORAL DAILY
Qty: 90 TABLET | Refills: 0 | Status: SHIPPED | OUTPATIENT
Start: 2025-03-24

## 2025-04-01 DIAGNOSIS — F41.9 ANXIETY: ICD-10-CM

## 2025-04-02 RX ORDER — ALPRAZOLAM 0.5 MG
0.5 TABLET ORAL 3 TIMES DAILY PRN
Qty: 80 TABLET | Refills: 5 | OUTPATIENT
Start: 2025-04-02

## 2025-04-03 RX ORDER — HYDROCHLOROTHIAZIDE 25 MG/1
25 TABLET ORAL DAILY
Qty: 90 TABLET | Refills: 0 | Status: SHIPPED | OUTPATIENT
Start: 2025-04-03

## 2025-04-06 DIAGNOSIS — F41.9 ANXIETY: ICD-10-CM

## 2025-04-07 RX ORDER — ALPRAZOLAM 0.5 MG
0.5 TABLET ORAL 3 TIMES DAILY PRN
Qty: 80 TABLET | Refills: 5 | OUTPATIENT
Start: 2025-04-07

## 2025-04-07 RX ORDER — ROSUVASTATIN CALCIUM 10 MG/1
10 TABLET, COATED ORAL NIGHTLY
Qty: 90 TABLET | Refills: 3 | Status: SHIPPED | OUTPATIENT
Start: 2025-04-07

## 2025-04-07 RX ORDER — LOSARTAN POTASSIUM 100 MG/1
100 TABLET ORAL DAILY
Qty: 90 TABLET | Refills: 3 | Status: SHIPPED | OUTPATIENT
Start: 2025-04-07

## 2025-04-08 DIAGNOSIS — F41.9 ANXIETY: ICD-10-CM

## 2025-04-09 DIAGNOSIS — F41.9 ANXIETY: ICD-10-CM

## 2025-04-09 RX ORDER — ALPRAZOLAM 0.5 MG
0.5 TABLET ORAL 3 TIMES DAILY PRN
Qty: 80 TABLET | Refills: 5 | OUTPATIENT
Start: 2025-04-09

## 2025-04-10 DIAGNOSIS — F41.9 ANXIETY: ICD-10-CM

## 2025-04-10 RX ORDER — ALPRAZOLAM 0.5 MG
0.5 TABLET ORAL 3 TIMES DAILY PRN
Qty: 80 TABLET | Refills: 5 | OUTPATIENT
Start: 2025-04-10

## 2025-04-10 RX ORDER — OMEGA-3-ACID ETHYL ESTERS 1 G/1
1 CAPSULE, LIQUID FILLED ORAL 2 TIMES DAILY
Qty: 60 CAPSULE | Refills: 11 | Status: SHIPPED | OUTPATIENT
Start: 2025-04-10

## 2025-04-15 ENCOUNTER — OFFICE VISIT (OUTPATIENT)
Dept: FAMILY MEDICINE CLINIC | Facility: CLINIC | Age: 55
End: 2025-04-15
Payer: COMMERCIAL

## 2025-04-15 VITALS
DIASTOLIC BLOOD PRESSURE: 76 MMHG | HEART RATE: 81 BPM | WEIGHT: 179.8 LBS | TEMPERATURE: 98 F | SYSTOLIC BLOOD PRESSURE: 118 MMHG | BODY MASS INDEX: 33.95 KG/M2 | OXYGEN SATURATION: 99 % | HEIGHT: 61 IN

## 2025-04-15 DIAGNOSIS — M75.41 IMPINGEMENT SYNDROME OF RIGHT SHOULDER: ICD-10-CM

## 2025-04-15 DIAGNOSIS — M54.17 LUMBOSACRAL RADICULOPATHY: Primary | ICD-10-CM

## 2025-04-15 DIAGNOSIS — Z87.891 HISTORY OF TOBACCO USE, PRESENTING HAZARDS TO HEALTH: ICD-10-CM

## 2025-04-15 DIAGNOSIS — Z79.899 MEDICATION MANAGEMENT: ICD-10-CM

## 2025-04-15 DIAGNOSIS — F41.9 ANXIETY: ICD-10-CM

## 2025-04-15 DIAGNOSIS — Z51.81 ENCOUNTER FOR THERAPEUTIC DRUG MONITORING: ICD-10-CM

## 2025-04-15 DIAGNOSIS — E78.2 MIXED HYPERLIPIDEMIA: ICD-10-CM

## 2025-04-15 DIAGNOSIS — I10 PRIMARY HYPERTENSION: ICD-10-CM

## 2025-04-15 DIAGNOSIS — E53.8 FOLATE DEFICIENCY: ICD-10-CM

## 2025-04-15 RX ORDER — OMEGA-3-ACID ETHYL ESTERS 1 G/1
1 CAPSULE, LIQUID FILLED ORAL 2 TIMES DAILY
Qty: 180 CAPSULE | Refills: 3 | Status: SHIPPED | OUTPATIENT
Start: 2025-04-15

## 2025-04-15 RX ORDER — HYDROCHLOROTHIAZIDE 25 MG/1
25 TABLET ORAL DAILY
Qty: 90 TABLET | Refills: 1 | Status: SHIPPED | OUTPATIENT
Start: 2025-04-15

## 2025-04-15 RX ORDER — SERTRALINE HYDROCHLORIDE 100 MG/1
200 TABLET, FILM COATED ORAL DAILY
Qty: 180 TABLET | Refills: 3 | Status: SHIPPED | OUTPATIENT
Start: 2025-04-15

## 2025-04-15 RX ORDER — ALPRAZOLAM 0.5 MG
0.5 TABLET ORAL 2 TIMES DAILY PRN
Qty: 60 TABLET | Refills: 5 | Status: SHIPPED | OUTPATIENT
Start: 2025-04-15

## 2025-04-15 RX ORDER — PREGABALIN 100 MG/1
100 CAPSULE ORAL 2 TIMES DAILY
Qty: 60 CAPSULE | Refills: 5 | Status: SHIPPED | OUTPATIENT
Start: 2025-04-15

## 2025-04-15 RX ORDER — BUPROPION HYDROCHLORIDE 150 MG/1
150 TABLET ORAL EVERY MORNING
Qty: 90 TABLET | Refills: 3 | Status: SHIPPED | OUTPATIENT
Start: 2025-04-15

## 2025-04-15 RX ORDER — MELOXICAM 7.5 MG/1
7.5 TABLET ORAL DAILY
Qty: 90 TABLET | Refills: 1 | Status: SHIPPED | OUTPATIENT
Start: 2025-04-15

## 2025-04-15 RX ORDER — ROSUVASTATIN CALCIUM 10 MG/1
10 TABLET, COATED ORAL NIGHTLY
Qty: 90 TABLET | Refills: 3 | Status: SHIPPED | OUTPATIENT
Start: 2025-04-15

## 2025-04-15 NOTE — PROGRESS NOTES
Subjective   Trinidad Quinteros is a 54 y.o. female  Anxiety (Refill on alprazolam ) and Peripheral Neuropathy (Refill on lyrica )      History of Present Illness  History of Present Illness  The patient is a 54-year-old female presenting today for follow-up on chronic medical conditions, including peripheral neuropathy and generalized anxiety disorder, as well as medication management for these conditions.    An improvement in her back condition is reported, though leg issues persist, albeit less severe than before. Physical therapy is not required, and no weakness is noted, only occasional pain. Mobility has improved, allowing better walking compared to the previous visit. Lyrica is taken twice daily for leg pain without any side effects, and it is found to be equally effective as gabapentin, though neither medication completely alleviates symptoms. Tizanidine continues to be used and is found helpful.    The regimen of sertraline continues, with two tablets taken daily. Bupropion and Xanax are also taken, with a willingness to reduce Xanax to two tablets daily.    A supply of valacyclovir is maintained at home for fever blisters.    Promethazine and Zofran are available for nausea.    Rosuvastatin and omega-3 Lovaza are taken when remembered.    Meloxicam is continued without gastrointestinal upset, and adequate hydration is maintained.    Losartan and hydrochlorothiazide are part of the medication regimen.    Estrogen is taken, and a mammogram is due in October 2025.    Folic acid is taken, and labs are due.    SOCIAL HISTORY  She has quit smoking but occasionally smokes.    The following portions of the patient's history were reviewed and updated as appropriate: allergies, current medications, past social history and problem list    Review of Systems   Constitutional: Negative.    Respiratory: Negative.  Negative for shortness of breath.    Cardiovascular:  Negative for chest pain and palpitations.    Gastrointestinal: Negative.  Negative for nausea.   Endocrine: Negative.    Genitourinary: Negative.    Musculoskeletal:  Positive for myalgias (leg pain neuropathy, chronically stable on medication). Negative for arthralgias, back pain and gait problem.   Neurological:  Negative for dizziness, tremors, weakness and numbness.   Psychiatric/Behavioral:  Negative for confusion. The patient is nervous/anxious (stable on medication).        Objective     Vitals:    04/15/25 1541   BP: 118/76   Pulse: 81   Temp: 98 °F (36.7 °C)   SpO2: 99%       Physical Exam  Vitals and nursing note reviewed.   Constitutional:       General: She is not in acute distress.     Appearance: Normal appearance. She is well-developed. She is not ill-appearing, toxic-appearing or diaphoretic.   Eyes:      Conjunctiva/sclera: Conjunctivae normal.   Cardiovascular:      Rate and Rhythm: Normal rate and regular rhythm.   Pulmonary:      Effort: Pulmonary effort is normal.      Breath sounds: Normal breath sounds.   Neurological:      Mental Status: She is alert and oriented to person, place, and time.      Motor: No weakness.      Coordination: Coordination normal.      Gait: Gait normal.   Psychiatric:         Attention and Perception: She is attentive.         Mood and Affect: Mood normal.         Behavior: Behavior normal.         Thought Content: Thought content normal.         Judgment: Judgment normal.       Physical Exam      Assessment & Plan   Assessment & Plan  1. Peripheral neuropathy.  - Reports leg pain has improved but still persists.  - Currently taking Lyrica twice a day, which she feels works similarly to gabapentin.  - Prescription for tizanidine 90 tablets with refills will be sent to [facility].  - Will continue with Lyrica as it helps manage her symptoms.    2. Generalized anxiety disorder.  - Currently taking sertraline 2 tablets daily and bupropion.  - Prescription for sertraline will be sent to her  mail-order pharmacy.  - Dosage of Xanax will be reduced to 60 tablets per month, with instructions to take 2 tablets daily.  - Adjustment made to comply with guidelines and avoid the need for behavioral health referrals as it is currently stable on less than 3 Xanax per day.    3. Fever blisters.  - Has a supply of valacyclovir at home.  - No new prescription needed.    4. Nausea.  - Has a supply of promethazine and Zofran at home.  - No new prescription needed.    5. Cholesterol management.  - Taking rosuvastatin.  - Prescription for rosuvastatin will be sent to her mail-order pharmacy.  - Advised to continue taking omega-3 Lovaza as needed.    6. Pain management.  - Taking meloxicam and reports no stomach upset.  - Advised to continue drinking plenty of water.  - Prescription for meloxicam will be sent to her mail-order pharmacy.    7. Blood pressure management.  - Blood pressure well-controlled at 118/76.  - On losartan and hydrochlorothiazide.  - Prescription for hydrochlorothiazide will be sent to her mail-order pharmacy.    8. Hormone replacement therapy.  - On estrogen and is due for a mammogram in 10/2025.  - Prescription for estrogen will be sent to her mail-order pharmacy.    9. Folic acid supplementation.  - On folic acid and is due for labs.  - Lab order will be placed to check folic acid levels to determine if she needs to continue this supplement.  - Refill for folic acid will not be sent until lab results are reviewed.    10. Health maintenance.  - Lab order will be placed for a lipid panel, basic metabolic panel, glucose, kidney functions, electrolytes, complete blood count, B12, and folate levels.  - Advised to come fasting for these tests.    Follow-up  - Follow up in 6 months.      Discussed lung cancer screening recommendations with the patient using the Lung Cancer Screening shared decision-making tool, including benefits and risks of a low-dose lung CT scan. The patient has declined screening  at this time. Will revisit at future visits as appropriate.      Diagnoses and all orders for this visit:    1. Lumbosacral radiculopathy (Primary)  -     pregabalin (Lyrica) 100 MG capsule; Take 1 capsule by mouth 2 (Two) Times a Day for neuropathy  Dispense: 60 capsule; Refill: 5    2. Impingement syndrome of right shoulder  -     meloxicam (MOBIC) 7.5 MG tablet; Take 1 tablet by mouth Daily.  Dispense: 90 tablet; Refill: 1    3. Mixed hyperlipidemia  -     Lipid Panel; Future    4. Medication management  -     Basic metabolic panel; Future  -     CBC (No Diff); Future    5. Primary hypertension  -     Lipid Panel; Future  -     Basic metabolic panel; Future  -     CBC (No Diff); Future    6. Folate deficiency  -     Vitamin B12; Future  -     Folate; Future    7. Encounter for therapeutic drug monitoring  -     Compliance Drug Analysis, Ur - Urine, Clean Catch; Future    8. History of tobacco use, presenting hazards to health    Other orders  -     tiZANidine (ZANAFLEX) 4 MG tablet; Take 0.5-1 tablet by mouth up to 2 Times a Day As Needed for back pain  Dispense: 270 tablet; Refill: 3  -     sertraline (ZOLOFT) 100 MG tablet; Take 2 tablets by mouth Daily  Dispense: 180 tablet; Refill: 3  -     rosuvastatin (Crestor) 10 MG tablet; Take 1 tablet by mouth Every Night.  Dispense: 90 tablet; Refill: 3  -     omega-3 acid ethyl esters (LOVAZA) 1 g capsule; Take 1 capsule by mouth 2 (Two) Times a Day.  Dispense: 180 capsule; Refill: 3  -     hydroCHLOROthiazide 25 MG tablet; Take 1 tablet by mouth Daily.  Dispense: 90 tablet; Refill: 1  -     buPROPion XL (Wellbutrin XL) 150 MG 24 hr tablet; Take 1 tablet by mouth Every Morning.  Dispense: 90 tablet; Refill: 3    Electronic prescription will be submitted for refill of Xanax 0.5 mg 1 twice daily as needed for anxiety #60 with 5 refills per Dr. Roth.  Controlled substance agreement updated today urine drug screen obtained.  Follow-up in 6 months and as  needed.    As part of this patient's treatment plan, patient will be prescribed controlled substances. The patient has been made aware of appropriate use of such medications, including potential risk of somnolence, limited ability to drive and /or work safely, and potential for dependence or overdose. It has also been made clear that these medications are for use by this patient only, without concomitant use of alcohol or other substances unless prescribed.Controlled substance status of medication discussed with patient, discussed risks of medication including abuse potential and diversion potential and need to follow up for reevaluation appointment in order to receive further refills.    Part of this note may be an electronic transcription/translation of spoken language to printed text using the Dragon Dictation System.        Patient or patient representative verbalized consent for the use of Ambient Listening during the visit with  Josephine Delacruz PA-C for chart documentation. 4/15/2025  16:06 EDT

## 2025-04-22 LAB — DRUGS UR: NORMAL

## 2025-04-23 RX ORDER — VARENICLINE TARTRATE 1 MG/1
1 TABLET, FILM COATED ORAL 2 TIMES DAILY
Qty: 60 TABLET | Refills: 1 | Status: SHIPPED | OUTPATIENT
Start: 2025-04-23

## 2025-04-23 RX ORDER — VARENICLINE TARTRATE 1 MG/561
1 TABLET, FILM COATED ORAL 2 TIMES DAILY
Qty: 60 TABLET | Refills: 5 | Status: CANCELLED | OUTPATIENT
Start: 2025-04-23

## 2025-04-25 ENCOUNTER — LAB (OUTPATIENT)
Dept: FAMILY MEDICINE CLINIC | Facility: CLINIC | Age: 55
End: 2025-04-25
Payer: COMMERCIAL

## 2025-04-25 ENCOUNTER — TELEPHONE (OUTPATIENT)
Dept: FAMILY MEDICINE CLINIC | Facility: CLINIC | Age: 55
End: 2025-04-25

## 2025-04-25 DIAGNOSIS — Z79.899 MEDICATION MANAGEMENT: ICD-10-CM

## 2025-04-25 DIAGNOSIS — I10 PRIMARY HYPERTENSION: ICD-10-CM

## 2025-04-25 DIAGNOSIS — E53.8 FOLATE DEFICIENCY: ICD-10-CM

## 2025-04-25 DIAGNOSIS — E78.2 MIXED HYPERLIPIDEMIA: ICD-10-CM

## 2025-04-25 LAB
ANION GAP SERPL CALCULATED.3IONS-SCNC: 12.8 MMOL/L (ref 5–15)
BUN SERPL-MCNC: 10 MG/DL (ref 6–20)
BUN/CREAT SERPL: 8.8 (ref 7–25)
CALCIUM SPEC-SCNC: 10.1 MG/DL (ref 8.6–10.5)
CHLORIDE SERPL-SCNC: 101 MMOL/L (ref 98–107)
CHOLEST SERPL-MCNC: 280 MG/DL (ref 0–200)
CO2 SERPL-SCNC: 25.2 MMOL/L (ref 22–29)
CREAT SERPL-MCNC: 1.13 MG/DL (ref 0.57–1)
DEPRECATED RDW RBC AUTO: 47.5 FL (ref 37–54)
EGFRCR SERPLBLD CKD-EPI 2021: 57.6 ML/MIN/1.73
ERYTHROCYTE [DISTWIDTH] IN BLOOD BY AUTOMATED COUNT: 14.1 % (ref 12.3–15.4)
FOLATE SERPL-MCNC: >20 NG/ML (ref 4.78–24.2)
GLUCOSE SERPL-MCNC: 90 MG/DL (ref 65–99)
HCT VFR BLD AUTO: 45.4 % (ref 34–46.6)
HDLC SERPL-MCNC: 48 MG/DL (ref 40–60)
HGB BLD-MCNC: 14.8 G/DL (ref 12–15.9)
LDLC SERPL CALC-MCNC: 126 MG/DL (ref 0–100)
LDLC/HDLC SERPL: 2.41 {RATIO}
MCH RBC QN AUTO: 29.9 PG (ref 26.6–33)
MCHC RBC AUTO-ENTMCNC: 32.6 G/DL (ref 31.5–35.7)
MCV RBC AUTO: 91.7 FL (ref 79–97)
PLATELET # BLD AUTO: 231 10*3/MM3 (ref 140–450)
PMV BLD AUTO: 10.3 FL (ref 6–12)
POTASSIUM SERPL-SCNC: 4.2 MMOL/L (ref 3.5–5.2)
RBC # BLD AUTO: 4.95 10*6/MM3 (ref 3.77–5.28)
SODIUM SERPL-SCNC: 139 MMOL/L (ref 136–145)
TRIGL SERPL-MCNC: 582 MG/DL (ref 0–150)
VIT B12 BLD-MCNC: 337 PG/ML (ref 211–946)
VLDLC SERPL-MCNC: 106 MG/DL (ref 5–40)
WBC NRBC COR # BLD AUTO: 6.83 10*3/MM3 (ref 3.4–10.8)

## 2025-04-25 PROCEDURE — 80061 LIPID PANEL: CPT | Performed by: PHYSICIAN ASSISTANT

## 2025-04-25 PROCEDURE — 85027 COMPLETE CBC AUTOMATED: CPT | Performed by: PHYSICIAN ASSISTANT

## 2025-04-25 PROCEDURE — 36415 COLL VENOUS BLD VENIPUNCTURE: CPT | Performed by: PHYSICIAN ASSISTANT

## 2025-04-25 PROCEDURE — 82607 VITAMIN B-12: CPT | Performed by: PHYSICIAN ASSISTANT

## 2025-04-25 PROCEDURE — 80048 BASIC METABOLIC PNL TOTAL CA: CPT | Performed by: PHYSICIAN ASSISTANT

## 2025-04-25 PROCEDURE — 82746 ASSAY OF FOLIC ACID SERUM: CPT | Performed by: PHYSICIAN ASSISTANT

## 2025-04-25 RX ORDER — SEMAGLUTIDE 0.68 MG/ML
0.25 INJECTION, SOLUTION SUBCUTANEOUS WEEKLY
Qty: 2 ML | Refills: 0 | Status: SHIPPED | OUTPATIENT
Start: 2025-04-25

## 2025-04-25 NOTE — TELEPHONE ENCOUNTER
Please notify patient Ozempic is a medicine that is for diabetes I will try sending the prescription in but generally they are going to require an A1c and proof that the patient is diabetic so be prepared that this may not work.  I have submitted the prescription for Ozempic to the pharmacy, we will wait and see what they say.

## 2025-04-25 NOTE — TELEPHONE ENCOUNTER
Insurance is no longer covering Wegovy, but they do cover ozempic. Can i get that sent in or do i need an appointment.

## 2025-04-28 ENCOUNTER — RESULTS FOLLOW-UP (OUTPATIENT)
Dept: FAMILY MEDICINE CLINIC | Facility: CLINIC | Age: 55
End: 2025-04-28
Payer: COMMERCIAL

## 2025-04-29 ENCOUNTER — TELEPHONE (OUTPATIENT)
Dept: FAMILY MEDICINE CLINIC | Facility: CLINIC | Age: 55
End: 2025-04-29
Payer: COMMERCIAL

## 2025-04-29 NOTE — TELEPHONE ENCOUNTER
I have missed the omega 3 some, but will start taking it as directed, can it be taken more than 2XD? take the Crestor every night. And yes I will do the (b12)

## 2025-04-29 NOTE — TELEPHONE ENCOUNTER
Try taking the Crestor every night and taking the fish oil twice every single day.,  Add on the B12 shots once a month and try to reduce fried foods in your diet and reduce sugar in your diet and then recheck your cholesterol panel in 3 months if it still up then we will raise the dose of the medication but I would not raise the fish oil to more than twice daily

## 2025-04-29 NOTE — TELEPHONE ENCOUNTER
Caller: CARITO SEBLE    Relationship:     Best call back number: [phone]     Caller requesting test results: YES    What test was performed: BLOOD WORK     When was the test performed: ANY LABE IS FINE    Where was the test performed:     Additional notes: CARITO SEBLE NEEDS A A1 C LAB HIGHER THEN 6.5  PLEASE SEND TO WHERE THE PA WAS SENT.

## 2025-04-30 ENCOUNTER — CLINICAL SUPPORT (OUTPATIENT)
Dept: FAMILY MEDICINE CLINIC | Facility: CLINIC | Age: 55
End: 2025-04-30
Payer: COMMERCIAL

## 2025-04-30 DIAGNOSIS — E53.8 B12 DEFICIENCY: Primary | ICD-10-CM

## 2025-04-30 PROCEDURE — 96372 THER/PROPH/DIAG INJ SC/IM: CPT | Performed by: PHYSICIAN ASSISTANT

## 2025-04-30 RX ORDER — CYANOCOBALAMIN 1000 UG/ML
1000 INJECTION, SOLUTION INTRAMUSCULAR; SUBCUTANEOUS
Status: SHIPPED | OUTPATIENT
Start: 2025-04-30

## 2025-04-30 RX ADMIN — CYANOCOBALAMIN 1000 MCG: 1000 INJECTION, SOLUTION INTRAMUSCULAR; SUBCUTANEOUS at 14:44

## 2025-05-01 ENCOUNTER — OFFICE VISIT (OUTPATIENT)
Dept: FAMILY MEDICINE CLINIC | Facility: CLINIC | Age: 55
End: 2025-05-01
Payer: COMMERCIAL

## 2025-05-01 VITALS
WEIGHT: 180 LBS | DIASTOLIC BLOOD PRESSURE: 78 MMHG | SYSTOLIC BLOOD PRESSURE: 126 MMHG | TEMPERATURE: 97.8 F | HEART RATE: 69 BPM | HEIGHT: 61 IN | BODY MASS INDEX: 33.99 KG/M2 | OXYGEN SATURATION: 98 %

## 2025-05-01 DIAGNOSIS — J01.90 ACUTE NON-RECURRENT SINUSITIS, UNSPECIFIED LOCATION: Primary | ICD-10-CM

## 2025-05-01 PROCEDURE — 99213 OFFICE O/P EST LOW 20 MIN: CPT | Performed by: PHYSICIAN ASSISTANT

## 2025-05-01 RX ORDER — AZITHROMYCIN 250 MG/1
TABLET, FILM COATED ORAL
Qty: 6 TABLET | Refills: 0 | Status: SHIPPED | OUTPATIENT
Start: 2025-05-01

## 2025-05-01 NOTE — PROGRESS NOTES
Subjective   Trinidad Quinteros is a 54 y.o. female  Sinusitis (Sinus pressure, bilateral ear fullness, some diarrhea yesteday x1 day )      History of Present Illness  History of Present Illness  The patient is a 54-year-old female who presents for evaluation of new onset symptoms of sinus problems.    She reports unilateral nasal drainage with clear discharge, accompanied by congestion on the opposite side. Mild pressure in the head and discomfort in the ears are noted. No fever or bronchitis-like symptoms are present. Intermittent sore throat is also reported. Current symptom management includes the use of a nasal spray and an oral antihistamine. Sneezing episodes are not reported.    SOCIAL HISTORY  She does not smoke anymore.    The following portions of the patient's history were reviewed and updated as appropriate: allergies, current medications, past social history and problem list    Review of Systems   Constitutional:  Negative for chills, fatigue and fever.   HENT:  Positive for congestion, ear pain, postnasal drip, rhinorrhea, sinus pressure and sore throat.    Eyes:  Negative for pain.   Respiratory:  Positive for cough. Negative for shortness of breath.    Gastrointestinal:  Positive for diarrhea.   Neurological:  Positive for headaches. Negative for dizziness.   Hematological:  Negative for adenopathy.       Objective     Vitals:    05/01/25 0939   BP: 126/78   Pulse: 69   Temp: 97.8 °F (36.6 °C)   SpO2: 98%       Physical Exam  Vitals and nursing note reviewed.   Constitutional:       General: She is not in acute distress.     Appearance: Normal appearance. She is well-developed. She is not ill-appearing, toxic-appearing or diaphoretic.   HENT:      Head: Normocephalic and atraumatic.      Right Ear: Tympanic membrane and ear canal normal.      Left Ear: Tympanic membrane and ear canal normal.      Nose: Mucosal edema, congestion and rhinorrhea present.      Right Sinus: Maxillary sinus tenderness and  frontal sinus tenderness present.      Left Sinus: Maxillary sinus tenderness and frontal sinus tenderness present.      Mouth/Throat:      Pharynx: No oropharyngeal exudate.   Eyes:      Pupils: Pupils are equal, round, and reactive to light.   Cardiovascular:      Rate and Rhythm: Normal rate and regular rhythm.   Pulmonary:      Effort: Pulmonary effort is normal.      Breath sounds: Normal breath sounds.   Neurological:      Mental Status: She is alert.       Physical Exam  Mouth/Throat: Drainage noted in the throat  Respiratory: Lungs clear to auscultation, no wheezing, rales or rhonchi    Assessment & Plan   Assessment & Plan  1. Sinusitis.  - Symptoms include clear nasal drainage, head pressure, and ear discomfort.  - Physical examination reveals significant drainage in the throat, clear lung sounds, and no fever.  - Discussed the likely onset of a sinus infection and reviewed current medications, including Chantix and an unspecified allergy pill.  - Prescribed a Z-Raffy (azithromycin) and Sudafed to manage the infection and alleviate congestion, with the prescription sent to [facility].    Diagnoses and all orders for this visit:    1. Acute non-recurrent sinusitis, unspecified location (Primary)    Other orders  -     azithromycin (Zithromax Z-Raffy) 250 MG tablet; Take 2 tablets by mouth on day 1, then 1 tablet daily on days 2-5  Dispense: 6 tablet; Refill: 0  -     Chlorcyclizine-Pseudoephed 25-60 MG tablet; Take 1/2 to 1 every 12 hours as needed for congestion  Dispense: 30 tablet; Refill: 0         Patient or patient representative verbalized consent for the use of Ambient Listening during the visit with  Josephine Delacruz PA-C for chart documentation. 5/1/2025  10:06 EDT

## 2025-05-08 ENCOUNTER — CLINICAL SUPPORT (OUTPATIENT)
Dept: FAMILY MEDICINE CLINIC | Facility: CLINIC | Age: 55
End: 2025-05-08
Payer: COMMERCIAL

## 2025-05-08 DIAGNOSIS — E53.8 B12 DEFICIENCY: Primary | ICD-10-CM

## 2025-05-08 PROCEDURE — 96372 THER/PROPH/DIAG INJ SC/IM: CPT | Performed by: PHYSICIAN ASSISTANT

## 2025-05-08 RX ORDER — CYANOCOBALAMIN 1000 UG/ML
1000 INJECTION, SOLUTION INTRAMUSCULAR; SUBCUTANEOUS
Status: SHIPPED | OUTPATIENT
Start: 2025-05-08

## 2025-05-08 RX ADMIN — CYANOCOBALAMIN 1000 MCG: 1000 INJECTION, SOLUTION INTRAMUSCULAR; SUBCUTANEOUS at 15:26

## 2025-05-20 RX ORDER — FOLIC ACID 1 MG/1
1 TABLET ORAL DAILY
Qty: 30 TABLET | Refills: 11 | Status: SHIPPED | OUTPATIENT
Start: 2025-05-20

## 2025-05-20 RX ORDER — VARENICLINE TARTRATE 1 MG/1
1 TABLET, FILM COATED ORAL 2 TIMES DAILY
Qty: 60 TABLET | Refills: 1 | Status: SHIPPED | OUTPATIENT
Start: 2025-05-20

## 2025-05-21 ENCOUNTER — TELEPHONE (OUTPATIENT)
Dept: FAMILY MEDICINE CLINIC | Facility: CLINIC | Age: 55
End: 2025-05-21
Payer: COMMERCIAL

## 2025-05-21 RX ORDER — CEFDINIR 300 MG/1
300 CAPSULE ORAL 2 TIMES DAILY
Qty: 20 CAPSULE | Refills: 0 | Status: SHIPPED | OUTPATIENT
Start: 2025-05-21

## 2025-05-21 RX ORDER — GUAIFENESIN, PSEUDOEPHEDRINE HYDROCHLORIDE 600; 60 MG/1; MG/1
1 TABLET, EXTENDED RELEASE ORAL EVERY 12 HOURS
Qty: 14 TABLET | Refills: 0 | Status: SHIPPED | OUTPATIENT
Start: 2025-05-21

## 2025-05-21 NOTE — TELEPHONE ENCOUNTER
Patient has HA, sinus pressure/congestion and wants to know if med can be sent in for her.    [facility] [address]

## 2025-06-04 ENCOUNTER — CLINICAL SUPPORT (OUTPATIENT)
Dept: FAMILY MEDICINE CLINIC | Facility: CLINIC | Age: 55
End: 2025-06-04
Payer: COMMERCIAL

## 2025-06-04 DIAGNOSIS — E53.8 B12 DEFICIENCY: Primary | ICD-10-CM

## 2025-06-04 RX ORDER — CYANOCOBALAMIN 1000 UG/ML
1000 INJECTION, SOLUTION INTRAMUSCULAR; SUBCUTANEOUS
Status: SHIPPED | OUTPATIENT
Start: 2025-06-04

## 2025-06-04 RX ADMIN — CYANOCOBALAMIN 1000 MCG: 1000 INJECTION, SOLUTION INTRAMUSCULAR; SUBCUTANEOUS at 09:54

## 2025-06-09 ENCOUNTER — OFFICE VISIT (OUTPATIENT)
Dept: FAMILY MEDICINE CLINIC | Facility: CLINIC | Age: 55
End: 2025-06-09
Payer: COMMERCIAL

## 2025-06-09 VITALS
SYSTOLIC BLOOD PRESSURE: 126 MMHG | OXYGEN SATURATION: 100 % | WEIGHT: 184.5 LBS | DIASTOLIC BLOOD PRESSURE: 80 MMHG | TEMPERATURE: 98.4 F | BODY MASS INDEX: 34.83 KG/M2 | HEIGHT: 61 IN | HEART RATE: 74 BPM

## 2025-06-09 DIAGNOSIS — E66.811 CLASS 1 OBESITY DUE TO EXCESS CALORIES WITH SERIOUS COMORBIDITY AND BODY MASS INDEX (BMI) OF 34.0 TO 34.9 IN ADULT: ICD-10-CM

## 2025-06-09 DIAGNOSIS — J30.2 SEASONAL ALLERGIC RHINITIS, UNSPECIFIED TRIGGER: Primary | ICD-10-CM

## 2025-06-09 DIAGNOSIS — K21.9 GASTROESOPHAGEAL REFLUX DISEASE WITHOUT ESOPHAGITIS: ICD-10-CM

## 2025-06-09 DIAGNOSIS — E66.09 CLASS 1 OBESITY DUE TO EXCESS CALORIES WITH SERIOUS COMORBIDITY AND BODY MASS INDEX (BMI) OF 34.0 TO 34.9 IN ADULT: ICD-10-CM

## 2025-06-09 PROCEDURE — 99214 OFFICE O/P EST MOD 30 MIN: CPT | Performed by: PHYSICIAN ASSISTANT

## 2025-06-09 RX ORDER — MONTELUKAST SODIUM 10 MG/1
10 TABLET ORAL NIGHTLY
Qty: 30 TABLET | Refills: 1 | Status: SHIPPED | OUTPATIENT
Start: 2025-06-09

## 2025-06-09 RX ORDER — OMEPRAZOLE 40 MG/1
40 CAPSULE, DELAYED RELEASE ORAL DAILY
Qty: 90 CAPSULE | Refills: 3 | Status: SHIPPED | OUTPATIENT
Start: 2025-06-09

## 2025-06-09 NOTE — PROGRESS NOTES
Subjective   Trinidad Quinteros is a 54 y.o. female  Obesity (Follow up on  weight , wants to go back on Wegovy) and Sinus Problem (Intermittent sinus drainage and congestion x1 day)      History of Present Illness  History of Present Illness  The patient is a 54-year-old female who presents for evaluation and treatment of weight management, allergies, and indigestion.    She discontinued Wegovy due to insurance coverage issues at the start of the year. Subsequently, she attempted to use Ozempic, but it was not approved by her insurance. She reports that Wegovy was effective in managing her appetite, although it induced nausea if she overate. To counteract this side effect, she utilized Zofran and Phenergan. She did not experience constipation while on Wegovy. She has been informed that her insurance will cover Wegovy until 09/2025. She has experienced significant weight gain since ceasing smoking.    She reports experiencing sinus congestion, which she attributes to allergies. She does not have any colored nasal discharge but reports white nasal discharge. She is not experiencing any fever or pain but has been suffering from headaches since yesterday. She also reports nasal drainage. She has been managing her symptoms with Stayhist, administered twice daily.    She has been dealing with indigestion for an extended period. A previous endoscopy did not reveal any ulcers. She is currently taking Pepcid for relief. She recalls that Protonix exacerbated her symptoms.    SOCIAL HISTORY  The patient has stopped smoking.    MEDICATIONS  Current: Zofran, Phenergan, Stayhist, Pepcid  Discontinued: Wegovy    The following portions of the patient's history were reviewed and updated as appropriate: allergies, current medications, past social history and problem list    Review of Systems   Constitutional:  Positive for appetite change and unexpected weight change. Negative for chills, fatigue and fever.   HENT:  Positive for  congestion, postnasal drip, rhinorrhea and sneezing. Negative for ear pain, hearing loss, sinus pressure, sore throat and trouble swallowing.    Eyes:  Negative for itching.   Respiratory:  Negative for cough.    Cardiovascular:  Negative for chest pain.   Gastrointestinal:  Negative for abdominal distention, abdominal pain, diarrhea and nausea.        Patient experiencing heartburn/acid reflux     Allergic/Immunologic: Positive for environmental allergies.   Neurological:  Negative for headaches.       Objective     Vitals:    06/09/25 1142   BP: 126/80   Pulse: 74   Temp: 98.4 °F (36.9 °C)   SpO2: 100%       Physical Exam  Vitals and nursing note reviewed.   Constitutional:       General: She is not in acute distress.     Appearance: Normal appearance. She is well-developed. She is obese. She is not ill-appearing, toxic-appearing or diaphoretic.      Comments: [id number] noted     HENT:      Head: Normocephalic and atraumatic.      Right Ear: External ear normal.      Left Ear: External ear normal.      Nose: Nose normal.   Eyes:      Conjunctiva/sclera: Conjunctivae normal.   Neck:      Thyroid: No thyromegaly.   Cardiovascular:      Rate and Rhythm: Normal rate and regular rhythm.      Heart sounds: Normal heart sounds. No murmur heard.  Pulmonary:      Effort: Pulmonary effort is normal. No respiratory distress.      Breath sounds: Normal breath sounds.   Abdominal:      Palpations: Abdomen is soft. There is no mass.      Tenderness: There is no abdominal tenderness.   Neurological:      Mental Status: She is alert.   Psychiatric:         Mood and Affect: Mood normal.         Behavior: Behavior normal.         Thought Content: Thought content normal.         Judgment: Judgment normal.       Physical Exam  Nose appears healthy. Throat was examined.  Lungs were auscultated.    Assessment & Plan   Assessment & Plan  1. Weight management.  She will resume Wegovy at the initial dose, with a refill provided. If she  tolerates the medication well by the third week, she is advised to communicate via MyChart to increase the dosage. This adjustment will be made monthly until 09/2025.    2. Allergies.  Her symptoms suggest an allergic reaction rather than an infection. She will continue her current regimen of Stayhist twice daily. Additionally, Singulair will be introduced into her treatment plan for the remainder of the month. If she experiences green nasal discharge, she is instructed to inform via MyChart, at which point an antibiotic will be prescribed.    3. Indigestion.  She will commence a course of Prilosec.    PROCEDURE  Endoscopy in the past did not reveal any ulcers.    Diagnoses and all orders for this visit:    1. Seasonal allergic rhinitis, unspecified trigger (Primary)    2. Class 1 obesity due to excess calories with serious comorbidity and body mass index (BMI) of 34.0 to 34.9 in adult    3. Gastroesophageal reflux disease without esophagitis    Other orders  -     Semaglutide-Weight Management 0.25 MG/0.5ML solution auto-injector; Inject 0.5 mL under the skin into the appropriate area as directed 1 (One) Time Per Week.  Dispense: 2 mL; Refill: 1  -     montelukast (Singulair) 10 MG tablet; Take 1 tablet by mouth Every Night as directed for allergies  Dispense: 30 tablet; Refill: 1  -     Chlorcyclizine-Pseudoephed 25-60 MG tablet; Take 0.5-1 tablet by mouth Every 12 (Twelve) Hours As Needed for congestion  Dispense: 30 tablet; Refill: 1  -     omeprazole (priLOSEC) 40 MG capsule; Take 1 capsule by mouth Daily. Take with breakfast for indigestion  Dispense: 90 capsule; Refill: 3         Patient or patient representative verbalized consent for the use of Ambient Listening during the visit with  Josephine Delacruz PA-C for chart documentation. 6/9/2025  12:39 EDT

## 2025-06-20 ENCOUNTER — TELEPHONE (OUTPATIENT)
Dept: FAMILY MEDICINE CLINIC | Facility: CLINIC | Age: 55
End: 2025-06-20
Payer: COMMERCIAL

## 2025-06-20 NOTE — TELEPHONE ENCOUNTER
Just wondering if I still need to take the folic acid. You had mentioned this before I had labs drawn, but I forgot to follow up with you. Thanks

## 2025-06-20 NOTE — TELEPHONE ENCOUNTER
I refilled it at the time I saw her but after looking at the results being normal she does not really have to take it right now if she does not want to, it is I cannot hurt to take it you can get too much but if she wants to stop it she can.  Her levels look fine now.

## 2025-06-30 ENCOUNTER — OFFICE VISIT (OUTPATIENT)
Dept: PAIN MEDICINE | Facility: CLINIC | Age: 55
End: 2025-06-30
Payer: COMMERCIAL

## 2025-06-30 VITALS — HEIGHT: 61 IN | WEIGHT: 184.4 LBS | BODY MASS INDEX: 34.81 KG/M2

## 2025-06-30 DIAGNOSIS — Z96.89 S/P INSERTION OF SPINAL CORD STIMULATOR: Primary | ICD-10-CM

## 2025-06-30 DIAGNOSIS — M54.16 LUMBAR RADICULOPATHY: ICD-10-CM

## 2025-06-30 PROCEDURE — 99213 OFFICE O/P EST LOW 20 MIN: CPT

## 2025-06-30 NOTE — PROGRESS NOTES
Primary Physician: Josephine Delacruz PA-C    CHIEF COMPLAINT or REASON FOR VISIT: Follow-up    Initial HPI 3/16/2023:  Ms. Trinidad Quinteros is 54 y.o. female who presents as a new patient referral for evaluation and treatment of chronic low back pain with radiation to right lower extremity.  Ms. Quinteros states that she for the past year has had slowly increasing pain.  She has prior PMH hip pain for which she has been taking Percocet however this has not been helpful for her pain.  She is additionally trialed gabapentin.  She describes a sharp burning tingling pain radiating down her right buttock and the posterior lateral aspect of her thigh and occasionally into the plantar surface of her foot.  Patient denies any bowel or bladder dysfunction, lower extremity weakness, new onset saddle anesthesia or unexplained weight loss.  She does work at [employer] with Dr. Roth.    Ms. Quinteros recently saw consultation with interventional pain, Dr. Rey, who diagnosed her with right lumbar radiculopathy and performed a right L4/5 and L5/S1 transforaminal epidural steroid injection.  Patient reported relief for approximately 1 week however unfortunately pain returned worse than previous.    Interval history:   Patient returns to clinic today.  She is approximately 11 months out from permanent spinal cord stimulator implant with [employer].  She continues to report excellent pain relief from her device.  She has some lower extremity pain however this is improved compared to before.  She has been more active.  Overall, she is doing well and has no new complaints at this time.  She resurged her device approximately every 2 to 3 weeks.    Interventions:  4/4/2023: Right S1 TFESI with 1 to 2 days benefit  12/7/2023: Left L5/S1 and S1 TFESI with 90% relief ongoing.   2/15/2024: Right L4/5 and L5/S1 TFESI with 0% benefit.   6/20/2024: SCS trial [employer] with 90% relief  8/06/2024: Permanent SCS Implant (leads top of T8) with  90% relief ongoing    Objective Pain Scoring:   BRIEF PAIN INVENTORY:  Total score:   Pain Score    25 0928   PainSc: 1    PainLoc: Leg              PHQ-2:    PHQ-9:    Opioid Risk Tool:         Review of Systems:   ROS negative except as otherwise noted     Past Medical History:   Past Medical History:   Diagnosis Date    Abnormal ECG 10 years    saw Dr Leslie    Allergic     Anxiety     Cataract     Chronic pain disorder     CTS (carpal tunnel syndrome)     Degenerative disc disease, cervical 2019    Depression     Diabetes mellitus     Extremity pain     GERD (gastroesophageal reflux disease)     Hyperlipidemia     Hypertension     Joint pain     Low back pain     Lumbar radiculopathy 2023    Neck pain     Obesity     ADRIAN on CPAP     Osteoarthritis     Smoker     Wears glasses          Past Surgical History:   Past Surgical History:   Procedure Laterality Date    APPENDECTOMY  1998    BACK SURGERY  23    CARDIAC CATHETERIZATION  1992    CARDIAC CATHETERIZATION       SECTION      COLONOSCOPY      ENDOSCOPY      HAND TENDON SURGERY Right     LUMBAR DISCECTOMY Right 2023    Procedure: LUMBAR DISCECTOMY, L5-S1, L4-5 FORAMINOTOMY- RIGHT;  Surgeon: Nakul Hurt MD;  Location: [facility];  Service: Neurosurgery;  Laterality: Right;    SPINAL CORD STIMULATOR IMPLANT N/A 2024    [facility] [employer]    SUBTOTAL HYSTERECTOMY      TUBAL ABDOMINAL LIGATION      WRIST SURGERY           Family History   Family History   Problem Relation Age of Onset    COPD Mother     Arthritis Mother     Diabetes Mother     Hypertension Mother     Miscarriages / Stillbirths Mother     Hyperlipidemia Mother     Cancer Mother         Copd    Depression Mother     COPD Father     Diabetes Father     Hypertension Father     Alcohol abuse Father     Hyperlipidemia Father     Cancer Father         Copd    Depression Father     Breast cancer Maternal Aunt 50     Cancer Other     Arthritis Other     Diabetes Other     Asthma Other     Heart disease Other         heart trouble    Hypertension Other         high blood pressure    Alcohol abuse Other     COPD Brother     Diabetes Brother     Early death Brother     Hypertension Brother     COPD Brother     Diabetes Brother     Early death Brother         COPD/Non-Hodgkin lymphoma    Early death Brother         covid/pneumonia     COPD Brother     Miscarriages / Stillbirths Daughter     Asthma Son     Cancer Maternal Grandfather         pancreatic cancer    Arthritis Maternal Grandmother     Miscarriages / Stillbirths Maternal Grandmother     COPD Brother     Hyperlipidemia Brother     Alcohol abuse Brother     Alcohol abuse Maternal Uncle     Alcohol abuse Maternal Uncle     Asthma Son         as a child, no flare ups in 7 - 8 years    Anxiety disorder Son     Depression Son     Miscarriages / Stillbirths Daughter     Depression Daughter     Hyperlipidemia Brother     Alcohol abuse Maternal Uncle     COPD Brother     Early death Brother     Hypertension Brother     Hyperlipidemia Brother     Depression Daughter     COPD Brother     Diabetes Brother     Early death Brother         COPD/Non-Hodgkin lymphoma    Early death Brother         covid/pneumonia     Ovarian cancer Neg Hx          Social History   Social History     Socioeconomic History    Marital status:    Tobacco Use    Smoking status: Former     Current packs/day: 0.00     Average packs/day: 0.7 packs/day for 74.9 years (55.0 ttl pk-yrs)     Types: Cigarettes     Start date: 1985     Quit date: 2025     Years since quittin.2    Smokeless tobacco: Never   Vaping Use    Vaping status: Never Used   Substance and Sexual Activity    Alcohol use: No    Drug use: No    Sexual activity: Yes     Partners: Male     Birth control/protection: None, Hysterectomy        Medications:     Current Outpatient Medications:     ALPRAZolam (XANAX) 0.5 MG  tablet, Take 1 tablet by mouth 2 (Two) Times a Day As Needed for Anxiety., Disp: 60 tablet, Rfl: 5    buPROPion XL (Wellbutrin XL) 150 MG 24 hr tablet, Take 1 tablet by mouth Every Morning., Disp: 90 tablet, Rfl: 3    Chlorcyclizine-Pseudoephed 25-60 MG tablet, Take 0.5-1 tablet by mouth Every 12 (Twelve) Hours As Needed for congestion, Disp: 30 tablet, Rfl: 1    estradiol (Estrace) 0.5 MG tablet, Take 1 tablet by mouth Daily., Disp: 90 tablet, Rfl: 2    folic acid (FOLVITE) 1 MG tablet, Take 1 tablet by mouth Daily., Disp: 30 tablet, Rfl: 11    hydroCHLOROthiazide 25 MG tablet, Take 1 tablet by mouth Daily., Disp: 90 tablet, Rfl: 1    Liraglutide (SAXENDA) 18 MG/3ML injection pen, Inject 0.6 mg under the skin into the appropriate area as directed Daily., Disp: 3 mL, Rfl: 1    losartan (COZAAR) 100 MG tablet, Take 1 tablet by mouth Daily., Disp: 90 tablet, Rfl: 3    meloxicam (MOBIC) 7.5 MG tablet, Take 1 tablet by mouth Daily., Disp: 90 tablet, Rfl: 1    montelukast (Singulair) 10 MG tablet, Take 1 tablet by mouth Every Night as directed for allergies, Disp: 30 tablet, Rfl: 1    omega-3 acid ethyl esters (LOVAZA) 1 g capsule, Take 1 capsule by mouth 2 (Two) Times a Day., Disp: 180 capsule, Rfl: 3    omeprazole (priLOSEC) 40 MG capsule, Take 1 capsule by mouth Daily. Take with breakfast for indigestion, Disp: 90 capsule, Rfl: 3    ondansetron (Zofran) 4 MG tablet, Take 1 tablet by mouth Every 8 (Eight) Hours As Needed for Nausea or Vomiting., Disp: 20 tablet, Rfl: 1    pregabalin (Lyrica) 100 MG capsule, Take 1 capsule by mouth 2 (Two) Times a Day for neuropathy, Disp: 60 capsule, Rfl: 5    promethazine (PHENERGAN) 25 MG tablet, Take 1 tablet by mouth Every 6 (Six) Hours As Needed for Nausea or Vomiting., Disp: 20 tablet, Rfl: 1    pseudoephedrine-guaifenesin (MUCINEX D)  MG per 12 hr tablet, Take 1 tablet by mouth Every 12 (Twelve) Hours., Disp: 14 tablet, Rfl: 0    rosuvastatin (Crestor) 10 MG tablet, Take 1  "tablet by mouth Every Night., Disp: 90 tablet, Rfl: 3    sertraline (ZOLOFT) 100 MG tablet, Take 2 tablets by mouth Daily, Disp: 180 tablet, Rfl: 3    tiZANidine (ZANAFLEX) 4 MG tablet, Take 0.5-1 tablet by mouth up to 2 Times a Day As Needed for back pain, Disp: 270 tablet, Rfl: 3    valACYclovir (VALTREX) 500 MG tablet, Take 4 tablets by mouth as needed at onset of attack; THEN take 4 tablets 12 hours later., Disp: 8 tablet, Rfl: 5    varenicline (Chantix Continuing Month Pak) 1 MG tablet, Take 1 tablet by mouth 2 (Two) Times a Day., Disp: 60 tablet, Rfl: 1    varenicline (Chantix Continuing Month Rafyf) 1 MG tablet, Take 1 tablet by mouth 2 (Two) Times a Day., Disp: 60 tablet, Rfl: 1    Current Facility-Administered Medications:     cyanocobalamin injection 1,000 mcg, 1,000 mcg, Intramuscular, Q28 Days, Josephine Delacruz PA-SANJANA, 1,000 mcg at 04/30/25 1444    cyanocobalamin injection 1,000 mcg, 1,000 mcg, Intramuscular, Q28 Days, Josephine Delacruz PA-C, 1,000 mcg at 05/08/25 1526    cyanocobalamin injection 1,000 mcg, 1,000 mcg, Intramuscular, Q28 Days, Josephine Delacruz PA-C, 1,000 mcg at 06/04/25 0954        Physical Exam:     Vitals:    06/30/25 0928   Weight: 83.6 kg (184 lb 6.4 oz)   Height: 154.9 cm (61\")   PainSc: 1    PainLoc: Leg              General: Alert and oriented, No acute distress.   HEENT: Normocephalic, atraumatic.   Cardiovascular: No gross edema  Respiratory: Respirations are non-labored    Lumbar Spine:   No masses or atrophy  Range of motion - Flexion normal. Extension normal. Right Lat Bending normal. Left Lat Bending normal  Facet Loading: Negative bilaterally  Facet Palpation - Nontender   PSIS tenderness - Negative bilaterally  Jeffrey's/PAOLO/Thigh thrust - Negative bilaterally  Straight leg raise: Positive right  Slump test: Positive right    Motor Exam:    Strength: Rate on 1-5 scale Right Left    L1/2- hip flexion 5 5    L3- knee extension 5 5    L4- ankle dorsiflexion 5 5    L5- " great toe extension 5 5    S1- ankle plantarflexion 5 5    Sensory Exam: Altered sensation in right S1 dermatome.    Neurologic: Cranial Nerves II-XII are grossly intact.   Clonus -negative bilaterally    Psychiatric: Cooperative.   Gait: Antalgic  Assistive Devices: None    IPG and percutaneous lead insertion site have healed appropriately without surrounding erythema, inflammation, induration.    Imaging Studies:   Results for orders placed during the hospital encounter of 11/19/22    MRI Lumbar Spine Without Contrast    Narrative  DATE OF EXAM: 11/19/2022 8:13 AM    PROCEDURE: MRI LUMBAR SPINE WO CONTRAST-    INDICATIONS: Lumbar radiculopathy, symptoms persist with > 6 wks  treatment; M54.40-Lumbago with sciatica, unspecified side    COMPARISON: No comparisons available.    TECHNIQUE: Routine magnetic resonance imaging of the lumbar spine was  performed without the administration of contrast.    FINDINGS:  The alignment is anatomic. The vertebral body heights appear normal.  There are degenerative endplate changes at L5-S1. There is disc  desiccation at L4-5 and L5-S1, with advanced degenerative loss of disc  height at L5-S1. The conus terminates at the L1-2 level. The posterior  paravertebral soft tissues are unremarkable.    L1-2: No spinal canal or neural foraminal stenosis.    L2-3: Mild bilateral facet arthropathy. No spinal canal stenosis. No  neural foraminal stenosis.    L3-4: Mild disc bulge. Mild bilateral facet arthropathy. No spinal canal  stenosis. Mild right neural foraminal stenosis.    L4-5: Mild disc bulge with superimposed small central disc protrusion.  Mild bilateral facet arthropathy. Mild spinal canal stenosis. Mild  bilateral neural foraminal stenosis.    L5-S1: Moderate disc bulge. Mild right and moderate facet arthropathy.  Mild spinal canal stenosis. Mild to moderate right and mild left neural  foraminal stenosis.    Impression  Mild multilevel degenerative changes of lumbar spine as  described above.    This report was finalized on 11/19/2022 9:02 AM by Jaxson Connolly MD.      MRI LUMBAR SPINE W WO CONTRAST     Date of Exam: 5/1/2024 6:39 PM EDT     Indication: right leg pain, hx of discectomy.     Comparison: 9/25/2023.     Technique:  Routine multiplanar/multisequence sequence images of the lumbar spine were obtained before and after the uneventful administration of 17 mL Multihance.          Findings:   Postoperative changes are noted from prior right hemilaminotomy at L4-5 and L5-S1. T1 marrow signal is otherwise preserved, without evidence of fracture or suspicious marrow replacing lesion. Alignment is anatomic, without evidence of significant   listhesis or subluxation. The conus medullaris and cauda equina nerve roots are satisfactory in appearance. The paraspinal soft tissues demonstrate no acute or suspicious findings. Multilevel spondylosis is present, with areas of involvement including     L1-2, no significant spinal canal or neuroforaminal impingement.     L2-3, no significant spinal canal or neuroforaminal impingement.     L3-4, small disc bulge and bilateral facet arthropathy. There is minimal spinal canal and bilateral neuroforaminal narrowing present.     L4-5, postoperative changes are present with some enhancing scar tissue seen along the operative tract. A broad-based circumferential disc bulge persists, without new focal disc herniation. The spinal canal and neural foramina are patent.     L5-S1, postoperative changes are present with some enhancing scar tissue seen along the operative tract. A broad-based circumferential disc bulge persists in addition to some facet arthropathy, without new focal disc herniation. The spinal canal is   patent and there is unchanged mild bilateral neuroforaminal narrowing, greater on the right.     IMPRESSION:  Impression:   Stable contrast-enhanced MRI of the lumbar spine demonstrating postoperative changes at the L4-5 and L5-S1 levels  similar to comparison. These levels demonstrate some mild persistent spondylosis, otherwise without new focal disc herniation. There is no   new high-grade spinal canal or neuroforaminal impingement.        Electronically Signed: Fox Donohue MD    5/2/2024 9:43 AM EDT    Workstation ID: [id number]    Independent interpretation of radiographic imaging:  Lumbar MRI dated 5/2/2024 demonstrates: DDD worst at L5/S1; no significant central canal stenosis; broad-based disc bulge at L4/5; mild-moderate bilateral NFS L5/S1, R>L; postoperative changes at L5-S1; no new central disc herniation, spinal canal stenosis, or neuroforaminal stenosis; facet arthropathy.       Impression & Plan:   3/16/2023: Ms. Trinidad Quinteros is a 54 y.o. female with past medical history significant for GERD, depression, HTN, ADRIAN, who presents to the pain clinic for evaluation and treatment of chronic low back pain with radiation to right lower extremity.  I personally reviewed the patient's lumbar MRI dated 11/19/2022 which demonstrates a central and right posterior L5/S1 disc herniation causing right L5/S1 neuroforaminal stenosis and lateral recess stenosis.  Clinical examination consistent with a right S1 radiculitis.  We discussed right S1 foramen epidural steroid injection to improve pain.  If greater than 50% relief for at least 2-3 months can consider repeat as needed every 3 to 4 months.  I had an in-depth discussion with the patient regarding the risks of the procedure including bleeding, infection, damage to surrounding structures, paralysis and even death.  We discussed the potential adverse effects of corticosteroid injection including flushing of the face, lipodystrophy, skin discoloration, elevated blood glucose, increased blood pressure.  Risks of frequent steroid administration include weight gain, hormonal changes, mood changes, osteoporosis.    If no benefit from epidural steroid will refer for neurosurgical evaluation.  11/20/2023:  Now status post right-sided L4/5 foraminotomy and L5/S1 discectomy with resolution of right-sided symptoms.  Recent onset left-sided symptoms.  I personally reviewed and interpreted her lumbar MRI dated 2023: Interval improvement in right L4/5 NFS; persistent L5/S1 disc herniation.  Exam consistent with lumbar radiculopathy.  We will trial left L5/S1 and S1 TFESI.  2024: Excellent benefit from left sided TFESI.  Complains of right-sided symptoms. Will plan for right L4/5 and L5/S1 TFESI.  If no benefit consider referral back to neurosurgery.  3/27/2024: No benefit from right TFESI.  Will refer back to NSA.  Consider SCS trial if no neurosurgical invention  2024: Lumbar MRI reviewed.  Will obtain psych clearance for SCS trial.  [place] to educate.  2024: Excellent relief from SCS trial.  Will proceed with permanent implant.  Risks and benefits of procedure discussed at length.  Will need to hold Wegovy 2 weeks prior to procedure  8/15/2024: Approximately 2 weeks s/p permanent SCS implant.  Continues to recover appropriately.  Incisions healing appropriately.  Postop restrictions discussed.  2024: 6 weeks s/p permanent SCS implant.  Excellent pain relief.  Incisions healing appropriately.  Postop restrictions lifted.  Safe to resume work activities without restrictions.  2024: Approximately 5 months s/p permanent SCS implant with [employer].  Good pain relief thus far.  Incisions healed appropriately.  2025: 11 months s/p permanent SCS implant with [employer].  Excellent pain relief thus far.  No new issues or complaints at this time.    1. S/P insertion of spinal cord stimulator    2. Lumbar radiculopathy                  PLAN:  1. Medication Recommendations: Continue per PCP  -Agree with pregabalin    2. Physical Therapy:     3. Psychological: Psych clearance obtained    4. Complementary and alternative (CAM) Therapies:     5. Labs: None indicated     6. Imagin. Interventions:  Approximately 11 months s/p permanent SCS implant.  [employer] device rep here for reprogramming and questions    8. Referrals:     9. Records requested:     10. Lifestyle goals: She has recently quit smoking    Follow-up 1 year; sooner if clinically dictated    [facility]  Megha Frazier PA-C

## 2025-08-04 RX ORDER — MONTELUKAST SODIUM 10 MG/1
10 TABLET ORAL NIGHTLY
Qty: 30 TABLET | Refills: 1 | Status: SHIPPED | OUTPATIENT
Start: 2025-08-04

## 2025-08-04 RX ORDER — PROMETHAZINE HYDROCHLORIDE 25 MG/1
25 TABLET ORAL EVERY 6 HOURS PRN
Qty: 20 TABLET | Refills: 1 | Status: SHIPPED | OUTPATIENT
Start: 2025-08-04

## (undated) DEVICE — BLANKT WARM UPPR/BDY ARM/OUT 57X196CM

## (undated) DEVICE — DRSNG WND BORDR/ADHS NONADHR/GZ LF 4X4IN STRL

## (undated) DEVICE — SOL ISO/ALC RUB 70PCT 4OZ

## (undated) DEVICE — PAD ARMBRD SURG CONVOL 7.5X20X2IN

## (undated) DEVICE — GAMMEX® NON-LATEX SIZE 7.5, STERILE NEOPRENE POWDER-FREE SURGICAL GLOVE: Brand: GAMMEX

## (undated) DEVICE — Device

## (undated) DEVICE — ELECTRD BLD EZ CLN MOD 6.5IN

## (undated) DEVICE — TOOL MR8-T12MH25M MR8 12CM T M 2FL 2.5MM: Brand: MIDAS REX MR8

## (undated) DEVICE — GLV SURG PREMIERPRO MIC LTX PF SZ7.5 BRN

## (undated) DEVICE — HDRST INTUB GENTLETOUCH SLOT 7IN RT

## (undated) DEVICE — STRAP POSTN KN/BDY FM 5X72IN DISP

## (undated) DEVICE — DRP MICROSCOPE 4 BINOCULAR CV 54X150IN

## (undated) DEVICE — SYR CONTRL PRESS/LO FIX/M/LL W/THMB/RNG 10ML

## (undated) DEVICE — PCH INST SURG INVISISHIELD 2PCKT

## (undated) DEVICE — CABL BIPOL MEGADYNE 12FT DISP

## (undated) DEVICE — PATIENT RETURN ELECTRODE, SINGLE-USE, CONTACT QUALITY MONITORING, ADULT, WITH 9FT CORD, FOR PATIENTS WEIGING OVER 33LBS. (15KG): Brand: MEGADYNE

## (undated) DEVICE — SUT MNCRYL PLS ANTIB UD 3/0 PS2 27IN

## (undated) DEVICE — ADHS SKIN PREMIERPRO EXOFIN TOPICAL HI/VISC .5ML

## (undated) DEVICE — PENCL ROCKRSWCH MEGADYNE W/HOLSTR 10FT SS

## (undated) DEVICE — PK NEURO DISC 10

## (undated) DEVICE — SNAP KOVER: Brand: UNBRANDED

## (undated) DEVICE — SPNG GZ WOVN 4X4IN 12PLY 10/BX STRL

## (undated) DEVICE — ANTIBACTERIAL UNDYED BRAIDED (POLYGLACTIN 910), SYNTHETIC ABSORBABLE SUTURE: Brand: COATED VICRYL